# Patient Record
Sex: FEMALE | Race: WHITE | NOT HISPANIC OR LATINO
[De-identification: names, ages, dates, MRNs, and addresses within clinical notes are randomized per-mention and may not be internally consistent; named-entity substitution may affect disease eponyms.]

---

## 2017-09-18 ENCOUNTER — RX RENEWAL (OUTPATIENT)
Age: 67
End: 2017-09-18

## 2017-11-13 ENCOUNTER — APPOINTMENT (OUTPATIENT)
Dept: GASTROENTEROLOGY | Facility: CLINIC | Age: 67
End: 2017-11-13
Payer: MEDICARE

## 2017-11-13 VITALS
BODY MASS INDEX: 39.27 KG/M2 | SYSTOLIC BLOOD PRESSURE: 140 MMHG | WEIGHT: 230 LBS | HEIGHT: 64 IN | DIASTOLIC BLOOD PRESSURE: 82 MMHG | TEMPERATURE: 98.1 F

## 2017-11-13 DIAGNOSIS — Z78.9 OTHER SPECIFIED HEALTH STATUS: ICD-10-CM

## 2017-11-13 DIAGNOSIS — Z86.39 PERSONAL HISTORY OF OTHER ENDOCRINE, NUTRITIONAL AND METABOLIC DISEASE: ICD-10-CM

## 2017-11-13 DIAGNOSIS — Z82.49 FAMILY HISTORY OF ISCHEMIC HEART DISEASE AND OTHER DISEASES OF THE CIRCULATORY SYSTEM: ICD-10-CM

## 2017-11-13 DIAGNOSIS — Z86.79 PERSONAL HISTORY OF OTHER DISEASES OF THE CIRCULATORY SYSTEM: ICD-10-CM

## 2017-11-13 DIAGNOSIS — Z82.5 FAMILY HISTORY OF ASTHMA AND OTHER CHRONIC LOWER RESPIRATORY DISEASES: ICD-10-CM

## 2017-11-13 PROCEDURE — 99213 OFFICE O/P EST LOW 20 MIN: CPT

## 2018-09-03 ENCOUNTER — RX RENEWAL (OUTPATIENT)
Age: 68
End: 2018-09-03

## 2018-09-04 ENCOUNTER — RX RENEWAL (OUTPATIENT)
Age: 68
End: 2018-09-04

## 2018-11-27 ENCOUNTER — RX RENEWAL (OUTPATIENT)
Age: 68
End: 2018-11-27

## 2019-02-20 ENCOUNTER — RX RENEWAL (OUTPATIENT)
Age: 69
End: 2019-02-20

## 2019-03-26 ENCOUNTER — APPOINTMENT (OUTPATIENT)
Dept: GASTROENTEROLOGY | Facility: CLINIC | Age: 69
End: 2019-03-26
Payer: MEDICARE

## 2019-03-26 VITALS
DIASTOLIC BLOOD PRESSURE: 100 MMHG | HEART RATE: 85 BPM | TEMPERATURE: 98.2 F | SYSTOLIC BLOOD PRESSURE: 170 MMHG | HEIGHT: 64 IN | OXYGEN SATURATION: 97 % | WEIGHT: 262 LBS | BODY MASS INDEX: 44.73 KG/M2

## 2019-03-26 PROCEDURE — 99214 OFFICE O/P EST MOD 30 MIN: CPT

## 2019-03-26 NOTE — PHYSICAL EXAM
[General Appearance - Alert] : alert [General Appearance - Well Nourished] : well nourished [General Appearance - Well Developed] : well developed [Sclera] : the sclera and conjunctiva were normal [PERRL With Normal Accommodation] : pupils were equal in size, round, and reactive to light [Extraocular Movements] : extraocular movements were intact [Outer Ear] : the ears and nose were normal in appearance [Oropharynx] : the oropharynx was normal [Neck Appearance] : the appearance of the neck was normal [Neck Cervical Mass (___cm)] : no neck mass was observed [Jugular Venous Distention Increased] : there was no jugular-venous distention [Thyroid Diffuse Enlargement] : the thyroid was not enlarged [Thyroid Nodule] : there were no palpable thyroid nodules [Auscultation Breath Sounds / Voice Sounds] : lungs were clear to auscultation bilaterally [Heart Rate And Rhythm] : heart rate was normal and rhythm regular [Heart Sounds] : normal S1 and S2 [Heart Sounds Gallop] : no gallops [Murmurs] : no murmurs [Heart Sounds Pericardial Friction Rub] : no pericardial rub [Edema] : there was no peripheral edema [Veins - Varicosity Changes] : there were no varicosital changes [Bowel Sounds] : normal bowel sounds [Abdomen Soft] : soft [Abdomen Tenderness] : non-tender [Abdomen Mass (___ Cm)] : no abdominal mass palpated [Cervical Lymph Nodes Enlarged Posterior Bilaterally] : posterior cervical [Cervical Lymph Nodes Enlarged Anterior Bilaterally] : anterior cervical [Supraclavicular Lymph Nodes Enlarged Bilaterally] : supraclavicular [No CVA Tenderness] : no ~M costovertebral angle tenderness [No Spinal Tenderness] : no spinal tenderness [Abnormal Walk] : normal gait [Nail Clubbing] : no clubbing  or cyanosis of the fingernails [Musculoskeletal - Swelling] : no joint swelling seen [Motor Tone] : muscle strength and tone were normal [Skin Color & Pigmentation] : normal skin color and pigmentation [Skin Turgor] : normal skin turgor [] : no rash [Deep Tendon Reflexes (DTR)] : deep tendon reflexes were 2+ and symmetric [Sensation] : the sensory exam was normal to light touch and pinprick [No Focal Deficits] : no focal deficits [Oriented To Time, Place, And Person] : oriented to person, place, and time [Impaired Insight] : insight and judgment were intact [Affect] : the affect was normal

## 2019-03-26 NOTE — HISTORY OF PRESENT ILLNESS
[FreeTextEntry1] : Patient came to the office today to discuss possible colonoscopy and other GI related issues. Patient was seen here and encouraged to return at this time to discuss possible ongoing screening. In the interim the patient has been having difficulty with weight loss than weight gain after her gastric sleeve surgery patient has put on about 75 pounds related to dietary indiscretion. Patient is also having some pulmonary and cardiac symptomatology which will require further investigation via cardiologist office. She denies current nausea vomiting fever chills rectal bleeding or melena.

## 2019-03-26 NOTE — ASSESSMENT
[FreeTextEntry1] : Impression and plan\par \par The patient presents to the office today to discuss possible colonoscopy. Unfortunately she is undergoing some cardiac investigation for dyspnea and exertional chest symptoms. I told the patient that we should hold off on endoscopic testing at this point until cardiac status is clarified. Patient will return to see me after this is complete. In the interim I counseled patient regarding weight loss and avoidance of carbohydrates which he said is her main problem. Patient will endeavor to lose weight. Further suggestions will follow patient's return visit here

## 2019-05-16 ENCOUNTER — RX RENEWAL (OUTPATIENT)
Age: 69
End: 2019-05-16

## 2019-08-10 ENCOUNTER — RX RENEWAL (OUTPATIENT)
Age: 69
End: 2019-08-10

## 2019-09-12 ENCOUNTER — RX RENEWAL (OUTPATIENT)
Age: 69
End: 2019-09-12

## 2020-02-14 ENCOUNTER — RX RENEWAL (OUTPATIENT)
Age: 70
End: 2020-02-14

## 2020-05-20 ENCOUNTER — RX RENEWAL (OUTPATIENT)
Age: 70
End: 2020-05-20

## 2020-07-14 ENCOUNTER — APPOINTMENT (OUTPATIENT)
Dept: GASTROENTEROLOGY | Facility: CLINIC | Age: 70
End: 2020-07-14
Payer: MEDICARE

## 2020-07-14 VITALS
WEIGHT: 237 LBS | TEMPERATURE: 98.2 F | BODY MASS INDEX: 40.46 KG/M2 | HEART RATE: 84 BPM | HEIGHT: 64 IN | DIASTOLIC BLOOD PRESSURE: 70 MMHG | SYSTOLIC BLOOD PRESSURE: 136 MMHG | OXYGEN SATURATION: 95 %

## 2020-07-14 DIAGNOSIS — Z12.11 ENCOUNTER FOR SCREENING FOR MALIGNANT NEOPLASM OF COLON: ICD-10-CM

## 2020-07-14 PROCEDURE — 99214 OFFICE O/P EST MOD 30 MIN: CPT

## 2020-07-14 NOTE — ASSESSMENT
[FreeTextEntry1] : Impression and plan\par \par Patient came to the office today to discuss recent CT scan demonstrating a calcific lesion in the distal pancreas versus a clinical artery aneurysm. I will get a confirmed with MRI and refer as necessary to this the surgeon for evaluation. The patient does have chronic reflux as well as history of polyps but I will hold off on endoscopic testing at this time until MRI results are back. Patient will keep me posted regarding her results of covert testing which will be performed today.

## 2020-07-14 NOTE — HISTORY OF PRESENT ILLNESS
[FreeTextEntry1] : Patient comes in the office today to discuss recent CT scan which demonstrated possible calcific lesion as detailed the pancreas for assessment of reason. The patient has been feeling well but she does have chronic dyspepsia. She was recently sick with an upper respiratory infection a CT scan did suggest possible culprit related pulmonary changes. Patient is currently asymptomatic without residual cor fever chills et cetera. Patellofemoral pain nausea vomiting fever or chills.

## 2020-08-27 ENCOUNTER — RX RENEWAL (OUTPATIENT)
Age: 70
End: 2020-08-27

## 2020-09-20 ENCOUNTER — RX RENEWAL (OUTPATIENT)
Age: 70
End: 2020-09-20

## 2020-12-23 PROBLEM — Z12.11 ENCOUNTER FOR SCREENING COLONOSCOPY: Status: RESOLVED | Noted: 2019-03-26 | Resolved: 2020-12-23

## 2021-03-15 ENCOUNTER — RX RENEWAL (OUTPATIENT)
Age: 71
End: 2021-03-15

## 2021-10-04 ENCOUNTER — NON-APPOINTMENT (OUTPATIENT)
Age: 71
End: 2021-10-04

## 2021-10-07 ENCOUNTER — APPOINTMENT (OUTPATIENT)
Dept: GASTROENTEROLOGY | Facility: CLINIC | Age: 71
End: 2021-10-07
Payer: MEDICARE

## 2021-10-07 VITALS
WEIGHT: 245 LBS | TEMPERATURE: 97.5 F | BODY MASS INDEX: 43.41 KG/M2 | OXYGEN SATURATION: 97 % | DIASTOLIC BLOOD PRESSURE: 80 MMHG | SYSTOLIC BLOOD PRESSURE: 136 MMHG | HEIGHT: 63 IN | HEART RATE: 79 BPM

## 2021-10-07 DIAGNOSIS — R10.13 EPIGASTRIC PAIN: ICD-10-CM

## 2021-10-07 DIAGNOSIS — U07.1 COVID-19: ICD-10-CM

## 2021-10-07 PROCEDURE — 99214 OFFICE O/P EST MOD 30 MIN: CPT

## 2021-10-07 NOTE — HISTORY OF PRESENT ILLNESS
[FreeTextEntry1] : Patient came to the office today to arrange for upper endoscopy and colonoscopy. She has chronic dyspepsia and is desirous of screening colonoscopy. Patient recently had coronavirus infection  patient has a history of coronary stent hypertension hyperlipidemia. She has not seen cardiology for about 2 years. Patient denies current chest pain dyspnea diarrhea rectal bleeding or melena.Patient is scheduled to see urology for followup of bladder and kidney issues.

## 2021-10-07 NOTE — ASSESSMENT
[FreeTextEntry1] : Impression plan\par \par Patient came today to arrange for upper endoscopy and colonoscopy. He has chronic reflux symptoms and has had previous gastric sleeve surgery. The patient describes nighttime symptomatology she takes over-the-counter H2 blocker for relief. She is desirous of screening colonoscopy. Patient has history of coronary disease and as such I suggested that she see cardiology to obtain clearance prior to her procedure. Additionally patient will need the bmi check  at the endoscopy center. The risks benefits alternatives and limitations of procedure were discussed. The patient wishes to go ahead.

## 2021-11-05 ENCOUNTER — TRANSCRIPTION ENCOUNTER (OUTPATIENT)
Age: 71
End: 2021-11-05

## 2022-01-24 DIAGNOSIS — Z20.822 CONTACT WITH AND (SUSPECTED) EXPOSURE TO COVID-19: ICD-10-CM

## 2022-01-24 DIAGNOSIS — Z01.818 ENCOUNTER FOR OTHER PREPROCEDURAL EXAMINATION: ICD-10-CM

## 2022-01-24 DIAGNOSIS — Z80.0 FAMILY HISTORY OF MALIGNANT NEOPLASM OF DIGESTIVE ORGANS: ICD-10-CM

## 2022-01-25 DIAGNOSIS — Z00.00 ENCOUNTER FOR GENERAL ADULT MEDICAL EXAMINATION W/OUT ABNORMAL FINDINGS: ICD-10-CM

## 2022-03-22 ENCOUNTER — APPOINTMENT (OUTPATIENT)
Dept: GASTROENTEROLOGY | Facility: AMBULATORY MEDICAL SERVICES | Age: 72
End: 2022-03-22
Payer: MEDICARE

## 2022-03-22 LAB — SARS-COV-2 N GENE NPH QL NAA+PROBE: NOT DETECTED

## 2022-03-22 PROCEDURE — 43239 EGD BIOPSY SINGLE/MULTIPLE: CPT | Mod: 59

## 2022-03-22 PROCEDURE — 45378 DIAGNOSTIC COLONOSCOPY: CPT | Mod: PT

## 2023-10-12 ENCOUNTER — NON-APPOINTMENT (OUTPATIENT)
Age: 73
End: 2023-10-12

## 2023-11-13 ENCOUNTER — APPOINTMENT (OUTPATIENT)
Dept: ELECTROPHYSIOLOGY | Facility: CLINIC | Age: 73
End: 2023-11-13
Payer: MEDICARE

## 2023-11-13 VITALS — OXYGEN SATURATION: 96 % | DIASTOLIC BLOOD PRESSURE: 84 MMHG | SYSTOLIC BLOOD PRESSURE: 137 MMHG | HEART RATE: 76 BPM

## 2023-11-13 DIAGNOSIS — R00.0 TACHYCARDIA, UNSPECIFIED: ICD-10-CM

## 2023-11-13 DIAGNOSIS — Z87.39 PERSONAL HISTORY OF OTHER DISEASES OF THE MUSCULOSKELETAL SYSTEM AND CONNECTIVE TISSUE: ICD-10-CM

## 2023-11-13 PROCEDURE — 99205 OFFICE O/P NEW HI 60 MIN: CPT

## 2023-11-13 PROCEDURE — 93000 ELECTROCARDIOGRAM COMPLETE: CPT

## 2023-11-13 RX ORDER — SODIUM PICOSULFATE, MAGNESIUM OXIDE, AND ANHYDROUS CITRIC ACID 10; 3.5; 12 MG/160ML; G/160ML; G/160ML
10-3.5-12 MG-GM LIQUID ORAL
Qty: 1 | Refills: 0 | Status: DISCONTINUED | COMMUNITY
Start: 2022-01-13 | End: 2023-11-13

## 2023-11-13 RX ORDER — OMEPRAZOLE 40 MG/1
40 CAPSULE, DELAYED RELEASE ORAL
Qty: 90 | Refills: 0 | Status: DISCONTINUED | COMMUNITY
Start: 2017-06-21 | End: 2023-11-13

## 2023-12-01 ENCOUNTER — NON-APPOINTMENT (OUTPATIENT)
Age: 73
End: 2023-12-01

## 2023-12-01 ENCOUNTER — OUTPATIENT (OUTPATIENT)
Dept: OUTPATIENT SERVICES | Facility: HOSPITAL | Age: 73
LOS: 1 days | End: 2023-12-01
Payer: MEDICARE

## 2023-12-01 ENCOUNTER — APPOINTMENT (OUTPATIENT)
Dept: CV DIAGNOSITCS | Facility: HOSPITAL | Age: 73
End: 2023-12-01

## 2023-12-01 ENCOUNTER — APPOINTMENT (OUTPATIENT)
Dept: CARDIOLOGY | Facility: CLINIC | Age: 73
End: 2023-12-01
Payer: MEDICARE

## 2023-12-01 VITALS
OXYGEN SATURATION: 98 % | BODY MASS INDEX: 43.05 KG/M2 | WEIGHT: 243 LBS | HEIGHT: 63 IN | HEART RATE: 74 BPM | SYSTOLIC BLOOD PRESSURE: 154 MMHG | DIASTOLIC BLOOD PRESSURE: 81 MMHG

## 2023-12-01 DIAGNOSIS — I42.1 OBSTRUCTIVE HYPERTROPHIC CARDIOMYOPATHY: ICD-10-CM

## 2023-12-01 PROCEDURE — 99214 OFFICE O/P EST MOD 30 MIN: CPT

## 2023-12-01 PROCEDURE — 93000 ELECTROCARDIOGRAM COMPLETE: CPT

## 2023-12-01 PROCEDURE — 93306 TTE W/DOPPLER COMPLETE: CPT | Mod: 26

## 2023-12-01 PROCEDURE — 93306 TTE W/DOPPLER COMPLETE: CPT

## 2023-12-07 ENCOUNTER — NON-APPOINTMENT (OUTPATIENT)
Age: 73
End: 2023-12-07

## 2023-12-07 DIAGNOSIS — Z98.890 OTHER SPECIFIED POSTPROCEDURAL STATES: ICD-10-CM

## 2023-12-07 DIAGNOSIS — Z92.89 PERSONAL HISTORY OF OTHER MEDICAL TREATMENT: ICD-10-CM

## 2023-12-08 ENCOUNTER — NON-APPOINTMENT (OUTPATIENT)
Age: 73
End: 2023-12-08

## 2023-12-11 ENCOUNTER — APPOINTMENT (OUTPATIENT)
Dept: CARDIOLOGY | Facility: CLINIC | Age: 73
End: 2023-12-11
Payer: MEDICARE

## 2023-12-11 PROCEDURE — 99215 OFFICE O/P EST HI 40 MIN: CPT

## 2023-12-13 NOTE — ASSESSMENT
[FreeTextEntry1] : She will follow up with CTS but prior to undergoing any definitive treatment she will be walked on the treadmill to assess pre and post exercise RV pressures and post exercise BNP levels will be checked The possibility of a repeat R+L heart cath was also discussed The recommendation that coumadin rather than DOACs are indicated in MS+afib were reviewed; she understands the recommendation but is not interested in taking coumadin

## 2023-12-13 NOTE — PHYSICAL EXAM
[Well Developed] : well developed [Well Nourished] : well nourished [No Acute Distress] : no acute distress [Normal Venous Pressure] : normal venous pressure [Normal Conjunctiva] : normal conjunctiva [No Carotid Bruit] : no carotid bruit [Normal S1, S2] : normal S1, S2 [No Murmur] : no murmur [No Rub] : no rub [No Gallop] : no gallop [Clear Lung Fields] : clear lung fields [Good Air Entry] : good air entry [No Respiratory Distress] : no respiratory distress  [Soft] : abdomen soft [Non Tender] : non-tender [No Masses/organomegaly] : no masses/organomegaly [Normal Bowel Sounds] : normal bowel sounds [Normal Gait] : normal gait [No Edema] : no edema [No Cyanosis] : no cyanosis [No Clubbing] : no clubbing [No Varicosities] : no varicosities [No Skin Lesions] : no skin lesions [No Rash] : no rash [Moves all extremities] : moves all extremities [No Focal Deficits] : no focal deficits [Normal Speech] : normal speech [Alert and Oriented] : alert and oriented [Normal memory] : normal memory

## 2023-12-13 NOTE — REASON FOR VISIT
Subjective: The patient is awake and alert. Status post femoral endarterectomy on 9/30/2021    Objective:    BP (!) 153/65   Pulse 86   Temp 97.9 °F (36.6 °C) (Temporal)   Resp 15   Ht 5' 7\" (1.702 m)   Wt 230 lb 2.2 oz (104.4 kg)   SpO2 98%   BMI 36.04 kg/m²     In: 2213.1 [P.O.:300; I.V.:1913.1]  Out: 765   In: 2213.1   Out: 765 [Urine:765]    General appearance: NAD, conversant  HEENT: AT/NC, MMM  Neck: FROM, supple  Lungs: Clear to auscultation  CV: RRR, no MRGs  Vasc: Radial pulses 2+  Abdomen: Soft, non-tender; no masses or HSM  Extremities: No peripheral edema or digital cyanosis  Skin: no rash, lesions or ulcers  Psych: Alert and oriented to person, place and time  Neuro: Alert and interactive     Recent Labs     09/29/21 0445 09/30/21  0544 10/01/21  0450   WBC 10.8 10.1 12.1*   HGB 9.8* 9.9* 7.7*   HCT 30.7* 30.3* 23.4*    233 225       Recent Labs     09/29/21 0445 09/29/21  0445 09/30/21  0522 09/30/21  0522 09/30/21  0931 09/30/21  1135 10/01/21  0450     --  135  --   --   --  134   K 3.9   < > 3.5   < > 3.2* 3.5 4.0     --  98  --   --   --  103   CO2 21*  --  24  --   --   --  21*   BUN 33*  --  38*  --   --   --  42*   CREATININE 1.3*   < > 1.7*  --  1.5* 1.5* 1.6*   CALCIUM 8.7  --  8.9  --   --   --  8.1*    < > = values in this interval not displayed.        Assessment:    Principal Problem:    Critical lower limb ischemia (HCC)  Active Problems:    Peripheral vascular disease ([address]    H/O aortic valve replacement    CKD (chronic kidney disease) stage 3, GFR 30-59 ml/min    Diabetes mellitus type 2, uncontrolled ([address]    CAD (coronary artery disease), native coronary artery    Status post coronary artery bypass graft    History of CVA (cerebrovascular accident)    Nonrheumatic mitral valve regurgitation    COPD (chronic obstructive pulmonary disease) (HCC)    Peripheral vascular disease of lower extremity with ulceration (HCC)    Nonrheumatic mitral valve [FreeTextEntry1] : The patient has a 10+ year history of shortness of breath with exertion As part of her w/u she was diagnosed with CAD and in 2012 underwent PCI of an occluded LCX at [facility] (with minimal improvement of symptoms). In 2019 she was sent for a R+L heart cath where she was found to have a patent stent  Her CI was 2.3l/m/m2 and her PCW was A=9 V=6 Mean=4 She has had multiple stress imaging exams (SPECt and PET) where she was found to have a smallish LV cavity with an EF of 70+% and no active ischemia (of note she visually has hypertrophy of the septum noted on SPECT) She has had several echocardiograms revealing normal to hyperdynamic LV function. Mitral calcification with a low gradient (peak E around 1m/s and a short deceleration time) She was empirically treated for possible diastolic dysfunction despite normal BNP levels and normal pressures at cath both in 2012 and 2019 w/o any significant improvement in symptoms She is being treated for LEIDY Most recently she developed a-fib and was sent for an EP consult. On her echocardiogram prior to her EP visit she was noted to have a hypercontractile LV along with her known septal hypertrophy and an intracavitary gradient was found. She was seen by Dr. Mccarthy where an echocardiogram clearly demonstrated significant calcification of the leaflet tips and  a mean gradient around 5.5-6.5mmHg at a HR of around 80bpm. The patient is now potentially exploring mitral valve surgery. On questioning, the patient feels a little worse since developing a-fib but essentially feels pretty much the same for the last 10+ years

## 2023-12-15 PROBLEM — I48.0 PAROXYSMAL ATRIAL FIBRILLATION: Status: ACTIVE | Noted: 2023-11-13

## 2023-12-15 PROBLEM — E78.5 HYPERLIPIDEMIA, UNSPECIFIED HYPERLIPIDEMIA TYPE: Status: ACTIVE | Noted: 2023-11-13

## 2023-12-17 PROBLEM — Z63.4 SUDDEN DEATH OF FAMILY MEMBER: Status: ACTIVE | Noted: 2023-12-17

## 2023-12-17 RX ORDER — BIOTIN 10 MG
TABLET ORAL DAILY
Refills: 0 | Status: DISCONTINUED | COMMUNITY
End: 2023-12-17

## 2023-12-17 RX ORDER — CLINDAMYCIN PHOSPHATE 1 %
1 KIT TOPICAL
Refills: 0 | Status: DISCONTINUED | COMMUNITY
End: 2023-12-17

## 2023-12-18 ENCOUNTER — APPOINTMENT (OUTPATIENT)
Dept: CARDIOTHORACIC SURGERY | Facility: CLINIC | Age: 73
End: 2023-12-18
Payer: MEDICARE

## 2023-12-18 VITALS
OXYGEN SATURATION: 97 % | RESPIRATION RATE: 14 BRPM | HEART RATE: 74 BPM | HEIGHT: 63 IN | WEIGHT: 243 LBS | DIASTOLIC BLOOD PRESSURE: 76 MMHG | BODY MASS INDEX: 43.05 KG/M2 | TEMPERATURE: 97.5 F | SYSTOLIC BLOOD PRESSURE: 124 MMHG

## 2023-12-18 DIAGNOSIS — I48.0 PAROXYSMAL ATRIAL FIBRILLATION: ICD-10-CM

## 2023-12-18 DIAGNOSIS — Z63.4 DISAPPEARANCE AND DEATH OF FAMILY MEMBER: ICD-10-CM

## 2023-12-18 DIAGNOSIS — E78.5 HYPERLIPIDEMIA, UNSPECIFIED: ICD-10-CM

## 2023-12-18 PROCEDURE — 99204 OFFICE O/P NEW MOD 45 MIN: CPT

## 2023-12-18 RX ORDER — LINAGLIPTIN 5 MG/1
5 TABLET, FILM COATED ORAL DAILY
Refills: 0 | Status: COMPLETED | COMMUNITY
End: 2023-12-18

## 2023-12-18 RX ORDER — FUROSEMIDE 40 MG/1
40 TABLET ORAL DAILY
Refills: 0 | Status: COMPLETED | COMMUNITY
End: 2023-12-18

## 2023-12-18 RX ORDER — MELOXICAM 15 MG/1
15 TABLET ORAL DAILY
Refills: 0 | Status: COMPLETED | COMMUNITY
End: 2023-12-18

## 2023-12-18 RX ORDER — ROSUVASTATIN CALCIUM 10 MG/1
10 TABLET, FILM COATED ORAL
Refills: 0 | Status: COMPLETED | COMMUNITY
End: 2023-12-18

## 2023-12-18 RX ORDER — ROSUVASTATIN CALCIUM 20 MG/1
20 TABLET, FILM COATED ORAL DAILY
Refills: 0 | Status: COMPLETED | COMMUNITY
End: 2023-12-18

## 2023-12-18 RX ORDER — ISOSORBIDE MONONITRATE 60 MG/1
60 TABLET, EXTENDED RELEASE ORAL DAILY
Refills: 0 | Status: COMPLETED | COMMUNITY
End: 2023-12-18

## 2023-12-18 RX ORDER — INDOMETHACIN 50 MG/1
50 CAPSULE ORAL
Refills: 0 | Status: COMPLETED | COMMUNITY
End: 2023-12-18

## 2023-12-18 RX ORDER — ISOSORBIDE MONONITRATE 30 MG/1
30 TABLET, EXTENDED RELEASE ORAL DAILY
Refills: 0 | Status: COMPLETED | COMMUNITY
End: 2023-12-18

## 2023-12-18 RX ORDER — ROSUVASTATIN CALCIUM 5 MG/1
5 TABLET, FILM COATED ORAL DAILY
Refills: 0 | Status: COMPLETED | COMMUNITY
End: 2023-12-18

## 2023-12-18 SDOH — SOCIAL STABILITY - SOCIAL INSECURITY: DISSAPEARANCE AND DEATH OF FAMILY MEMBER: Z63.4

## 2023-12-18 NOTE — DATA REVIEWED
[FreeTextEntry1] : TTE  on 12/12/2023 showed doming and thickening of the tips of the mitral valve consistent with rheumatic mitral valve disease. Moderate to severe MS, the trans mitral mean gradient is 7.8 at a heart rate of 81 bpm. EF 77%  TTE on 10/4/2023 showed LV is normal in size, hypertrophy of the basal septum along with hyper contractility and EMETERIO and chordae, resting gradient of approximately 50 mmHg, RV is normal in size and function, EF 70%

## 2023-12-18 NOTE — ASSESSMENT
[FreeTextEntry1] : Elmira is a 73 year old with PMH that includes morbid obesity (status post gastric sleeve 2015),  DMT2, CAD status post PCI of LCX (2012), LEIDY, (CPAP), CKD (GRF 25, Cr 2 per scanned labs 10/23), atrial fibrillation (Eliquis), and rheumatic mitral valve disease. She reports chronic dyspnea on exertion and was referred for mitral valve surgery by Dr. Bartolo Cheatham.   Patient was seen by Dr. Albert for atrial fibrillation and further management.  Due to TTE showing possible HCOM patient was referred to [facility]. Patient was seen by Mehdi Mccarthy on 12/1/2023 for HCOM- a TTE in October showed EMETERIO with a resting LVOT gradient of approximately 50 mmHg. Patient had a repeat TTE which was reviewed by Dr. Mccarthy.  As per Dr. Mccarthy's office note, consideration for MVR with MAZE and left atrial appendage closure was recommended.   TTE on 12/12/2023 showed doming and thickening of the tips of the mitral valve consistent with rheumatic mitral valve disease. Moderate to severe MS, the trans mitral mean gradient is 7.8 at a heart rate of 81 bpm. EF 77%   and daughter.  BARBOZA for the past 10 years.  Reports it has gotten worse over the past few months with feeling of "elephant on my chest".  She started her husbands furosemide 20mg QD with improvement and then was prescribed lasix 40mg from her cardiologist with improvement in LE swelling and her chest pressure resolved.  Feels afib with palpitations as well. Has a long hx of palpitations but was recently dx with afib but unsure if she was in afib prior. Gets dizzy when she bends over or when she stands up too fast.  Denies CP, weight gain. Sleeps with 3 pillows on incline.  Can't lay flat due to comfort as well as orthopnea. Can only walk a city block before she has to stop to rest due to SOB.  Has trouble walking up inclines. Has hx of PCI and hasnt had cath since 2019. NYHA III  I reviewed the cardiac imaging, medical records and reports with patient and discussed the case.  I discussed the risks, benefits and alternatives to surgery. Risks included but not limited to bleeding, stroke, Myocardial Infarction, kidney problems, blood transfusion permanent pacemaker implantation, infections and death. I also discussed the various approaches in detail. I feel that the patient will benefit and is a candidate for surgical mitral valvve replacement, MAZE and RICHARD ligation. All questions and concerns were addressed.    Pt wishes to think about it prior to scheduling surgery.    Plan:  - Mitral valve replacement, MAZE and RICHARD ligation - Cardiac Catherization to evaluate coronary arteries five days prior as she will need to be off Eliquis 5 days prior to surgery.  - Stop Eliquis 5 days prior to surgery - Stop Farxiga 3 days prior to surgery - CAll with any quetions or concerns

## 2023-12-18 NOTE — HISTORY OF PRESENT ILLNESS
[Diabetes Mellitus] : Diabetes Mellitus [Dyslipidemia] : Dyslipidemia [Hypertension] : Hypertension [Sleep Apnea] : Sleep Apnea [A fib / A flutter] : A fib / A flutter [Dialysis] : no dialysis [FreeTextEntry1] : Elmira is a 73 year old with PMH that includes morbid obesity (status post gastric sleeve 2015),  DMT2, CAD status post PCI of LCX (2012), LEIDY, (CPAP), CKD (GRF 25, Cr 2 per scanned labs 10/23), atrial fibrillation (Eliquis), and rheumatic mitral valve disease. She reports chronic dyspnea on exertion and was referred for mitral valve surgery by Dr. Bartolo Cheatham.   Patient was seen by Dr. Albert for atrial fibrillation and further management.  Due to TTE showing possible HCOM patient was referred to [facility]. Patient was seen by Mehdi Mccarthy on 12/1/2023 for HCOM- a TTE in October showed EMETERIO with a resting LVOT gradient of approximately 50 mmHg. Patient had a repeat TTE which was reviewed by Dr. Mccarthy.  As per Dr. Mccarthy's office note, consideration for MVR with MAZE and left atrial appendage closure was recommended.   TTE on 12/12/2023 showed doming and thickening of the tips of the mitral valve consistent with rheumatic mitral valve disease. Moderate to severe MS, the trans mitral mean gradient is 7.8 at a heart rate of 81 bpm. EF 77%   and daughter.  BARBOZA for the past 10 years.  Reports it has gotten worse over the past few months with feeling of "elephant on my chest".  She started her husbands furosemide 20mg QD with improvement and then was prescribed lasix 40mg from her cardiologist with improvement in LE swelling and her chest pressure resolved.  Feels afib with palpitations as well. Has a long hx of palpitations but was recently dx with afib but unsure if she was in afib prior. Gets dizzy when she bends over or when she stands up too fast.  Denies CP, weight gain. Sleeps with 3 pillows on incline.  Can't lay flat due to comfort as well as orthopnea. Can only walk a city block before she has to stop to rest due to SOB.  Has trouble walking up inclines. Has hx of PCI and hasnt had cath since 2019. NYHA III [Home Oxygen] : no home oxygen use [Inhaled Medication Therapy] : no inhaled medication therapy [Liver Disease] : no liver disease [Immunocompromise Present] : not immunocompromised [Peripheral Artery Disease] : no peripheral artery disease [Unresponsive Neurologic State] : not in a unresponsive neurologic state

## 2023-12-18 NOTE — REVIEW OF SYSTEMS
[Feeling Poorly] : feeling poorly [Feeling Tired] : feeling tired [Chest Pain] : chest pain [Palpitations] : palpitations [Lower Ext Edema] : lower extremity edema [Shortness Of Breath] : shortness of breath [SOB on Exertion] : shortness of breath during exertion [Orthopnea] : orthopnea [As Noted in HPI] : as noted in HPI [Negative] : Heme/Lymph

## 2023-12-18 NOTE — PHYSICAL EXAM
[General Appearance - Alert] : alert [General Appearance - In No Acute Distress] : in no acute distress [Neck Appearance] : the appearance of the neck was normal [Jugular Venous Distention Increased] : there was no jugular-venous distention [] : no respiratory distress [Respiration, Rhythm And Depth] : normal respiratory rhythm and effort [Exaggerated Use Of Accessory Muscles For Inspiration] : no accessory muscle use [Auscultation Breath Sounds / Voice Sounds] : lungs were clear to auscultation bilaterally [Apical Impulse] : the apical impulse was normal [Heart Rate And Rhythm] : heart rate was normal and rhythm regular [Heart Sounds] : normal S1 and S2 [Abdomen Soft] : soft [Abdomen Tenderness] : non-tender [Involuntary Movements] : no involuntary movements were seen [No Focal Deficits] : no focal deficits [Oriented To Time, Place, And Person] : oriented to person, place, and time [Impaired Insight] : insight and judgment were intact [Affect] : the affect was normal [Mood] : the mood was normal

## 2023-12-21 ENCOUNTER — APPOINTMENT (OUTPATIENT)
Dept: CARDIOLOGY | Facility: CLINIC | Age: 73
End: 2023-12-21

## 2023-12-26 ENCOUNTER — APPOINTMENT (OUTPATIENT)
Dept: CARDIOLOGY | Facility: CLINIC | Age: 73
End: 2023-12-26

## 2023-12-26 RX ORDER — CLINDAMYCIN PHOSPHATE 1 %
1 KIT TOPICAL
Refills: 0 | Status: DISCONTINUED | COMMUNITY
End: 2023-12-26

## 2023-12-26 RX ORDER — ATENOLOL 50 MG/1
50 TABLET ORAL DAILY
Refills: 0 | Status: DISCONTINUED | COMMUNITY
End: 2023-12-26

## 2023-12-26 RX ORDER — SEMAGLUTIDE 0.68 MG/ML
2 INJECTION, SOLUTION SUBCUTANEOUS
Refills: 0 | Status: DISCONTINUED | COMMUNITY
End: 2023-12-26

## 2023-12-26 RX ORDER — NITROFURANTOIN MONOHYDRATE/MACROCRYSTALLINE 25; 75 MG/1; MG/1
100 CAPSULE ORAL
Refills: 0 | Status: DISCONTINUED | COMMUNITY
End: 2023-12-26

## 2023-12-26 RX ORDER — SITAGLIPTIN 100 MG/1
100 TABLET, FILM COATED ORAL DAILY
Refills: 0 | Status: DISCONTINUED | COMMUNITY
End: 2023-12-26

## 2023-12-26 NOTE — HISTORY OF PRESENT ILLNESS
[FreeTextEntry1] : This is a 72 yo F w/PMHx of HTN, HLD, DMT2, Gout, CAD with stents (LCX in 2012), LEIDY on CPAP, CKD4, recent diagnosed A-fib on Eliquis, reporting chronic BARBOZA and recently diagnosed for MVR.  Pt was evaluated by Dr. Dalal and has planned to have MVR with MAZE procedure on 1/9/24, has scheduled for cardiac cath on 1/2/24 prior to MVR with Maze procedure.  Medications reconciled to [place],  Procedure, logistics discussed as well as below. Pt understood and all questions are answered  Holding Eliquis for 3 doses(last barboza 12/31pm),  Farxiga for 3 days (last dose 12/28),  Hold Metformin one dose (last dose 1/1 pm) NPO for 4 hours (pt will go PST at 7am on 1/2 then come to cath lab)  Take am medications (except above) with very light breakfast around 5 am

## 2024-01-02 ENCOUNTER — OUTPATIENT (OUTPATIENT)
Dept: OUTPATIENT SERVICES | Facility: HOSPITAL | Age: 74
LOS: 1 days | End: 2024-01-02

## 2024-01-02 ENCOUNTER — OUTPATIENT (OUTPATIENT)
Dept: OUTPATIENT SERVICES | Facility: HOSPITAL | Age: 74
LOS: 1 days | End: 2024-01-02
Payer: MEDICARE

## 2024-01-02 ENCOUNTER — TRANSCRIPTION ENCOUNTER (OUTPATIENT)
Age: 74
End: 2024-01-02

## 2024-01-02 VITALS
HEART RATE: 87 BPM | RESPIRATION RATE: 16 BRPM | TEMPERATURE: 98 F | OXYGEN SATURATION: 97 % | SYSTOLIC BLOOD PRESSURE: 134 MMHG | HEIGHT: 62.5 IN | DIASTOLIC BLOOD PRESSURE: 74 MMHG | WEIGHT: 237 LBS

## 2024-01-02 DIAGNOSIS — Z90.3 ACQUIRED ABSENCE OF STOMACH [PART OF]: Chronic | ICD-10-CM

## 2024-01-02 DIAGNOSIS — N18.9 CHRONIC KIDNEY DISEASE, UNSPECIFIED: ICD-10-CM

## 2024-01-02 DIAGNOSIS — I34.81 NONRHEUMATIC MITRAL (VALVE) ANNULUS CALCIFICATION: ICD-10-CM

## 2024-01-02 DIAGNOSIS — Z01.818 ENCOUNTER FOR OTHER PREPROCEDURAL EXAMINATION: ICD-10-CM

## 2024-01-02 DIAGNOSIS — Z98.890 OTHER SPECIFIED POSTPROCEDURAL STATES: Chronic | ICD-10-CM

## 2024-01-02 DIAGNOSIS — E66.01 MORBID (SEVERE) OBESITY DUE TO EXCESS CALORIES: ICD-10-CM

## 2024-01-02 DIAGNOSIS — Z95.5 PRESENCE OF CORONARY ANGIOPLASTY IMPLANT AND GRAFT: ICD-10-CM

## 2024-01-02 DIAGNOSIS — Z95.5 PRESENCE OF CORONARY ANGIOPLASTY IMPLANT AND GRAFT: Chronic | ICD-10-CM

## 2024-01-02 DIAGNOSIS — E11.9 TYPE 2 DIABETES MELLITUS WITHOUT COMPLICATIONS: ICD-10-CM

## 2024-01-02 DIAGNOSIS — G47.33 OBSTRUCTIVE SLEEP APNEA (ADULT) (PEDIATRIC): ICD-10-CM

## 2024-01-02 DIAGNOSIS — I48.91 UNSPECIFIED ATRIAL FIBRILLATION: ICD-10-CM

## 2024-01-02 DIAGNOSIS — Z98.84 BARIATRIC SURGERY STATUS: Chronic | ICD-10-CM

## 2024-01-02 DIAGNOSIS — Z29.9 ENCOUNTER FOR PROPHYLACTIC MEASURES, UNSPECIFIED: ICD-10-CM

## 2024-01-02 LAB
A1C WITH ESTIMATED AVERAGE GLUCOSE RESULT: 7 % — HIGH (ref 4–5.6)
A1C WITH ESTIMATED AVERAGE GLUCOSE RESULT: 7 % — HIGH (ref 4–5.6)
ANION GAP SERPL CALC-SCNC: 14 MMOL/L — SIGNIFICANT CHANGE UP (ref 5–17)
ANION GAP SERPL CALC-SCNC: 14 MMOL/L — SIGNIFICANT CHANGE UP (ref 5–17)
BLD GP AB SCN SERPL QL: NEGATIVE — SIGNIFICANT CHANGE UP
BLD GP AB SCN SERPL QL: NEGATIVE — SIGNIFICANT CHANGE UP
BUN SERPL-MCNC: 30 MG/DL — HIGH (ref 7–23)
BUN SERPL-MCNC: 30 MG/DL — HIGH (ref 7–23)
CALCIUM SERPL-MCNC: 10.2 MG/DL — SIGNIFICANT CHANGE UP (ref 8.4–10.5)
CALCIUM SERPL-MCNC: 10.2 MG/DL — SIGNIFICANT CHANGE UP (ref 8.4–10.5)
CHLORIDE SERPL-SCNC: 107 MMOL/L — SIGNIFICANT CHANGE UP (ref 96–108)
CHLORIDE SERPL-SCNC: 107 MMOL/L — SIGNIFICANT CHANGE UP (ref 96–108)
CO2 SERPL-SCNC: 18 MMOL/L — LOW (ref 22–31)
CO2 SERPL-SCNC: 18 MMOL/L — LOW (ref 22–31)
CREAT SERPL-MCNC: 2.2 MG/DL — HIGH (ref 0.5–1.3)
CREAT SERPL-MCNC: 2.2 MG/DL — HIGH (ref 0.5–1.3)
EGFR: 23 ML/MIN/1.73M2 — LOW
EGFR: 23 ML/MIN/1.73M2 — LOW
ESTIMATED AVERAGE GLUCOSE: 154 MG/DL — HIGH (ref 68–114)
ESTIMATED AVERAGE GLUCOSE: 154 MG/DL — HIGH (ref 68–114)
GLUCOSE SERPL-MCNC: 122 MG/DL — HIGH (ref 70–99)
GLUCOSE SERPL-MCNC: 122 MG/DL — HIGH (ref 70–99)
HCT VFR BLD CALC: 38.5 % — SIGNIFICANT CHANGE UP (ref 34.5–45)
HCT VFR BLD CALC: 38.5 % — SIGNIFICANT CHANGE UP (ref 34.5–45)
HGB BLD-MCNC: 12.8 G/DL — SIGNIFICANT CHANGE UP (ref 11.5–15.5)
HGB BLD-MCNC: 12.8 G/DL — SIGNIFICANT CHANGE UP (ref 11.5–15.5)
MCHC RBC-ENTMCNC: 31.9 PG — SIGNIFICANT CHANGE UP (ref 27–34)
MCHC RBC-ENTMCNC: 31.9 PG — SIGNIFICANT CHANGE UP (ref 27–34)
MCHC RBC-ENTMCNC: 33.2 GM/DL — SIGNIFICANT CHANGE UP (ref 32–36)
MCHC RBC-ENTMCNC: 33.2 GM/DL — SIGNIFICANT CHANGE UP (ref 32–36)
MCV RBC AUTO: 96 FL — SIGNIFICANT CHANGE UP (ref 80–100)
MCV RBC AUTO: 96 FL — SIGNIFICANT CHANGE UP (ref 80–100)
MRSA PCR RESULT.: SIGNIFICANT CHANGE UP
MRSA PCR RESULT.: SIGNIFICANT CHANGE UP
NRBC # BLD: 0 /100 WBCS — SIGNIFICANT CHANGE UP (ref 0–0)
NRBC # BLD: 0 /100 WBCS — SIGNIFICANT CHANGE UP (ref 0–0)
PLATELET # BLD AUTO: 120 K/UL — LOW (ref 150–400)
PLATELET # BLD AUTO: 120 K/UL — LOW (ref 150–400)
POTASSIUM SERPL-MCNC: 4.2 MMOL/L — SIGNIFICANT CHANGE UP (ref 3.5–5.3)
POTASSIUM SERPL-MCNC: 4.2 MMOL/L — SIGNIFICANT CHANGE UP (ref 3.5–5.3)
POTASSIUM SERPL-SCNC: 4.2 MMOL/L — SIGNIFICANT CHANGE UP (ref 3.5–5.3)
POTASSIUM SERPL-SCNC: 4.2 MMOL/L — SIGNIFICANT CHANGE UP (ref 3.5–5.3)
RBC # BLD: 4.01 M/UL — SIGNIFICANT CHANGE UP (ref 3.8–5.2)
RBC # BLD: 4.01 M/UL — SIGNIFICANT CHANGE UP (ref 3.8–5.2)
RBC # FLD: 14.2 % — SIGNIFICANT CHANGE UP (ref 10.3–14.5)
RBC # FLD: 14.2 % — SIGNIFICANT CHANGE UP (ref 10.3–14.5)
RH IG SCN BLD-IMP: POSITIVE — SIGNIFICANT CHANGE UP
RH IG SCN BLD-IMP: POSITIVE — SIGNIFICANT CHANGE UP
S AUREUS DNA NOSE QL NAA+PROBE: DETECTED
S AUREUS DNA NOSE QL NAA+PROBE: DETECTED
SODIUM SERPL-SCNC: 139 MMOL/L — SIGNIFICANT CHANGE UP (ref 135–145)
SODIUM SERPL-SCNC: 139 MMOL/L — SIGNIFICANT CHANGE UP (ref 135–145)
WBC # BLD: 5.45 K/UL — SIGNIFICANT CHANGE UP (ref 3.8–10.5)
WBC # BLD: 5.45 K/UL — SIGNIFICANT CHANGE UP (ref 3.8–10.5)
WBC # FLD AUTO: 5.45 K/UL — SIGNIFICANT CHANGE UP (ref 3.8–10.5)
WBC # FLD AUTO: 5.45 K/UL — SIGNIFICANT CHANGE UP (ref 3.8–10.5)

## 2024-01-02 PROCEDURE — 93880 EXTRACRANIAL BILAT STUDY: CPT | Mod: 26

## 2024-01-02 PROCEDURE — 71046 X-RAY EXAM CHEST 2 VIEWS: CPT | Mod: 26

## 2024-01-02 RX ORDER — CHLORHEXIDINE GLUCONATE 213 G/1000ML
1 SOLUTION TOPICAL ONCE
Refills: 0 | Status: COMPLETED | OUTPATIENT
Start: 2024-01-23 | End: 2024-01-23

## 2024-01-02 RX ORDER — OMEGA-3 ACID ETHYL ESTERS 1 G
1 CAPSULE ORAL
Refills: 0 | DISCHARGE

## 2024-01-02 RX ORDER — CEFUROXIME AXETIL 250 MG
1500 TABLET ORAL ONCE
Refills: 0 | Status: COMPLETED | OUTPATIENT
Start: 2024-01-23 | End: 2024-01-23

## 2024-01-02 RX ORDER — COLCHICINE 0.6 MG
1 TABLET ORAL
Refills: 0 | DISCHARGE

## 2024-01-02 NOTE — H&P PST ADULT - LAST CARDIAC ANGIOGRAM/IMAGING
2019 at [facility] 2/2 BARBOZA - as per patient, medical mgmt 2019 at [facility] 2/2 BARBOZA - as per patient, medical mgmt

## 2024-01-02 NOTE — H&P PST ADULT - CARDIOVASCULAR
irregular rhythm - AF, regular rate, 1+ peripheral edema/S1 S2 present/no gallops/no rub/murmur/peripheral edema details…

## 2024-01-02 NOTE — H&P PST ADULT - PROBLEM SELECTOR PLAN 2
HGA1C ordered and obtained at [facility]  FS on admit  Pt advised to hold metformin DOS with last dose on 1/8/24 PM HGA1C ordered and obtained at [facility]  FS on admit  Pt advised to hold metformin DOS with last dose on 1/8/24 PM HGA1C ordered and obtained at [facility]  FS on admit  Pt advised to hold metformin DOS with last dose on 1/8/24 PM  Pt denies any nausea, vomiting or early satiety with Trulicity use - pt advised to hold medication 7 days prior to surgery with last dose on 12/31/23 HGA1C ordered and obtained at [facility]  FS on admit  Pt advised to hold metformin DOS with last dose on 1/8/24 PM  Pt denies any nausea, vomiting or early satiety with Trulicity use - pt advised to hold medication 7 days prior to surgery with last dose on 12/31/23

## 2024-01-02 NOTE — H&P PST ADULT - PROBLEM SELECTOR PLAN 5
venous stat K DOS venous stat K DOS  Pt advised to hold Farxiga 4 days prior to surgery with last dose on 1/4/24

## 2024-01-02 NOTE — H&P PST ADULT - NEGATIVE GASTROINTESTINAL SYMPTOMS
no early satiety with trulicity use/no nausea/no vomiting/no diarrhea/no constipation/no change in bowel habits/no abdominal pain/no melena/no jaundice

## 2024-01-02 NOTE — H&P PST ADULT - ASSESSMENT
DASI score: 5.72  DASI activity: Able to walk 2-3 blocks at own pace, ADLs, Grocery Shopping, 1 Flight of Stairs to get to bedroom  Loose teeth or denture: denies DASI score: 5.72  DASI activity: Able to walk 2-3 blocks at own pace, ADLs, Grocery Shopping, 1 Flight of Stairs to get to bedroom  BARBOZA when walking > 1 block or climbing 1 flight of stairs, sleeps on 3 pillows at night on incline; pt denies chest pain, dyspnea at rest or syncopal episodes  Loose teeth or denture: denies    CAPRINI VTE 2.0 SCORE [CLOT updated 2019]    AGE RELATED RISK FACTORS                                                       MOBILITY RELATED FACTORS  [ ] Age 41-60 years                                            (1 Point)                    [ ] Bed rest                                                        (1 Point)  [X ] Age: 61-74 years                                           (2 Points)                  [ ] Plaster cast                                                   (2 Points)  [ ] Age= 75 years                                              (3 Points)                    [ ] Bed bound for more than 72 hours                 (2 Points)    DISEASE RELATED RISK FACTORS                                               GENDER SPECIFIC FACTORS  [X ] Edema in the lower extremities                       (1 Point)              [ ] Pregnancy                                                     (1 Point)  [ ] Varicose veins                                               (1 Point)                     [ ] Post-partum < 6 weeks                                   (1 Point)             [X ] BMI > 25 Kg/m2                                            (1 Point)                     [ ] Hormonal therapy  or oral contraception          (1 Point)                 [ ] Sepsis (in the previous month)                        (1 Point)               [ ] History of pregnancy complications                 (1 point)  [ ] Pneumonia or serious lung disease                                               [ ] Unexplained or recurrent                     (1 Point)           (in the previous month)                               (1 Point)  [ ] Abnormal pulmonary function test                     (1 Point)                 SURGERY RELATED RISK FACTORS  [ ] Acute myocardial infarction                              (1 Point)               [ ]  Section                                             (1 Point)  [ ] Congestive heart failure (in the previous month)  (1 Point)      [ ] Minor surgery                                                  (1 Point)   [ ] Inflammatory bowel disease                             (1 Point)               [ ] Arthroscopic surgery                                        (2 Points)  [ ] Central venous access                                      (2 Points)                [X ] General surgery lasting more than 45 minutes (2 points)  [ ] Malignancy- Present or previous                   (2 Points)                [ ] Elective arthroplasty                                         (5 points)    [ ] Stroke (in the previous month)                          (5 Points)                                                                                                                                                           HEMATOLOGY RELATED FACTORS                                                 TRAUMA RELATED RISK FACTORS  [ ] Prior episodes of VTE                                     (3 Points)                [ ] Fracture of the hip, pelvis, or leg                       (5 Points)  [ ] Positive family history for VTE                         (3 Points)             [ ] Acute spinal cord injury (in the previous month)  (5 Points)  [ ] Prothrombin 65715 A                                     (3 Points)               [ ] Paralysis  (less than 1 month)                             (5 Points)  [ ] Factor V Leiden                                             (3 Points)                  [ ] Multiple Trauma within 1 month                        (5 Points)  [ ] Lupus anticoagulants                                     (3 Points)                                                           [ ] Anticardiolipin antibodies                               (3 Points)                                                       [ ] High homocysteine in the blood                      (3 Points)                                             [ ] Other congenital or acquired thrombophilia      (3 Points)                                                [ ] Heparin induced thrombocytopenia                  (3 Points)                                     Total Score [    6      ] DASI score: 5.72  DASI activity: Able to walk 2-3 blocks at own pace, ADLs, Grocery Shopping, 1 Flight of Stairs to get to bedroom  BRABOZA when walking > 1 block or climbing 1 flight of stairs, sleeps on 3 pillows at night on incline; pt denies chest pain, dyspnea at rest or syncopal episodes  Loose teeth or denture: denies    CAPRINI VTE 2.0 SCORE [CLOT updated 2019]    AGE RELATED RISK FACTORS                                                       MOBILITY RELATED FACTORS  [ ] Age 41-60 years                                            (1 Point)                    [ ] Bed rest                                                        (1 Point)  [X ] Age: 61-74 years                                           (2 Points)                  [ ] Plaster cast                                                   (2 Points)  [ ] Age= 75 years                                              (3 Points)                    [ ] Bed bound for more than 72 hours                 (2 Points)    DISEASE RELATED RISK FACTORS                                               GENDER SPECIFIC FACTORS  [X ] Edema in the lower extremities                       (1 Point)              [ ] Pregnancy                                                     (1 Point)  [ ] Varicose veins                                               (1 Point)                     [ ] Post-partum < 6 weeks                                   (1 Point)             [X ] BMI > 25 Kg/m2                                            (1 Point)                     [ ] Hormonal therapy  or oral contraception          (1 Point)                 [ ] Sepsis (in the previous month)                        (1 Point)               [ ] History of pregnancy complications                 (1 point)  [ ] Pneumonia or serious lung disease                                               [ ] Unexplained or recurrent                     (1 Point)           (in the previous month)                               (1 Point)  [ ] Abnormal pulmonary function test                     (1 Point)                 SURGERY RELATED RISK FACTORS  [ ] Acute myocardial infarction                              (1 Point)               [ ]  Section                                             (1 Point)  [ ] Congestive heart failure (in the previous month)  (1 Point)      [ ] Minor surgery                                                  (1 Point)   [ ] Inflammatory bowel disease                             (1 Point)               [ ] Arthroscopic surgery                                        (2 Points)  [ ] Central venous access                                      (2 Points)                [X ] General surgery lasting more than 45 minutes (2 points)  [ ] Malignancy- Present or previous                   (2 Points)                [ ] Elective arthroplasty                                         (5 points)    [ ] Stroke (in the previous month)                          (5 Points)                                                                                                                                                           HEMATOLOGY RELATED FACTORS                                                 TRAUMA RELATED RISK FACTORS  [ ] Prior episodes of VTE                                     (3 Points)                [ ] Fracture of the hip, pelvis, or leg                       (5 Points)  [ ] Positive family history for VTE                         (3 Points)             [ ] Acute spinal cord injury (in the previous month)  (5 Points)  [ ] Prothrombin 21642 A                                     (3 Points)               [ ] Paralysis  (less than 1 month)                             (5 Points)  [ ] Factor V Leiden                                             (3 Points)                  [ ] Multiple Trauma within 1 month                        (5 Points)  [ ] Lupus anticoagulants                                     (3 Points)                                                           [ ] Anticardiolipin antibodies                               (3 Points)                                                       [ ] High homocysteine in the blood                      (3 Points)                                             [ ] Other congenital or acquired thrombophilia      (3 Points)                                                [ ] Heparin induced thrombocytopenia                  (3 Points)                                     Total Score [    6      ]

## 2024-01-02 NOTE — H&P PST ADULT - NSICDXPASTSURGICALHX_GEN_ALL_CORE_FT
PAST SURGICAL HISTORY:  H/O gastric sleeve     History of coronary angiogram     History of laparoscopic adjustable gastric banding     History of removal of laparoscopic gastric banding device     NVD (normal vaginal delivery)     S/P coronary artery stent placement

## 2024-01-02 NOTE — H&P PST ADULT - LAST ECHOCARDIOGRAM
12/1/23 with moderate to severe mitral valve stenosis, left ventricular systolic function is hyperdynamic, no regional wall abnormalities, EF estimated at 77%

## 2024-01-02 NOTE — H&P PST ADULT - HISTORY OF PRESENT ILLNESS
73 year old female with PMH morbid obesity (BMI 42.7 s/p gastric sleeve 2015), DMT2, CAD s/p PCI of LCX (2012), LEIDY (on CPAP), CKD (GRF 25, Cr 2 per scanned labs 10/23), AF (on Eliquis) and rheumatic mitral valve disease reports c/o chronic BARBOZA for over 10 years. She reports that it has progressively worsened over the past few months with the feeling of an "elephant on my chest" that has progressively worsened over the past few months. Her symptoms improved after starting Lasix daily. She gets dizzy when she bends over or when she stands up too fast. She denies CP or weight gain. She sleeps with 3 pillows on an incline at night. She cannot lay flat due to comfort as well as orthopnea. She can only walk one block before she has to stop to rest due to SOB. Pt now presents to PST for scheduled mitral valve replacement, maze procedure and left atrial appendage ligation with Dr. Dalal on 1/9/24.      73 year old female with PMH morbid obesity (BMI 42.7 s/p gastric sleeve 2015), DMT2, CAD s/p PCI of LCX (2012), LEIDY (on CPAP), CKD (stage 3 - on farxiga), AF (on Eliquis) and rheumatic mitral valve disease reports c/o chronic BARBOZA for over 10 years that has progressively worsened over the past few months with the feeling of an "elephant on my chest." Her symptoms improved after starting Lasix daily. She gets dizzy when she bends over or when she stands up too fast. She denies CP or weight gain. She sleeps with 3 pillows on an incline at night. She cannot lay flat due to comfort as well as orthopnea. She can only walk one block before she has to stop to rest due to SOB. Pt now presents to PST for scheduled mitral valve replacement, maze procedure and left atrial appendage ligation with Dr. Dalal on 1/9/24.

## 2024-01-02 NOTE — H&P PST ADULT - GENITOURINARY
NIKKI CAVAZOSG DISCHARGE NOTE    Patient discharged to home at 5:21 PM via carseat. Accompanied by mother and father and staff. Discharge instructions reviewed with caregiver, opportunity offered to ask questions. Prescriptions - None ordered for discharge. All belongings sent with patient.    Idania Valentine RN   negative

## 2024-01-02 NOTE — H&P PST ADULT - NSICDXPASTMEDICALHX_GEN_ALL_CORE_FT
PAST MEDICAL HISTORY:  2019 novel coronavirus disease (COVID-19)     AF (atrial fibrillation)     Chronic kidney disease (CKD)     HLD (hyperlipidemia)     HTN (hypertension)     Hypertrophic obstructive cardiomyopathy     Nonrheumatic mitral (valve) annulus calcification     Obesity, morbid, BMI 40.0-49.9     LEIDY on CPAP     Stented coronary artery     Type 2 diabetes mellitus

## 2024-01-02 NOTE — H&P PST ADULT - PRIMARY CARE PROVIDER
Dr. Lyla Ferrari [phone] - last visit 9/2023 for F/U Dr. Lyla Ferrari [phone] - last visit 9/2023 for F/U

## 2024-01-02 NOTE — H&P PST ADULT - PROBLEM SELECTOR PLAN 1
Pt is scheduled for a mitral valve replacement, maze procedure and left atrial appendage ligation with Dr. Dalal on 1/9/24  CBC, BMP, HGA1C, T/S and MRSA/MSSA ordered and obtained at [facility]  Carotid duplex and 2-view AP/LA CXR ordered and obtained at PST  ABO, FS on admit  ERP medications ordered DOS Pt is scheduled for a mitral valve replacement, maze procedure and left atrial appendage ligation with Dr. Dalal on 1/9/24  CBC, BMP, HGA1C, T/S and MRSA/MSSA ordered and obtained at [facility]  Carotid duplex and 2-view AP/LA CXR ordered and obtained at PST  ABO, FS on admit  ERP medications ordered DOS

## 2024-01-02 NOTE — H&P PST ADULT - OTHER CARE PROVIDERS
Dr. Bartolo Cheatham (Cardiologist) [phone] - last visit 2 weeks ago for F/U; Dr. José Miguel Cruz (Nephrologist) [phone] - last visit 6 months ago Dr. Bartolo Cheatham (Cardiologist) [phone] - last visit 2 weeks ago for F/U; Dr. José Miguel Cruz (Nephrologist) [phone] - last visit 6 months ago

## 2024-01-02 NOTE — H&P PST ADULT - RESPIRATORY AND THORAX COMMENTS
BARBOZA when walking > 1 block or climbing 1 flight of stairs, sleeps on 3 pillows at night on incline; pt denies chest pain, dyspnea at rest or syncopal episodes

## 2024-01-03 DIAGNOSIS — Z22.321 CARRIER OR SUSPECTED CARRIER OF METHICILLIN SUSCEPTIBLE STAPHYLOCOCCUS AUREUS: ICD-10-CM

## 2024-01-06 ENCOUNTER — EMERGENCY (EMERGENCY)
Facility: HOSPITAL | Age: 74
LOS: 1 days | Discharge: HOME | End: 2024-01-06
Payer: MEDICARE

## 2024-01-06 VITALS
SYSTOLIC BLOOD PRESSURE: 135 MMHG | TEMPERATURE: 98 F | HEART RATE: 61 BPM | OXYGEN SATURATION: 99 % | DIASTOLIC BLOOD PRESSURE: 87 MMHG | RESPIRATION RATE: 16 BRPM

## 2024-01-06 VITALS
HEART RATE: 72 BPM | SYSTOLIC BLOOD PRESSURE: 124 MMHG | RESPIRATION RATE: 20 BRPM | HEIGHT: 63 IN | OXYGEN SATURATION: 98 % | WEIGHT: 237 LBS | TEMPERATURE: 99 F | DIASTOLIC BLOOD PRESSURE: 65 MMHG

## 2024-01-06 DIAGNOSIS — Z95.5 PRESENCE OF CORONARY ANGIOPLASTY IMPLANT AND GRAFT: Chronic | ICD-10-CM

## 2024-01-06 DIAGNOSIS — Z98.84 BARIATRIC SURGERY STATUS: Chronic | ICD-10-CM

## 2024-01-06 DIAGNOSIS — Z98.890 OTHER SPECIFIED POSTPROCEDURAL STATES: Chronic | ICD-10-CM

## 2024-01-06 DIAGNOSIS — Z90.3 ACQUIRED ABSENCE OF STOMACH [PART OF]: Chronic | ICD-10-CM

## 2024-01-06 PROBLEM — U07.1 COVID-19: Chronic | Status: ACTIVE | Noted: 2024-01-02

## 2024-01-06 PROBLEM — E66.01 MORBID (SEVERE) OBESITY DUE TO EXCESS CALORIES: Chronic | Status: ACTIVE | Noted: 2024-01-02

## 2024-01-06 PROBLEM — I42.1 OBSTRUCTIVE HYPERTROPHIC CARDIOMYOPATHY: Chronic | Status: ACTIVE | Noted: 2024-01-02

## 2024-01-06 PROBLEM — E78.5 HYPERLIPIDEMIA, UNSPECIFIED: Chronic | Status: ACTIVE | Noted: 2024-01-02

## 2024-01-06 PROBLEM — I10 ESSENTIAL (PRIMARY) HYPERTENSION: Chronic | Status: ACTIVE | Noted: 2024-01-02

## 2024-01-06 PROBLEM — I34.81 NONRHEUMATIC MITRAL (VALVE) ANNULUS CALCIFICATION: Chronic | Status: ACTIVE | Noted: 2024-01-02

## 2024-01-06 PROBLEM — E11.9 TYPE 2 DIABETES MELLITUS WITHOUT COMPLICATIONS: Chronic | Status: ACTIVE | Noted: 2024-01-02

## 2024-01-06 PROBLEM — I48.91 UNSPECIFIED ATRIAL FIBRILLATION: Chronic | Status: ACTIVE | Noted: 2024-01-02

## 2024-01-06 PROBLEM — N18.9 CHRONIC KIDNEY DISEASE, UNSPECIFIED: Chronic | Status: ACTIVE | Noted: 2024-01-02

## 2024-01-06 PROBLEM — G47.33 OBSTRUCTIVE SLEEP APNEA (ADULT) (PEDIATRIC): Chronic | Status: ACTIVE | Noted: 2024-01-02

## 2024-01-06 LAB
ALBUMIN SERPL ELPH-MCNC: 4.1 G/DL — SIGNIFICANT CHANGE UP (ref 3.3–5)
ALBUMIN SERPL ELPH-MCNC: 4.1 G/DL — SIGNIFICANT CHANGE UP (ref 3.3–5)
ALP SERPL-CCNC: 71 U/L — SIGNIFICANT CHANGE UP (ref 40–120)
ALP SERPL-CCNC: 71 U/L — SIGNIFICANT CHANGE UP (ref 40–120)
ALT FLD-CCNC: 46 U/L — HIGH (ref 10–45)
ALT FLD-CCNC: 46 U/L — HIGH (ref 10–45)
ANION GAP SERPL CALC-SCNC: 10 MMOL/L — SIGNIFICANT CHANGE UP (ref 5–17)
ANION GAP SERPL CALC-SCNC: 10 MMOL/L — SIGNIFICANT CHANGE UP (ref 5–17)
APPEARANCE UR: ABNORMAL
APPEARANCE UR: ABNORMAL
APTT BLD: 30.9 SEC — SIGNIFICANT CHANGE UP (ref 24.5–35.6)
APTT BLD: 30.9 SEC — SIGNIFICANT CHANGE UP (ref 24.5–35.6)
AST SERPL-CCNC: 25 U/L — SIGNIFICANT CHANGE UP (ref 10–40)
AST SERPL-CCNC: 25 U/L — SIGNIFICANT CHANGE UP (ref 10–40)
BACTERIA # UR AUTO: NEGATIVE /HPF — SIGNIFICANT CHANGE UP
BACTERIA # UR AUTO: NEGATIVE /HPF — SIGNIFICANT CHANGE UP
BASOPHILS # BLD AUTO: 0.01 K/UL — SIGNIFICANT CHANGE UP (ref 0–0.2)
BASOPHILS # BLD AUTO: 0.01 K/UL — SIGNIFICANT CHANGE UP (ref 0–0.2)
BASOPHILS NFR BLD AUTO: 0.2 % — SIGNIFICANT CHANGE UP (ref 0–2)
BASOPHILS NFR BLD AUTO: 0.2 % — SIGNIFICANT CHANGE UP (ref 0–2)
BILIRUB SERPL-MCNC: 0.4 MG/DL — SIGNIFICANT CHANGE UP (ref 0.2–1.2)
BILIRUB SERPL-MCNC: 0.4 MG/DL — SIGNIFICANT CHANGE UP (ref 0.2–1.2)
BILIRUB UR-MCNC: NEGATIVE — SIGNIFICANT CHANGE UP
BILIRUB UR-MCNC: NEGATIVE — SIGNIFICANT CHANGE UP
BUN SERPL-MCNC: 31 MG/DL — HIGH (ref 7–23)
BUN SERPL-MCNC: 31 MG/DL — HIGH (ref 7–23)
CALCIUM SERPL-MCNC: 10.4 MG/DL — SIGNIFICANT CHANGE UP (ref 8.4–10.5)
CALCIUM SERPL-MCNC: 10.4 MG/DL — SIGNIFICANT CHANGE UP (ref 8.4–10.5)
CAST: 1 /LPF — SIGNIFICANT CHANGE UP (ref 0–4)
CAST: 1 /LPF — SIGNIFICANT CHANGE UP (ref 0–4)
CHLORIDE SERPL-SCNC: 110 MMOL/L — HIGH (ref 96–108)
CHLORIDE SERPL-SCNC: 110 MMOL/L — HIGH (ref 96–108)
CO2 SERPL-SCNC: 22 MMOL/L — SIGNIFICANT CHANGE UP (ref 22–31)
CO2 SERPL-SCNC: 22 MMOL/L — SIGNIFICANT CHANGE UP (ref 22–31)
COLOR SPEC: ABNORMAL
COLOR SPEC: ABNORMAL
CREAT SERPL-MCNC: 1.86 MG/DL — HIGH (ref 0.5–1.3)
CREAT SERPL-MCNC: 1.86 MG/DL — HIGH (ref 0.5–1.3)
DIFF PNL FLD: ABNORMAL
DIFF PNL FLD: ABNORMAL
EGFR: 28 ML/MIN/1.73M2 — LOW
EGFR: 28 ML/MIN/1.73M2 — LOW
EOSINOPHIL # BLD AUTO: 0.05 K/UL — SIGNIFICANT CHANGE UP (ref 0–0.5)
EOSINOPHIL # BLD AUTO: 0.05 K/UL — SIGNIFICANT CHANGE UP (ref 0–0.5)
EOSINOPHIL NFR BLD AUTO: 0.8 % — SIGNIFICANT CHANGE UP (ref 0–6)
EOSINOPHIL NFR BLD AUTO: 0.8 % — SIGNIFICANT CHANGE UP (ref 0–6)
GLUCOSE SERPL-MCNC: 99 MG/DL — SIGNIFICANT CHANGE UP (ref 70–99)
GLUCOSE SERPL-MCNC: 99 MG/DL — SIGNIFICANT CHANGE UP (ref 70–99)
GLUCOSE UR QL: 100 MG/DL
GLUCOSE UR QL: 100 MG/DL
HCT VFR BLD CALC: 36.2 % — SIGNIFICANT CHANGE UP (ref 34.5–45)
HCT VFR BLD CALC: 36.2 % — SIGNIFICANT CHANGE UP (ref 34.5–45)
HGB BLD-MCNC: 12.1 G/DL — SIGNIFICANT CHANGE UP (ref 11.5–15.5)
HGB BLD-MCNC: 12.1 G/DL — SIGNIFICANT CHANGE UP (ref 11.5–15.5)
IMM GRANULOCYTES NFR BLD AUTO: 0.3 % — SIGNIFICANT CHANGE UP (ref 0–0.9)
IMM GRANULOCYTES NFR BLD AUTO: 0.3 % — SIGNIFICANT CHANGE UP (ref 0–0.9)
INR BLD: 1.15 RATIO — SIGNIFICANT CHANGE UP (ref 0.85–1.18)
INR BLD: 1.15 RATIO — SIGNIFICANT CHANGE UP (ref 0.85–1.18)
KETONES UR-MCNC: NEGATIVE MG/DL — SIGNIFICANT CHANGE UP
KETONES UR-MCNC: NEGATIVE MG/DL — SIGNIFICANT CHANGE UP
LEUKOCYTE ESTERASE UR-ACNC: ABNORMAL
LEUKOCYTE ESTERASE UR-ACNC: ABNORMAL
LIDOCAIN IGE QN: 87 U/L — HIGH (ref 7–60)
LIDOCAIN IGE QN: 87 U/L — HIGH (ref 7–60)
LYMPHOCYTES # BLD AUTO: 2.1 K/UL — SIGNIFICANT CHANGE UP (ref 1–3.3)
LYMPHOCYTES # BLD AUTO: 2.1 K/UL — SIGNIFICANT CHANGE UP (ref 1–3.3)
LYMPHOCYTES # BLD AUTO: 35.1 % — SIGNIFICANT CHANGE UP (ref 13–44)
LYMPHOCYTES # BLD AUTO: 35.1 % — SIGNIFICANT CHANGE UP (ref 13–44)
MCHC RBC-ENTMCNC: 32 PG — SIGNIFICANT CHANGE UP (ref 27–34)
MCHC RBC-ENTMCNC: 32 PG — SIGNIFICANT CHANGE UP (ref 27–34)
MCHC RBC-ENTMCNC: 33.4 GM/DL — SIGNIFICANT CHANGE UP (ref 32–36)
MCHC RBC-ENTMCNC: 33.4 GM/DL — SIGNIFICANT CHANGE UP (ref 32–36)
MCV RBC AUTO: 95.8 FL — SIGNIFICANT CHANGE UP (ref 80–100)
MCV RBC AUTO: 95.8 FL — SIGNIFICANT CHANGE UP (ref 80–100)
MONOCYTES # BLD AUTO: 0.34 K/UL — SIGNIFICANT CHANGE UP (ref 0–0.9)
MONOCYTES # BLD AUTO: 0.34 K/UL — SIGNIFICANT CHANGE UP (ref 0–0.9)
MONOCYTES NFR BLD AUTO: 5.7 % — SIGNIFICANT CHANGE UP (ref 2–14)
MONOCYTES NFR BLD AUTO: 5.7 % — SIGNIFICANT CHANGE UP (ref 2–14)
NEUTROPHILS # BLD AUTO: 3.46 K/UL — SIGNIFICANT CHANGE UP (ref 1.8–7.4)
NEUTROPHILS # BLD AUTO: 3.46 K/UL — SIGNIFICANT CHANGE UP (ref 1.8–7.4)
NEUTROPHILS NFR BLD AUTO: 57.9 % — SIGNIFICANT CHANGE UP (ref 43–77)
NEUTROPHILS NFR BLD AUTO: 57.9 % — SIGNIFICANT CHANGE UP (ref 43–77)
NITRITE UR-MCNC: NEGATIVE — SIGNIFICANT CHANGE UP
NITRITE UR-MCNC: NEGATIVE — SIGNIFICANT CHANGE UP
NRBC # BLD: 0 /100 WBCS — SIGNIFICANT CHANGE UP (ref 0–0)
NRBC # BLD: 0 /100 WBCS — SIGNIFICANT CHANGE UP (ref 0–0)
NT-PROBNP SERPL-SCNC: 1573 PG/ML — HIGH (ref 0–300)
NT-PROBNP SERPL-SCNC: 1573 PG/ML — HIGH (ref 0–300)
PH UR: 6 — SIGNIFICANT CHANGE UP (ref 5–8)
PH UR: 6 — SIGNIFICANT CHANGE UP (ref 5–8)
PLATELET # BLD AUTO: 121 K/UL — LOW (ref 150–400)
PLATELET # BLD AUTO: 121 K/UL — LOW (ref 150–400)
POTASSIUM SERPL-MCNC: 4.8 MMOL/L — SIGNIFICANT CHANGE UP (ref 3.5–5.3)
POTASSIUM SERPL-MCNC: 4.8 MMOL/L — SIGNIFICANT CHANGE UP (ref 3.5–5.3)
POTASSIUM SERPL-SCNC: 4.8 MMOL/L — SIGNIFICANT CHANGE UP (ref 3.5–5.3)
POTASSIUM SERPL-SCNC: 4.8 MMOL/L — SIGNIFICANT CHANGE UP (ref 3.5–5.3)
PROT SERPL-MCNC: 6.8 G/DL — SIGNIFICANT CHANGE UP (ref 6–8.3)
PROT SERPL-MCNC: 6.8 G/DL — SIGNIFICANT CHANGE UP (ref 6–8.3)
PROT UR-MCNC: 100 MG/DL
PROT UR-MCNC: 100 MG/DL
PROTHROM AB SERPL-ACNC: 12 SEC — SIGNIFICANT CHANGE UP (ref 9.5–13)
PROTHROM AB SERPL-ACNC: 12 SEC — SIGNIFICANT CHANGE UP (ref 9.5–13)
RBC # BLD: 3.78 M/UL — LOW (ref 3.8–5.2)
RBC # BLD: 3.78 M/UL — LOW (ref 3.8–5.2)
RBC # FLD: 14 % — SIGNIFICANT CHANGE UP (ref 10.3–14.5)
RBC # FLD: 14 % — SIGNIFICANT CHANGE UP (ref 10.3–14.5)
RBC CASTS # UR COMP ASSIST: 25 /HPF — HIGH (ref 0–4)
RBC CASTS # UR COMP ASSIST: 25 /HPF — HIGH (ref 0–4)
SODIUM SERPL-SCNC: 142 MMOL/L — SIGNIFICANT CHANGE UP (ref 135–145)
SODIUM SERPL-SCNC: 142 MMOL/L — SIGNIFICANT CHANGE UP (ref 135–145)
SP GR SPEC: 1.01 — SIGNIFICANT CHANGE UP (ref 1–1.03)
SP GR SPEC: 1.01 — SIGNIFICANT CHANGE UP (ref 1–1.03)
SQUAMOUS # UR AUTO: 3 /HPF — SIGNIFICANT CHANGE UP (ref 0–5)
SQUAMOUS # UR AUTO: 3 /HPF — SIGNIFICANT CHANGE UP (ref 0–5)
TROPONIN T, HIGH SENSITIVITY RESULT: 21 NG/L — SIGNIFICANT CHANGE UP (ref 0–51)
UROBILINOGEN FLD QL: 0.2 MG/DL — SIGNIFICANT CHANGE UP (ref 0.2–1)
UROBILINOGEN FLD QL: 0.2 MG/DL — SIGNIFICANT CHANGE UP (ref 0.2–1)
WBC # BLD: 5.98 K/UL — SIGNIFICANT CHANGE UP (ref 3.8–10.5)
WBC # BLD: 5.98 K/UL — SIGNIFICANT CHANGE UP (ref 3.8–10.5)
WBC # FLD AUTO: 5.98 K/UL — SIGNIFICANT CHANGE UP (ref 3.8–10.5)
WBC # FLD AUTO: 5.98 K/UL — SIGNIFICANT CHANGE UP (ref 3.8–10.5)
WBC UR QL: 5 /HPF — SIGNIFICANT CHANGE UP (ref 0–5)
WBC UR QL: 5 /HPF — SIGNIFICANT CHANGE UP (ref 0–5)

## 2024-01-06 PROCEDURE — 99285 EMERGENCY DEPT VISIT HI MDM: CPT | Mod: 25

## 2024-01-06 PROCEDURE — 87086 URINE CULTURE/COLONY COUNT: CPT

## 2024-01-06 PROCEDURE — 83690 ASSAY OF LIPASE: CPT

## 2024-01-06 PROCEDURE — 85610 PROTHROMBIN TIME: CPT

## 2024-01-06 PROCEDURE — 85025 COMPLETE CBC W/AUTO DIFF WBC: CPT

## 2024-01-06 PROCEDURE — 36415 COLL VENOUS BLD VENIPUNCTURE: CPT

## 2024-01-06 PROCEDURE — 74176 CT ABD & PELVIS W/O CONTRAST: CPT | Mod: 26,MA

## 2024-01-06 PROCEDURE — 74176 CT ABD & PELVIS W/O CONTRAST: CPT | Mod: MA

## 2024-01-06 PROCEDURE — 84484 ASSAY OF TROPONIN QUANT: CPT

## 2024-01-06 PROCEDURE — 85730 THROMBOPLASTIN TIME PARTIAL: CPT

## 2024-01-06 PROCEDURE — 81001 URINALYSIS AUTO W/SCOPE: CPT

## 2024-01-06 PROCEDURE — 80053 COMPREHEN METABOLIC PANEL: CPT

## 2024-01-06 PROCEDURE — 83880 ASSAY OF NATRIURETIC PEPTIDE: CPT

## 2024-01-06 PROCEDURE — 93005 ELECTROCARDIOGRAM TRACING: CPT

## 2024-01-06 PROCEDURE — 99285 EMERGENCY DEPT VISIT HI MDM: CPT

## 2024-01-06 RX ORDER — SODIUM CHLORIDE 9 MG/ML
1000 INJECTION INTRAMUSCULAR; INTRAVENOUS; SUBCUTANEOUS
Refills: 0 | Status: DISCONTINUED | OUTPATIENT
Start: 2024-01-06 | End: 2024-01-10

## 2024-01-06 NOTE — ED PROVIDER NOTE - PROGRESS NOTE DETAILS
Wander GUILLEN: Pt is stable and ready for discharge. Strict return precautions given. All questions answered. Pt has no chest pain nor SOB, elevated BNP likely i/s/o elevated creatinine. Pt informed of incidental findings and will have close f/u w/ uro.

## 2024-01-06 NOTE — ED ADULT NURSE NOTE - OBJECTIVE STATEMENT
73 y.o. female coming in from home via private car for hematuria x this AM at around 0200. pt states that she is on Elaquis for a fib but states that she did not take it this AM. pt also states that she was supposed to have surgery for a possible valve replacement r/t rheumatic heart. pt states that she was unable to get an angio because her BUN and Creatinine were elevated and could not get the contrast. PMH diabetes, HTN, HLD. pt denies CP/dizziness/SOB/weakness/burning/pain on urination/N/V/D, no other complaints at this time. pt states that at 0200 today the urine was a very dark red color and has lightened over the day, urine at bedside noted to be pink colored

## 2024-01-06 NOTE — ED ADULT TRIAGE NOTE - CHIEF COMPLAINT QUOTE
hematuria since 2am this morning, (on eliquis - hx rheumatic mitral stenosis/afib), pending open heart surgery scheduled for jan 23rd, delayed for JORDIN - unable to undergo cardiac cath

## 2024-01-06 NOTE — ED PROVIDER NOTE - ATTENDING CONTRIBUTION TO CARE
MD Kang:  patient seen and evaluated with the resident.  I was present for key portions of the History & Physical, and I agree with the Impression & Plan.    Patient is a 73-year-old female brought in by daughter for evaluation of spontaneous gross hematuria at 2 AM today.  Patient is on Eliquis for atrial fibrillation.  She was supposed to have a mitral valve repair, but the procedure was delayed because she was unable to undergo preprocedural cardiac catheterization on account of acutely worsened renal function (baseline creatinine 1.5 but most recent creatinine 2.2).    Associated symptoms: No abdominal pain, no dysuria, no hesitancy, no pressure, no flank pain, no fevers, no chills    Quality of hematuria initially was like dark wine but no clots.  Presently is translucent more like carroll    Vital signs within normal limits  Gen: Well appearing F in NAD.  Head: NC/AT.     PERRL, EOMI.  Neck: trachea midline, supple.    Resp:  No distress, CTA B.    Cardiac RRR, no RMG.    Abdomen:  soft, nondistended, nontender; no R/G.  Ext: no deformities, no edema.    Neuro:  A&Ox4 appears non focal.   Skin:  Warm and dry as visualized, no rash.     Psych:  Normal affect and mood.    Medical decision making:  HPI most consistent with resolving gross hematuria without symptoms concerning for obstructive uropathy.  Nevertheless, differential diagnosis includes UTI, glomerulonephritis, and she will be evaluated with basic labs CBC, CMP, urinalysis, urine culture, noncontrast CT abdomen.    If the above workup is unremarkable then patient will be instructed to hold her Eliquis for the next 72 hours to encourage spontaneous abatement of her bleeding.  She will also need expeditious urology follow-up for outpatient cystoscopy.  Patient has a urologist at [facility] who she will call on Monday, we will also make a referral within [facility]. MD Kang:  patient seen and evaluated with the resident.  I was present for key portions of the History & Physical, and I agree with the Impression & Plan.    Patient is a 73-year-old female brought in by daughter for evaluation of spontaneous gross hematuria at 2 AM today.  Patient is on Eliquis for atrial fibrillation.  She was supposed to have a mitral valve repair, but the procedure was delayed because she was unable to undergo preprocedural cardiac catheterization on account of acutely worsened renal function (baseline creatinine 1.5 but most recent creatinine 2.2).    Associated symptoms: No abdominal pain, no dysuria, no hesitancy, no pressure, no flank pain, no fevers, no chills    Quality of hematuria initially was like dark wine but no clots.  Presently is translucent more like carroll    Vital signs within normal limits  Gen: Well appearing F in NAD.  Head: NC/AT.     PERRL, EOMI.  Neck: trachea midline, supple.    Resp:  No distress, CTA B.    Cardiac RRR, no RMG.    Abdomen:  soft, nondistended, nontender; no R/G.  Ext: no deformities, no edema.    Neuro:  A&Ox4 appears non focal.   Skin:  Warm and dry as visualized, no rash.     Psych:  Normal affect and mood.    Medical decision making:  HPI most consistent with resolving gross hematuria without symptoms concerning for obstructive uropathy.  Nevertheless, differential diagnosis includes UTI, glomerulonephritis, and she will be evaluated with basic labs CBC, CMP, urinalysis, urine culture, noncontrast CT abdomen.    If the above workup is unremarkable then patient will be instructed to hold her Eliquis for the next 72 hours to encourage spontaneous abatement of her bleeding.  She will also need expeditious urology follow-up for outpatient cystoscopy.  Patient has a urologist at [facility] who she will call on Monday, we will also make a referral within [facility].

## 2024-01-06 NOTE — ED PROVIDER NOTE - CLINICAL SUMMARY MEDICAL DECISION MAKING FREE TEXT BOX
74 y/o female w/ PMH morbid obesity (BMI 42.7 s/p gastric sleeve 2015), DMT2, CAD s/p PCI of LCX (2012), LEIDY (on CPAP), CKD (stage 3 - on farxiga), AF (on Eliquis) and rheumatic mitral valve disease c/o 12 hour history of spontaneous dark hematuria. Pts symptoms are improving throughout the day. Pt is otherwise asymptomatic. Denies fevers, chills, nausea, vomiting, dizziness, chest pain, SOB, abdominal pain, dysuria. +1 bilateral pitting edema, urine is becoming clear. Will screen for ACS (troponin), anemia/electrolytes, and chest x ray to screen for cardiopulmonary pathology. BNP for heart failure. Check creatinine and reassess. UA/UC for UTI. CTAP w/o contrast to eval for ureteral pathology.    Urologist: Tamra Craig MD 72 y/o female w/ PMH morbid obesity (BMI 42.7 s/p gastric sleeve 2015), DMT2, CAD s/p PCI of LCX (2012), LEIDY (on CPAP), CKD (stage 3 - on farxiga), AF (on Eliquis) and rheumatic mitral valve disease c/o 12 hour history of spontaneous dark hematuria. Pts symptoms are improving throughout the day. Pt is otherwise asymptomatic. Denies fevers, chills, nausea, vomiting, dizziness, chest pain, SOB, abdominal pain, dysuria. +1 bilateral pitting edema, urine is becoming clear. Will screen for ACS (troponin), anemia/electrolytes, and chest x ray to screen for cardiopulmonary pathology. BNP for heart failure. Check creatinine and reassess. UA/UC for UTI. CTAP w/o contrast to eval for ureteral pathology.    Urologist: Tamra Craig MD

## 2024-01-06 NOTE — ED PROVIDER NOTE - PATIENT PORTAL LINK FT
You can access the FollowMyHealth Patient Portal offered by [facility] by registering at the following website: [email or web address] By joining [facility]’s FollowMyHealth portal, you will also be able to view your health information using other applications (apps) compatible with our system. You can access the FollowMyHealth Patient Portal offered by [facility] by registering at the following website: [email or web address] By joining [employer]’s FollowMyHealth portal, you will also be able to view your health information using other applications (apps) compatible with our system.

## 2024-01-06 NOTE — CONSULT NOTE ADULT - SUBJECTIVE AND OBJECTIVE BOX
Patient is a 73y old  Female who presents with a chief complaint of     HPI:   72 yo F with morbid obesity (BMI 42.7 s/p gastric sleeve ), DMT2, CAD s/p PCI of LCX (), LEIDY (on CPAP), CKD (stage 3 - on farxiga), AF (on Eliquis) and rheumatic mitral valve disease c/o 12 hour history of spontaneous dark hematuria. NEphrology following up for elevated Creatinine. Patient has CKD ( baseline Cr 1.5 mg/dl)  and is seeing Dr. Galindo Nieves as outpatient. No h/o kidney stones. No other urinary complaints. No SOb, pedal edema. She did not take her eliquis this am     PAST MEDICAL & SURGICAL HISTORY:  Nonrheumatic mitral (valve) annulus calcification      Obesity, morbid, BMI 40.0-49.9      Chronic kidney disease (CKD)      AF (atrial fibrillation)      Type 2 diabetes mellitus      HLD (hyperlipidemia)      LEIDY on CPAP      Hypertrophic obstructive cardiomyopathy      2019 novel coronavirus disease (COVID-19)      HTN (hypertension)      Stented coronary artery      History of coronary angiogram      S/P coronary artery stent placement      H/O gastric sleeve      History of laparoscopic adjustable gastric banding      History of removal of laparoscopic gastric banding device      NVD (normal vaginal delivery)          MEDICATIONS  (STANDING):      Allergies    No Known Allergies    Intolerances        SOCIAL HISTORY:  Denies ETOh,Smoking,     FAMILY HISTORY:      REVIEW OF SYSTEMS:  CONSTITUTIONAL: No weakness, fevers or chills  EYES/ENT: No visual changes;  No vertigo or throat pain   NECK: No pain or stiffness  RESPIRATORY: No cough, wheezing, hemoptysis; No shortness of breath  CARDIOVASCULAR: No chest pain or palpitations  GASTROINTESTINAL: No abdominal or epigastric pain. No nausea, vomiting, or hematemesis; No diarrhea or constipation. No melena or hematochezia.  GENITOURINARY: No dysuria, frequency or hematuria  NEUROLOGICAL: No numbness or weakness  SKIN: No itching, burning, rashes, or lesions   All other review of systems is negative unless indicated above.    VITAL:  T(C): , Max: 37 (24 @ 13:49)  T(F): , Max: 98.6 (24 @ 13:49)  HR: 72 (24 @ 13:49)  BP: 124/65 (24 @ 13:49)  BP(mean): --  RR: 20 (24 @ 13:49)  SpO2: 98% (24 @ 13:49)  Wt(kg): --    PHYSICAL EXAM:  Constitutional: NAD, Alert  HEENT: NCAT, MMM  Neck: Supple, No JVD  Respiratory: CTA-b/l  Cardiovascular: RRR s1s2, no m/r/g  Gastrointestinal: BS+, soft, NT/ND  Extremities: No peripheral edema b/l  Neurological: no focal deficits; strength grossly intact  Back: no CVAT b/l  Skin: No rashes, no nevi    LABS:                        12.1   5.98  )-----------( 121      ( 2024 16:18 )             36.2     Na(142)/K(4.8)/Cl(110)/HCO3(22)/BUN(31)/Cr(1.86)Glu(99)/Ca(10.4)/Mg(--)/PO4(--)     @ 16:18    Urinalysis Basic - ( 2024 16:18 )    Color: Red / Appearance: Cloudy / S.009 / pH: x  Gluc: 99 mg/dL / Ketone: Negative mg/dL  / Bili: Negative / Urobili: 0.2 mg/dL   Blood: x / Protein: 100 mg/dL / Nitrite: Negative   Leuk Esterase: Trace / RBC: 25 /HPF / WBC 5 /HPF   Sq Epi: x / Non Sq Epi: 3 /HPF / Bacteria: Negative /HPF            IMAGING:    ASSESSMENT:   72 yo F with morbid obesity (BMI 42.7 s/p gastric sleeve ), DMT2, CAD s/p PCI of LCX (), LEIDY (on CPAP), CKD (stage 3 - on farxiga), AF (on Eliquis) and rheumatic mitral valve disease c/o 12 hour history of spontaneous dark hematuria.    CKD III with baseline Cr 1.5 mg/dl   Mild non oliguric JORDIN - pre renal vs obstructive   Euvolemic   lytes controlled   Blood pressure controlled  hematuria       RECOMMEND:  gentle hydration NS 50 cc x 10 hours   CT abdomen pending   Urine studies when hematuria clears      Thank you for involving [facility] in this patient's care.    With warm regards,    Wilma Lopez MD   [facility]  Office: [phone]           Patient is a 73y old  Female who presents with a chief complaint of     HPI:   72 yo F with morbid obesity (BMI 42.7 s/p gastric sleeve ), DMT2, CAD s/p PCI of LCX (), LEIDY (on CPAP), CKD (stage 3 - on farxiga), AF (on Eliquis) and rheumatic mitral valve disease c/o 12 hour history of spontaneous dark hematuria. NEphrology following up for elevated Creatinine. Patient has CKD ( baseline Cr 1.5 mg/dl)  and is seeing Dr. Galindo Nieves as outpatient. No h/o kidney stones. No other urinary complaints. No SOb, pedal edema. She did not take her eliquis this am     PAST MEDICAL & SURGICAL HISTORY:  Nonrheumatic mitral (valve) annulus calcification      Obesity, morbid, BMI 40.0-49.9      Chronic kidney disease (CKD)      AF (atrial fibrillation)      Type 2 diabetes mellitus      HLD (hyperlipidemia)      LEIDY on CPAP      Hypertrophic obstructive cardiomyopathy      2019 novel coronavirus disease (COVID-19)      HTN (hypertension)      Stented coronary artery      History of coronary angiogram      S/P coronary artery stent placement      H/O gastric sleeve      History of laparoscopic adjustable gastric banding      History of removal of laparoscopic gastric banding device      NVD (normal vaginal delivery)          MEDICATIONS  (STANDING):      Allergies    No Known Allergies    Intolerances        SOCIAL HISTORY:  Denies ETOh,Smoking,     FAMILY HISTORY:      REVIEW OF SYSTEMS:  CONSTITUTIONAL: No weakness, fevers or chills  EYES/ENT: No visual changes;  No vertigo or throat pain   NECK: No pain or stiffness  RESPIRATORY: No cough, wheezing, hemoptysis; No shortness of breath  CARDIOVASCULAR: No chest pain or palpitations  GASTROINTESTINAL: No abdominal or epigastric pain. No nausea, vomiting, or hematemesis; No diarrhea or constipation. No melena or hematochezia.  GENITOURINARY: No dysuria, frequency or hematuria  NEUROLOGICAL: No numbness or weakness  SKIN: No itching, burning, rashes, or lesions   All other review of systems is negative unless indicated above.    VITAL:  T(C): , Max: 37 (24 @ 13:49)  T(F): , Max: 98.6 (24 @ 13:49)  HR: 72 (24 @ 13:49)  BP: 124/65 (24 @ 13:49)  BP(mean): --  RR: 20 (24 @ 13:49)  SpO2: 98% (24 @ 13:49)  Wt(kg): --    PHYSICAL EXAM:  Constitutional: NAD, Alert  HEENT: NCAT, MMM  Neck: Supple, No JVD  Respiratory: CTA-b/l  Cardiovascular: RRR s1s2, no m/r/g  Gastrointestinal: BS+, soft, NT/ND  Extremities: No peripheral edema b/l  Neurological: no focal deficits; strength grossly intact  Back: no CVAT b/l  Skin: No rashes, no nevi    LABS:                        12.1   5.98  )-----------( 121      ( 2024 16:18 )             36.2     Na(142)/K(4.8)/Cl(110)/HCO3(22)/BUN(31)/Cr(1.86)Glu(99)/Ca(10.4)/Mg(--)/PO4(--)     @ 16:18    Urinalysis Basic - ( 2024 16:18 )    Color: Red / Appearance: Cloudy / S.009 / pH: x  Gluc: 99 mg/dL / Ketone: Negative mg/dL  / Bili: Negative / Urobili: 0.2 mg/dL   Blood: x / Protein: 100 mg/dL / Nitrite: Negative   Leuk Esterase: Trace / RBC: 25 /HPF / WBC 5 /HPF   Sq Epi: x / Non Sq Epi: 3 /HPF / Bacteria: Negative /HPF            IMAGING:    ASSESSMENT:   72 yo F with morbid obesity (BMI 42.7 s/p gastric sleeve ), DMT2, CAD s/p PCI of LCX (), LEIDY (on CPAP), CKD (stage 3 - on farxiga), AF (on Eliquis) and rheumatic mitral valve disease c/o 12 hour history of spontaneous dark hematuria.    CKD III with baseline Cr 1.5 mg/dl   Mild non oliguric JORDIN - pre renal vs obstructive   Euvolemic   lytes controlled   Blood pressure controlled  hematuria       RECOMMEND:  gentle hydration NS 50 cc x 10 hours   CT abdomen pending   Urine studies when hematuria clears      Thank you for involving [facility] in this patient's care.    With warm regards,    Wilma Lopez MD   [facility]  Office: [phone]

## 2024-01-06 NOTE — ED PROVIDER NOTE - PHYSICAL EXAMINATION
GENERAL: NAD  HEENT:  Atraumatic  CHEST/LUNG: Chest rise equal bilaterally  HEART: Regular rate and rhythm  ABDOMEN: Soft, Nontender, Nondistended. No CVA TTP.   EXTREMITIES:  Extremities warm> +1 bilateral pitting edema  PSYCH: A&Ox3  SKIN: No obvious rashes or lesions  NEUROLOGY: strength and sensation intact in all extremities

## 2024-01-06 NOTE — ED PROVIDER NOTE - OBJECTIVE STATEMENT
72 y/o female w/ PMH morbid obesity (BMI 42.7 s/p gastric sleeve 2015), DMT2, CAD s/p PCI of LCX (2012), LEIDY (on CPAP), CKD (stage 3 - on farxiga), AF (on Eliquis) and rheumatic mitral valve disease c/o 12 hour history of spontaneous dark hematuria. Pts symptoms are improving throughout the day. Pt is otherwise asymptomatic. Denies fevers, chills, nausea, vomiting, dizziness, chest pain, SOB, abdominal pain, dysuria.

## 2024-01-06 NOTE — ED ADULT NURSE NOTE - NSFALLUNIVINTERV_ED_ALL_ED
Bed/Stretcher in lowest position, wheels locked, appropriate side rails in place/Call bell, personal items and telephone in reach/Instruct patient to call for assistance before getting out of bed/chair/stretcher/Non-slip footwear applied when patient is off stretcher/[place] to call system/Physically safe environment - no spills, clutter or unnecessary equipment/Purposeful proactive rounding/Room/bathroom lighting operational, light cord in reach Bed/Stretcher in lowest position, wheels locked, appropriate side rails in place/Call bell, personal items and telephone in reach/Instruct patient to call for assistance before getting out of bed/chair/stretcher/Non-slip footwear applied when patient is off stretcher/[place] to call system/Physically safe environment - no spills, clutter or unnecessary equipment/Purposeful proactive rounding/Room/bathroom lighting operational, light cord in reach

## 2024-01-06 NOTE — ED PROVIDER NOTE - NSFOLLOWUPINSTRUCTIONS_ED_ALL_ED_FT
You were seen for resolving hematuria.     You have a 7 mm hyperattenuating lesion in the left kidney mid pole.    Please follow up with your cardiologist for your elevated pro-BNP and a urology doctor for your bloody urine.     Please follow up with your primary care doctor for your liver findings on the CT scan.       SEEK IMMEDIATE MEDICAL CARE IF YOU HAVE ANY OF THE FOLLOWING SYMPTOMS: worsening chest pain, coughing up blood, unexplained back/neck/jaw pain, severe abdominal pain, dizziness or lightheadedness, fainting, shortness of breath, sweaty or clammy skin, vomiting, or racing heart beat. These symptoms may represent a serious problem that is an emergency. Do not wait to see if the symptoms will go away. Get medical help right away. Call 911 and do not drive yourself to the hospital.

## 2024-01-08 ENCOUNTER — APPOINTMENT (OUTPATIENT)
Dept: CARDIOLOGY | Facility: CLINIC | Age: 74
End: 2024-01-08

## 2024-01-08 LAB
CULTURE RESULTS: SIGNIFICANT CHANGE UP
CULTURE RESULTS: SIGNIFICANT CHANGE UP
SPECIMEN SOURCE: SIGNIFICANT CHANGE UP
SPECIMEN SOURCE: SIGNIFICANT CHANGE UP

## 2024-01-16 ENCOUNTER — NON-APPOINTMENT (OUTPATIENT)
Age: 74
End: 2024-01-16

## 2024-01-18 ENCOUNTER — TRANSCRIPTION ENCOUNTER (OUTPATIENT)
Age: 74
End: 2024-01-18

## 2024-01-18 ENCOUNTER — OUTPATIENT (OUTPATIENT)
Dept: OUTPATIENT SERVICES | Facility: HOSPITAL | Age: 74
LOS: 1 days | End: 2024-01-18
Payer: MEDICARE

## 2024-01-18 VITALS
OXYGEN SATURATION: 98 % | HEART RATE: 74 BPM | RESPIRATION RATE: 17 BRPM | SYSTOLIC BLOOD PRESSURE: 126 MMHG | DIASTOLIC BLOOD PRESSURE: 63 MMHG

## 2024-01-18 VITALS
WEIGHT: 257.06 LBS | TEMPERATURE: 98 F | OXYGEN SATURATION: 98 % | HEIGHT: 63 IN | RESPIRATION RATE: 17 BRPM | HEART RATE: 74 BPM | SYSTOLIC BLOOD PRESSURE: 143 MMHG | DIASTOLIC BLOOD PRESSURE: 68 MMHG

## 2024-01-18 DIAGNOSIS — I25.10 ATHEROSCLEROTIC HEART DISEASE OF NATIVE CORONARY ARTERY WITHOUT ANGINA PECTORIS: ICD-10-CM

## 2024-01-18 DIAGNOSIS — Z98.890 OTHER SPECIFIED POSTPROCEDURAL STATES: Chronic | ICD-10-CM

## 2024-01-18 DIAGNOSIS — Z98.84 BARIATRIC SURGERY STATUS: Chronic | ICD-10-CM

## 2024-01-18 DIAGNOSIS — Z95.5 PRESENCE OF CORONARY ANGIOPLASTY IMPLANT AND GRAFT: Chronic | ICD-10-CM

## 2024-01-18 DIAGNOSIS — Z90.3 ACQUIRED ABSENCE OF STOMACH [PART OF]: Chronic | ICD-10-CM

## 2024-01-18 LAB
ANION GAP SERPL CALC-SCNC: 12 MMOL/L — SIGNIFICANT CHANGE UP (ref 5–17)
BUN SERPL-MCNC: 33 MG/DL — HIGH (ref 7–23)
CALCIUM SERPL-MCNC: 10.4 MG/DL — SIGNIFICANT CHANGE UP (ref 8.4–10.5)
CHLORIDE SERPL-SCNC: 107 MMOL/L — SIGNIFICANT CHANGE UP (ref 96–108)
CO2 SERPL-SCNC: 23 MMOL/L — SIGNIFICANT CHANGE UP (ref 22–31)
CREAT SERPL-MCNC: 2.03 MG/DL — HIGH (ref 0.5–1.3)
EGFR: 26 ML/MIN/1.73M2 — LOW
GLUCOSE BLDC GLUCOMTR-MCNC: 157 MG/DL — HIGH (ref 70–99)
GLUCOSE SERPL-MCNC: 157 MG/DL — HIGH (ref 70–99)
HCT VFR BLD CALC: 38.4 % — SIGNIFICANT CHANGE UP (ref 34.5–45)
HGB BLD-MCNC: 12.8 G/DL — SIGNIFICANT CHANGE UP (ref 11.5–15.5)
MCHC RBC-ENTMCNC: 33 PG — SIGNIFICANT CHANGE UP (ref 27–34)
MCHC RBC-ENTMCNC: 33.3 GM/DL — SIGNIFICANT CHANGE UP (ref 32–36)
MCV RBC AUTO: 99 FL — SIGNIFICANT CHANGE UP (ref 80–100)
NRBC # BLD: 0 /100 WBCS — SIGNIFICANT CHANGE UP (ref 0–0)
PLATELET # BLD AUTO: 154 K/UL — SIGNIFICANT CHANGE UP (ref 150–400)
POTASSIUM SERPL-MCNC: 4.6 MMOL/L — SIGNIFICANT CHANGE UP (ref 3.5–5.3)
POTASSIUM SERPL-SCNC: 4.6 MMOL/L — SIGNIFICANT CHANGE UP (ref 3.5–5.3)
RBC # BLD: 3.88 M/UL — SIGNIFICANT CHANGE UP (ref 3.8–5.2)
RBC # FLD: 14.5 % — SIGNIFICANT CHANGE UP (ref 10.3–14.5)
SODIUM SERPL-SCNC: 142 MMOL/L — SIGNIFICANT CHANGE UP (ref 135–145)
WBC # BLD: 7.68 K/UL — SIGNIFICANT CHANGE UP (ref 3.8–10.5)
WBC # FLD AUTO: 7.68 K/UL — SIGNIFICANT CHANGE UP (ref 3.8–10.5)

## 2024-01-18 PROCEDURE — 87640 STAPH A DNA AMP PROBE: CPT

## 2024-01-18 PROCEDURE — 93454 CORONARY ARTERY ANGIO S&I: CPT | Mod: 26

## 2024-01-18 PROCEDURE — 86850 RBC ANTIBODY SCREEN: CPT

## 2024-01-18 PROCEDURE — 93880 EXTRACRANIAL BILAT STUDY: CPT

## 2024-01-18 PROCEDURE — 86900 BLOOD TYPING SEROLOGIC ABO: CPT

## 2024-01-18 PROCEDURE — 85027 COMPLETE CBC AUTOMATED: CPT

## 2024-01-18 PROCEDURE — G0463: CPT

## 2024-01-18 PROCEDURE — 83735 ASSAY OF MAGNESIUM: CPT

## 2024-01-18 PROCEDURE — 99152 MOD SED SAME PHYS/QHP 5/>YRS: CPT

## 2024-01-18 PROCEDURE — 86901 BLOOD TYPING SEROLOGIC RH(D): CPT

## 2024-01-18 PROCEDURE — 82962 GLUCOSE BLOOD TEST: CPT

## 2024-01-18 PROCEDURE — 71046 X-RAY EXAM CHEST 2 VIEWS: CPT

## 2024-01-18 PROCEDURE — 93005 ELECTROCARDIOGRAM TRACING: CPT

## 2024-01-18 PROCEDURE — 86923 COMPATIBILITY TEST ELECTRIC: CPT

## 2024-01-18 PROCEDURE — 87641 MR-STAPH DNA AMP PROBE: CPT

## 2024-01-18 PROCEDURE — 83036 HEMOGLOBIN GLYCOSYLATED A1C: CPT

## 2024-01-18 PROCEDURE — 80048 BASIC METABOLIC PNL TOTAL CA: CPT

## 2024-01-18 PROCEDURE — 36415 COLL VENOUS BLD VENIPUNCTURE: CPT

## 2024-01-18 PROCEDURE — 80053 COMPREHEN METABOLIC PANEL: CPT

## 2024-01-18 RX ORDER — UBIDECARENONE 100 MG
1 CAPSULE ORAL
Refills: 0 | DISCHARGE

## 2024-01-18 RX ORDER — APIXABAN 2.5 MG/1
1 TABLET, FILM COATED ORAL
Refills: 0 | DISCHARGE

## 2024-01-18 RX ORDER — ASCORBIC ACID 60 MG
1 TABLET,CHEWABLE ORAL
Refills: 0 | DISCHARGE

## 2024-01-18 RX ORDER — DULAGLUTIDE 4.5 MG/.5ML
0.75 INJECTION, SOLUTION SUBCUTANEOUS
Refills: 0 | DISCHARGE

## 2024-01-18 RX ORDER — METFORMIN HYDROCHLORIDE 850 MG/1
1 TABLET ORAL
Refills: 0 | DISCHARGE

## 2024-01-18 RX ORDER — SODIUM CHLORIDE 9 MG/ML
1000 INJECTION INTRAMUSCULAR; INTRAVENOUS; SUBCUTANEOUS
Refills: 0 | Status: DISCONTINUED | OUTPATIENT
Start: 2024-01-18 | End: 2024-02-01

## 2024-01-18 RX ORDER — LOSARTAN POTASSIUM 100 MG/1
1 TABLET, FILM COATED ORAL
Refills: 0 | DISCHARGE

## 2024-01-18 RX ORDER — DAPAGLIFLOZIN 10 MG/1
1 TABLET, FILM COATED ORAL
Refills: 0 | DISCHARGE

## 2024-01-18 RX ADMIN — SODIUM CHLORIDE 75 MILLILITER(S): 9 INJECTION INTRAMUSCULAR; INTRAVENOUS; SUBCUTANEOUS at 07:29

## 2024-01-18 NOTE — ASU PATIENT PROFILE, ADULT - VISION (WITH CORRECTIVE LENSES IF THE PATIENT USUALLY WEARS THEM):
Discharged in satisfactory condition. Normal vision: sees adequately in most situations; can see medication labels, newsprint

## 2024-01-18 NOTE — ASU DISCHARGE PLAN (ADULT/PEDIATRIC) - NS MD DC FALL RISK RISK
For information on Fall & Injury Prevention, visit: [email or web address] OR  [email or web address] OR  https://www.cdc.gov/steadi/patient.html

## 2024-01-18 NOTE — ASU DISCHARGE PLAN (ADULT/PEDIATRIC) - ASU DC SPECIAL INSTRUCTIONSFT

## 2024-01-18 NOTE — ASU DISCHARGE PLAN (ADULT/PEDIATRIC) - CARE PROVIDER_API CALL
Pierre Dalal  Thoracic and Cardiac Surgery  [address] [place] [id number]  Phone: [phone]  Fax: [phone]  Established Patient  Follow Up Time:

## 2024-01-22 ENCOUNTER — NON-APPOINTMENT (OUTPATIENT)
Age: 74
End: 2024-01-22

## 2024-01-22 ENCOUNTER — TRANSCRIPTION ENCOUNTER (OUTPATIENT)
Age: 74
End: 2024-01-22

## 2024-01-23 ENCOUNTER — INPATIENT (INPATIENT)
Facility: HOSPITAL | Age: 74
LOS: 15 days | Discharge: HOME HEALTH | DRG: 219 | End: 2024-02-08
Attending: THORACIC SURGERY (CARDIOTHORACIC VASCULAR SURGERY) | Admitting: THORACIC SURGERY (CARDIOTHORACIC VASCULAR SURGERY)
Payer: MEDICARE

## 2024-01-23 ENCOUNTER — RESULT REVIEW (OUTPATIENT)
Age: 74
End: 2024-01-23

## 2024-01-23 ENCOUNTER — APPOINTMENT (OUTPATIENT)
Dept: CARDIOTHORACIC SURGERY | Facility: HOSPITAL | Age: 74
End: 2024-01-23

## 2024-01-23 VITALS
HEIGHT: 62.52 IN | DIASTOLIC BLOOD PRESSURE: 83 MMHG | OXYGEN SATURATION: 96 % | SYSTOLIC BLOOD PRESSURE: 158 MMHG | RESPIRATION RATE: 18 BRPM | TEMPERATURE: 99 F | HEART RATE: 71 BPM | WEIGHT: 240.08 LBS

## 2024-01-23 DIAGNOSIS — Z95.5 PRESENCE OF CORONARY ANGIOPLASTY IMPLANT AND GRAFT: Chronic | ICD-10-CM

## 2024-01-23 DIAGNOSIS — Z98.84 BARIATRIC SURGERY STATUS: Chronic | ICD-10-CM

## 2024-01-23 DIAGNOSIS — I34.81 NONRHEUMATIC MITRAL (VALVE) ANNULUS CALCIFICATION: ICD-10-CM

## 2024-01-23 DIAGNOSIS — Z90.3 ACQUIRED ABSENCE OF STOMACH [PART OF]: Chronic | ICD-10-CM

## 2024-01-23 DIAGNOSIS — Z98.890 OTHER SPECIFIED POSTPROCEDURAL STATES: Chronic | ICD-10-CM

## 2024-01-23 LAB
ALBUMIN SERPL ELPH-MCNC: 3.3 G/DL — SIGNIFICANT CHANGE UP (ref 3.3–5)
ALP SERPL-CCNC: 45 U/L — SIGNIFICANT CHANGE UP (ref 40–120)
ALT FLD-CCNC: 21 U/L — SIGNIFICANT CHANGE UP (ref 10–45)
ANION GAP SERPL CALC-SCNC: 13 MMOL/L — SIGNIFICANT CHANGE UP (ref 5–17)
APTT BLD: 26.2 SEC — SIGNIFICANT CHANGE UP (ref 24.5–35.6)
AST SERPL-CCNC: 75 U/L — HIGH (ref 10–40)
BASE EXCESS BLDV CALC-SCNC: -0.4 MMOL/L — SIGNIFICANT CHANGE UP (ref -2–3)
BASE EXCESS BLDV CALC-SCNC: -0.5 MMOL/L — SIGNIFICANT CHANGE UP (ref -2–3)
BASE EXCESS BLDV CALC-SCNC: -0.8 MMOL/L — SIGNIFICANT CHANGE UP (ref -2–3)
BASE EXCESS BLDV CALC-SCNC: -1.6 MMOL/L — SIGNIFICANT CHANGE UP (ref -2–3)
BASE EXCESS BLDV CALC-SCNC: -1.8 MMOL/L — SIGNIFICANT CHANGE UP (ref -2–3)
BASE EXCESS BLDV CALC-SCNC: -2.3 MMOL/L — LOW (ref -2–3)
BASE EXCESS BLDV CALC-SCNC: -4.9 MMOL/L — LOW (ref -2–3)
BASOPHILS # BLD AUTO: 0.01 K/UL — SIGNIFICANT CHANGE UP (ref 0–0.2)
BASOPHILS NFR BLD AUTO: 0.2 % — SIGNIFICANT CHANGE UP (ref 0–2)
BILIRUB SERPL-MCNC: 1 MG/DL — SIGNIFICANT CHANGE UP (ref 0.2–1.2)
BUN SERPL-MCNC: 22 MG/DL — SIGNIFICANT CHANGE UP (ref 7–23)
CA-I SERPL-SCNC: 0.89 MMOL/L — LOW (ref 1.15–1.33)
CA-I SERPL-SCNC: 0.91 MMOL/L — LOW (ref 1.15–1.33)
CA-I SERPL-SCNC: 1.03 MMOL/L — LOW (ref 1.15–1.33)
CA-I SERPL-SCNC: 1.18 MMOL/L — SIGNIFICANT CHANGE UP (ref 1.15–1.33)
CALCIUM SERPL-MCNC: 8.5 MG/DL — SIGNIFICANT CHANGE UP (ref 8.4–10.5)
CHLORIDE BLDV-SCNC: 106 MMOL/L — SIGNIFICANT CHANGE UP (ref 96–108)
CHLORIDE BLDV-SCNC: 108 MMOL/L — SIGNIFICANT CHANGE UP (ref 96–108)
CHLORIDE BLDV-SCNC: 110 MMOL/L — HIGH (ref 96–108)
CHLORIDE BLDV-SCNC: 111 MMOL/L — HIGH (ref 96–108)
CHLORIDE SERPL-SCNC: 112 MMOL/L — HIGH (ref 96–108)
CK MB BLD-MCNC: 9.7 % — HIGH (ref 0–3.5)
CK MB CFR SERPL CALC: 74 NG/ML — HIGH (ref 0–3.8)
CK SERPL-CCNC: 759 U/L — HIGH (ref 25–170)
CLOSURE TME COLL+EPINEP BLD: 107 K/UL — LOW (ref 150–400)
CO2 BLDV-SCNC: 22 MMOL/L — SIGNIFICANT CHANGE UP (ref 22–26)
CO2 BLDV-SCNC: 24 MMOL/L — SIGNIFICANT CHANGE UP (ref 22–26)
CO2 BLDV-SCNC: 25 MMOL/L — SIGNIFICANT CHANGE UP (ref 22–26)
CO2 BLDV-SCNC: 26 MMOL/L — SIGNIFICANT CHANGE UP (ref 22–26)
CO2 SERPL-SCNC: 22 MMOL/L — SIGNIFICANT CHANGE UP (ref 22–31)
CREAT SERPL-MCNC: 1.4 MG/DL — HIGH (ref 0.5–1.3)
EGFR: 40 ML/MIN/1.73M2 — LOW
EOSINOPHIL # BLD AUTO: 0.02 K/UL — SIGNIFICANT CHANGE UP (ref 0–0.5)
EOSINOPHIL NFR BLD AUTO: 0.3 % — SIGNIFICANT CHANGE UP (ref 0–6)
FIBRINOGEN PPP-MCNC: 289 MG/DL — SIGNIFICANT CHANGE UP (ref 200–445)
GAS PNL BLDA: SIGNIFICANT CHANGE UP
GAS PNL BLDV: 140 MMOL/L — SIGNIFICANT CHANGE UP (ref 136–145)
GAS PNL BLDV: 141 MMOL/L — SIGNIFICANT CHANGE UP (ref 136–145)
GAS PNL BLDV: 142 MMOL/L — SIGNIFICANT CHANGE UP (ref 136–145)
GAS PNL BLDV: 143 MMOL/L — SIGNIFICANT CHANGE UP (ref 136–145)
GAS PNL BLDV: SIGNIFICANT CHANGE UP
GLUCOSE BLDV-MCNC: 116 MG/DL — HIGH (ref 70–99)
GLUCOSE BLDV-MCNC: 126 MG/DL — HIGH (ref 70–99)
GLUCOSE BLDV-MCNC: 132 MG/DL — HIGH (ref 70–99)
GLUCOSE BLDV-MCNC: 140 MG/DL — HIGH (ref 70–99)
GLUCOSE SERPL-MCNC: 136 MG/DL — HIGH (ref 70–99)
HCO3 BLDV-SCNC: 21 MMOL/L — LOW (ref 22–29)
HCO3 BLDV-SCNC: 23 MMOL/L — SIGNIFICANT CHANGE UP (ref 22–29)
HCO3 BLDV-SCNC: 24 MMOL/L — SIGNIFICANT CHANGE UP (ref 22–29)
HCO3 BLDV-SCNC: 25 MMOL/L — SIGNIFICANT CHANGE UP (ref 22–29)
HCT VFR BLD CALC: 28.2 % — LOW (ref 34.5–45)
HCT VFR BLDA CALC: 25 % — LOW (ref 34.5–46.5)
HCT VFR BLDA CALC: 26 % — LOW (ref 34.5–46.5)
HCT VFR BLDA CALC: 27 % — LOW (ref 34.5–46.5)
HCT VFR BLDA CALC: 29 % — LOW (ref 34.5–46.5)
HGB BLD CALC-MCNC: 8.2 G/DL — LOW (ref 11.7–16.1)
HGB BLD CALC-MCNC: 8.8 G/DL — LOW (ref 11.7–16.1)
HGB BLD CALC-MCNC: 8.9 G/DL — LOW (ref 11.7–16.1)
HGB BLD CALC-MCNC: 9.7 G/DL — LOW (ref 11.7–16.1)
HGB BLD-MCNC: 9.7 G/DL — LOW (ref 11.5–15.5)
HOROWITZ INDEX BLDV+IHG-RTO: 100 — SIGNIFICANT CHANGE UP
HOROWITZ INDEX BLDV+IHG-RTO: 40 — SIGNIFICANT CHANGE UP
HOROWITZ INDEX BLDV+IHG-RTO: 50 — SIGNIFICANT CHANGE UP
IMM GRANULOCYTES NFR BLD AUTO: 0.7 % — SIGNIFICANT CHANGE UP (ref 0–0.9)
INR BLD: 1.23 RATIO — HIGH (ref 0.85–1.18)
LACTATE BLDV-MCNC: 0.7 MMOL/L — SIGNIFICANT CHANGE UP (ref 0.5–2)
LACTATE BLDV-MCNC: 0.7 MMOL/L — SIGNIFICANT CHANGE UP (ref 0.5–2)
LACTATE BLDV-MCNC: 0.8 MMOL/L — SIGNIFICANT CHANGE UP (ref 0.5–2)
LACTATE BLDV-MCNC: 1.4 MMOL/L — SIGNIFICANT CHANGE UP (ref 0.5–2)
LYMPHOCYTES # BLD AUTO: 1.2 K/UL — SIGNIFICANT CHANGE UP (ref 1–3.3)
LYMPHOCYTES # BLD AUTO: 20 % — SIGNIFICANT CHANGE UP (ref 13–44)
MAGNESIUM SERPL-MCNC: 4 MG/DL — HIGH (ref 1.6–2.6)
MCHC RBC-ENTMCNC: 33.2 PG — SIGNIFICANT CHANGE UP (ref 27–34)
MCHC RBC-ENTMCNC: 34.4 GM/DL — SIGNIFICANT CHANGE UP (ref 32–36)
MCV RBC AUTO: 96.6 FL — SIGNIFICANT CHANGE UP (ref 80–100)
MONOCYTES # BLD AUTO: 0.54 K/UL — SIGNIFICANT CHANGE UP (ref 0–0.9)
MONOCYTES NFR BLD AUTO: 9 % — SIGNIFICANT CHANGE UP (ref 2–14)
NEUTROPHILS # BLD AUTO: 4.18 K/UL — SIGNIFICANT CHANGE UP (ref 1.8–7.4)
NEUTROPHILS NFR BLD AUTO: 69.8 % — SIGNIFICANT CHANGE UP (ref 43–77)
NRBC # BLD: 0 /100 WBCS — SIGNIFICANT CHANGE UP (ref 0–0)
OTHER CELLS CSF MANUAL: 12.8 ML/DL — LOW (ref 18–22)
PCO2 BLDV: 34 MMHG — LOW (ref 39–42)
PCO2 BLDV: 36 MMHG — LOW (ref 39–42)
PCO2 BLDV: 39 MMHG — SIGNIFICANT CHANGE UP (ref 39–42)
PCO2 BLDV: 39 MMHG — SIGNIFICANT CHANGE UP (ref 39–42)
PCO2 BLDV: 42 MMHG — SIGNIFICANT CHANGE UP (ref 39–42)
PCO2 BLDV: 42 MMHG — SIGNIFICANT CHANGE UP (ref 39–42)
PCO2 BLDV: 43 MMHG — HIGH (ref 39–42)
PH BLDV: 7.3 — LOW (ref 7.32–7.43)
PH BLDV: 7.35 — SIGNIFICANT CHANGE UP (ref 7.32–7.43)
PH BLDV: 7.38 — SIGNIFICANT CHANGE UP (ref 7.32–7.43)
PH BLDV: 7.38 — SIGNIFICANT CHANGE UP (ref 7.32–7.43)
PH BLDV: 7.4 — SIGNIFICANT CHANGE UP (ref 7.32–7.43)
PH BLDV: 7.41 — SIGNIFICANT CHANGE UP (ref 7.32–7.43)
PH BLDV: 7.44 — HIGH (ref 7.32–7.43)
PHOSPHATE SERPL-MCNC: 1.8 MG/DL — LOW (ref 2.5–4.5)
PLATELET # BLD AUTO: SIGNIFICANT CHANGE UP K/UL (ref 150–400)
PO2 BLDV: 174 MMHG — HIGH (ref 25–45)
PO2 BLDV: 214 MMHG — HIGH (ref 25–45)
PO2 BLDV: 241 MMHG — HIGH (ref 25–45)
PO2 BLDV: 43 MMHG — SIGNIFICANT CHANGE UP (ref 25–45)
PO2 BLDV: 44 MMHG — SIGNIFICANT CHANGE UP (ref 25–45)
PO2 BLDV: 48 MMHG — HIGH (ref 25–45)
PO2 BLDV: 70 MMHG — HIGH (ref 25–45)
POTASSIUM BLDV-SCNC: 4.2 MMOL/L — SIGNIFICANT CHANGE UP (ref 3.5–5.1)
POTASSIUM BLDV-SCNC: 5.2 MMOL/L — HIGH (ref 3.5–5.1)
POTASSIUM BLDV-SCNC: 6.2 MMOL/L — CRITICAL HIGH (ref 3.5–5.1)
POTASSIUM BLDV-SCNC: 6.5 MMOL/L — CRITICAL HIGH (ref 3.5–5.1)
POTASSIUM BLDV-SCNC: 7.4 MMOL/L — CRITICAL HIGH (ref 3.5–5.1)
POTASSIUM SERPL-MCNC: 4.9 MMOL/L — SIGNIFICANT CHANGE UP (ref 3.5–5.3)
POTASSIUM SERPL-SCNC: 4.9 MMOL/L — SIGNIFICANT CHANGE UP (ref 3.5–5.3)
PROT SERPL-MCNC: 5.1 G/DL — LOW (ref 6–8.3)
PROTHROM AB SERPL-ACNC: 13.4 SEC — HIGH (ref 9.5–13)
RBC # BLD: 2.92 M/UL — LOW (ref 3.8–5.2)
RBC # FLD: 14.6 % — HIGH (ref 10.3–14.5)
SAO2 % BLDV: 73.4 % — SIGNIFICANT CHANGE UP (ref 67–88)
SAO2 % BLDV: 75.6 % — SIGNIFICANT CHANGE UP (ref 67–88)
SAO2 % BLDV: 79.2 % — SIGNIFICANT CHANGE UP (ref 67–88)
SAO2 % BLDV: 95.7 % — HIGH (ref 67–88)
SAO2 % BLDV: 98.7 % — HIGH (ref 67–88)
SAO2 % BLDV: 99.3 % — HIGH (ref 67–88)
SAO2 % BLDV: 99.8 % — HIGH (ref 67–88)
SODIUM SERPL-SCNC: 147 MMOL/L — HIGH (ref 135–145)
TROPONIN T, HIGH SENSITIVITY RESULT: 2999 NG/L — HIGH (ref 0–51)
VANCOMYCIN TROUGH SERPL-MCNC: 13.3 UG/ML — SIGNIFICANT CHANGE UP (ref 10–20)
WBC # BLD: 5.99 K/UL — SIGNIFICANT CHANGE UP (ref 3.8–10.5)
WBC # FLD AUTO: 5.99 K/UL — SIGNIFICANT CHANGE UP (ref 3.8–10.5)

## 2024-01-23 PROCEDURE — 99291 CRITICAL CARE FIRST HOUR: CPT

## 2024-01-23 PROCEDURE — 33430 REPLACEMENT OF MITRAL VALVE: CPT

## 2024-01-23 PROCEDURE — 33510 CABG VEIN SINGLE: CPT

## 2024-01-23 PROCEDURE — 88304 TISSUE EXAM BY PATHOLOGIST: CPT | Mod: 26

## 2024-01-23 PROCEDURE — 88305 TISSUE EXAM BY PATHOLOGIST: CPT | Mod: 26

## 2024-01-23 PROCEDURE — 71045 X-RAY EXAM CHEST 1 VIEW: CPT | Mod: 26

## 2024-01-23 PROCEDURE — 33258 ABLATE ATRIA X10SV ADD-ON: CPT

## 2024-01-23 PROCEDURE — 33508 ENDOSCOPIC VEIN HARVEST: CPT | Mod: 59

## 2024-01-23 DEVICE — SURGICEL FIBRILLAR 4 X 4": Type: IMPLANTABLE DEVICE | Status: FUNCTIONAL

## 2024-01-23 DEVICE — MEDIASTINAL CATH DRAIN 9MM: Type: IMPLANTABLE DEVICE | Status: FUNCTIONAL

## 2024-01-23 DEVICE — CATH VENT L 2 LUM 18FRX20IN: Type: IMPLANTABLE DEVICE | Status: FUNCTIONAL

## 2024-01-23 DEVICE — PACING WIRE WHITE M-24 BAREWIRE 37MM X 89MM: Type: IMPLANTABLE DEVICE | Status: FUNCTIONAL

## 2024-01-23 DEVICE — CANNULA RCSP 15FR X 12.5" AUTO-INFLATE CUFF WITH SOLID STYLET: Type: IMPLANTABLE DEVICE | Status: FUNCTIONAL

## 2024-01-23 DEVICE — LIGATING CLIPS WECK HORIZON MEDIUM (BLUE) 24: Type: IMPLANTABLE DEVICE | Status: FUNCTIONAL

## 2024-01-23 DEVICE — LIGATING CLIPS WECK HORIZON SMALL-WIDE (RED) 24: Type: IMPLANTABLE DEVICE | Status: FUNCTIONAL

## 2024-01-23 DEVICE — CANNULA VENOUS 1 STAGE RIGHT ANGLE 28FR X 3/8": Type: IMPLANTABLE DEVICE | Status: FUNCTIONAL

## 2024-01-23 DEVICE — CANNULA AORTIC PERFUSION EZ GLIDE STRAIGHT 21FR X 35CM VENTED: Type: IMPLANTABLE DEVICE | Status: FUNCTIONAL

## 2024-01-23 DEVICE — ATRICLIP LAA EXCLUSION DEVICE 35MM FLEX: Type: IMPLANTABLE DEVICE | Status: FUNCTIONAL

## 2024-01-23 DEVICE — CANNULA AORTIC ROOT 14G X 14CM FLANGED: Type: IMPLANTABLE DEVICE | Status: FUNCTIONAL

## 2024-01-23 DEVICE — CANNULA VENOUS 1 STAGE STRAIGHT 36FR X 1/2": Type: IMPLANTABLE DEVICE | Status: FUNCTIONAL

## 2024-01-23 DEVICE — ATRICURE ISOLATOR SYNERGY CLAMP OPEN LEFT CURVE 65MM JAW: Type: IMPLANTABLE DEVICE | Status: FUNCTIONAL

## 2024-01-23 DEVICE — SURGCEL 4 X 8": Type: IMPLANTABLE DEVICE | Status: FUNCTIONAL

## 2024-01-23 DEVICE — KIT CVC 2LUM MAC 9FR CHG: Type: IMPLANTABLE DEVICE | Status: FUNCTIONAL

## 2024-01-23 DEVICE — KIT A-LINE 1LUM 20G X 12CM SAFE KIT: Type: IMPLANTABLE DEVICE | Status: FUNCTIONAL

## 2024-01-23 DEVICE — BIOGLUE 5ML SYR: Type: IMPLANTABLE DEVICE | Status: FUNCTIONAL

## 2024-01-23 DEVICE — IMPLANTABLE DEVICE: Type: IMPLANTABLE DEVICE | Status: FUNCTIONAL

## 2024-01-23 RX ORDER — INSULIN HUMAN 100 [IU]/ML
3 INJECTION, SOLUTION SUBCUTANEOUS
Qty: 100 | Refills: 0 | Status: DISCONTINUED | OUTPATIENT
Start: 2024-01-23 | End: 2024-01-26

## 2024-01-23 RX ORDER — MUPIROCIN 20 MG/G
1 OINTMENT TOPICAL
Refills: 0 | Status: COMPLETED | OUTPATIENT
Start: 2024-01-23 | End: 2024-01-28

## 2024-01-23 RX ORDER — SODIUM CHLORIDE 9 MG/ML
3 INJECTION INTRAMUSCULAR; INTRAVENOUS; SUBCUTANEOUS EVERY 8 HOURS
Refills: 0 | Status: DISCONTINUED | OUTPATIENT
Start: 2024-01-23 | End: 2024-02-08

## 2024-01-23 RX ORDER — CHLORHEXIDINE GLUCONATE 213 G/1000ML
1 SOLUTION TOPICAL DAILY
Refills: 0 | Status: DISCONTINUED | OUTPATIENT
Start: 2024-01-23 | End: 2024-02-08

## 2024-01-23 RX ORDER — ASCORBIC ACID 60 MG
500 TABLET,CHEWABLE ORAL
Refills: 0 | Status: COMPLETED | OUTPATIENT
Start: 2024-01-23 | End: 2024-01-28

## 2024-01-23 RX ORDER — CALCIUM GLUCONATE 100 MG/ML
2 VIAL (ML) INTRAVENOUS ONCE
Refills: 0 | Status: COMPLETED | OUTPATIENT
Start: 2024-01-23 | End: 2024-01-23

## 2024-01-23 RX ORDER — ACETAMINOPHEN 500 MG
1000 TABLET ORAL ONCE
Refills: 0 | Status: COMPLETED | OUTPATIENT
Start: 2024-01-23 | End: 2024-01-23

## 2024-01-23 RX ORDER — OXYCODONE HYDROCHLORIDE 5 MG/1
5 TABLET ORAL EVERY 4 HOURS
Refills: 0 | Status: DISCONTINUED | OUTPATIENT
Start: 2024-01-23 | End: 2024-01-27

## 2024-01-23 RX ORDER — ALBUMIN HUMAN 25 %
250 VIAL (ML) INTRAVENOUS ONCE
Refills: 0 | Status: COMPLETED | OUTPATIENT
Start: 2024-01-23 | End: 2024-01-23

## 2024-01-23 RX ORDER — DEXTROSE 50 % IN WATER 50 %
50 SYRINGE (ML) INTRAVENOUS
Refills: 0 | Status: DISCONTINUED | OUTPATIENT
Start: 2024-01-23 | End: 2024-02-08

## 2024-01-23 RX ORDER — ALBUMIN HUMAN 25 %
250 VIAL (ML) INTRAVENOUS
Refills: 0 | Status: COMPLETED | OUTPATIENT
Start: 2024-01-23 | End: 2024-01-23

## 2024-01-23 RX ORDER — PANTOPRAZOLE SODIUM 20 MG/1
40 TABLET, DELAYED RELEASE ORAL DAILY
Refills: 0 | Status: DISCONTINUED | OUTPATIENT
Start: 2024-01-23 | End: 2024-01-26

## 2024-01-23 RX ORDER — LIDOCAINE HCL 20 MG/ML
0.2 VIAL (ML) INJECTION ONCE
Refills: 0 | Status: COMPLETED | OUTPATIENT
Start: 2024-01-23 | End: 2024-01-23

## 2024-01-23 RX ORDER — MILRINONE LACTATE 1 MG/ML
0.2 INJECTION, SOLUTION INTRAVENOUS
Qty: 20 | Refills: 0 | Status: DISCONTINUED | OUTPATIENT
Start: 2024-01-23 | End: 2024-01-24

## 2024-01-23 RX ORDER — SODIUM CHLORIDE 9 MG/ML
1000 INJECTION INTRAMUSCULAR; INTRAVENOUS; SUBCUTANEOUS
Refills: 0 | Status: DISCONTINUED | OUTPATIENT
Start: 2024-01-23 | End: 2024-02-04

## 2024-01-23 RX ORDER — POLYETHYLENE GLYCOL 3350 17 G/17G
17 POWDER, FOR SOLUTION ORAL DAILY
Refills: 0 | Status: DISCONTINUED | OUTPATIENT
Start: 2024-01-24 | End: 2024-02-08

## 2024-01-23 RX ORDER — SODIUM CHLORIDE 9 MG/ML
3 INJECTION INTRAMUSCULAR; INTRAVENOUS; SUBCUTANEOUS EVERY 8 HOURS
Refills: 0 | Status: DISCONTINUED | OUTPATIENT
Start: 2024-01-23 | End: 2024-01-23

## 2024-01-23 RX ORDER — CHLORHEXIDINE GLUCONATE 213 G/1000ML
15 SOLUTION TOPICAL EVERY 12 HOURS
Refills: 0 | Status: DISCONTINUED | OUTPATIENT
Start: 2024-01-23 | End: 2024-01-23

## 2024-01-23 RX ORDER — SODIUM CHLORIDE 9 MG/ML
1000 INJECTION INTRAMUSCULAR; INTRAVENOUS; SUBCUTANEOUS ONCE
Refills: 0 | Status: COMPLETED | OUTPATIENT
Start: 2024-01-23 | End: 2024-01-23

## 2024-01-23 RX ORDER — CEFUROXIME AXETIL 250 MG
1500 TABLET ORAL EVERY 8 HOURS
Refills: 0 | Status: DISCONTINUED | OUTPATIENT
Start: 2024-01-23 | End: 2024-01-23

## 2024-01-23 RX ORDER — GABAPENTIN 400 MG/1
200 CAPSULE ORAL ONCE
Refills: 0 | Status: COMPLETED | OUTPATIENT
Start: 2024-01-23 | End: 2024-01-23

## 2024-01-23 RX ORDER — ACETAMINOPHEN 500 MG
650 TABLET ORAL EVERY 6 HOURS
Refills: 0 | Status: COMPLETED | OUTPATIENT
Start: 2024-01-23 | End: 2024-01-26

## 2024-01-23 RX ORDER — SENNA PLUS 8.6 MG/1
2 TABLET ORAL AT BEDTIME
Refills: 0 | Status: DISCONTINUED | OUTPATIENT
Start: 2024-01-24 | End: 2024-02-08

## 2024-01-23 RX ORDER — ALLOPURINOL 300 MG
1 TABLET ORAL
Refills: 0 | DISCHARGE

## 2024-01-23 RX ORDER — OXYCODONE HYDROCHLORIDE 5 MG/1
10 TABLET ORAL EVERY 4 HOURS
Refills: 0 | Status: DISCONTINUED | OUTPATIENT
Start: 2024-01-23 | End: 2024-01-30

## 2024-01-23 RX ORDER — DOBUTAMINE HCL 250MG/20ML
2.5 VIAL (ML) INTRAVENOUS
Qty: 500 | Refills: 0 | Status: DISCONTINUED | OUTPATIENT
Start: 2024-01-23 | End: 2024-01-27

## 2024-01-23 RX ORDER — HYDROMORPHONE HYDROCHLORIDE 2 MG/ML
0.5 INJECTION INTRAMUSCULAR; INTRAVENOUS; SUBCUTANEOUS ONCE
Refills: 0 | Status: DISCONTINUED | OUTPATIENT
Start: 2024-01-23 | End: 2024-01-23

## 2024-01-23 RX ORDER — AMIODARONE HYDROCHLORIDE 400 MG/1
400 TABLET ORAL
Refills: 0 | Status: DISCONTINUED | OUTPATIENT
Start: 2024-01-23 | End: 2024-01-23

## 2024-01-23 RX ORDER — DEXTROSE 50 % IN WATER 50 %
25 SYRINGE (ML) INTRAVENOUS
Refills: 0 | Status: DISCONTINUED | OUTPATIENT
Start: 2024-01-23 | End: 2024-02-08

## 2024-01-23 RX ORDER — DEXMEDETOMIDINE HYDROCHLORIDE IN 0.9% SODIUM CHLORIDE 4 UG/ML
1.5 INJECTION INTRAVENOUS
Qty: 200 | Refills: 0 | Status: DISCONTINUED | OUTPATIENT
Start: 2024-01-23 | End: 2024-01-24

## 2024-01-23 RX ORDER — CEFUROXIME AXETIL 250 MG
1500 TABLET ORAL EVERY 12 HOURS
Refills: 0 | Status: COMPLETED | OUTPATIENT
Start: 2024-01-23 | End: 2024-01-25

## 2024-01-23 RX ORDER — VANCOMYCIN HCL 1 G
1000 VIAL (EA) INTRAVENOUS EVERY 12 HOURS
Refills: 0 | Status: COMPLETED | OUTPATIENT
Start: 2024-01-23 | End: 2024-01-24

## 2024-01-23 RX ORDER — GABAPENTIN 400 MG/1
100 CAPSULE ORAL EVERY 12 HOURS
Refills: 0 | Status: COMPLETED | OUTPATIENT
Start: 2024-01-23 | End: 2024-01-28

## 2024-01-23 RX ORDER — HYDROMORPHONE HYDROCHLORIDE 2 MG/ML
0.5 INJECTION INTRAMUSCULAR; INTRAVENOUS; SUBCUTANEOUS EVERY 6 HOURS
Refills: 0 | Status: DISCONTINUED | OUTPATIENT
Start: 2024-01-23 | End: 2024-01-25

## 2024-01-23 RX ORDER — ACETAMINOPHEN 500 MG
650 TABLET ORAL EVERY 6 HOURS
Refills: 0 | Status: DISCONTINUED | OUTPATIENT
Start: 2024-01-26 | End: 2024-02-08

## 2024-01-23 RX ADMIN — CHLORHEXIDINE GLUCONATE 1 APPLICATION(S): 213 SOLUTION TOPICAL at 20:21

## 2024-01-23 RX ADMIN — CHLORHEXIDINE GLUCONATE 15 MILLILITER(S): 213 SOLUTION TOPICAL at 19:16

## 2024-01-23 RX ADMIN — Medication 0.2 MILLILITER(S): at 08:06

## 2024-01-23 RX ADMIN — Medication 1000 MILLIGRAM(S): at 08:07

## 2024-01-23 RX ADMIN — DEXMEDETOMIDINE HYDROCHLORIDE IN 0.9% SODIUM CHLORIDE 40.8 MICROGRAM(S)/KG/HR: 4 INJECTION INTRAVENOUS at 18:44

## 2024-01-23 RX ADMIN — Medication 125 MILLILITER(S): at 16:57

## 2024-01-23 RX ADMIN — Medication 200 GRAM(S): at 21:29

## 2024-01-23 RX ADMIN — INSULIN HUMAN 3 UNIT(S)/HR: 100 INJECTION, SOLUTION SUBCUTANEOUS at 18:44

## 2024-01-23 RX ADMIN — Medication 125 MILLILITER(S): at 18:47

## 2024-01-23 RX ADMIN — Medication 250 MILLIGRAM(S): at 20:21

## 2024-01-23 RX ADMIN — Medication 6.53 MICROGRAM(S)/KG/MIN: at 18:44

## 2024-01-23 RX ADMIN — MUPIROCIN 1 APPLICATION(S): 20 OINTMENT TOPICAL at 19:16

## 2024-01-23 RX ADMIN — Medication 200 GRAM(S): at 19:27

## 2024-01-23 RX ADMIN — Medication 100 MILLIGRAM(S): at 21:38

## 2024-01-23 RX ADMIN — HYDROMORPHONE HYDROCHLORIDE 0.5 MILLIGRAM(S): 2 INJECTION INTRAMUSCULAR; INTRAVENOUS; SUBCUTANEOUS at 16:50

## 2024-01-23 RX ADMIN — Medication 125 MILLILITER(S): at 22:28

## 2024-01-23 RX ADMIN — SODIUM CHLORIDE 3 MILLILITER(S): 9 INJECTION INTRAMUSCULAR; INTRAVENOUS; SUBCUTANEOUS at 22:30

## 2024-01-23 RX ADMIN — HYDROMORPHONE HYDROCHLORIDE 0.5 MILLIGRAM(S): 2 INJECTION INTRAMUSCULAR; INTRAVENOUS; SUBCUTANEOUS at 16:35

## 2024-01-23 RX ADMIN — Medication 125 MILLILITER(S): at 22:26

## 2024-01-23 RX ADMIN — GABAPENTIN 200 MILLIGRAM(S): 400 CAPSULE ORAL at 08:07

## 2024-01-23 RX ADMIN — HYDROMORPHONE HYDROCHLORIDE 0.5 MILLIGRAM(S): 2 INJECTION INTRAMUSCULAR; INTRAVENOUS; SUBCUTANEOUS at 20:30

## 2024-01-23 RX ADMIN — HYDROMORPHONE HYDROCHLORIDE 0.5 MILLIGRAM(S): 2 INJECTION INTRAMUSCULAR; INTRAVENOUS; SUBCUTANEOUS at 20:45

## 2024-01-23 RX ADMIN — SODIUM CHLORIDE 1000 MILLILITER(S): 9 INJECTION INTRAMUSCULAR; INTRAVENOUS; SUBCUTANEOUS at 17:00

## 2024-01-23 RX ADMIN — Medication 125 MILLILITER(S): at 17:07

## 2024-01-23 NOTE — BRIEF OPERATIVE NOTE - COMMENTS
accurate blood loss cannot be estimated due to use of cell saver and cardiotomy suction  CXR performed in OR and reviewed  by Dr Dalal, no unanticipated foreign bodies in the chest

## 2024-01-23 NOTE — BRIEF OPERATIVE NOTE - NSICDXBRIEFPROCEDURE_GEN_ALL_CORE_FT
PROCEDURES:  Mitral valve replacement 23-Jan-2024 16:40:32  John Hu  Hybrid Maze procedure 23-Jan-2024 16:40:42  John Hu  Clipping, left atrial appendage 23-Jan-2024 16:41:01  John Hu  CABG, using 1 vein graft 23-Jan-2024 16:41:09  John Hu

## 2024-01-23 NOTE — CONSULT NOTE ADULT - ASSESSMENT
73 year old female with PMH morbid obesity (BMI 42.7 s/p gastric sleeve 2015), DMT2, CAD s/p PCI of LCX (2012), LEIDY (on CPAP), CKD (stage 3 - on farxiga), AF (on Eliquis) and rheumatic mitral valve disease reports c/o chronic BARBOZA  SP Mitral valve replacement;  Hybrid Maze procedure   Clipping, left atrial appendage and CABG, using 1 vein graft    CKD stage 3 a            73 year old female with PMH morbid obesity (BMI 42.7 s/p gastric sleeve 2015), DMT2, CAD s/p PCI of LCX (2012), LEIDY (on CPAP), CKD (stage 3 - on farxiga), AF (on Eliquis) and rheumatic mitral valve disease reports c/o chronic BARBOZA  1/23/24 SP Mitral valve replacement;  Hybrid Maze procedure Clipping, left atrial appendage and CABG, using 1 vein graft    CKD stage 3a   Hypernatremia   Hypophosphatemia   Respiratory failure and intubated     1 Renal - Creatinine baseline more in the 1.6-1.7 range;  On Bumex gtt at present and trend her urine output  As an outpt we discussed JORDIN and the possibility of HD as well   Cont the daniels   No need for renal sono  IV NaPhos x 1   2 Endo-Farxiga and ARB are both held and to restarted as outpt   3 CVS-Off bblockers and on inotropes at present ;  Not on pressors   4 Pulm-Wait till sedation weaars and then CPAP to extubate   5 GI-NPO     dw CTICU  >60 minutes spent on total encounter and more then 50% of the visit was spent counseling and/or coordinating care by the attending physician   Critically sick and unstable     Sayed [facility]   [phone]

## 2024-01-23 NOTE — CONSULT NOTE ADULT - SUBJECTIVE AND OBJECTIVE BOX
NEPHROLOGY - NSN    Patient seen and examined.    HPI:  73 year old female with PMH morbid obesity (BMI 42.7 s/p gastric sleeve 2015), DMT2, CAD s/p PCI of LCX (2012), LEIDY (on CPAP), CKD (stage 3 - on farxiga), AF (on Eliquis) and rheumatic mitral valve disease reports c/o chronic BARBOZA for over 10 years that has progressively worsened over the past few months with the feeling of an "elephant on my chest." Her symptoms improved after starting Lasix daily. She gets dizzy when she bends over or when she stands up too fast. She denies CP or weight gain. She sleeps with 3 pillows on an incline at night. She cannot lay flat due to comfort as well as orthopnea. She can only walk one block before she has to stop to rest due to SOB. Pt now presents to [facility] for scheduled mitral valve replacement, maze procedure and left atrial appendage ligation with Dr. Dalal on 1/9/24.      (02 Jan 2024 07:09)      PAST MEDICAL & SURGICAL HISTORY:  Nonrheumatic mitral (valve) annulus calcification      Obesity, morbid, BMI 40.0-49.9      Chronic kidney disease (CKD)      AF (atrial fibrillation)      Type 2 diabetes mellitus      HLD (hyperlipidemia)      LEIDY on CPAP      Hypertrophic obstructive cardiomyopathy      2019 novel coronavirus disease (COVID-19)      HTN (hypertension)      Stented coronary artery      History of coronary angiogram      S/P coronary artery stent placement      H/O gastric sleeve      History of laparoscopic adjustable gastric banding      History of removal of laparoscopic gastric banding device      NVD (normal vaginal delivery)          MEDICATIONS  (STANDING):  acetaminophen     Tablet .. 650 milliGRAM(s) Oral every 6 hours  albumin human  5% IVPB 250 milliLiter(s) IV Intermittent every 10 minutes  ascorbic acid 500 milliGRAM(s) Oral two times a day  cefuroxime  IVPB 1500 milliGRAM(s) IV Intermittent every 12 hours  cefuroxime  IVPB 1500 milliGRAM(s) IV Intermittent once  chlorhexidine 0.12% Liquid 15 milliLiter(s) Oral Mucosa every 12 hours  chlorhexidine 2% Cloths 1 Application(s) Topical daily  chlorhexidine 2% Cloths 1 Application(s) Topical once  dextrose 50% Injectable 50 milliLiter(s) IV Push every 15 minutes  dextrose 50% Injectable 25 milliLiter(s) IV Push every 15 minutes  DOBUTamine Infusion 2 MICROgram(s)/kG/Min (6.53 mL/Hr) IV Continuous <Continuous>  gabapentin 100 milliGRAM(s) Oral every 12 hours  insulin regular Infusion 3 Unit(s)/Hr (3 mL/Hr) IV Continuous <Continuous>  mupirocin 2% Ointment 1 Application(s) Both Nostrils two times a day  pantoprazole  Injectable 40 milliGRAM(s) IV Push daily  sodium chloride 0.9% lock flush 3 milliLiter(s) IV Push every 8 hours  sodium chloride 0.9%. 1000 milliLiter(s) (10 mL/Hr) IV Continuous <Continuous>  vancomycin  IVPB 1000 milliGRAM(s) IV Intermittent every 12 hours      Allergies    No Known Allergies    Intolerances        SOCIAL HISTORY:  Denies alcohol abuse, drug abuse or tobacco usage.     FAMILY HISTORY:      VITALS:  T(C): 37.1 (01-23-24 @ 07:00), Max: 37.1 (01-23-24 @ 07:00)  HR: 80 (01-23-24 @ 17:03) (71 - 80)  BP: 158/83 (01-23-24 @ 07:00) (158/83 - 158/83)  RR: 18 (01-23-24 @ 07:00) (18 - 18)  SpO2: 100% (01-23-24 @ 17:03) (96% - 100%)    REVIEW OF SYSTEMS:  Denies any nausea, vomiting, diarrhea, fever or chills. Denies chest pain, SOB, focal weakness, hematuria or dysuria. Good oral intake and denies fatigue or weakness. All other pertinent systems are reviewed and are negative.    PHYSICAL EXAM:  Constitutional: NAD  HEENT: EOMI  Neck:  No JVD, supple   Respiratory: CTA B/L  Cardiovascular: S1 and S2, RRR  Gastrointestinal: + BS, soft, NT, ND  Extremities: No peripheral edema, + peripheral pulses  Neurological: A/O x 3, CN2-12 intact  Psychiatric: Normal mood, normal affect  : No Diallo  Skin: No rashes, C/D/I  Access: Not applicable    I and O's:    Height (cm): 158.8 (01-23 @ 07:00)  Weight (kg): 108.862 (01-23 @ 07:00)  BMI (kg/m2): 43.2 (01-23 @ 07:00)  BSA (m2): 2.08 (01-23 @ 07:00)    LABS:                        9.7    5.99  )-----------( x        ( 23 Jan 2024 16:27 )             28.2     01-23    147<H>  |  112<H>  |  22  ----------------------------<  136<H>  4.9   |  22  |  1.40<H>    Ca    8.5      23 Jan 2024 16:27  Phos  1.8     01-23  Mg     4.0     01-23    TPro  5.1<L>  /  Alb  3.3  /  TBili  1.0  /  DBili  x   /  AST  75<H>  /  ALT  21  /  AlkPhos  45  01-23      URINE:  Urinalysis Basic - ( 23 Jan 2024 16:27 )    Color: x / Appearance: x / SG: x / pH: x  Gluc: 136 mg/dL / Ketone: x  / Bili: x / Urobili: x   Blood: x / Protein: x / Nitrite: x   Leuk Esterase: x / RBC: x / WBC x   Sq Epi: x / Non Sq Epi: x / Bacteria: x        RADIOLOGY & ADDITIONAL STUDIES:    < from: CT Abdomen and Pelvis No Cont (01.06.24 @ 18:01) >    ACC: [id number] EXAM:  CT ABDOMEN AND PELVIS   ORDERED BY: MICHELLE BATISTA     PROCEDURE DATE:  01/06/2024          INTERPRETATION:  CLINICAL INFORMATION: Hematuria.    COMPARISON: None.    CONTRAST/COMPLICATIONS:  IV Contrast: NONE  Oral Contrast: NONE  Complications: None reported at time of study completion    PROCEDURE:  CT of the Abdomen and Pelvis was performed.  Sagittal and coronal reformats were performed.    FINDINGS:  Limited evaluation of the vasculature and solid organs in the absence of   intravenous contrast.  LOWER CHEST: Atherosclerotic changes of the coronary arteries and aorta.   Mitral annular calcification. Cardiomegaly. Lung bases are clear.    LIVER: Normal morphology. Mildly increased hepatic attenuation.  BILE DUCTS: Normal caliber.  GALLBLADDER: Within normal limits.  SPLEEN: Within normal limits. Focal vascular calcification in the splenic   hilum.  PANCREAS: Partial fatty involution. No ductal dilatation. No   peripancreatic edema.  ADRENALS: Within normal limits.  KIDNEYS/URETERS: No hydronephrosis or obstructing urinary stones. No   nephrolithiasis. Left lower pole and renal sinus parapelvic cysts. Mild   bilateral nonspecific perinephric stranding. This left-sided fat   stranding in the renal pelvis and along the ureter. 7 mm hyperattenuating   lesion in the left kidney mid pole.    BLADDER: Minimally distended.  REPRODUCTIVE ORGANS: Uterus and adnexa within normal limits.    BOWEL: Sleeve gastrectomy. No bowel obstruction. Appendix is normal.   Colonic diverticulosis. No evidence of acute diverticulitis.  PERITONEUM: No ascites. No pneumoperitoneum  VESSELS: Atherosclerotic changes.  RETROPERITONEUM/LYMPH NODES: No lymphadenopathy. Subcentimeter   gastrohepatic lymph node and portacaval lymph node.  ABDOMINAL WALL: Rectus diastases. Small calcification in the anterior   abdominal wall, image [id number].  BONES: Degenerative changes.    IMPRESSION:  No hydronephrosis or obstructing urolithiasis.    Perinephric fat stranding and inflammatory fat stranding along the left   upper collecting system may represent upper urinary tract infection,   correlate with urinalysis.    Uncomplicated colonic diverticulosis.    Mildly increased hepatic attenuation, which may be seen secondary to   medication side effect such as amiodarone. Alternative etiology include   iron or copper deposition.        --- End of Report ---          EMELY SAHNI MD; Resident Radiologist  This document has been electronically signed.  NAREN MEDELLIN MD; Attending Radiologist  This document has been electronically signed. Jan 6 2024    < end of copied text >   NEPHROLOGY - NSN    Patient seen and examined.    HPI:  73 year old female with PMH morbid obesity (BMI 42.7 s/p gastric sleeve 2015), DMT2, CAD s/p PCI of LCX (2012), LEIDY (on CPAP), CKD (stage 3 - on farxiga), AF (on Eliquis) and rheumatic mitral valve disease reports c/o chronic BARBOZA for over 10 years that has progressively worsened over the past few months with the feeling of an "elephant on my chest." Her symptoms improved after starting Lasix daily. She gets dizzy when she bends over or when she stands up too fast. She denies CP or weight gain. She sleeps with 3 pillows on an incline at night. She cannot lay flat due to comfort as well as orthopnea. She can only walk one block before she has to stop to rest due to SOB. Pt now presents to [facility] for scheduled mitral valve replacement, maze procedure and left atrial appendage ligation with Dr. Dalal on 1/9/24.     As an outpt her creatinine was over 2 and they delayed cath.  I stopped the ARB and the farxiga in anticipation of the OR and the cath   Pt is intubated on inotropes and Bumex     Unable to get ROS      PAST MEDICAL & SURGICAL HISTORY:  Nonrheumatic mitral (valve) annulus calcification      Obesity, morbid, BMI 40.0-49.9      Chronic kidney disease (CKD)      AF (atrial fibrillation)      Type 2 diabetes mellitus      HLD (hyperlipidemia)      LEIDY on CPAP      Hypertrophic obstructive cardiomyopathy      2019 novel coronavirus disease (COVID-19)      HTN (hypertension)      Stented coronary artery      History of coronary angiogram      S/P coronary artery stent placement      H/O gastric sleeve      History of laparoscopic adjustable gastric banding      History of removal of laparoscopic gastric banding device      NVD (normal vaginal delivery)          MEDICATIONS  (STANDING):  acetaminophen     Tablet .. 650 milliGRAM(s) Oral every 6 hours  albumin human  5% IVPB 250 milliLiter(s) IV Intermittent every 10 minutes  ascorbic acid 500 milliGRAM(s) Oral two times a day  cefuroxime  IVPB 1500 milliGRAM(s) IV Intermittent every 12 hours  cefuroxime  IVPB 1500 milliGRAM(s) IV Intermittent once  chlorhexidine 0.12% Liquid 15 milliLiter(s) Oral Mucosa every 12 hours  chlorhexidine 2% Cloths 1 Application(s) Topical daily  chlorhexidine 2% Cloths 1 Application(s) Topical once  dextrose 50% Injectable 50 milliLiter(s) IV Push every 15 minutes  dextrose 50% Injectable 25 milliLiter(s) IV Push every 15 minutes  DOBUTamine Infusion 2 MICROgram(s)/kG/Min (6.53 mL/Hr) IV Continuous <Continuous>  gabapentin 100 milliGRAM(s) Oral every 12 hours  insulin regular Infusion 3 Unit(s)/Hr (3 mL/Hr) IV Continuous <Continuous>  mupirocin 2% Ointment 1 Application(s) Both Nostrils two times a day  pantoprazole  Injectable 40 milliGRAM(s) IV Push daily  sodium chloride 0.9% lock flush 3 milliLiter(s) IV Push every 8 hours  sodium chloride 0.9%. 1000 milliLiter(s) (10 mL/Hr) IV Continuous <Continuous>  vancomycin  IVPB 1000 milliGRAM(s) IV Intermittent every 12 hours      Allergies    No Known Allergies    Intolerances        SOCIAL HISTORY:  Denies alcohol abuse, drug abuse or tobacco usage.     FAMILY HISTORY:      VITALS:  T(C): 37.1 (01-23-24 @ 07:00), Max: 37.1 (01-23-24 @ 07:00)  HR: 80 (01-23-24 @ 17:03) (71 - 80)  BP: 158/83 (01-23-24 @ 07:00) (158/83 - 158/83)  RR: 18 (01-23-24 @ 07:00) (18 - 18)  SpO2: 100% (01-23-24 @ 17:03) (96% - 100%)    REVIEW OF SYSTEMS:  unable .    PHYSICAL EXAM:  Constitutional: Obese   HEENT: EOMI  Neck:  No JVD, supple   Respiratory: transmitted   Cardiovascular: S1 and S2, RRR  Gastrointestinal: + BS, soft, NT, ND  Extremities: No peripheral edema, + peripheral pulses  Neurological:    : +  Diallo  Skin: No rashes, C/D/I  Access: Not applicable    I and O's:    Height (cm): 158.8 (01-23 @ 07:00)  Weight (kg): 108.862 (01-23 @ 07:00)  BMI (kg/m2): 43.2 (01-23 @ 07:00)  BSA (m2): 2.08 (01-23 @ 07:00)    LABS:                        9.7    5.99  )-----------( x        ( 23 Jan 2024 16:27 )             28.2     01-23    147<H>  |  112<H>  |  22  ----------------------------<  136<H>  4.9   |  22  |  1.40<H>    Ca    8.5      23 Jan 2024 16:27  Phos  1.8     01-23  Mg     4.0     01-23    TPro  5.1<L>  /  Alb  3.3  /  TBili  1.0  /  DBili  x   /  AST  75<H>  /  ALT  21  /  AlkPhos  45  01-23      URINE:  Urinalysis Basic - ( 23 Jan 2024 16:27 )    Color: x / Appearance: x / SG: x / pH: x  Gluc: 136 mg/dL / Ketone: x  / Bili: x / Urobili: x   Blood: x / Protein: x / Nitrite: x   Leuk Esterase: x / RBC: x / WBC x   Sq Epi: x / Non Sq Epi: x / Bacteria: x        RADIOLOGY & ADDITIONAL STUDIES:    < from: CT Abdomen and Pelvis No Cont (01.06.24 @ 18:01) >    ACC: [id number] EXAM:  CT ABDOMEN AND PELVIS   ORDERED BY: MICHELLE BATISTA     PROCEDURE DATE:  01/06/2024          INTERPRETATION:  CLINICAL INFORMATION: Hematuria.    COMPARISON: None.    CONTRAST/COMPLICATIONS:  IV Contrast: NONE  Oral Contrast: NONE  Complications: None reported at time of study completion    PROCEDURE:  CT of the Abdomen and Pelvis was performed.  Sagittal and coronal reformats were performed.    FINDINGS:  Limited evaluation of the vasculature and solid organs in the absence of   intravenous contrast.  LOWER CHEST: Atherosclerotic changes of the coronary arteries and aorta.   Mitral annular calcification. Cardiomegaly. Lung bases are clear.    LIVER: Normal morphology. Mildly increased hepatic attenuation.  BILE DUCTS: Normal caliber.  GALLBLADDER: Within normal limits.  SPLEEN: Within normal limits. Focal vascular calcification in the splenic   hilum.  PANCREAS: Partial fatty involution. No ductal dilatation. No   peripancreatic edema.  ADRENALS: Within normal limits.  KIDNEYS/URETERS: No hydronephrosis or obstructing urinary stones. No   nephrolithiasis. Left lower pole and renal sinus parapelvic cysts. Mild   bilateral nonspecific perinephric stranding. This left-sided fat   stranding in the renal pelvis and along the ureter. 7 mm hyperattenuating   lesion in the left kidney mid pole.    BLADDER: Minimally distended.  REPRODUCTIVE ORGANS: Uterus and adnexa within normal limits.    BOWEL: Sleeve gastrectomy. No bowel obstruction. Appendix is normal.   Colonic diverticulosis. No evidence of acute diverticulitis.  PERITONEUM: No ascites. No pneumoperitoneum  VESSELS: Atherosclerotic changes.  RETROPERITONEUM/LYMPH NODES: No lymphadenopathy. Subcentimeter   gastrohepatic lymph node and portacaval lymph node.  ABDOMINAL WALL: Rectus diastases. Small calcification in the anterior   abdominal wall, image [id number].  BONES: Degenerative changes.    IMPRESSION:  No hydronephrosis or obstructing urolithiasis.    Perinephric fat stranding and inflammatory fat stranding along the left   upper collecting system may represent upper urinary tract infection,   correlate with urinalysis.    Uncomplicated colonic diverticulosis.    Mildly increased hepatic attenuation, which may be seen secondary to   medication side effect such as amiodarone. Alternative etiology include   iron or copper deposition.        --- End of Report ---          EMELY SAHNI MD; Resident Radiologist  This document has been electronically signed.  NAREN MEDELLIN MD; Attending Radiologist  This document has been electronically signed. Jan 6 2024    < end of copied text >

## 2024-01-23 NOTE — BRIEF OPERATIVE NOTE - OPERATION/FINDINGS
CPB: 228 min  XC: 179 min  Mitral valve replacement with morin II valve for mitral stenosis   RICHARD clip, MAZE  C1 - SVG - LAD

## 2024-01-23 NOTE — AIRWAY REMOVAL NOTE  ADULT & PEDS - ARTIFICAL AIRWAY REMOVAL COMMENTS
Written order for extubation verified. The patient was identified by full name and birth date compared to the identification band. Present during the procedure was JENNA Suarez

## 2024-01-23 NOTE — BRIEF OPERATIVE NOTE - NSICDXBRIEFPREOP_GEN_ALL_CORE_FT
PRE-OP DIAGNOSIS:  Mitral stenosis 23-Jan-2024 16:41:30  John Hu  Chronic atrial fibrillation 23-Jan-2024 16:41:39  John Hu

## 2024-01-23 NOTE — BRIEF OPERATIVE NOTE - NS MD BRIEF OP NOTE  [facility]  [address] [place] [id number]  Phone: [phone]  Fax: [phone]  Follow Up Time: 2 weeks   Saphenous Vein

## 2024-01-23 NOTE — PATIENT PROFILE ADULT - FUNCTIONAL ASSESSMENT - DAILY ACTIVITY 5.
Normal vision: sees adequately in most situations; can see medication labels, newsprint
4 = No assist / stand by assistance

## 2024-01-23 NOTE — PROGRESS NOTE ADULT - SUBJECTIVE AND OBJECTIVE BOX
Patient seen and examined at the bedside.    Remained critically ill on continuous ICU monitoring.    OBJECTIVE:  Vital Signs Last 24 Hrs  T(C): 35.1 (23 Jan 2024 16:05), Max: 37.1 (23 Jan 2024 07:00)  T(F): 95.2 (23 Jan 2024 16:05), Max: 98.8 (23 Jan 2024 07:00)  HR: 80 (23 Jan 2024 17:15) (71 - 80)  BP: 158/83 (23 Jan 2024 07:00) (158/83 - 158/83)  BP(mean): --  RR: 10 (23 Jan 2024 17:15) (10 - 19)  SpO2: 99% (23 Jan 2024 17:15) (96% - 100%)    Parameters below as of 23 Jan 2024 16:05  Patient On (Oxygen Delivery Method): ventilator    O2 Concentration (%): 100      Physical Exam:   General: Intubated   Neurology: nonfocal   ENT/Neck: Neck supple, trachea midline, No JVD   Respiratory: Clear bilaterally   CV: S1S2, no murmurs        [x] Sternal dressing, [x] Mediastinal CTx2 [x] Bulb        [x] Paced rhythm, [x] TPM DDD - 80  Abdominal: Soft, NT, ND +BS   Extremities: 1-2+ pedal edema noted   ============================I/O===========================   I&O's Detail    ============================ LABS =========================                        9.7    5.99  )-----------( x        ( 23 Jan 2024 16:27 )             28.2     01-23    147<H>  |  112<H>  |  22  ----------------------------<  136<H>  4.9   |  22  |  1.40<H>    Ca    8.5      23 Jan 2024 16:27  Phos  1.8     01-23  Mg     4.0     01-23    TPro  5.1<L>  /  Alb  3.3  /  TBili  1.0  /  DBili  x   /  AST  75<H>  /  ALT  21  /  AlkPhos  45  01-23    LIVER FUNCTIONS - ( 23 Jan 2024 16:27 )  Alb: 3.3 g/dL / Pro: 5.1 g/dL / ALK PHOS: 45 U/L / ALT: 21 U/L / AST: 75 U/L / GGT: x           PT/INR - ( 23 Jan 2024 16:27 )   PT: 13.4 sec;   INR: 1.23 ratio       PTT - ( 23 Jan 2024 16:27 )  PTT:26.2 sec  ABG - ( 23 Jan 2024 17:00 )  pH, Arterial: 7.48  pH, Blood: x     /  pCO2: 28    /  pO2: 219   / HCO3: 21    / Base Excess: -2.0  /  SaO2: 98.8      ======================Micro/Rad/Cardio=================  Culture: Reviewed   CXR: Reviewed  Echo: Reviewed  ======================================================  PAST MEDICAL & SURGICAL HISTORY:  Nonrheumatic mitral (valve) annulus calcification  Obesity, morbid, BMI 40.0-49.9  Chronic kidney disease (CKD)  AF (atrial fibrillation)  Type 2 diabetes mellitus  HLD (hyperlipidemia)  LEIDY on CPAP  Hypertrophic obstructive cardiomyopathy  2019 novel coronavirus disease (COVID-19)  HTN (hypertension)  Stented coronary artery  History of coronary angiogram  S/P coronary artery stent placement  H/O gastric sleeve  History of laparoscopic adjustable gastric banding  History of removal of laparoscopic gastric banding device  NVD (normal vaginal delivery)  ======================================================    Assessment:  74 yo F with PMHx of morbid obesity (BMI 42.7 s/p gastric sleeve 2015), DMT2, CAD s/p PCI of LCX (2012), LEIDY (on CPAP), CKD (stage 3 - on farxiga), AF (on Eliquis) and rheumatic mitral valve disease.    Mitral stenosis, chronic afib s/p MVR, MAZE, RICHARD, CABG x1 on 1/23/24  Hemodynamic instability  Post op respiratory insufficiency  Acute blood loss anemia    ======================================================  Plan:   ***Neuro***  [x] Nonfocal    Pain management with Tylenol, Dilaudid, Gabapentin, Oxycodone, and prns    ***Cardiovascular***  Invasive hemodynamic monitoring, assess perfusion indices   WV / CVP 12/ MAP 92/ Hct 38.4/ Lactate 1.8  [x] Dobutamine 2 mcgs/kG/Min - off  Volume: 1u PRBC, 1 cryo, 2u Plt in OR; 250mL Albumin in CTU   Reassessment of hemodynamics post resuscitation  Monitor chest tube outputs     ***Pulmonary***  Post op vent management   Titration of FiO2 and PEEP, follow SpO2, CXR, blood gasses     Mode: AC/ CMV (Assist Control/ Continuous Mandatory Ventilation)  RR (machine): 10  TV (machine): 800  FiO2: 50  PEEP: 5  ITime: 1  MAP: 10  PIP: 36              ***GI***  [x] NPO for now.    [x] Protonix for stress ulcer ppx    ***Renal***  [x] CKD  Continue to monitor I/Os, BUN/Creatinine.   Replete lymiladis PRN  Imani present     ***ID***  Perioperative coverage with Cefuroxime   Vanco for postop ppx    ***Endocrine***  [x] DM2: HbA1c 7.0%                - [x] Insulin gtt               - Need tight glycemic control to prevent wound infection.            Patient requires continuous monitoring with:  bedside rhythm monitoring, arterial line, pulse oximetry, ventilator management and monitoring; intermittent blood gas analysis.  Care plan discussed with ICU care team.  patient remain critical; required more than usual post op care; I have spent 35 minutes providing non routine post op care, revaluated multiple times during the day .     Care Plan discussed with the ICU care team. Patient remained critical, at risk for life threatening decompensation.     By signing my name below, I, Jonelle Rod , attest that this documentation has been prepared under the direction and in the presence of Stephen Rendon MD.  Electronically signed: Kevin Keys, 01-23-24 @ 17:25    I, Julieta Mitchell, personally performed the services described in this documentation. all medical record entries made by the scribe were at my direction and in my presence. I have reviewed the chart and agree that the record reflects my personal performance and is accurate and complete  Electronically signed: Stephen Rendon MD. Patient seen and examined at the bedside.    Remained critically ill on continuous ICU monitoring.    OBJECTIVE:  Vital Signs Last 24 Hrs  T(C): 35.1 (23 Jan 2024 16:05), Max: 37.1 (23 Jan 2024 07:00)  T(F): 95.2 (23 Jan 2024 16:05), Max: 98.8 (23 Jan 2024 07:00)  HR: 80 (23 Jan 2024 17:15) (71 - 80)  BP: 158/83 (23 Jan 2024 07:00) (158/83 - 158/83)  BP(mean): --  RR: 10 (23 Jan 2024 17:15) (10 - 19)  SpO2: 99% (23 Jan 2024 17:15) (96% - 100%)    Parameters below as of 23 Jan 2024 16:05  Patient On (Oxygen Delivery Method): ventilator    O2 Concentration (%): 100      Physical Exam:   General: Intubated   Neurology: nonfocal   ENT/Neck: Neck supple, trachea midline, No JVD   Respiratory: Clear bilaterally   CV: S1S2, no murmurs        [x] Sternal dressing, [x] Mediastinal CTx2 [x] Bulb        [x] Paced rhythm, [x] TPM DDD - 80  Abdominal: Soft, NT, ND +BS   Extremities: 1-2+ pedal edema noted   ============================I/O===========================   I&O's Detail    ============================ LABS =========================                        9.7    5.99  )-----------( x        ( 23 Jan 2024 16:27 )             28.2     01-23    147<H>  |  112<H>  |  22  ----------------------------<  136<H>  4.9   |  22  |  1.40<H>    Ca    8.5      23 Jan 2024 16:27  Phos  1.8     01-23  Mg     4.0     01-23    TPro  5.1<L>  /  Alb  3.3  /  TBili  1.0  /  DBili  x   /  AST  75<H>  /  ALT  21  /  AlkPhos  45  01-23    LIVER FUNCTIONS - ( 23 Jan 2024 16:27 )  Alb: 3.3 g/dL / Pro: 5.1 g/dL / ALK PHOS: 45 U/L / ALT: 21 U/L / AST: 75 U/L / GGT: x           PT/INR - ( 23 Jan 2024 16:27 )   PT: 13.4 sec;   INR: 1.23 ratio       PTT - ( 23 Jan 2024 16:27 )  PTT:26.2 sec  ABG - ( 23 Jan 2024 17:00 )  pH, Arterial: 7.48  pH, Blood: x     /  pCO2: 28    /  pO2: 219   / HCO3: 21    / Base Excess: -2.0  /  SaO2: 98.8      ======================Micro/Rad/Cardio=================  Culture: Reviewed   CXR: Reviewed  Echo: Reviewed  ======================================================  PAST MEDICAL & SURGICAL HISTORY:  Nonrheumatic mitral (valve) annulus calcification  Obesity, morbid, BMI 40.0-49.9  Chronic kidney disease (CKD)  AF (atrial fibrillation)  Type 2 diabetes mellitus  HLD (hyperlipidemia)  LEIDY on CPAP  Hypertrophic obstructive cardiomyopathy  2019 novel coronavirus disease (COVID-19)  HTN (hypertension)  Stented coronary artery  History of coronary angiogram  S/P coronary artery stent placement  H/O gastric sleeve  History of laparoscopic adjustable gastric banding  History of removal of laparoscopic gastric banding device  NVD (normal vaginal delivery)  ======================================================    Assessment:  72 yo F with PMHx of morbid obesity (BMI 42.7 s/p gastric sleeve 2015), DMT2, CAD s/p PCI of LCX (2012), LEIDY (on CPAP), CKD (stage 3 - on farxiga), AF (on Eliquis) and rheumatic mitral valve disease.    Mitral stenosis, chronic afib s/p MVR, MAZE, RICHARD, CABG x1 on 1/23/24  Hemodynamic instability  Post op respiratory insufficiency  Acute blood loss anemia    ======================================================  Plan:   ***Neuro***  [x] Nonfocal    Pain management with Tylenol, Dilaudid, Gabapentin, Oxycodone, and prns    ***Cardiovascular***  Invasive hemodynamic monitoring, assess perfusion indices   SC / CVP 12/ MAP 92/ Hct 38.4/ Lactate 1.8  [x] Dobutamine 2 mcgs/kG/Min - off  Volume: 1u PRBC, 1 cryo, 2u Plt in OR; 250mL Albumin in CTU   Reassessment of hemodynamics post resuscitation  Monitor chest tube outputs     ***Pulmonary***  Post op vent management   Titration of FiO2 and PEEP, follow SpO2, CXR, blood gasses     Mode: AC/ CMV (Assist Control/ Continuous Mandatory Ventilation)  RR (machine): 10  TV (machine): 800  FiO2: 50  PEEP: 5  ITime: 1  MAP: 10  PIP: 36              ***GI***  [x] NPO for now.    [x] Protonix for stress ulcer ppx    ***Renal***  [x] CKD  Continue to monitor I/Os, BUN/Creatinine.   Replete lymiladis PRN  Imani present     ***ID***  Perioperative coverage with Cefuroxime   Vanco for postop ppx    ***Endocrine***  [x] DM2: HbA1c 7.0%                - [x] Insulin gtt               - Need tight glycemic control to prevent wound infection.            Patient requires continuous monitoring with:  bedside rhythm monitoring, arterial line, pulse oximetry, ventilator management and monitoring; intermittent blood gas analysis.  Care plan discussed with ICU care team.  patient remain critical; required more than usual post op care; I have spent 45 minutes providing non routine post op care, revaluated multiple times during the day .     Care Plan discussed with the ICU care team. Patient remained critical, at risk for life threatening decompensation.     By signing my name below, I, Jonelle Rod , attest that this documentation has been prepared under the direction and in the presence of Stephen Rendon MD.  Electronically signed: Kevin Keys, 01-23-24 @ 17:25    I, Julieta Mitchell, personally performed the services described in this documentation. all medical record entries made by the scribe were at my direction and in my presence. I have reviewed the chart and agree that the record reflects my personal performance and is accurate and complete  Electronically signed: Stephen Rendon MD. Patient seen and examined at the bedside.    Remained critically ill on continuous ICU monitoring.    OBJECTIVE:  Vital Signs Last 24 Hrs  T(C): 35.1 (23 Jan 2024 16:05), Max: 37.1 (23 Jan 2024 07:00)  T(F): 95.2 (23 Jan 2024 16:05), Max: 98.8 (23 Jan 2024 07:00)  HR: 80 (23 Jan 2024 17:15) (71 - 80)  BP: 158/83 (23 Jan 2024 07:00) (158/83 - 158/83)  BP(mean): --  RR: 10 (23 Jan 2024 17:15) (10 - 19)  SpO2: 99% (23 Jan 2024 17:15) (96% - 100%)    Parameters below as of 23 Jan 2024 16:05  Patient On (Oxygen Delivery Method): ventilator    O2 Concentration (%): 100      Physical Exam:   General: Intubated   Neurology: nonfocal   ENT/Neck: Neck supple, trachea midline, No JVD   Respiratory: Clear bilaterally   CV: S1S2, no murmurs        [x] Sternal dressing, [x] Mediastinal CTx2 [x] Bulb        [x] Paced rhythm, [x] TPM DDD - 80  Abdominal: Soft, NT, ND +BS   Extremities: 1-2+ pedal edema noted   ============================I/O===========================   I&O's Detail I&O's Detail    23 Jan 2024 07:01  -  23 Jan 2024 20:16  --------------------------------------------------------  IN:    Albumin 5%  - 250 mL: 750 mL    Dexmedetomidine: 81.7 mL    DOBUTamine: 35.8 mL    Insulin: 9 mL    Milrinone: 26 mL    sodium chloride 0.9%: 50 mL    Sodium Chloride 0.9% Bolus: 1000 mL  Total IN: 1952.5 mL    OUT:    Bulb (mL): 105 mL    Chest Tube (mL): 190 mL    Indwelling Catheter - Urethral (mL): 605 mL  Total OUT: 900 mL    Total NET: 1052.5 mL  ============================ LABS =========================                        9.7    5.99  )-----------( x        ( 23 Jan 2024 16:27 )             28.2     01-23    147<H>  |  112<H>  |  22  ----------------------------<  136<H>  4.9   |  22  |  1.40<H>    Ca    8.5      23 Jan 2024 16:27  Phos  1.8     01-23  Mg     4.0     01-23    TPro  5.1<L>  /  Alb  3.3  /  TBili  1.0  /  DBili  x   /  AST  75<H>  /  ALT  21  /  AlkPhos  45  01-23    LIVER FUNCTIONS - ( 23 Jan 2024 16:27 )  Alb: 3.3 g/dL / Pro: 5.1 g/dL / ALK PHOS: 45 U/L / ALT: 21 U/L / AST: 75 U/L / GGT: x           PT/INR - ( 23 Jan 2024 16:27 )   PT: 13.4 sec;   INR: 1.23 ratio       PTT - ( 23 Jan 2024 16:27 )  PTT:26.2 sec  ABG - ( 23 Jan 2024 17:00 )  pH, Arterial: 7.48  pH, Blood: x     /  pCO2: 28    /  pO2: 219   / HCO3: 21    / Base Excess: -2.0  /  SaO2: 98.8      ======================Micro/Rad/Cardio=================  Culture: Reviewed   CXR: Reviewed  Echo: Reviewed  ======================================================  PAST MEDICAL & SURGICAL HISTORY:  Nonrheumatic mitral (valve) annulus calcification  Obesity, morbid, BMI 40.0-49.9  Chronic kidney disease (CKD)  AF (atrial fibrillation)  Type 2 diabetes mellitus  HLD (hyperlipidemia)  LEIDY on CPAP  Hypertrophic obstructive cardiomyopathy  2019 novel coronavirus disease (COVID-19)  HTN (hypertension)  Stented coronary artery  History of coronary angiogram  S/P coronary artery stent placement  H/O gastric sleeve  History of laparoscopic adjustable gastric banding  History of removal of laparoscopic gastric banding device  NVD (normal vaginal delivery)  ======================================================    Assessment:  74 yo F with PMHx of morbid obesity (BMI 42.7 s/p gastric sleeve 2015), DMT2, CAD s/p PCI of LCX (2012), LEIDY (on CPAP), CKD (stage 3 - on farxiga), AF (on Eliquis) and rheumatic mitral valve disease.    Nonrheumatic mitral stenosis, chronic afib s/p MVR, MAZE, RICHARD, CABG x1 on 1/23/24  Hemodynamic instability  Post op respiratory insufficiency  Acute blood loss anemia    ======================================================  Plan:   ***Neuro***  [x] Nonfocal    Pain management with Tylenol, Dilaudid, Gabapentin, Oxycodone, and prns    ***Cardiovascular***  Invasive hemodynamic monitoring, assess perfusion indices   HI / CVP 12/ MAP 92/ Hct 38.4/ Lactate 1.8  [x] Dobutamine - off [x] Primacor 0.2 mcgs/kG/Min (started)   Volume: 1u PRBC, 1 cryo, 2u Plt in OR; 250mL Albumin in CTU   Reassessment of hemodynamics post resuscitation  Monitor chest tube outputs     ***Pulmonary***  Post op vent management   Titration of FiO2 and PEEP, follow SpO2, CXR, blood gasses   CPAP use at home - will start BiPAP on extubation.     Mode: AC/ CMV (Assist Control/ Continuous Mandatory Ventilation)  RR (machine): 10  TV (machine): 800  FiO2: 50  PEEP: 5  ITime: 1  MAP: 10  PIP: 36              ***GI***  [x] NPO for now.    [x] Protonix for stress ulcer ppx    ***Renal***  [x] CKD  Continue to monitor I/Os, BUN/Creatinine.   Replete lytes PRN  Diallo present     ***ID***  Perioperative coverage with Cefuroxime   Continue Vanco for MSSA     ***Endocrine***  [x] DM2: HbA1c 7.0%                - [x] Insulin gtt               - Need tight glycemic control to prevent wound infection.            Patient requires continuous monitoring with:  bedside rhythm monitoring, arterial line, pulse oximetry, ventilator management and monitoring; intermittent blood gas analysis.  Care plan discussed with ICU care team.  patient remain critical; required more than usual post op care; I have spent 45 minutes providing non routine post op care, revaluated multiple times during the day .     Care Plan discussed with the ICU care team. Patient remained critical, at risk for life threatening decompensation.     By signing my name below, I, Jonelle Rod , attest that this documentation has been prepared under the direction and in the presence of Stephen Rendon MD.  Electronically signed: Kevin Keys, 01-23-24 @ 17:25    I, Julieta Mitchell, personally performed the services described in this documentation. all medical record entries made by the scribe were at my direction and in my presence. I have reviewed the chart and agree that the record reflects my personal performance and is accurate and complete  Electronically signed: Stephen Rendon MD.    Progress Note Adult-Critical Care Scribe [Charted Location: [facility] [id number]] [Authored: 23-Jan-2024 17:24]- for Visit: [phone], Incomplete, Revised, Unsigned w/additional Signatures Pending, Available to Patient    Progress Note:   · Provider Specialty	Critical Care      · Subjective and Objective:   Patient seen and examined at the bedside.    Remained critically ill on continuous ICU monitoring.    OBJECTIVE:  Vital Signs Last 24 Hrs  T(C): 35.1 (23 Jan 2024 16:05), Max: 37.1 (23 Jan 2024 07:00)  T(F): 95.2 (23 Jan 2024 16:05), Max: 98.8 (23 Jan 2024 07:00)  HR: 80 (23 Jan 2024 17:15) (71 - 80)  BP: 158/83 (23 Jan 2024 07:00) (158/83 - 158/83)  BP(mean): --  RR: 10 (23 Jan 2024 17:15) (10 - 19)  SpO2: 99% (23 Jan 2024 17:15) (96% - 100%)    Parameters below as of 23 Jan 2024 16:05  Patient On (Oxygen Delivery Method): ventilator    O2 Concentration (%): 100      Physical Exam:   General: Intubated   Neurology: nonfocal   ENT/Neck: Neck supple, trachea midline, No JVD   Respiratory: Clear bilaterally   CV: S1S2, no murmurs        [x] Sternal dressing, [x] Mediastinal CTx2 [x] Bulb        [x] Paced rhythm, [x] TPM DDD - 80  Abdominal: Soft, NT, ND +BS   Extremities: 1-2+ pedal edema noted   ============================I/O===========================   I&O's Detail I&O's Detail    23 Jan 2024 07:01  -  23 Jan 2024 20:16  --------------------------------------------------------  IN:    Albumin 5%  - 250 mL: 750 mL    Dexmedetomidine: 81.7 mL    DOBUTamine: 35.8 mL    Insulin: 9 mL    Milrinone: 26 mL    sodium chloride 0.9%: 50 mL    Sodium Chloride 0.9% Bolus: 1000 mL  Total IN: 1952.5 mL    OUT:    Bulb (mL): 105 mL    Chest Tube (mL): 190 mL    Indwelling Catheter - Urethral (mL): 605 mL  Total OUT: 900 mL    Total NET: 1052.5 mL  ============================ LABS =========================                        9.7    5.99  )-----------( x        ( 23 Jan 2024 16:27 )             28.2     01-23    147<H>  |  112<H>  |  22  ----------------------------<  136<H>  4.9   |  22  |  1.40<H>    Ca    8.5      23 Jan 2024 16:27  Phos  1.8     01-23  Mg     4.0     01-23    TPro  5.1<L>  /  Alb  3.3  /  TBili  1.0  /  DBili  x   /  AST  75<H>  /  ALT  21  /  AlkPhos  45  01-23    LIVER FUNCTIONS - ( 23 Jan 2024 16:27 )  Alb: 3.3 g/dL / Pro: 5.1 g/dL / ALK PHOS: 45 U/L / ALT: 21 U/L / AST: 75 U/L / GGT: x           PT/INR - ( 23 Jan 2024 16:27 )   PT: 13.4 sec;   INR: 1.23 ratio       PTT - ( 23 Jan 2024 16:27 )  PTT:26.2 sec  ABG - ( 23 Jan 2024 17:00 )  pH, Arterial: 7.48  pH, Blood: x     /  pCO2: 28    /  pO2: 219   / HCO3: 21    / Base Excess: -2.0  /  SaO2: 98.8      ======================Micro/Rad/Cardio=================  Culture: Reviewed   CXR: Reviewed  Echo: Reviewed  ======================================================  PAST MEDICAL & SURGICAL HISTORY:  Nonrheumatic mitral (valve) annulus calcification  Obesity, morbid, BMI 40.0-49.9  Chronic kidney disease (CKD)  AF (atrial fibrillation)  Type 2 diabetes mellitus  HLD (hyperlipidemia)  LEIDY on CPAP  Hypertrophic obstructive cardiomyopathy                                                                                                                                                                                                           2019 novel coronavirus disease (COVID-19)  HTN (hypertension)  Stented coronary artery  History of coronary angiogram  S/P coronary artery stent placement  H/O gastric sleeve  History of laparoscopic adjustable gastric banding  History of removal of laparoscopic gastric banding device  NVD (normal vaginal delivery)  ======================================================    Assessment:  72 yo F with PMHx of morbid obesity (BMI 42.7 s/p gastric sleeve 2015), DMT2, CAD s/p PCI of LCX (2012), LEIDY (on CPAP), CKD (stage 3 - on farxiga), AF (on Eliquis) and rheumatic mitral valve disease.    Nonrheumatic mitral stenosis, chronic afib s/p MVR, MAZE, RICHARD, CABG x1 on 1/23/24  Hemodynamic instability  Post op respiratory insufficiency   encounter weaning ventilator  Encounter AV pacing temporary cardiac pacing  Acute blood loss anemia  Obstructive sleep apnea    ======================================================  Plan:   ***Neuro***  [x] Nonfocal    Pain management with Tylenol, Dilaudid, Gabapentin, Oxycodone, and prns    ***Cardiovascular***  Invasive hemodynamic monitoring, assess perfusion indices   WV / CVP 12/ MAP 92/ Hct 38.4/ Lactate 1.8  [x] Dobutamine - off [x] Primacor 0.2 mcgs/kG/Min (started)   Volume: 1u PRBC, 1 cryo, 2u Plt in OR; 250mL Albumin in CTU   Reassessment of hemodynamics post resuscitation  Monitor chest tube outputs   Inotropic support dobutamine, Primacor    ***Pulmonary***  Post op vent management   Titration of FiO2 and PEEP, follow SpO2, CXR, blood gasses   CPAP use at home - will start BiPAP on extubation.     Mode: AC/ CMV (Assist Control/ Continuous Mandatory Ventilation)  RR (machine): 10  TV (machine): 800  FiO2: 50  PEEP: 5  ITime: 1  MAP: 10  PIP: 36            Wean to extubate  ***GI***  [x] NPO for now.    [x] Protonix for stress ulcer ppx    ***Renal***  [x] CKD  Continue to monitor I/Os, BUN/Creatinine.   Replete lytes PRN  Diallo present     ***ID***  Perioperative coverage with Cefuroxime   Continue Vanco for MSSA     ***Endocrine***  [x] DM2: HbA1c 7.0%                - [x] Insulin gtt               - Need tight glycemic control to prevent wound infection.      Patient requires continuous monitoring with:  bedside rhythm monitoring, arterial line, pulse oximetry, ventilator management and monitoring; intermittent blood gas analysis.  Care plan discussed with ICU care team.  patient remain critical; required more than usual post op care; I have spent 45 minutes providing non routine post op care, revaluated multiple times during the day .     Care Plan discussed with the ICU care team. Patient remained critical, at risk for life threatening decompensation.     By signing my name below, I, Jonelle Rod , attest that this documentation has been prepared under the direction and in the presence of Stephen Rendon MD.  Electronically signed: Kevin Keys, 01-23-24 @ 17:25    I, Julieta Mitchell, personally performed the services described in this documentation. all medical record entries made by the scribe were at my direction and in my presence. I have reviewed the chart and agree that the record reflects my personal performance and is accurate and complete  Electronically signed: Stephen Rendon MD.        Electronic Signatures:  Julieta Mitchell)  (Signature Pending)  	Authored: Progress Note, Subjective and Objective  Jonelle Rod (Scribe)  (Entered 23-Jan-2024 20:17)  	Entered: Progress Note, Subjective and Objective      Last Updated: 23-Jan-2024 20:17 by Jonelle Rod (Mikee)

## 2024-01-24 LAB
ALBUMIN SERPL ELPH-MCNC: 4.1 G/DL — SIGNIFICANT CHANGE UP (ref 3.3–5)
ALP SERPL-CCNC: 40 U/L — SIGNIFICANT CHANGE UP (ref 40–120)
ALT FLD-CCNC: 13 U/L — SIGNIFICANT CHANGE UP (ref 10–45)
ANION GAP SERPL CALC-SCNC: 14 MMOL/L — SIGNIFICANT CHANGE UP (ref 5–17)
ANION GAP SERPL CALC-SCNC: 15 MMOL/L — SIGNIFICANT CHANGE UP (ref 5–17)
APTT BLD: 23.9 SEC — LOW (ref 24.5–35.6)
AST SERPL-CCNC: 77 U/L — HIGH (ref 10–40)
BASE EXCESS BLDV CALC-SCNC: -1.6 MMOL/L — SIGNIFICANT CHANGE UP (ref -2–3)
BASE EXCESS BLDV CALC-SCNC: -1.7 MMOL/L — SIGNIFICANT CHANGE UP (ref -2–3)
BASE EXCESS BLDV CALC-SCNC: -5.6 MMOL/L — LOW (ref -2–3)
BILIRUB SERPL-MCNC: 1.3 MG/DL — HIGH (ref 0.2–1.2)
BUN SERPL-MCNC: 21 MG/DL — SIGNIFICANT CHANGE UP (ref 7–23)
BUN SERPL-MCNC: 22 MG/DL — SIGNIFICANT CHANGE UP (ref 7–23)
CA-I SERPL-SCNC: 1.26 MMOL/L — SIGNIFICANT CHANGE UP (ref 1.15–1.33)
CALCIUM SERPL-MCNC: 9 MG/DL — SIGNIFICANT CHANGE UP (ref 8.4–10.5)
CALCIUM SERPL-MCNC: 9.1 MG/DL — SIGNIFICANT CHANGE UP (ref 8.4–10.5)
CHLORIDE BLDV-SCNC: 110 MMOL/L — HIGH (ref 96–108)
CHLORIDE SERPL-SCNC: 109 MMOL/L — HIGH (ref 96–108)
CHLORIDE SERPL-SCNC: 114 MMOL/L — HIGH (ref 96–108)
CO2 BLDV-SCNC: 22 MMOL/L — SIGNIFICANT CHANGE UP (ref 22–26)
CO2 BLDV-SCNC: 27 MMOL/L — HIGH (ref 22–26)
CO2 BLDV-SCNC: 27 MMOL/L — HIGH (ref 22–26)
CO2 SERPL-SCNC: 20 MMOL/L — LOW (ref 22–31)
CO2 SERPL-SCNC: 20 MMOL/L — LOW (ref 22–31)
CREAT SERPL-MCNC: 1.63 MG/DL — HIGH (ref 0.5–1.3)
CREAT SERPL-MCNC: 1.68 MG/DL — HIGH (ref 0.5–1.3)
EGFR: 32 ML/MIN/1.73M2 — LOW
EGFR: 33 ML/MIN/1.73M2 — LOW
FIBRINOGEN PPP-MCNC: 279 MG/DL — SIGNIFICANT CHANGE UP (ref 200–445)
GAS PNL BLDA: SIGNIFICANT CHANGE UP
GAS PNL BLDV: 140 MMOL/L — SIGNIFICANT CHANGE UP (ref 136–145)
GAS PNL BLDV: SIGNIFICANT CHANGE UP
GAS PNL BLDV: SIGNIFICANT CHANGE UP
GLUCOSE BLDV-MCNC: 137 MG/DL — HIGH (ref 70–99)
GLUCOSE SERPL-MCNC: 147 MG/DL — HIGH (ref 70–99)
GLUCOSE SERPL-MCNC: 161 MG/DL — HIGH (ref 70–99)
HCO3 BLDV-SCNC: 21 MMOL/L — LOW (ref 22–29)
HCO3 BLDV-SCNC: 25 MMOL/L — SIGNIFICANT CHANGE UP (ref 22–29)
HCO3 BLDV-SCNC: 26 MMOL/L — SIGNIFICANT CHANGE UP (ref 22–29)
HCT VFR BLD CALC: 27.2 % — LOW (ref 34.5–45)
HCT VFR BLDA CALC: 28 % — LOW (ref 34.5–46.5)
HGB BLD CALC-MCNC: 9.2 G/DL — LOW (ref 11.7–16.1)
HGB BLD-MCNC: 9.2 G/DL — LOW (ref 11.5–15.5)
HOROWITZ INDEX BLDV+IHG-RTO: 40 — SIGNIFICANT CHANGE UP
HOROWITZ INDEX BLDV+IHG-RTO: 44 — SIGNIFICANT CHANGE UP
INR BLD: 1.19 RATIO — HIGH (ref 0.85–1.18)
LACTATE BLDV-MCNC: 0.9 MMOL/L — SIGNIFICANT CHANGE UP (ref 0.5–2)
MAGNESIUM SERPL-MCNC: 3.1 MG/DL — HIGH (ref 1.6–2.6)
MCHC RBC-ENTMCNC: 32.5 PG — SIGNIFICANT CHANGE UP (ref 27–34)
MCHC RBC-ENTMCNC: 33.8 GM/DL — SIGNIFICANT CHANGE UP (ref 32–36)
MCV RBC AUTO: 96.1 FL — SIGNIFICANT CHANGE UP (ref 80–100)
NRBC # BLD: 0 /100 WBCS — SIGNIFICANT CHANGE UP (ref 0–0)
PCO2 BLDV: 43 MMHG — HIGH (ref 39–42)
PCO2 BLDV: 50 MMHG — HIGH (ref 39–42)
PCO2 BLDV: 52 MMHG — HIGH (ref 39–42)
PH BLDV: 7.29 — LOW (ref 7.32–7.43)
PH BLDV: 7.3 — LOW (ref 7.32–7.43)
PH BLDV: 7.31 — LOW (ref 7.32–7.43)
PHOSPHATE SERPL-MCNC: 2.8 MG/DL — SIGNIFICANT CHANGE UP (ref 2.5–4.5)
PLATELET # BLD AUTO: 115 K/UL — LOW (ref 150–400)
PO2 BLDV: 47 MMHG — HIGH (ref 25–45)
PO2 BLDV: 48 MMHG — HIGH (ref 25–45)
PO2 BLDV: 49 MMHG — HIGH (ref 25–45)
POTASSIUM BLDV-SCNC: 4.3 MMOL/L — SIGNIFICANT CHANGE UP (ref 3.5–5.1)
POTASSIUM SERPL-MCNC: 4.5 MMOL/L — SIGNIFICANT CHANGE UP (ref 3.5–5.3)
POTASSIUM SERPL-MCNC: 4.8 MMOL/L — SIGNIFICANT CHANGE UP (ref 3.5–5.3)
POTASSIUM SERPL-SCNC: 4.5 MMOL/L — SIGNIFICANT CHANGE UP (ref 3.5–5.3)
POTASSIUM SERPL-SCNC: 4.8 MMOL/L — SIGNIFICANT CHANGE UP (ref 3.5–5.3)
PROT SERPL-MCNC: 5.5 G/DL — LOW (ref 6–8.3)
PROTHROM AB SERPL-ACNC: 13 SEC — SIGNIFICANT CHANGE UP (ref 9.5–13)
RBC # BLD: 2.83 M/UL — LOW (ref 3.8–5.2)
RBC # FLD: 16 % — HIGH (ref 10.3–14.5)
SAO2 % BLDV: 76.7 % — SIGNIFICANT CHANGE UP (ref 67–88)
SAO2 % BLDV: 78.5 % — SIGNIFICANT CHANGE UP (ref 67–88)
SAO2 % BLDV: 79.8 % — SIGNIFICANT CHANGE UP (ref 67–88)
SODIUM SERPL-SCNC: 143 MMOL/L — SIGNIFICANT CHANGE UP (ref 135–145)
SODIUM SERPL-SCNC: 149 MMOL/L — HIGH (ref 135–145)
TSH SERPL-MCNC: 3.47 UIU/ML — SIGNIFICANT CHANGE UP (ref 0.27–4.2)
VANCOMYCIN TROUGH SERPL-MCNC: 15.2 UG/ML — SIGNIFICANT CHANGE UP (ref 10–20)
VANCOMYCIN TROUGH SERPL-MCNC: 16.5 UG/ML — SIGNIFICANT CHANGE UP (ref 10–20)
VANCOMYCIN TROUGH SERPL-MCNC: 20.5 UG/ML — HIGH (ref 10–20)
WBC # BLD: 5.64 K/UL — SIGNIFICANT CHANGE UP (ref 3.8–10.5)
WBC # FLD AUTO: 5.64 K/UL — SIGNIFICANT CHANGE UP (ref 3.8–10.5)

## 2024-01-24 PROCEDURE — 93010 ELECTROCARDIOGRAM REPORT: CPT

## 2024-01-24 PROCEDURE — 71045 X-RAY EXAM CHEST 1 VIEW: CPT | Mod: 26

## 2024-01-24 PROCEDURE — 99291 CRITICAL CARE FIRST HOUR: CPT

## 2024-01-24 RX ORDER — ACETAMINOPHEN 500 MG
1000 TABLET ORAL ONCE
Refills: 0 | Status: COMPLETED | OUTPATIENT
Start: 2024-01-24 | End: 2024-01-24

## 2024-01-24 RX ORDER — SODIUM BICARBONATE 1 MEQ/ML
50 SYRINGE (ML) INTRAVENOUS ONCE
Refills: 0 | Status: COMPLETED | OUTPATIENT
Start: 2024-01-24 | End: 2024-01-24

## 2024-01-24 RX ORDER — ONDANSETRON 8 MG/1
4 TABLET, FILM COATED ORAL ONCE
Refills: 0 | Status: COMPLETED | OUTPATIENT
Start: 2024-01-24 | End: 2024-01-24

## 2024-01-24 RX ORDER — NOREPINEPHRINE BITARTRATE/D5W 8 MG/250ML
0.03 PLASTIC BAG, INJECTION (ML) INTRAVENOUS
Qty: 8 | Refills: 0 | Status: DISCONTINUED | OUTPATIENT
Start: 2024-01-24 | End: 2024-01-25

## 2024-01-24 RX ORDER — AMINOCAPROIC ACID 500 MG/1
5 TABLET ORAL ONCE
Refills: 0 | Status: COMPLETED | OUTPATIENT
Start: 2024-01-24 | End: 2024-01-24

## 2024-01-24 RX ORDER — ALBUMIN HUMAN 25 %
250 VIAL (ML) INTRAVENOUS ONCE
Refills: 0 | Status: COMPLETED | OUTPATIENT
Start: 2024-01-24 | End: 2024-01-24

## 2024-01-24 RX ORDER — METOCLOPRAMIDE HCL 10 MG
5 TABLET ORAL ONCE
Refills: 0 | Status: COMPLETED | OUTPATIENT
Start: 2024-01-24 | End: 2024-01-24

## 2024-01-24 RX ORDER — FUROSEMIDE 40 MG
20 TABLET ORAL EVERY 12 HOURS
Refills: 0 | Status: DISCONTINUED | OUTPATIENT
Start: 2024-01-24 | End: 2024-01-25

## 2024-01-24 RX ORDER — ENOXAPARIN SODIUM 100 MG/ML
30 INJECTION SUBCUTANEOUS EVERY 24 HOURS
Refills: 0 | Status: DISCONTINUED | OUTPATIENT
Start: 2024-01-24 | End: 2024-01-27

## 2024-01-24 RX ORDER — ATORVASTATIN CALCIUM 80 MG/1
80 TABLET, FILM COATED ORAL AT BEDTIME
Refills: 0 | Status: DISCONTINUED | OUTPATIENT
Start: 2024-01-24 | End: 2024-02-08

## 2024-01-24 RX ORDER — ASPIRIN/CALCIUM CARB/MAGNESIUM 324 MG
81 TABLET ORAL DAILY
Refills: 0 | Status: DISCONTINUED | OUTPATIENT
Start: 2024-01-24 | End: 2024-02-08

## 2024-01-24 RX ADMIN — SODIUM CHLORIDE 3 MILLILITER(S): 9 INJECTION INTRAMUSCULAR; INTRAVENOUS; SUBCUTANEOUS at 05:51

## 2024-01-24 RX ADMIN — POLYETHYLENE GLYCOL 3350 17 GRAM(S): 17 POWDER, FOR SOLUTION ORAL at 11:45

## 2024-01-24 RX ADMIN — GABAPENTIN 100 MILLIGRAM(S): 400 CAPSULE ORAL at 17:17

## 2024-01-24 RX ADMIN — PANTOPRAZOLE SODIUM 40 MILLIGRAM(S): 20 TABLET, DELAYED RELEASE ORAL at 11:45

## 2024-01-24 RX ADMIN — SODIUM CHLORIDE 3 MILLILITER(S): 9 INJECTION INTRAMUSCULAR; INTRAVENOUS; SUBCUTANEOUS at 21:27

## 2024-01-24 RX ADMIN — Medication 100 MILLIGRAM(S): at 21:22

## 2024-01-24 RX ADMIN — AMINOCAPROIC ACID 250 GRAM(S): 500 TABLET ORAL at 00:30

## 2024-01-24 RX ADMIN — Medication 81 MILLIGRAM(S): at 05:44

## 2024-01-24 RX ADMIN — Medication 400 MILLIGRAM(S): at 09:53

## 2024-01-24 RX ADMIN — ONDANSETRON 4 MILLIGRAM(S): 8 TABLET, FILM COATED ORAL at 11:18

## 2024-01-24 RX ADMIN — ENOXAPARIN SODIUM 30 MILLIGRAM(S): 100 INJECTION SUBCUTANEOUS at 17:18

## 2024-01-24 RX ADMIN — Medication 500 MILLIGRAM(S): at 17:18

## 2024-01-24 RX ADMIN — Medication 500 MILLIGRAM(S): at 05:44

## 2024-01-24 RX ADMIN — MUPIROCIN 1 APPLICATION(S): 20 OINTMENT TOPICAL at 05:51

## 2024-01-24 RX ADMIN — OXYCODONE HYDROCHLORIDE 10 MILLIGRAM(S): 5 TABLET ORAL at 22:16

## 2024-01-24 RX ADMIN — GABAPENTIN 100 MILLIGRAM(S): 400 CAPSULE ORAL at 05:45

## 2024-01-24 RX ADMIN — Medication 50 MILLIEQUIVALENT(S): at 12:54

## 2024-01-24 RX ADMIN — CHLORHEXIDINE GLUCONATE 1 APPLICATION(S): 213 SOLUTION TOPICAL at 12:26

## 2024-01-24 RX ADMIN — Medication 400 MILLIGRAM(S): at 03:45

## 2024-01-24 RX ADMIN — Medication 100 MILLIGRAM(S): at 08:26

## 2024-01-24 RX ADMIN — ATORVASTATIN CALCIUM 80 MILLIGRAM(S): 80 TABLET, FILM COATED ORAL at 21:22

## 2024-01-24 RX ADMIN — Medication 5 MILLIGRAM(S): at 13:20

## 2024-01-24 RX ADMIN — OXYCODONE HYDROCHLORIDE 10 MILLIGRAM(S): 5 TABLET ORAL at 21:46

## 2024-01-24 RX ADMIN — Medication 1000 MILLIGRAM(S): at 14:15

## 2024-01-24 RX ADMIN — HYDROMORPHONE HYDROCHLORIDE 0.5 MILLIGRAM(S): 2 INJECTION INTRAMUSCULAR; INTRAVENOUS; SUBCUTANEOUS at 17:17

## 2024-01-24 RX ADMIN — HYDROMORPHONE HYDROCHLORIDE 0.5 MILLIGRAM(S): 2 INJECTION INTRAMUSCULAR; INTRAVENOUS; SUBCUTANEOUS at 17:32

## 2024-01-24 RX ADMIN — Medication 1000 MILLIGRAM(S): at 04:00

## 2024-01-24 RX ADMIN — Medication 400 MILLIGRAM(S): at 14:00

## 2024-01-24 RX ADMIN — SENNA PLUS 2 TABLET(S): 8.6 TABLET ORAL at 21:22

## 2024-01-24 RX ADMIN — MUPIROCIN 1 APPLICATION(S): 20 OINTMENT TOPICAL at 17:18

## 2024-01-24 RX ADMIN — Medication 1000 MILLIGRAM(S): at 10:08

## 2024-01-24 RX ADMIN — Medication 125 MILLILITER(S): at 06:55

## 2024-01-24 RX ADMIN — Medication 250 MILLIGRAM(S): at 09:17

## 2024-01-24 RX ADMIN — SODIUM CHLORIDE 3 MILLILITER(S): 9 INJECTION INTRAMUSCULAR; INTRAVENOUS; SUBCUTANEOUS at 13:05

## 2024-01-24 NOTE — PHYSICAL THERAPY INITIAL EVALUATION ADULT - MANUAL MUSCLE TESTING RESULTS, REHAB EVAL
Functionally assessed atleast 3+/5 for B UE and LE while pushing up from chair into standing/grossly assessed due to

## 2024-01-24 NOTE — PROGRESS NOTE ADULT - SUBJECTIVE AND OBJECTIVE BOX
Patient seen and examined at the bedside.    Remained critically ill on continuous ICU monitoring.    OBJECTIVE:  Vital Signs Last 24 Hrs  T(C): 37.2 (24 Jan 2024 08:00), Max: 37.3 (24 Jan 2024 00:00)  T(F): 99 (24 Jan 2024 08:00), Max: 99.1 (24 Jan 2024 00:00)  HR: 87 (24 Jan 2024 07:45) (80 - 105)  BP: --  BP(mean): --  RR: 18 (24 Jan 2024 07:00) (10 - 20)  SpO2: 97% (24 Jan 2024 07:45) (94% - 100%)    Parameters below as of 24 Jan 2024 08:00  Patient On (Oxygen Delivery Method): nasal cannula  O2 Flow (L/min): 6      Physical Exam:   General: Intubated   Neurology: nonfocal   ENT/Neck: Neck supple, trachea midline, No JVD   Respiratory: Clear bilaterally   CV: S1S2, no murmurs        [x] Sternal dressing, [x] Mediastinal CTx2 [x] Bulb        [x] Paced rhythm, [x] TPM DDD - 88  Abdominal: Soft, NT, ND +BS   Extremities: 1-2+ pedal edema noted     Assessment:  74 yo F with PMHx of morbid obesity (BMI 42.7 s/p gastric sleeve 2015), DMT2, CAD s/p PCI of LCX (2012), LEIDY (on CPAP), CKD (stage 3 - on farxiga), AF (on Eliquis) and rheumatic mitral valve disease.    Nonrheumatic mitral stenosis, chronic afib s/p MVR, MAZE, RICHARD, CABG x1 on 1/23/24  Hemodynamic instability  Post op respiratory insufficiency  Acute blood loss anemia  Thrombocytopenia    Plan:   ***Neuro***  [x] Nonfocal    Pain management with Tylenol, Dilaudid, Gabapentin, Oxycodone, and prns    ***Cardiovascular***  Invasive hemodynamic monitoring, assess perfusion indices   PA / CVP 3 / MAP 67 / Hct 27.2% / Lactate 1.3  [x] Dobutamine 4 mcg/kG/MIN  Volume: 1u PRBC, Albumin 250 mL   Reassessment of hemodynamics post resuscitation  [x] ASA [x] Statin  Monitor chest tube outputs     ***Pulmonary***  [x] Nasal Cannula 6L  Post op vent management   Titration of FiO2 and PEEP, follow SpO2, CXR, blood gasses   CPAP use at home - will start BiPAP on extubation.     Mode: CPAP with PS  FiO2: 40  PEEP: 5  PS: 10  MAP: 7  PIP: 16           ***GI***  [x] NPO for now.    [x] Protonix for stress ulcer prophylaxis  Bowel regimen with Miralax and Senna.     ***Renal***  [x] CKD  Continue to monitor I/Os, BUN/Creatinine.   Replete lytes PRN  Diallo present     ***ID***  Perioperative coverage with Cefuroxime   Continue Vanco for MSSA     ***Endocrine***  [x] DM2: HbA1c 7.0%                - [x] Insulin gtt               - Need tight glycemic control to prevent wound infection.                  Patient requires continuous monitoring with bedside rhythm monitoring, pulse oximetry monitoring, and continuous central venous and arterial pressure monitoring; and intermittent blood gas analysis. Care plan discussed with the ICU care team.   Patient remained critical, at risk for life threatening decompensation.    I have spent 30 minutes providing critical care management to this patient.    By signing my name below, I, Karine Navarro, attest that this documentation has been prepared under the direction and in the presence of LISA Guerrier  Electronically signed: Kevin Manning, 01-24-24 @ 09:24    IErin PA, personally performed the services described in this documentation. all medical record entries made by the scribe were at my direction and in my presence. I have reviewed the chart and agree that the record reflects my personal performance and is accurate and complete  Electronically signed: LISA Guerrier Patient seen and examined at the bedside.    Remained critically ill on continuous ICU monitoring.    OBJECTIVE:  Vital Signs Last 24 Hrs  T(C): 37.2 (24 Jan 2024 08:00), Max: 37.3 (24 Jan 2024 00:00)  T(F): 99 (24 Jan 2024 08:00), Max: 99.1 (24 Jan 2024 00:00)  HR: 87 (24 Jan 2024 07:45) (80 - 105)  BP: --  BP(mean): --  RR: 18 (24 Jan 2024 07:00) (10 - 20)  SpO2: 97% (24 Jan 2024 07:45) (94% - 100%)  Parameters below as of 24 Jan 2024 08:00  Patient On (Oxygen Delivery Method): nasal cannula  O2 Flow (L/min): 6    Physical Exam:   General: OOBTC, sitting comfortably.   Neurology: nonfocal. Moves all extremities and follows commands. A+Ox4   ENT/Neck: Neck supple, trachea midline, No JVD   Respiratory: Clear bilaterally   CV: S1S2, no murmurs        [x] Sternal dressing, [x] Mediastinal CTx2 [x] Bulb        [x] Paced rhythm, [x] TPM DDD - 88  Abdominal: Soft, NT, ND +BS   Extremities: 1-2+ pedal edema noted     Assessment:  72 yo F with PMHx of morbid obesity (BMI 42.7 s/p gastric sleeve 2015), DMT2, CAD s/p PCI of LCX (2012), LEIDY (on CPAP), CKD (stage 3 - on farxiga), AF (on Eliquis) and rheumatic mitral valve disease.  Nonrheumatic mitral stenosis, chronic afib s/p MVR, MAZE, RICHARD, CABG x1 on 1/23/24  Hemodynamic instability  Post op respiratory insufficiency  Acute blood loss anemia  Thrombocytopenia    Plan:   ***Neuro***  [x] Nonfocal    Pain management with Tylenol, Dilaudid, Gabapentin, Oxycodone, and prns    ***Cardiovascular***  Invasive hemodynamic monitoring, assess perfusion indices   WA / CVP 3 / MAP 67 / Hct 27.2% / Lactate 1.3  [x] Dobutamine 4 mcg/kG/MIN  Volume: 1u PRBC, Albumin 250 mL for orthostatic hypotension.   Reassessment of hemodynamics post resuscitation  [x] ASA [x] Statin  Monitor chest tube outputs  [X] lovenox for DVT prophylaxis  [X] Lipitor      ***Pulmonary***  [x] Nasal Cannula 6L - wean as tolerated and by O2 saturations.   Post op vent management   Titration of FiO2 and PEEP, follow SpO2, CXR, blood gasses   CPAP use at home - will start BiPAP on extubation.          ***GI***    [x] Protonix for stress ulcer prophylaxis  Bowel regimen with Miralax and Senna.     ***Renal***  [x] CKD  Continue to monitor I/Os, BUN/Creatinine.   Replete lytes PRN  Diallo present     ***ID***  Perioperative coverage with Cefuroxime   Continue Vanco for MSSA     ***Endocrine***  [x] DM2: HbA1c 7.0%                - [x] Insulin gtt               - Need tight glycemic control to prevent wound infection.                  Patient requires continuous monitoring with bedside rhythm monitoring, pulse oximetry monitoring, and continuous central venous and arterial pressure monitoring; and intermittent blood gas analysis. Care plan discussed with the ICU care team.   Patient remained critical, at risk for life threatening decompensation.    I have spent 30 minutes providing critical care management to this patient.    By signing my name below, I, Karine Navarro, attest that this documentation has been prepared under the direction and in the presence of LISA Guerrier  Electronically signed: Kevin Manning, 01-24-24 @ 09:24    I, LISA Guerrier, personally performed the services described in this documentation. all medical record entries made by the deliaibe were at my direction and in my presence. I have reviewed the chart and agree that the record reflects my personal performance and is accurate and complete  Electronically signed: LISA Guerrier

## 2024-01-24 NOTE — PHYSICAL THERAPY INITIAL EVALUATION ADULT - ADDITIONAL COMMENTS
Pt lives in a house with , has no stairs to enter through the garage, able to stay on first floor, bedroom on 3rd floor with 6+6 steps to get to bedroom. Patient was independent with all ADLs and IADLs prior to admission. Ambulated without device prior.

## 2024-01-24 NOTE — PROGRESS NOTE ADULT - ASSESSMENT
73 year old female with PMH morbid obesity (BMI 42.7 s/p gastric sleeve 2015), DMT2, CAD s/p PCI of LCX (2012), LEIDY (on CPAP), CKD (stage 3 - on farxiga), AF (on Eliquis) and rheumatic mitral valve disease reports c/o chronic BARBOZA  1/23/24 SP Mitral valve replacement;  Hybrid Maze procedure Clipping, left atrial appendage and CABG, using 1 vein graft    CKD stage 3a   Hypernatremia       1 Renal - Creatinine baseline more in the 1.6-1.7 range;  Trend renal function and urine output   As an outpt we discussed JORDIN and the possibility of HD as well   Cont the daniels   No need for renal sono  2 Endo-Farxiga and ARB are both held and to restarted as outpt   3 CVS-Off bblockers and on inotropes at present ;  Taper Levo as BP allows   4 Pulm-Nasal canula and incentive spirometry   5 GI- encourage PO intake     dw CTICU  >60 minutes spent on total encounter and more then 50% of the visit was spent counseling and/or coordinating care by the attending physician   Critically sick and unstable     Sayed [facility]   [phone]

## 2024-01-24 NOTE — PROGRESS NOTE ADULT - SUBJECTIVE AND OBJECTIVE BOX
[facility]OLOGY[facility] [phone]        Patient seen and examined in bed.  She was on Levo gtt and  gtt         MEDICATIONS  (STANDING):  acetaminophen     Tablet .. 650 milliGRAM(s) Oral every 6 hours  ascorbic acid 500 milliGRAM(s) Oral two times a day  aspirin enteric coated 81 milliGRAM(s) Oral daily  atorvastatin 80 milliGRAM(s) Oral at bedtime  cefuroxime  IVPB 1500 milliGRAM(s) IV Intermittent every 12 hours  cefuroxime  IVPB 1500 milliGRAM(s) IV Intermittent once  chlorhexidine 2% Cloths 1 Application(s) Topical daily  chlorhexidine 2% Cloths 1 Application(s) Topical once  dextrose 50% Injectable 50 milliLiter(s) IV Push every 15 minutes  dextrose 50% Injectable 25 milliLiter(s) IV Push every 15 minutes  DOBUTamine Infusion 4 MICROgram(s)/kG/Min (13.1 mL/Hr) IV Continuous <Continuous>  gabapentin 100 milliGRAM(s) Oral every 12 hours  insulin regular Infusion 3 Unit(s)/Hr (3 mL/Hr) IV Continuous <Continuous>  mupirocin 2% Ointment 1 Application(s) Both Nostrils two times a day  pantoprazole  Injectable 40 milliGRAM(s) IV Push daily  polyethylene glycol 3350 17 Gram(s) Oral daily  senna 2 Tablet(s) Oral at bedtime  sodium chloride 0.9% lock flush 3 milliLiter(s) IV Push every 8 hours  sodium chloride 0.9%. 1000 milliLiter(s) (10 mL/Hr) IV Continuous <Continuous>  vancomycin  IVPB 1000 milliGRAM(s) IV Intermittent every 12 hours      VITAL:  T(C): , Max: 37.3 (24 @ 00:00)  T(F): , Max: 99.1 (24 @ 00:00)  HR: 87 (24 @ 07:45)  BP: --  BP(mean): --  RR: 18 (24 @ 07:00)  SpO2: 97% (24 @ 07:45)  Wt(kg): --    I and O's:     @ 07:01  -   @ 07:00  --------------------------------------------------------  IN: 4199.7 mL / OUT: 2965 mL / NET: 1234.7 mL     @ 07:01  -   @ 08:59  --------------------------------------------------------  IN: 28.6 mL / OUT: 0 mL / NET: 28.6 mL          PHYSICAL EXAM:    Constitutional: NAD; obese   Neck:  No JVD  Respiratory: reduce   Cardiovascular: S1 and S2  Gastrointestinal: BS+, soft, NT/ND  Extremities: No peripheral edema  Neurological: A/O x 3, no focal deficits  Psychiatric: Normal mood, normal affect  : +  Diallo  Skin: No rashes  Access: Not applicable    LABS:                        9.2    5.64  )-----------( 115      ( 2024 00:36 )             27.2         149<H>  |  114<H>  |  22  ----------------------------<  161<H>  4.8   |  20<L>  |  1.68<H>    Ca    9.1      2024 00:36  Phos  2.8       Mg     3.1         TPro  5.5<L>  /  Alb  4.1  /  TBili  1.3<H>  /  DBili  x   /  AST  77<H>  /  ALT  13  /  AlkPhos  40            Urine Studies:  Urinalysis Basic - ( 2024 00:36 )    Color: x / Appearance: x / SG: x / pH: x  Gluc: 161 mg/dL / Ketone: x  / Bili: x / Urobili: x   Blood: x / Protein: x / Nitrite: x   Leuk Esterase: x / RBC: x / WBC x   Sq Epi: x / Non Sq Epi: x / Bacteria: x            RADIOLOGY & ADDITIONAL STUDIES:

## 2024-01-24 NOTE — PHYSICAL THERAPY INITIAL EVALUATION ADULT - PERTINENT HX OF CURRENT PROBLEM, REHAB EVAL
73 year old female with PMH morbid obesity (BMI 42.7 s/p gastric sleeve 2015), DMT2, CAD s/p PCI of LCX (2012), LEIDY (on CPAP), CKD (stage 3 - on farxiga), AF (on Eliquis) and rheumatic mitral valve disease reports c/o chronic BARBOZA s/p Mitral valve replacement;  Hybrid Maze procedure Clipping, left atrial appendage and CABG, using 1 vein graft 1/23/24

## 2024-01-25 LAB
ALBUMIN SERPL ELPH-MCNC: 3.4 G/DL — SIGNIFICANT CHANGE UP (ref 3.3–5)
ALP SERPL-CCNC: 43 U/L — SIGNIFICANT CHANGE UP (ref 40–120)
ALT FLD-CCNC: 14 U/L — SIGNIFICANT CHANGE UP (ref 10–45)
ANION GAP SERPL CALC-SCNC: 13 MMOL/L — SIGNIFICANT CHANGE UP (ref 5–17)
AST SERPL-CCNC: 53 U/L — HIGH (ref 10–40)
BASE EXCESS BLDV CALC-SCNC: -1.1 MMOL/L — SIGNIFICANT CHANGE UP (ref -2–3)
BASE EXCESS BLDV CALC-SCNC: -1.8 MMOL/L — SIGNIFICANT CHANGE UP (ref -2–3)
BASE EXCESS BLDV CALC-SCNC: -2.2 MMOL/L — LOW (ref -2–3)
BASOPHILS # BLD AUTO: 0.01 K/UL — SIGNIFICANT CHANGE UP (ref 0–0.2)
BASOPHILS NFR BLD AUTO: 0.1 % — SIGNIFICANT CHANGE UP (ref 0–2)
BILIRUB SERPL-MCNC: 0.8 MG/DL — SIGNIFICANT CHANGE UP (ref 0.2–1.2)
BUN SERPL-MCNC: 20 MG/DL — SIGNIFICANT CHANGE UP (ref 7–23)
CALCIUM SERPL-MCNC: 9.1 MG/DL — SIGNIFICANT CHANGE UP (ref 8.4–10.5)
CHLORIDE SERPL-SCNC: 108 MMOL/L — SIGNIFICANT CHANGE UP (ref 96–108)
CO2 BLDV-SCNC: 26 MMOL/L — SIGNIFICANT CHANGE UP (ref 22–26)
CO2 SERPL-SCNC: 21 MMOL/L — LOW (ref 22–31)
CREAT SERPL-MCNC: 1.6 MG/DL — HIGH (ref 0.5–1.3)
EGFR: 34 ML/MIN/1.73M2 — LOW
EOSINOPHIL # BLD AUTO: 0 K/UL — SIGNIFICANT CHANGE UP (ref 0–0.5)
EOSINOPHIL NFR BLD AUTO: 0 % — SIGNIFICANT CHANGE UP (ref 0–6)
GAS PNL BLDA: SIGNIFICANT CHANGE UP
GAS PNL BLDV: SIGNIFICANT CHANGE UP
GAS PNL BLDV: SIGNIFICANT CHANGE UP
GLUCOSE SERPL-MCNC: 128 MG/DL — HIGH (ref 70–99)
HCO3 BLDV-SCNC: 24 MMOL/L — SIGNIFICANT CHANGE UP (ref 22–29)
HCO3 BLDV-SCNC: 25 MMOL/L — SIGNIFICANT CHANGE UP (ref 22–29)
HCO3 BLDV-SCNC: 25 MMOL/L — SIGNIFICANT CHANGE UP (ref 22–29)
HCT VFR BLD CALC: 27.8 % — LOW (ref 34.5–45)
HGB BLD-MCNC: 9.1 G/DL — LOW (ref 11.5–15.5)
HOROWITZ INDEX BLDV+IHG-RTO: 40 — SIGNIFICANT CHANGE UP
HOROWITZ INDEX BLDV+IHG-RTO: 44 — SIGNIFICANT CHANGE UP
HOROWITZ INDEX BLDV+IHG-RTO: 44 — SIGNIFICANT CHANGE UP
IMM GRANULOCYTES NFR BLD AUTO: 0.5 % — SIGNIFICANT CHANGE UP (ref 0–0.9)
LYMPHOCYTES # BLD AUTO: 1.02 K/UL — SIGNIFICANT CHANGE UP (ref 1–3.3)
LYMPHOCYTES # BLD AUTO: 13.5 % — SIGNIFICANT CHANGE UP (ref 13–44)
MAGNESIUM SERPL-MCNC: 2.4 MG/DL — SIGNIFICANT CHANGE UP (ref 1.6–2.6)
MCHC RBC-ENTMCNC: 31.6 PG — SIGNIFICANT CHANGE UP (ref 27–34)
MCHC RBC-ENTMCNC: 32.7 GM/DL — SIGNIFICANT CHANGE UP (ref 32–36)
MCV RBC AUTO: 96.5 FL — SIGNIFICANT CHANGE UP (ref 80–100)
MONOCYTES # BLD AUTO: 0.56 K/UL — SIGNIFICANT CHANGE UP (ref 0–0.9)
MONOCYTES NFR BLD AUTO: 7.4 % — SIGNIFICANT CHANGE UP (ref 2–14)
NEUTROPHILS # BLD AUTO: 5.93 K/UL — SIGNIFICANT CHANGE UP (ref 1.8–7.4)
NEUTROPHILS NFR BLD AUTO: 78.5 % — HIGH (ref 43–77)
NRBC # BLD: 0 /100 WBCS — SIGNIFICANT CHANGE UP (ref 0–0)
PCO2 BLDV: 45 MMHG — HIGH (ref 39–42)
PCO2 BLDV: 47 MMHG — HIGH (ref 39–42)
PCO2 BLDV: 48 MMHG — HIGH (ref 39–42)
PH BLDV: 7.32 — SIGNIFICANT CHANGE UP (ref 7.32–7.43)
PH BLDV: 7.32 — SIGNIFICANT CHANGE UP (ref 7.32–7.43)
PH BLDV: 7.35 — SIGNIFICANT CHANGE UP (ref 7.32–7.43)
PHOSPHATE SERPL-MCNC: 3.4 MG/DL — SIGNIFICANT CHANGE UP (ref 2.5–4.5)
PLATELET # BLD AUTO: 89 K/UL — LOW (ref 150–400)
PO2 BLDV: 40 MMHG — SIGNIFICANT CHANGE UP (ref 25–45)
PO2 BLDV: 42 MMHG — SIGNIFICANT CHANGE UP (ref 25–45)
PO2 BLDV: 45 MMHG — SIGNIFICANT CHANGE UP (ref 25–45)
POTASSIUM SERPL-MCNC: 4.3 MMOL/L — SIGNIFICANT CHANGE UP (ref 3.5–5.3)
POTASSIUM SERPL-SCNC: 4.3 MMOL/L — SIGNIFICANT CHANGE UP (ref 3.5–5.3)
PROT SERPL-MCNC: 5.4 G/DL — LOW (ref 6–8.3)
RBC # BLD: 2.88 M/UL — LOW (ref 3.8–5.2)
RBC # FLD: 17.4 % — HIGH (ref 10.3–14.5)
SAO2 % BLDV: 72.5 % — SIGNIFICANT CHANGE UP (ref 67–88)
SAO2 % BLDV: 72.9 % — SIGNIFICANT CHANGE UP (ref 67–88)
SAO2 % BLDV: 76.7 % — SIGNIFICANT CHANGE UP (ref 67–88)
SODIUM SERPL-SCNC: 142 MMOL/L — SIGNIFICANT CHANGE UP (ref 135–145)
WBC # BLD: 7.56 K/UL — SIGNIFICANT CHANGE UP (ref 3.8–10.5)
WBC # FLD AUTO: 7.56 K/UL — SIGNIFICANT CHANGE UP (ref 3.8–10.5)

## 2024-01-25 PROCEDURE — 71045 X-RAY EXAM CHEST 1 VIEW: CPT | Mod: 26

## 2024-01-25 PROCEDURE — 99291 CRITICAL CARE FIRST HOUR: CPT

## 2024-01-25 RX ORDER — ONDANSETRON 8 MG/1
4 TABLET, FILM COATED ORAL ONCE
Refills: 0 | Status: COMPLETED | OUTPATIENT
Start: 2024-01-25 | End: 2024-01-25

## 2024-01-25 RX ORDER — SPIRONOLACTONE 25 MG/1
50 TABLET, FILM COATED ORAL EVERY 12 HOURS
Refills: 0 | Status: DISCONTINUED | OUTPATIENT
Start: 2024-01-25 | End: 2024-01-29

## 2024-01-25 RX ADMIN — Medication 650 MILLIGRAM(S): at 17:03

## 2024-01-25 RX ADMIN — SODIUM CHLORIDE 3 MILLILITER(S): 9 INJECTION INTRAMUSCULAR; INTRAVENOUS; SUBCUTANEOUS at 06:15

## 2024-01-25 RX ADMIN — Medication 500 MILLIGRAM(S): at 17:03

## 2024-01-25 RX ADMIN — Medication 10 MILLIGRAM(S): at 17:03

## 2024-01-25 RX ADMIN — MUPIROCIN 1 APPLICATION(S): 20 OINTMENT TOPICAL at 17:13

## 2024-01-25 RX ADMIN — Medication 650 MILLIGRAM(S): at 13:00

## 2024-01-25 RX ADMIN — Medication 650 MILLIGRAM(S): at 00:23

## 2024-01-25 RX ADMIN — ATORVASTATIN CALCIUM 80 MILLIGRAM(S): 80 TABLET, FILM COATED ORAL at 21:08

## 2024-01-25 RX ADMIN — HYDROMORPHONE HYDROCHLORIDE 0.5 MILLIGRAM(S): 2 INJECTION INTRAMUSCULAR; INTRAVENOUS; SUBCUTANEOUS at 17:30

## 2024-01-25 RX ADMIN — Medication 10 MILLIGRAM(S): at 06:11

## 2024-01-25 RX ADMIN — SODIUM CHLORIDE 3 MILLILITER(S): 9 INJECTION INTRAMUSCULAR; INTRAVENOUS; SUBCUTANEOUS at 22:00

## 2024-01-25 RX ADMIN — Medication 650 MILLIGRAM(S): at 06:11

## 2024-01-25 RX ADMIN — ONDANSETRON 4 MILLIGRAM(S): 8 TABLET, FILM COATED ORAL at 17:02

## 2024-01-25 RX ADMIN — ENOXAPARIN SODIUM 30 MILLIGRAM(S): 100 INJECTION SUBCUTANEOUS at 17:02

## 2024-01-25 RX ADMIN — Medication 650 MILLIGRAM(S): at 00:53

## 2024-01-25 RX ADMIN — Medication 100 MILLIGRAM(S): at 08:13

## 2024-01-25 RX ADMIN — HYDROMORPHONE HYDROCHLORIDE 0.5 MILLIGRAM(S): 2 INJECTION INTRAMUSCULAR; INTRAVENOUS; SUBCUTANEOUS at 17:02

## 2024-01-25 RX ADMIN — SPIRONOLACTONE 50 MILLIGRAM(S): 25 TABLET, FILM COATED ORAL at 06:12

## 2024-01-25 RX ADMIN — HYDROMORPHONE HYDROCHLORIDE 0.5 MILLIGRAM(S): 2 INJECTION INTRAMUSCULAR; INTRAVENOUS; SUBCUTANEOUS at 05:31

## 2024-01-25 RX ADMIN — Medication 20 MILLIGRAM(S): at 00:23

## 2024-01-25 RX ADMIN — Medication 500 MILLIGRAM(S): at 06:11

## 2024-01-25 RX ADMIN — Medication 650 MILLIGRAM(S): at 18:03

## 2024-01-25 RX ADMIN — MUPIROCIN 1 APPLICATION(S): 20 OINTMENT TOPICAL at 06:12

## 2024-01-25 RX ADMIN — SENNA PLUS 2 TABLET(S): 8.6 TABLET ORAL at 21:08

## 2024-01-25 RX ADMIN — SPIRONOLACTONE 50 MILLIGRAM(S): 25 TABLET, FILM COATED ORAL at 17:03

## 2024-01-25 RX ADMIN — GABAPENTIN 100 MILLIGRAM(S): 400 CAPSULE ORAL at 17:03

## 2024-01-25 RX ADMIN — Medication 650 MILLIGRAM(S): at 06:45

## 2024-01-25 RX ADMIN — HYDROMORPHONE HYDROCHLORIDE 0.5 MILLIGRAM(S): 2 INJECTION INTRAMUSCULAR; INTRAVENOUS; SUBCUTANEOUS at 06:01

## 2024-01-25 RX ADMIN — GABAPENTIN 100 MILLIGRAM(S): 400 CAPSULE ORAL at 06:12

## 2024-01-25 NOTE — PROGRESS NOTE ADULT - ASSESSMENT
73 year old female with PMH morbid obesity (BMI 42.7 s/p gastric sleeve 2015), DMT2, CAD s/p PCI of LCX (2012), LEIDY (on CPAP), CKD (stage 3 - on farxiga), AF (on Eliquis) and rheumatic mitral valve disease reports c/o chronic BARBOZA  1/23/24 SP Mitral valve replacement;  Hybrid Maze procedure Clipping, left atrial appendage and CABG, using 1 vein graft    CKD stage 3a       1 Renal - Creatinine baseline more in the 1.6-1.7 range;  PO Torsemide started and on aldactone as well   (As an outpt we discussed JORDIN and the possibility of HD as well)  Cont the daniels   No need for renal sono  2 Endo-Farxiga and ARB are both held and to restarted as outpt   3 CVS-Off bblockers and on inotropes at present ;  Not on pressors   4 Pulm-Nasal canula       dw CTICU  >60 minutes spent on total encounter and more then 50% of the visit was spent counseling and/or coordinating care by the attending physician   Critically sick and unstable     Sayed [facility]   [phone]

## 2024-01-25 NOTE — DIETITIAN INITIAL EVALUATION ADULT - ADD RECOMMEND
Reinforce nutrition education as needed - RD remains available. Provide encouragement with PO intake, menu selections, and assistance with meals as needed. Continue to monitor nutritional intake, labs, weights, BM, skin, clinical course.  Multivitamin if not otherwise contraindicated. Reinforce nutrition education as needed - RD remains available. Provide encouragement with PO intake, menu selections, and assistance with meals as needed. Continue to monitor nutritional intake, labs, weights, BM, skin, clinical course.

## 2024-01-25 NOTE — CONSULT NOTE ADULT - ASSESSMENT
73 year old female with PMH morbid obesity (BMI 42.7 s/p gastric sleeve 2015), DMT2, CAD s/p PCI of LCX (2012), LEIDY (on CPAP), CKD (stage 3 - on farxiga), valvular pAF (on Eliquis) and rheumatic mitral valve disease reports c/o chronic BARBOZA for over 10 years that has progressively worsened over the past few months. Pt now s/p scheduled mitral valve replacement, 1v CABG to LAD, maze procedure and left atrial appendage ligation with Dr. Dalal on 1/23/24. Post procedure, in junctional bradycardia, epicardial A and V wires placed.   Patient currently V pacing with poor Atrial capture.     Recommendations  - V-threshold 2 mA  - Continue to monitor underlying heart rhythm to determine if will need PPM    Please see attending attestation for final recommendations        Michael Samuels MD  Cardiology Fellow     All Cardiology service information can be found 24/7 on [email or web address], password: [email or web address]

## 2024-01-25 NOTE — PROGRESS NOTE ADULT - SUBJECTIVE AND OBJECTIVE BOX
Patient seen and examined at the bedside.    Remained critically ill on continuous ICU monitoring.    OBJECTIVE:  Vital Signs Last 24 Hrs  T(C): 36.9 (25 Jan 2024 08:00), Max: 37.5 (25 Jan 2024 04:00)  T(F): 98.4 (25 Jan 2024 08:00), Max: 99.5 (25 Jan 2024 04:00)  HR: 88 (25 Jan 2024 09:00) (88 - 107)  BP: 108/53 (25 Jan 2024 09:00) (101/55 - 133/71)  BP(mean): 77 (25 Jan 2024 09:00) (74 - 96)  RR: 14 (25 Jan 2024 09:00) (5 - 37)  SpO2: 94% (25 Jan 2024 09:00) (91% - 100%)    Parameters below as of 25 Jan 2024 08:00  Patient On (Oxygen Delivery Method): nasal cannula  O2 Flow (L/min): 6        Physical Exam:   General: OOBTC, sitting comfortably.   Neurology: nonfocal. Moves all extremities and follows commands. A+Ox4   ENT/Neck: Neck supple, trachea midline, No JVD   Respiratory: Clear bilaterally   CV: S1S2, no murmurs        [x] Sternal dressing, [x] Mediastinal CTx2 [x] Bulb        [x] Paced rhythm, [x] TPM DDD - 88  Abdominal: Soft, NT, ND +BS   Extremities: 1-2+ pedal edema noted                        Assessment:  72 yo F with PMHx of morbid obesity (BMI 42.7 s/p gastric sleeve 2015), DMT2, CAD s/p PCI of LCX (2012), LEIDY (on CPAP), CKD (stage 3 - on farxiga), AF (on Eliquis) and rheumatic mitral valve disease.  Nonrheumatic mitral stenosis, chronic afib s/p MVR, MAZE, RICHARD, CABG x1 on 1/23/24  Hemodynamic instability  Post op respiratory insufficiency  Acute blood loss anemia  Thrombocytopenia      Plan:   ***Neuro***  [x] Nonfocal    Pain management with Tylenol, Dilaudid, Gabapentin, Oxycodone, and prns    ***Cardiovascular***  Invasive hemodynamic monitoring, assess perfusion indices   TX / CVP 10 / MAP 68 / Hct 27.8% / Lactate 1.2  [x] Dobutamine 2.5 mcg/kG/MIN  Volume: 1u PRBC, Albumin 250 mL for orthostatic hypotension.   Reassessment of hemodynamics post resuscitation  [x] ASA [x] Statin  Monitor chest tube outputs  [X] lovenox for DVT prophylaxis    ***Pulmonary***  [x] Nasal Cannula 6L - wean as tolerated and by O2 saturations.   Post op vent management   Titration of FiO2 and PEEP, follow SpO2, CXR, blood gasses   CPAP use at home - will start BiPAP on extubation.          ***GI***    Tolerating diet  [x] Protonix for stress ulcer prophylaxis  Bowel regimen with Miralax and Senna.     ***Renal***  [x] CKD  Diuresis with aldactone and torsemide   Continue to monitor I/Os, BUN/Creatinine.   Replete lytes PRN  Diallo present     ***ID***  No active antibiotic coverage    ***Endocrine***  [x] DM2: HbA1c 7.0%                - [x] Insulin gtt               - Need tight glycemic control to prevent wound infection.            Patient requires continuous monitoring with bedside rhythm monitoring, pulse oximetry monitoring, and continuous central venous and arterial pressure monitoring; and intermittent blood gas analysis. Care plan discussed with the ICU care team.   Patient remained critical, at risk for life threatening decompensation.    I have spent 30 minutes providing critical care management to this patient.    By signing my name below, I, Prabha Hightower, attest that this documentation has been prepared under the direction and in the presence of LISA Guerrier  Electronically signed: Kevin Molina, 01-25-24 @ 09:11    I, LISA Guerrier, personally performed the services described in this documentation. all medical record entries made by the deliaibjenny were at my direction and in my presence. I have reviewed the chart and agree that the record reflects my personal performance and is accurate and complete  Electronically signed: LISA Guerrier Patient seen and examined at the bedside.    Remained critically ill on continuous ICU monitoring.    OBJECTIVE:  Vital Signs Last 24 Hrs  T(C): 36.9 (25 Jan 2024 08:00), Max: 37.5 (25 Jan 2024 04:00)  T(F): 98.4 (25 Jan 2024 08:00), Max: 99.5 (25 Jan 2024 04:00)  HR: 88 (25 Jan 2024 09:00) (88 - 107)  BP: 108/53 (25 Jan 2024 09:00) (101/55 - 133/71)  BP(mean): 77 (25 Jan 2024 09:00) (74 - 96)  RR: 14 (25 Jan 2024 09:00) (5 - 37)  SpO2: 94% (25 Jan 2024 09:00) (91% - 100%)    Parameters below as of 25 Jan 2024 08:00  Patient On (Oxygen Delivery Method): nasal cannula  O2 Flow (L/min): 6    Physical Exam:   General: OOBTC, sitting comfortably.   Neurology: nonfocal. Moves all extremities and follows commands. A+Ox4   ENT/Neck: Neck supple, trachea midline, No JVD   Respiratory: Clear bilaterally   CV: S1S2, no murmurs        [x] Sternal dressing, [x] Mediastinal CTx2 [x] Bulb        [x] Paced rhythm, [x] TPM DDD - 90, 10, 10  Abdominal: Soft, NT, ND +BS   Extremities: 1-2+ pedal edema noted                      Assessment:  72 yo F with PMHx of morbid obesity (BMI 42.7 s/p gastric sleeve 2015), DMT2, CAD s/p PCI of LCX (2012), LEIDY (on CPAP), CKD (stage 3 - on farxiga), AF (on Eliquis) and rheumatic mitral valve disease.  Nonrheumatic mitral stenosis, chronic afib s/p MVR, MAZE, RICHARD, CABG x1 on 1/23/24  Hemodynamic instability  Post op respiratory insufficiency  Acute blood loss anemia  Thrombocytopenia    Plan:   ***Neuro***  [x] Nonfocal    Pain management with Tylenol, Dilaudid, Gabapentin, Oxycodone, and prns    ***Cardiovascular***  Invasive hemodynamic monitoring, assess perfusion indices   VA / CVP 10 / MAP 68 / Hct 27.8% / Lactate 1.2  [x] Dobutamine 2.5 mcg/kG/MIN  Volume: 1u PRBC, Albumin 250 mL for orthostatic hypotension.   Reassessment of hemodynamics post resuscitation  [x] ASA [x] Statin  Monitor chest tube outputs. Will remove Mediastinal tubes today given low outputs.   [X] lovenox for DVT prophylaxis  EP consult today for underlying junctional rhythm and potential need for PPM. F/u recommendations.     ***Pulmonary***  [x] Nasal Cannula 6L - wean as tolerated and by O2 saturations.   Post op vent management   Titration of FiO2 and PEEP, follow SpO2, CXR, blood gasses   CPAP use at home - will start BiPAP on extubation.          ***GI***    Tolerating diet  [x] Protonix for stress ulcer prophylaxis  Bowel regimen with Miralax and Senna.     ***Renal***  [x] CKD  Diuresis with aldactone and torsemide   Continue to monitor I/Os, BUN/Creatinine.   Replete lytes PRN  Diallo present     ***ID***  No active antibiotic coverage    ***Endocrine***  [x] DM2: HbA1c 7.0%                - [x] Insulin gtt               - Need tight glycemic control to prevent wound infection.    Patient requires continuous monitoring with bedside rhythm monitoring, pulse oximetry monitoring, and continuous central venous and arterial pressure monitoring; and intermittent blood gas analysis. Care plan discussed with the ICU care team.   Patient remained critical, at risk for life threatening decompensation.    I have spent 45 minutes providing critical care management to this patient.    By signing my name below, I, Prabha Hightower, attest that this documentation has been prepared under the direction and in the presence of LISA Guerrier  Electronically signed: Colleen Molina, 01-25-24 @ 09:11    I, LISA Guerrier, personally performed the services described in this documentation. all medical record entries made by the colleen were at my direction and in my presence. I have reviewed the chart and agree that the record reflects my personal performance and is accurate and complete  Electronically signed: LISA Guerrier

## 2024-01-25 NOTE — DIETITIAN INITIAL EVALUATION ADULT - OTHER INFO
- S/p MVR, MAZE, RICHARD, CABG x1 on 1/23/24.  - HbA1c 7.0%, on insulin drip for BG management. At home, on Metformin & Trulicity  - Hx gastric sleeve in 2015    Weight Hx:  - Dosing wt 240lbs.    - S/p MVR, MAZE, RICHARD, CABG x1 on 1/23/24.  - HbA1c 7.0%, on insulin drip for BG management. At home, on Metformin & Trulicity  - Hx gastric sleeve in 2015    Weight Hx:  - Dosing wt 240lbs. Pt reports UBW 239lbs. Weight prior to gastric sleeve 260lbs. Pt endorses intentional weight loss recently when starting Trulicity. Current wt 280.2lbs. Weight fluctuations expected secondary to perioperative fluid shifts/diuresis, will continue to monitor/trend as able.

## 2024-01-25 NOTE — CONSULT NOTE ADULT - SUBJECTIVE AND OBJECTIVE BOX
Patient seen and evaluated at bedside      HPI:  73 year old female with PMH morbid obesity (BMI 42.7 s/p gastric sleeve 2015), DMT2, CAD s/p PCI of LCX (2012), LEIDY (on CPAP), CKD (stage 3 - on farxiga), valvular pAF (on Eliquis) and rheumatic mitral valve disease reports c/o chronic BARBOZA for over 10 years that has progressively worsened over the past few months. Pt now s/p scheduled mitral valve replacement, 1v CABG to LAD, maze procedure and left atrial appendage ligation with Dr. Dalal on 1/23/24. Post procedure, in junctional bradycardia, epicardial A and V wires placed.    Patient currently V pacing with poor Atrial capture.     PMHx:   Nonrheumatic mitral (valve) annulus calcification  Obesity, morbid, BMI 40.0-49.9  Chronic kidney disease (CKD)  AF (atrial fibrillation)  Type 2 diabetes mellitus  HLD (hyperlipidemia)  LEIDY on CPAP  Hypertrophic obstructive cardiomyopathy  2019 novel coronavirus disease (COVID-19)  HTN (hypertension)  Stented coronary artery        PSHx:   History of coronary angiogram  Stented coronary artery  S/P coronary artery stent placement  H/O gastric sleeve  History of laparoscopic adjustable gastric banding  History of removal of laparoscopic gastric banding device  NVD (normal vaginal delivery)      Allergies:  No Known Allergies      Current Medications:   acetaminophen     Tablet .. 650 milliGRAM(s) Oral every 6 hours  ascorbic acid 500 milliGRAM(s) Oral two times a day  aspirin enteric coated 81 milliGRAM(s) Oral daily  atorvastatin 80 milliGRAM(s) Oral at bedtime  bisacodyl Suppository 10 milliGRAM(s) Rectal once  chlorhexidine 2% Cloths 1 Application(s) Topical daily  dextrose 50% Injectable 25 milliLiter(s) IV Push every 15 minutes  dextrose 50% Injectable 50 milliLiter(s) IV Push every 15 minutes  DOBUTamine Infusion 2.5 MICROgram(s)/kG/Min IV Continuous <Continuous>  enoxaparin Injectable 30 milliGRAM(s) SubCutaneous every 24 hours  gabapentin 100 milliGRAM(s) Oral every 12 hours  HYDROmorphone  Injectable 0.5 milliGRAM(s) IV Push every 6 hours PRN  insulin regular Infusion 3 Unit(s)/Hr IV Continuous <Continuous>  mupirocin 2% Ointment 1 Application(s) Both Nostrils two times a day  oxyCODONE    IR 10 milliGRAM(s) Oral every 4 hours PRN  oxyCODONE    IR 5 milliGRAM(s) Oral every 4 hours PRN  pantoprazole  Injectable 40 milliGRAM(s) IV Push daily  polyethylene glycol 3350 17 Gram(s) Oral daily  senna 2 Tablet(s) Oral at bedtime  sodium chloride 0.9% lock flush 3 milliLiter(s) IV Push every 8 hours  sodium chloride 0.9%. 1000 milliLiter(s) IV Continuous <Continuous>  spironolactone 50 milliGRAM(s) Oral every 12 hours  torsemide 10 milliGRAM(s) Oral every 12 hours    REVIEW OF SYSTEMS:  All other review of systems is negative unless indicated above.    Physical Exam:  T(F): 98.4 (01-25), Max: 99.5 (01-25)  HR: 88 (01-25) (88 - 107)  BP: 107/68 (01-25) (101/55 - 133/71)  RR: 19 (01-25)  SpO2: 95% (01-25)  Appearance: No acute distress;  Eyes:  EOMI  HEENT: Normal oral mucosa  Cardiovascular: RRR, sternal dressing in place  Respiratory: nml resp effort   Gastrointestinal: soft, non-tender, non-distended  Neurologic: Non-focal  Psychiatry: AAOx3, mood & affect appropriate    Cardiovascular Diagnostic Testing:      Labs: Personally reviewed                        9.1    7.56  )-----------( 89       ( 25 Jan 2024 00:45 )             27.8     01-25    142  |  108  |  20  ----------------------------<  128<H>  4.3   |  21<L>  |  1.60<H>    Ca    9.1      25 Jan 2024 00:44  Phos  3.4     01-25  Mg     2.4     01-25    TPro  5.4<L>  /  Alb  3.4  /  TBili  0.8  /  DBili  x   /  AST  53<H>  /  ALT  14  /  AlkPhos  43  01-25    PT/INR - ( 24 Jan 2024 00:36 )   PT: 13.0 sec;   INR: 1.19 ratio         PTT - ( 24 Jan 2024 00:36 )  PTT:23.9 sec    CARDIAC MARKERS ( 23 Jan 2024 16:27 )  2999 ng/L / x     / x     / 759 U/L / x     / 74.0 ng/mL              Thyroid Stimulating Hormone, Serum: 3.47 uIU/mL (01-23 @ 18:26)

## 2024-01-25 NOTE — DIETITIAN INITIAL EVALUATION ADULT - ORAL INTAKE PTA/DIET HISTORY
Pt reports fair appetite and intake PTA; states since starting Trulicity in the past 6 months her appetite is somewhat diminished. Also noted pt with hx of gastric sleeve. Avoids sugar sweetened beverages and is mindful of carbohydrate intake. Takes multivitamin, vitamin D, vitamin C and CoQ10 at home. NKFA or intolerances. Denies difficulty chewing/swallowing.

## 2024-01-25 NOTE — PROGRESS NOTE ADULT - SUBJECTIVE AND OBJECTIVE BOX
[facility] [phone]        Patient seen and examined in bed. On  gtt at present         MEDICATIONS  (STANDING):  acetaminophen     Tablet .. 650 milliGRAM(s) Oral every 6 hours  ascorbic acid 500 milliGRAM(s) Oral two times a day  aspirin enteric coated 81 milliGRAM(s) Oral daily  atorvastatin 80 milliGRAM(s) Oral at bedtime  bisacodyl Suppository 10 milliGRAM(s) Rectal once  chlorhexidine 2% Cloths 1 Application(s) Topical daily  dextrose 50% Injectable 50 milliLiter(s) IV Push every 15 minutes  dextrose 50% Injectable 25 milliLiter(s) IV Push every 15 minutes  DOBUTamine Infusion 2.5 MICROgram(s)/kG/Min (8.17 mL/Hr) IV Continuous <Continuous>  enoxaparin Injectable 30 milliGRAM(s) SubCutaneous every 24 hours  gabapentin 100 milliGRAM(s) Oral every 12 hours  insulin regular Infusion 3 Unit(s)/Hr (3 mL/Hr) IV Continuous <Continuous>  mupirocin 2% Ointment 1 Application(s) Both Nostrils two times a day  pantoprazole  Injectable 40 milliGRAM(s) IV Push daily  polyethylene glycol 3350 17 Gram(s) Oral daily  senna 2 Tablet(s) Oral at bedtime  sodium chloride 0.9% lock flush 3 milliLiter(s) IV Push every 8 hours  sodium chloride 0.9%. 1000 milliLiter(s) (10 mL/Hr) IV Continuous <Continuous>  spironolactone 50 milliGRAM(s) Oral every 12 hours  torsemide 10 milliGRAM(s) Oral every 12 hours      VITAL:  T(C): , Max: 37.5 (24 @ 04:00)  T(F): , Max: 99.5 (24 @ 04:00)  HR: 89 (24 @ 08:00)  BP: 124/64 (24 @ 08:00)  BP(mean): 87 (24 @ 08:00)  RR: 26 (24 @ 08:00)  SpO2: 93% (24 @ 08:00)  Wt(kg): --    I and O's:     @ 07:  -   @ 07:00  --------------------------------------------------------  IN: 1177.1 mL / OUT: 1635 mL / NET: -457.9 mL     @ 07:01  -   @ 08:22  --------------------------------------------------------  IN: 21.2 mL / OUT: 85 mL / NET: -63.8 mL          PHYSICAL EXAM:    Constitutional: NAD; obese   Neck:  No JVD  Respiratory: reduced   Cardiovascular: S1 and S2  Gastrointestinal: BS+, soft, NT/ND  Extremities: No peripheral edema  Neurological: A/O x 3, no focal deficits  Psychiatric: Normal mood, normal affect  : +  D[place]  Skin: No rashes  Access: Not applicable    LABS:                        9.1    7.56  )-----------( 89       ( 2024 00:45 )             27.8         142  |  108  |  20  ----------------------------<  128<H>  4.3   |  21<L>  |  1.60<H>    Ca    9.1      2024 00:44  Phos  3.4       Mg     2.4         TPro  5.4<L>  /  Alb  3.4  /  TBili  0.8  /  DBili  x   /  AST  53<H>  /  ALT  14  /  AlkPhos  43            Urine Studies:  Urinalysis Basic - ( 2024 00:44 )    Color: x / Appearance: x / SG: x / pH: x  Gluc: 128 mg/dL / Ketone: x  / Bili: x / Urobili: x   Blood: x / Protein: x / Nitrite: x   Leuk Esterase: x / RBC: x / WBC x   Sq Epi: x / Non Sq Epi: x / Bacteria: x            RADIOLOGY & ADDITIONAL STUDIES:    < from: Xray Chest 1 View- PORTABLE-Routine (24 @ 04:34) >    ACC: [id number] EXAM:  XR CHEST PORTABLE ROUTINE 1V   ORDERED BY: GISELLE JENSEN     ACC: [id number] EXAM:  XR CHEST PORTABLE ROUTINE 1V   ORDERED BY: MIRA MABRY     PROCEDURE DATE:  2024          INTERPRETATION:  EXAMINATION: XR CHEST, XR CHEST    CLINICAL INDICATION: Post-cardiac surgery    TECHNIQUE: Single frontal, portable view of the chest was obtained.    COMPARISON: Chest x-ray 2024.    FINDINGS:    2024 3:29 PM:  Midline sternotomy wires.  Surgical clips overlying the mediastinum.  Left atrial appendage clip.  Mediastinal drains in place.  Status post mitral valve replacement.  ET tube with tip terminating in the mid trachea.  Enteric tube courses over the diaphragm with tip and side-port overlying   the proximal stomach.  The heart is not accurately assessed in this AP projection.  Increased interstitial lung markings bilaterally, right greater than left.  There is no pneumothorax or large pleural effusion, the left costophrenic   angle is collimated out ofthe field-of-view.  No acute abnormalities in the visualized osseous structures.    2024 3:05 AM:  Interval removal of ET and enteric tubes.  Mildly improved interstitial opacities bilaterally.    IMPRESSION:  Lines and tubes as above.  Improving postoperative pulmonary edema.    --- End of Report ---           ROBERT CARR MD; Resident Radiologist  This document has been electronically signed.  ALISA BROWN MD; Attending Radiologist  This document has been electronically signed. 2024  5:15PM    < end of copied text >

## 2024-01-25 NOTE — DIETITIAN INITIAL EVALUATION ADULT - PERTINENT MEDS FT
MEDICATIONS  (STANDING):  acetaminophen     Tablet .. 650 milliGRAM(s) Oral every 6 hours  ascorbic acid 500 milliGRAM(s) Oral two times a day  aspirin enteric coated 81 milliGRAM(s) Oral daily  atorvastatin 80 milliGRAM(s) Oral at bedtime  bisacodyl Suppository 10 milliGRAM(s) Rectal once  chlorhexidine 2% Cloths 1 Application(s) Topical daily  dextrose 50% Injectable 25 milliLiter(s) IV Push every 15 minutes  dextrose 50% Injectable 50 milliLiter(s) IV Push every 15 minutes  DOBUTamine Infusion 2.5 MICROgram(s)/kG/Min (8.17 mL/Hr) IV Continuous <Continuous>  enoxaparin Injectable 30 milliGRAM(s) SubCutaneous every 24 hours  gabapentin 100 milliGRAM(s) Oral every 12 hours  insulin regular Infusion 3 Unit(s)/Hr (3 mL/Hr) IV Continuous <Continuous>  mupirocin 2% Ointment 1 Application(s) Both Nostrils two times a day  pantoprazole  Injectable 40 milliGRAM(s) IV Push daily  polyethylene glycol 3350 17 Gram(s) Oral daily  senna 2 Tablet(s) Oral at bedtime  sodium chloride 0.9% lock flush 3 milliLiter(s) IV Push every 8 hours  sodium chloride 0.9%. 1000 milliLiter(s) (10 mL/Hr) IV Continuous <Continuous>  spironolactone 50 milliGRAM(s) Oral every 12 hours  torsemide 10 milliGRAM(s) Oral every 12 hours    MEDICATIONS  (PRN):  HYDROmorphone  Injectable 0.5 milliGRAM(s) IV Push every 6 hours PRN Breakthrough Pain  oxyCODONE    IR 10 milliGRAM(s) Oral every 4 hours PRN Severe Pain (7 - 10)  oxyCODONE    IR 5 milliGRAM(s) Oral every 4 hours PRN Moderate Pain (4 - 6)

## 2024-01-25 NOTE — DIETITIAN INITIAL EVALUATION ADULT - NSFNSGIIOFT_GEN_A_CORE
Last BM ____.     01-24-24 @ 07:01  -  01-25-24 @ 07:00  --------------------------------------------------------  OUT:    Chest Tube (mL): 20 mL  Total OUT: 20 mL    Total NET: -20 mL      01-25-24 @ 07:01 - 01-25-24 @ 09:10  --------------------------------------------------------  OUT:    Chest Tube (mL): 20 mL  Total OUT: 20 mL    Total NET: -20 mL       Last BM prior to surgery.     01-24-24 @ 07:01  -  01-25-24 @ 07:00  --------------------------------------------------------  OUT:    Chest Tube (mL): 20 mL  Total OUT: 20 mL    Total NET: -20 mL      01-25-24 @ 07:01  -  01-25-24 @ 09:10  --------------------------------------------------------  OUT:    Chest Tube (mL): 20 mL  Total OUT: 20 mL    Total NET: -20 mL

## 2024-01-26 DIAGNOSIS — I49.8 OTHER SPECIFIED CARDIAC ARRHYTHMIAS: ICD-10-CM

## 2024-01-26 DIAGNOSIS — Z95.1 PRESENCE OF AORTOCORONARY BYPASS GRAFT: ICD-10-CM

## 2024-01-26 LAB
ALBUMIN SERPL ELPH-MCNC: 3.4 G/DL — SIGNIFICANT CHANGE UP (ref 3.3–5)
ALP SERPL-CCNC: 48 U/L — SIGNIFICANT CHANGE UP (ref 40–120)
ALT FLD-CCNC: 14 U/L — SIGNIFICANT CHANGE UP (ref 10–45)
ANION GAP SERPL CALC-SCNC: 14 MMOL/L — SIGNIFICANT CHANGE UP (ref 5–17)
AST SERPL-CCNC: 35 U/L — SIGNIFICANT CHANGE UP (ref 10–40)
BASE EXCESS BLDV CALC-SCNC: 0.8 MMOL/L — SIGNIFICANT CHANGE UP (ref -2–3)
BASOPHILS # BLD AUTO: 0.02 K/UL — SIGNIFICANT CHANGE UP (ref 0–0.2)
BASOPHILS NFR BLD AUTO: 0.3 % — SIGNIFICANT CHANGE UP (ref 0–2)
BILIRUB SERPL-MCNC: 0.9 MG/DL — SIGNIFICANT CHANGE UP (ref 0.2–1.2)
BUN SERPL-MCNC: 22 MG/DL — SIGNIFICANT CHANGE UP (ref 7–23)
CALCIUM SERPL-MCNC: 9.3 MG/DL — SIGNIFICANT CHANGE UP (ref 8.4–10.5)
CHLORIDE SERPL-SCNC: 108 MMOL/L — SIGNIFICANT CHANGE UP (ref 96–108)
CO2 BLDV-SCNC: 29 MMOL/L — HIGH (ref 22–26)
CO2 SERPL-SCNC: 24 MMOL/L — SIGNIFICANT CHANGE UP (ref 22–31)
CREAT SERPL-MCNC: 1.69 MG/DL — HIGH (ref 0.5–1.3)
EGFR: 32 ML/MIN/1.73M2 — LOW
EOSINOPHIL # BLD AUTO: 0.04 K/UL — SIGNIFICANT CHANGE UP (ref 0–0.5)
EOSINOPHIL NFR BLD AUTO: 0.6 % — SIGNIFICANT CHANGE UP (ref 0–6)
GAS PNL BLDA: SIGNIFICANT CHANGE UP
GAS PNL BLDV: SIGNIFICANT CHANGE UP
GLUCOSE SERPL-MCNC: 101 MG/DL — HIGH (ref 70–99)
HCO3 BLDV-SCNC: 27 MMOL/L — SIGNIFICANT CHANGE UP (ref 22–29)
HCT VFR BLD CALC: 27.3 % — LOW (ref 34.5–45)
HGB BLD-MCNC: 8.9 G/DL — LOW (ref 11.5–15.5)
HOROWITZ INDEX BLDV+IHG-RTO: 40 — SIGNIFICANT CHANGE UP
IMM GRANULOCYTES NFR BLD AUTO: 0.6 % — SIGNIFICANT CHANGE UP (ref 0–0.9)
LYMPHOCYTES # BLD AUTO: 1.3 K/UL — SIGNIFICANT CHANGE UP (ref 1–3.3)
LYMPHOCYTES # BLD AUTO: 17.9 % — SIGNIFICANT CHANGE UP (ref 13–44)
MAGNESIUM SERPL-MCNC: 2.1 MG/DL — SIGNIFICANT CHANGE UP (ref 1.6–2.6)
MCHC RBC-ENTMCNC: 31.7 PG — SIGNIFICANT CHANGE UP (ref 27–34)
MCHC RBC-ENTMCNC: 32.6 GM/DL — SIGNIFICANT CHANGE UP (ref 32–36)
MCV RBC AUTO: 97.2 FL — SIGNIFICANT CHANGE UP (ref 80–100)
MONOCYTES # BLD AUTO: 0.59 K/UL — SIGNIFICANT CHANGE UP (ref 0–0.9)
MONOCYTES NFR BLD AUTO: 8.1 % — SIGNIFICANT CHANGE UP (ref 2–14)
NEUTROPHILS # BLD AUTO: 5.26 K/UL — SIGNIFICANT CHANGE UP (ref 1.8–7.4)
NEUTROPHILS NFR BLD AUTO: 72.5 % — SIGNIFICANT CHANGE UP (ref 43–77)
NRBC # BLD: 0 /100 WBCS — SIGNIFICANT CHANGE UP (ref 0–0)
PCO2 BLDV: 49 MMHG — HIGH (ref 39–42)
PH BLDV: 7.35 — SIGNIFICANT CHANGE UP (ref 7.32–7.43)
PHOSPHATE SERPL-MCNC: 2.9 MG/DL — SIGNIFICANT CHANGE UP (ref 2.5–4.5)
PLATELET # BLD AUTO: 93 K/UL — LOW (ref 150–400)
PO2 BLDV: 44 MMHG — SIGNIFICANT CHANGE UP (ref 25–45)
POTASSIUM SERPL-MCNC: 4.2 MMOL/L — SIGNIFICANT CHANGE UP (ref 3.5–5.3)
POTASSIUM SERPL-SCNC: 4.2 MMOL/L — SIGNIFICANT CHANGE UP (ref 3.5–5.3)
PROT SERPL-MCNC: 5.4 G/DL — LOW (ref 6–8.3)
RBC # BLD: 2.81 M/UL — LOW (ref 3.8–5.2)
RBC # FLD: 16.5 % — HIGH (ref 10.3–14.5)
SAO2 % BLDV: 77.7 % — SIGNIFICANT CHANGE UP (ref 67–88)
SODIUM SERPL-SCNC: 146 MMOL/L — HIGH (ref 135–145)
WBC # BLD: 7.25 K/UL — SIGNIFICANT CHANGE UP (ref 3.8–10.5)
WBC # FLD AUTO: 7.25 K/UL — SIGNIFICANT CHANGE UP (ref 3.8–10.5)

## 2024-01-26 PROCEDURE — 71045 X-RAY EXAM CHEST 1 VIEW: CPT | Mod: 26

## 2024-01-26 PROCEDURE — 93010 ELECTROCARDIOGRAM REPORT: CPT

## 2024-01-26 PROCEDURE — 99233 SBSQ HOSP IP/OBS HIGH 50: CPT

## 2024-01-26 PROCEDURE — 99291 CRITICAL CARE FIRST HOUR: CPT

## 2024-01-26 RX ORDER — INSULIN LISPRO 100/ML
6 VIAL (ML) SUBCUTANEOUS
Refills: 0 | Status: DISCONTINUED | OUTPATIENT
Start: 2024-01-26 | End: 2024-01-26

## 2024-01-26 RX ORDER — GLUCAGON INJECTION, SOLUTION 0.5 MG/.1ML
1 INJECTION, SOLUTION SUBCUTANEOUS ONCE
Refills: 0 | Status: DISCONTINUED | OUTPATIENT
Start: 2024-01-26 | End: 2024-02-08

## 2024-01-26 RX ORDER — INSULIN LISPRO 100/ML
VIAL (ML) SUBCUTANEOUS
Refills: 0 | Status: DISCONTINUED | OUTPATIENT
Start: 2024-01-26 | End: 2024-02-08

## 2024-01-26 RX ORDER — SODIUM CHLORIDE 9 MG/ML
1000 INJECTION, SOLUTION INTRAVENOUS
Refills: 0 | Status: DISCONTINUED | OUTPATIENT
Start: 2024-01-26 | End: 2024-02-08

## 2024-01-26 RX ORDER — INSULIN LISPRO 100/ML
3 VIAL (ML) SUBCUTANEOUS ONCE
Refills: 0 | Status: COMPLETED | OUTPATIENT
Start: 2024-01-26 | End: 2024-01-26

## 2024-01-26 RX ORDER — ONDANSETRON 8 MG/1
8 TABLET, FILM COATED ORAL ONCE
Refills: 0 | Status: DISCONTINUED | OUTPATIENT
Start: 2024-01-26 | End: 2024-01-26

## 2024-01-26 RX ORDER — INSULIN GLARGINE 100 [IU]/ML
25 INJECTION, SOLUTION SUBCUTANEOUS AT BEDTIME
Refills: 0 | Status: DISCONTINUED | OUTPATIENT
Start: 2024-01-26 | End: 2024-01-29

## 2024-01-26 RX ORDER — INSULIN GLARGINE 100 [IU]/ML
19 INJECTION, SOLUTION SUBCUTANEOUS AT BEDTIME
Refills: 0 | Status: DISCONTINUED | OUTPATIENT
Start: 2024-01-26 | End: 2024-01-26

## 2024-01-26 RX ORDER — DEXTROSE 50 % IN WATER 50 %
25 SYRINGE (ML) INTRAVENOUS ONCE
Refills: 0 | Status: DISCONTINUED | OUTPATIENT
Start: 2024-01-26 | End: 2024-02-08

## 2024-01-26 RX ORDER — INSULIN LISPRO 100/ML
7 VIAL (ML) SUBCUTANEOUS
Refills: 0 | Status: DISCONTINUED | OUTPATIENT
Start: 2024-01-26 | End: 2024-01-27

## 2024-01-26 RX ORDER — ONDANSETRON 8 MG/1
4 TABLET, FILM COATED ORAL ONCE
Refills: 0 | Status: COMPLETED | OUTPATIENT
Start: 2024-01-26 | End: 2024-01-26

## 2024-01-26 RX ORDER — INSULIN LISPRO 100/ML
VIAL (ML) SUBCUTANEOUS AT BEDTIME
Refills: 0 | Status: DISCONTINUED | OUTPATIENT
Start: 2024-01-26 | End: 2024-02-08

## 2024-01-26 RX ORDER — DEXTROSE 50 % IN WATER 50 %
12.5 SYRINGE (ML) INTRAVENOUS ONCE
Refills: 0 | Status: DISCONTINUED | OUTPATIENT
Start: 2024-01-26 | End: 2024-02-08

## 2024-01-26 RX ORDER — PANTOPRAZOLE SODIUM 20 MG/1
40 TABLET, DELAYED RELEASE ORAL
Refills: 0 | Status: DISCONTINUED | OUTPATIENT
Start: 2024-01-27 | End: 2024-02-08

## 2024-01-26 RX ORDER — ALLOPURINOL 300 MG
100 TABLET ORAL DAILY
Refills: 0 | Status: DISCONTINUED | OUTPATIENT
Start: 2024-01-27 | End: 2024-02-08

## 2024-01-26 RX ORDER — HYDROMORPHONE HYDROCHLORIDE 2 MG/ML
0.5 INJECTION INTRAMUSCULAR; INTRAVENOUS; SUBCUTANEOUS ONCE
Refills: 0 | Status: DISCONTINUED | OUTPATIENT
Start: 2024-01-26 | End: 2024-01-26

## 2024-01-26 RX ORDER — DEXTROSE 50 % IN WATER 50 %
15 SYRINGE (ML) INTRAVENOUS ONCE
Refills: 0 | Status: DISCONTINUED | OUTPATIENT
Start: 2024-01-26 | End: 2024-02-08

## 2024-01-26 RX ADMIN — INSULIN GLARGINE 25 UNIT(S): 100 INJECTION, SOLUTION SUBCUTANEOUS at 21:58

## 2024-01-26 RX ADMIN — Medication 500 MILLIGRAM(S): at 06:28

## 2024-01-26 RX ADMIN — Medication 650 MILLIGRAM(S): at 06:59

## 2024-01-26 RX ADMIN — SODIUM CHLORIDE 3 MILLILITER(S): 9 INJECTION INTRAMUSCULAR; INTRAVENOUS; SUBCUTANEOUS at 22:00

## 2024-01-26 RX ADMIN — SODIUM CHLORIDE 3 MILLILITER(S): 9 INJECTION INTRAMUSCULAR; INTRAVENOUS; SUBCUTANEOUS at 15:58

## 2024-01-26 RX ADMIN — Medication 2: at 17:28

## 2024-01-26 RX ADMIN — OXYCODONE HYDROCHLORIDE 5 MILLIGRAM(S): 5 TABLET ORAL at 13:00

## 2024-01-26 RX ADMIN — SPIRONOLACTONE 50 MILLIGRAM(S): 25 TABLET, FILM COATED ORAL at 06:28

## 2024-01-26 RX ADMIN — Medication 7 UNIT(S): at 17:30

## 2024-01-26 RX ADMIN — SPIRONOLACTONE 50 MILLIGRAM(S): 25 TABLET, FILM COATED ORAL at 17:31

## 2024-01-26 RX ADMIN — Medication 2: at 12:36

## 2024-01-26 RX ADMIN — Medication 81 MILLIGRAM(S): at 12:31

## 2024-01-26 RX ADMIN — ONDANSETRON 4 MILLIGRAM(S): 8 TABLET, FILM COATED ORAL at 12:32

## 2024-01-26 RX ADMIN — Medication 650 MILLIGRAM(S): at 06:29

## 2024-01-26 RX ADMIN — PANTOPRAZOLE SODIUM 40 MILLIGRAM(S): 20 TABLET, DELAYED RELEASE ORAL at 12:32

## 2024-01-26 RX ADMIN — ENOXAPARIN SODIUM 30 MILLIGRAM(S): 100 INJECTION SUBCUTANEOUS at 17:28

## 2024-01-26 RX ADMIN — HYDROMORPHONE HYDROCHLORIDE 0.5 MILLIGRAM(S): 2 INJECTION INTRAMUSCULAR; INTRAVENOUS; SUBCUTANEOUS at 03:15

## 2024-01-26 RX ADMIN — GABAPENTIN 100 MILLIGRAM(S): 400 CAPSULE ORAL at 06:28

## 2024-01-26 RX ADMIN — Medication 2: at 08:50

## 2024-01-26 RX ADMIN — OXYCODONE HYDROCHLORIDE 5 MILLIGRAM(S): 5 TABLET ORAL at 12:30

## 2024-01-26 RX ADMIN — GABAPENTIN 100 MILLIGRAM(S): 400 CAPSULE ORAL at 17:28

## 2024-01-26 RX ADMIN — Medication 500 MILLIGRAM(S): at 17:28

## 2024-01-26 RX ADMIN — Medication 650 MILLIGRAM(S): at 13:02

## 2024-01-26 RX ADMIN — MUPIROCIN 1 APPLICATION(S): 20 OINTMENT TOPICAL at 17:27

## 2024-01-26 RX ADMIN — OXYCODONE HYDROCHLORIDE 10 MILLIGRAM(S): 5 TABLET ORAL at 20:15

## 2024-01-26 RX ADMIN — Medication 3 UNIT(S): at 12:36

## 2024-01-26 RX ADMIN — MUPIROCIN 1 APPLICATION(S): 20 OINTMENT TOPICAL at 06:29

## 2024-01-26 RX ADMIN — SODIUM CHLORIDE 3 MILLILITER(S): 9 INJECTION INTRAMUSCULAR; INTRAVENOUS; SUBCUTANEOUS at 06:22

## 2024-01-26 RX ADMIN — CHLORHEXIDINE GLUCONATE 1 APPLICATION(S): 213 SOLUTION TOPICAL at 12:37

## 2024-01-26 RX ADMIN — HYDROMORPHONE HYDROCHLORIDE 0.5 MILLIGRAM(S): 2 INJECTION INTRAMUSCULAR; INTRAVENOUS; SUBCUTANEOUS at 04:00

## 2024-01-26 RX ADMIN — OXYCODONE HYDROCHLORIDE 10 MILLIGRAM(S): 5 TABLET ORAL at 20:45

## 2024-01-26 RX ADMIN — Medication 650 MILLIGRAM(S): at 12:32

## 2024-01-26 RX ADMIN — POLYETHYLENE GLYCOL 3350 17 GRAM(S): 17 POWDER, FOR SOLUTION ORAL at 12:36

## 2024-01-26 RX ADMIN — ATORVASTATIN CALCIUM 80 MILLIGRAM(S): 80 TABLET, FILM COATED ORAL at 21:59

## 2024-01-26 RX ADMIN — Medication 10 MILLIGRAM(S): at 06:29

## 2024-01-26 NOTE — PROGRESS NOTE ADULT - PROBLEM SELECTOR PLAN 3
continue postop care  continue asa and statin  maintain  @ 2.5 mcg/kg/ min  diuresis for hypervolemia  maintain meds ct- likely d/c in am if output minimal   + epicardial pw - VVi 50  pulm toilet  pain management  increase activity as tolerated   discharge planning- home pt continue postop care  continue asa and statin  maintain  @ 2.5 mcg/kg/ min  diuresis for hypervolemia  maintain meds ct- likely d/c in am if output minimal   + epicardial pw - DDD 50  pulm toilet  pain management  increase activity as tolerated   discharge planning- home pt

## 2024-01-26 NOTE — PROGRESS NOTE ADULT - ASSESSMENT
73 year old female with PMH morbid obesity (BMI 42.7 s/p gastric sleeve 2015), DMT2, CAD s/p PCI of LCX (2012), LEIDY (on CPAP), CKD (stage 3 - on farxiga), AF (on Eliquis) and rheumatic mitral valve disease reports c/o chronic BARBOZA  1/23/24 SP Mitral valve replacement;  Hybrid Maze procedure Clipping, left atrial appendage and CABG, using 1 vein graft    CKD stage 3a   Hypernatremia     1 Renal - Creatinine baseline more in the 1.6-1.7 range;  PO Torsemide started and on aldactone that was increased   Cont the daniels   No need for renal sono  2 Endo-Farxiga and ARB are both held and to restarted as outpt   3 CVS-Off bblockers and on inotropes at present ;  Not on pressors   4 Pulm-Nasal canula   5 GI-Encourage PO intake     dw CTICU      Sayed [facility]   [phone]          73 year old female with PMH morbid obesity (BMI 42.7 s/p gastric sleeve 2015), DMT2, CAD s/p PCI of LCX (2012), LEIDY (on CPAP), CKD (stage 3 - on farxiga), AF (on Eliquis) and rheumatic mitral valve disease reports c/o chronic BARBOZA  1/23/24 SP Mitral valve replacement;  Hybrid Maze procedure Clipping, left atrial appendage and CABG, using 1 vein graft    CKD stage 3a   Hypernatremia     1 Renal - Creatinine baseline more in the 1.6-1.7 range;  PO Torsemide started and on aldactone that was increased   Cont the daniels   No need for renal sono  2 Endo-Farxiga and ARB are both held and to restarted as outpt   3 CVS-Off bblockers and on inotropes at present ;  Not on pressors   Trying to see underlying rhythm that was junctional   4 Pulm-Nasal canula   5 GI-Encourage PO intake and she can increase her free water intake     dw CTICU    >60 minutes spent on total encounter and more then 50% of the visit was spent counseling and/or coordinating care by the attending physician       Sayed Lizbeth   [facility]   [phone]

## 2024-01-26 NOTE — PROGRESS NOTE ADULT - ASSESSMENT
73F w/ PMH morbid obesity (BMI 42.7 s/p gastric sleeve ), DMT2, CAD s/p PCI of LCX (), LEIDY (on CPAP), CKD (stage 3 - on farxiga), AF (on Eliquis) and rheumatic mitral valve disease reports c/o chronic BARBOZA for over 10 years that has progressively worsened over the past few months with the feeling of an "elephant on my chest." Her symptoms improved after starting Lasix daily. She gets dizzy when she bends over or when she stands up too fast. She denies CP or weight gain. She sleeps with 3 pillows on an incline at night. She cannot lay flat due to comfort as well as orthopnea. She can only walk one block before she has to stop to rest due to SOB. Pt now presents to PST for scheduled mitral valve replacement, maze procedure and left atrial appendage ligation with Dr. Dalal on 24.  s/p  MVR (t)/ C1V/ Maze/ RICHARD clip  post extubated / inotropic support  EP consulted for underlying junctional rhythm; no av nodals at this time; + epicardial pw; maintain    tx sdu; VSS; afib 60-70 on  2.5; maintain med ct's; diuresis as per renal; endo consulted for diabetic management  keep npo after midnight sat into sun for possible ppm as per EP; no av nodals at this time   discharge planning- home pt when stable

## 2024-01-26 NOTE — CONSULT NOTE ADULT - PROBLEM SELECTOR RECOMMENDATION 9
Will increase Lantus to 25 units at bed time.  Will increase Admelog to  7units before each meal in addition to Admelog correction scale coverage.  Will continue monitoring FS, log, and glucose trends, will Follow up.  Patient counseled for compliance with consistent low carb diet and exercise as tolerated outpatient.

## 2024-01-26 NOTE — PROGRESS NOTE ADULT - SUBJECTIVE AND OBJECTIVE BOX
VITAL SIGNS    Telemetry:    Vital Signs Last 24 Hrs  T(C): 36.5 (24 @ 12:00), Max: 37 (24 @ 16:00)  T(F): 97.7 (24 @ 12:00), Max: 98.6 (24 @ 16:00)  HR: 69 (24 @ 13:00) (69 - 89)  BP: 132/59 (24 @ 11:00) (96/54 - 137/65)  RR: 24 (24 @ 13:00) (12 - 25)  SpO2: 96% (24 @ 13:00) (94% - 100%)             @ 07: @ 07:00  --------------------------------------------------------  IN: 738.8 mL / OUT: 735 mL / NET: 3.8 mL     07: @ 15:24  --------------------------------------------------------  IN: 329.2 mL / OUT: 364 mL / NET: -34.8 mL       Daily     Daily Weight in k.7 (2024 00:00)  Admit Wt: Drug Dosing Weight  Height (cm): 158.8 (2024 07:00)  Weight (kg): 108.862 (2024 07:00)  BMI (kg/m2): 43.2 (2024 07:00)  BSA (m2): 2.08 (2024 07:00)    Bilirubin Total: 0.9 mg/dL ( @ 00:35)    CAPILLARY BLOOD GLUCOSE  165 (2024 12:00)  162 (2024 08:00)  148 (2024 06:00)  146 (2024 04:00)  136 (2024 02:00)  116 (2024 01:00)  96 (2024 00:30)  116 (2024 23:00)  123 (2024 21:00)  136 (2024 20:00)  147 (2024 19:00)  131 (2024 18:00)  130 (2024 17:00)  118 (2024 16:00)      POCT Blood Glucose.: 165 mg/dL (2024 12:04)  POCT Blood Glucose.: 162 mg/dL (2024 07:57)  POCT Blood Glucose.: 148 mg/dL (2024 05:55)  POCT Blood Glucose.: 141 mg/dL (2024 04:05)  POCT Blood Glucose.: 64 mg/dL (2024 04:03)  POCT Blood Glucose.: 131 mg/dL (2024 01:54)  POCT Blood Glucose.: 116 mg/dL (2024 01:10)  POCT Blood Glucose.: 116 mg/dL (2024 23:05)  POCT Blood Glucose.: 123 mg/dL (2024 21:58)  POCT Blood Glucose.: 125 mg/dL (2024 21:11)  POCT Blood Glucose.: 131 mg/dL (2024 20:31)  POCT Blood Glucose.: 147 mg/dL (2024 19:00)  POCT Blood Glucose.: 131 mg/dL (2024 17:53)  POCT Blood Glucose.: 130 mg/dL (2024 17:09)  POCT Blood Glucose.: 118 mg/dL (2024 16:04)          LABS:     @ 07:  -   @ 07:00  --------------------------------------------------------  IN: 738.8 mL / OUT: 735 mL / NET: 3.8 mL     @ 07:  -   @ 15:24  --------------------------------------------------------  IN: 329.2 mL / OUT: 364 mL / NET: -34.8 mL    cret                        8.9    7.25  )-----------( 93       ( 2024 00:35 )             27.3         146<H>  |  108  |  22  ----------------------------<  101<H>  4.2   |  24  |  1.69<H>    Ca    9.3      2024 00:35  Phos  2.9       Mg     2.1         TPro  5.4<L>  /  Alb  3.4  /  TBili  0.9  /  DBili  x   /  AST  35  /  ALT  14  /  AlkPhos  48          acetaminophen     Tablet .. 650 milliGRAM(s) Oral every 6 hours PRN  ascorbic acid 500 milliGRAM(s) Oral two times a day  aspirin enteric coated 81 milliGRAM(s) Oral daily  atorvastatin 80 milliGRAM(s) Oral at bedtime  bisacodyl Suppository 10 milliGRAM(s) Rectal once  chlorhexidine 2% Cloths 1 Application(s) Topical daily  dextrose 5%. 1000 milliLiter(s) IV Continuous <Continuous>  dextrose 5%. 1000 milliLiter(s) IV Continuous <Continuous>  dextrose 50% Injectable 25 milliLiter(s) IV Push every 15 minutes  dextrose 50% Injectable 12.5 Gram(s) IV Push once  dextrose 50% Injectable 25 Gram(s) IV Push once  dextrose 50% Injectable 25 Gram(s) IV Push once  dextrose 50% Injectable 50 milliLiter(s) IV Push every 15 minutes  dextrose Oral Gel 15 Gram(s) Oral once PRN  DOBUTamine Infusion 2.5 MICROgram(s)/kG/Min IV Continuous <Continuous>  enoxaparin Injectable 30 milliGRAM(s) SubCutaneous every 24 hours  gabapentin 100 milliGRAM(s) Oral every 12 hours  glucagon  Injectable 1 milliGRAM(s) IntraMuscular once  insulin glargine Injectable (LANTUS) 25 Unit(s) SubCutaneous at bedtime  insulin lispro (ADMELOG) corrective regimen sliding scale   SubCutaneous three times a day before meals  insulin lispro (ADMELOG) corrective regimen sliding scale   SubCutaneous at bedtime  insulin lispro Injectable (ADMELOG) 7 Unit(s) SubCutaneous three times a day before meals  mupirocin 2% Ointment 1 Application(s) Both Nostrils two times a day  oxyCODONE    IR 5 milliGRAM(s) Oral every 4 hours PRN  oxyCODONE    IR 10 milliGRAM(s) Oral every 4 hours PRN  polyethylene glycol 3350 17 Gram(s) Oral daily  senna 2 Tablet(s) Oral at bedtime  sodium chloride 0.9% lock flush 3 milliLiter(s) IV Push every 8 hours  sodium chloride 0.9%. 1000 milliLiter(s) IV Continuous <Continuous>  spironolactone 50 milliGRAM(s) Oral every 12 hours  torsemide 10 milliGRAM(s) Oral every 12 hours      PHYSICAL EXAM    Subjective: "I feel ok."   Neurology: alert and oriented x 3, nonfocal, no gross deficits  CV : tele:  afib 60-70  Sternal Wound :  CDI with prevena dressing , Stable  Lungs: clear diminished at the bases; RR easy, unlabored   Abdomen: soft, nontender, nondistended, positive bowel sounds, neg bowel movement   Neg N/V/D; obese abdomen    :  daniels d/c- pt dtv   Extremities:   BRAUN; trace LE edema, neg calf tenderness.  PPP bilaterally; rt radial av intact       PW: + epicardial pw VVI 50   Chest tubes: 2 meds---> draining serous- sang drainage                 VITAL SIGNS    Telemetry:  afib 60-70  Vital Signs Last 24 Hrs  T(C): 36.5 (24 @ 12:00), Max: 37 (24 @ 16:00)  T(F): 97.7 (24 @ 12:00), Max: 98.6 (24 @ 16:00)  HR: 69 (24 @ 13:00) (69 - 89)  BP: 132/59 (24 @ 11:00) (96/54 - 137/65)  RR: 24 (24 @ 13:00) (12 - 25)  SpO2: 96% (24 @ 13:00) (94% - 100%)             07:  -   @ 07:00  --------------------------------------------------------  IN: 738.8 mL / OUT: 735 mL / NET: 3.8 mL     07: @ 15:24  --------------------------------------------------------  IN: 329.2 mL / OUT: 364 mL / NET: -34.8 mL       Daily     Daily Weight in k.7 (2024 00:00)  Admit Wt: Drug Dosing Weight  Height (cm): 158.8 (2024 07:00)  Weight (kg): 108.862 (2024 07:00)  BMI (kg/m2): 43.2 (2024 07:00)  BSA (m2): 2.08 (2024 07:00)    Bilirubin Total: 0.9 mg/dL ( @ 00:35)    CAPILLARY BLOOD GLUCOSE  165 (2024 12:00)  162 (2024 08:00)  148 (2024 06:00)  146 (2024 04:00)  136 (2024 02:00)  116 (2024 01:00)  96 (2024 00:30)  116 (2024 23:00)  123 (2024 21:00)  136 (2024 20:00)  147 (2024 19:00)  131 (2024 18:00)  130 (2024 17:00)  118 (2024 16:00)      POCT Blood Glucose.: 165 mg/dL (2024 12:04)  POCT Blood Glucose.: 162 mg/dL (2024 07:57)  POCT Blood Glucose.: 148 mg/dL (2024 05:55)  POCT Blood Glucose.: 141 mg/dL (2024 04:05)  POCT Blood Glucose.: 64 mg/dL (2024 04:03)  POCT Blood Glucose.: 131 mg/dL (2024 01:54)  POCT Blood Glucose.: 116 mg/dL (2024 01:10)  POCT Blood Glucose.: 116 mg/dL (2024 23:05)  POCT Blood Glucose.: 123 mg/dL (2024 21:58)  POCT Blood Glucose.: 125 mg/dL (2024 21:11)  POCT Blood Glucose.: 131 mg/dL (2024 20:31)  POCT Blood Glucose.: 147 mg/dL (2024 19:00)  POCT Blood Glucose.: 131 mg/dL (2024 17:53)  POCT Blood Glucose.: 130 mg/dL (2024 17:09)  POCT Blood Glucose.: 118 mg/dL (2024 16:04)          LABS:     @ : @ 07:00  --------------------------------------------------------  IN: 738.8 mL / OUT: 735 mL / NET: 3.8 mL     @ 07: @ 15:24  --------------------------------------------------------  IN: 329.2 mL / OUT: 364 mL / NET: -34.8 mL    cret                        8.9    7.25  )-----------( 93       ( 2024 00:35 )             27.3         146<H>  |  108  |  22  ----------------------------<  101<H>  4.2   |  24  |  1.69<H>    Ca    9.3      2024 00:35  Phos  2.9       Mg     2.1         TPro  5.4<L>  /  Alb  3.4  /  TBili  0.9  /  DBili  x   /  AST  35  /  ALT  14  /  AlkPhos  48          acetaminophen     Tablet .. 650 milliGRAM(s) Oral every 6 hours PRN  ascorbic acid 500 milliGRAM(s) Oral two times a day  aspirin enteric coated 81 milliGRAM(s) Oral daily  atorvastatin 80 milliGRAM(s) Oral at bedtime  bisacodyl Suppository 10 milliGRAM(s) Rectal once  chlorhexidine 2% Cloths 1 Application(s) Topical daily  dextrose 5%. 1000 milliLiter(s) IV Continuous <Continuous>  dextrose 5%. 1000 milliLiter(s) IV Continuous <Continuous>  dextrose 50% Injectable 25 milliLiter(s) IV Push every 15 minutes  dextrose 50% Injectable 12.5 Gram(s) IV Push once  dextrose 50% Injectable 25 Gram(s) IV Push once  dextrose 50% Injectable 25 Gram(s) IV Push once  dextrose 50% Injectable 50 milliLiter(s) IV Push every 15 minutes  dextrose Oral Gel 15 Gram(s) Oral once PRN  DOBUTamine Infusion 2.5 MICROgram(s)/kG/Min IV Continuous <Continuous>  enoxaparin Injectable 30 milliGRAM(s) SubCutaneous every 24 hours  gabapentin 100 milliGRAM(s) Oral every 12 hours  glucagon  Injectable 1 milliGRAM(s) IntraMuscular once  insulin glargine Injectable (LANTUS) 25 Unit(s) SubCutaneous at bedtime  insulin lispro (ADMELOG) corrective regimen sliding scale   SubCutaneous three times a day before meals  insulin lispro (ADMELOG) corrective regimen sliding scale   SubCutaneous at bedtime  insulin lispro Injectable (ADMELOG) 7 Unit(s) SubCutaneous three times a day before meals  mupirocin 2% Ointment 1 Application(s) Both Nostrils two times a day  oxyCODONE    IR 5 milliGRAM(s) Oral every 4 hours PRN  oxyCODONE    IR 10 milliGRAM(s) Oral every 4 hours PRN  polyethylene glycol 3350 17 Gram(s) Oral daily  senna 2 Tablet(s) Oral at bedtime  sodium chloride 0.9% lock flush 3 milliLiter(s) IV Push every 8 hours  sodium chloride 0.9%. 1000 milliLiter(s) IV Continuous <Continuous>  spironolactone 50 milliGRAM(s) Oral every 12 hours  torsemide 10 milliGRAM(s) Oral every 12 hours      PHYSICAL EXAM    Subjective: "I feel ok."   Neurology: alert and oriented x 3, nonfocal, no gross deficits  CV : tele:  afib 60-70  Sternal Wound :  CDI with prevena dressing , Stable  Lungs: clear diminished at the bases; RR easy, unlabored   Abdomen: soft, nontender, nondistended, positive bowel sounds, neg bowel movement   Neg N/V/D; obese abdomen    :  daniels d/c- pt dtv   Extremities:   BRAUN; trace LE edema, neg calf tenderness.  PPP bilaterally; rt radial av intact       PW: + epicardial pw DDD 50  Chest tubes: 2 meds---> draining serous- sang drainage

## 2024-01-26 NOTE — PROGRESS NOTE ADULT - ASSESSMENT
73 year old female with PMH morbid obesity (BMI 42.7 s/p gastric sleeve 2015), DMT2, CAD s/p PCI of LCX (2012), LEIDY (on CPAP), CKD (stage 3 - on farxiga), valvular pAF (on Eliquis) and rheumatic mitral valve disease reports c/o chronic BARBOZA for over 10 years that has progressively worsened over the past few months. Pt now s/p scheduled mitral valve replacement, 1v CABG to LAD, maze procedure and left atrial appendage ligation with Dr. Dalal on 1/23/24. Post procedure, in junctional bradycardia, epicardial A and V wires placed.     Impression and Recommendations  - Patient placed on backup rate of 50 on the PPM, not currently paced. On telemetry appears to have accelerated junctional rhythm, HR in the 70s, narrow complex QRS  - Asymptomatic and hemodynamically stable, plan to transfer to stepdown unit  - Continue to monitor on telemetry   - Further recommendations to follow     Anabela Montgomery MD  Cardiology Fellow     Recommendations are preliminary until cosigned by attending  73 year old female with PMH morbid obesity (BMI 42.7 s/p gastric sleeve 2015), DMT2, CAD s/p PCI of LCX (2012), LEIDY (on CPAP), CKD (stage 3 - on farxiga), valvular pAF (on Eliquis) and rheumatic mitral valve disease reports c/o chronic BARBOZA for over 10 years that has progressively worsened over the past few months. Pt now s/p scheduled mitral valve replacement, 1v CABG to LAD, maze procedure and left atrial appendage ligation with Dr. Dalal on 1/23/24. Post procedure, in junctional bradycardia, epicardial A and V wires placed.     Impression and Recommendations  - Patient placed on backup rate of 50 on the PPM, not currently paced. On telemetry appears to have accelerated junctional rhythm, HR in the 70s, narrow complex QRS. Likely Sinus bradycardia with junctional escape w/ V-A conduction  - Asymptomatic and hemodynamically stable, plan to transfer to stepdown unit  - Continue to monitor on telemetry, sinus node will likely recover   - Please Keep NPO at midnight on Sunday in the case the patient continues to be in the same rhythm and requires a PPM     Anabela Montgomery MD  Cardiology Fellow     Recommendations are preliminary until cosigned by attending

## 2024-01-26 NOTE — CONSULT NOTE ADULT - NS ATTEND AMEND GEN_ALL_CORE FT
stable underlying junctional at 60 bpm(narrow complex QRS) with heart block. Will see how her rhythm progresses over next 48 hours to determine need for permanent pacing
Chart, labs, vitals, radiology reviewed. Above H&P reviewed and edited where appropriate. Agree with history and physical exam. Agree with assessment and plan. I reviewed the overnight course of events and discussed the care with the patient/ family.  All the decisions in assessment and plan are made by me.

## 2024-01-26 NOTE — PROGRESS NOTE ADULT - SUBJECTIVE AND OBJECTIVE BOX
Overnight Events: No acute events     Review Of Systems: No chest pain, shortness of breath, or palpitations            Current Meds:  acetaminophen     Tablet .. 650 milliGRAM(s) Oral every 6 hours  acetaminophen     Tablet .. 650 milliGRAM(s) Oral every 6 hours PRN  ascorbic acid 500 milliGRAM(s) Oral two times a day  aspirin enteric coated 81 milliGRAM(s) Oral daily  atorvastatin 80 milliGRAM(s) Oral at bedtime  bisacodyl Suppository 10 milliGRAM(s) Rectal once  chlorhexidine 2% Cloths 1 Application(s) Topical daily  dextrose 5%. 1000 milliLiter(s) IV Continuous <Continuous>  dextrose 5%. 1000 milliLiter(s) IV Continuous <Continuous>  dextrose 50% Injectable 25 milliLiter(s) IV Push every 15 minutes  dextrose 50% Injectable 12.5 Gram(s) IV Push once  dextrose 50% Injectable 25 Gram(s) IV Push once  dextrose 50% Injectable 50 milliLiter(s) IV Push every 15 minutes  dextrose 50% Injectable 25 Gram(s) IV Push once  dextrose Oral Gel 15 Gram(s) Oral once PRN  DOBUTamine Infusion 2.5 MICROgram(s)/kG/Min IV Continuous <Continuous>  enoxaparin Injectable 30 milliGRAM(s) SubCutaneous every 24 hours  gabapentin 100 milliGRAM(s) Oral every 12 hours  glucagon  Injectable 1 milliGRAM(s) IntraMuscular once  insulin glargine Injectable (LANTUS) 19 Unit(s) SubCutaneous at bedtime  insulin lispro (ADMELOG) corrective regimen sliding scale   SubCutaneous at bedtime  insulin lispro (ADMELOG) corrective regimen sliding scale   SubCutaneous three times a day before meals  insulin lispro Injectable (ADMELOG) 6 Unit(s) SubCutaneous three times a day before meals  mupirocin 2% Ointment 1 Application(s) Both Nostrils two times a day  oxyCODONE    IR 10 milliGRAM(s) Oral every 4 hours PRN  oxyCODONE    IR 5 milliGRAM(s) Oral every 4 hours PRN  pantoprazole  Injectable 40 milliGRAM(s) IV Push daily  polyethylene glycol 3350 17 Gram(s) Oral daily  senna 2 Tablet(s) Oral at bedtime  sodium chloride 0.9% lock flush 3 milliLiter(s) IV Push every 8 hours  sodium chloride 0.9%. 1000 milliLiter(s) IV Continuous <Continuous>  spironolactone 50 milliGRAM(s) Oral every 12 hours  torsemide 10 milliGRAM(s) Oral every 12 hours      Vitals:  T(F): 98.2 (), Max: 98.6 ()  HR: 71 () (71 - 89)  BP: 96/54 () (96/54 - 137/65)  RR: 22 ()  SpO2: 95% ()  I&O's Summary    2024 07:  -  2024 07:00  --------------------------------------------------------  IN: 738.8 mL / OUT: 735 mL / NET: 3.8 mL    2024 07:  -  2024 11:27  --------------------------------------------------------  IN: 174.6 mL / OUT: 35 mL / NET: 139.6 mL        Physical Exam:    Appearance: No acute distress; Sitting in bedside chair  Head: AT/NC  Cardiovascular: RRR, sternal dressing in place  Respiratory: nml resp effort   Gastrointestinal: soft, non-tender, non-distended  Neurologic: Normal speech  Psychiatry: AAOx3, mood & affect appropriate    Cardiovascular Diagnostic Testin.9    7.25  )-----------( 93       ( 2024 00:35 )             27.3         146<H>  |  108  |  22  ----------------------------<  101<H>  4.2   |  24  |  1.69<H>    Ca    9.3      2024 00:35  Phos  2.9       Mg     2.1         TPro  5.4<L>  /  Alb  3.4  /  TBili  0.9  /  DBili  x   /  AST  35  /  ALT  14  /  AlkPhos  48        CARDIAC MARKERS ( 2024 16:27 )  2999 ng/L / x     / x     / 759 U/L / x     / 74.0 ng/mL

## 2024-01-26 NOTE — PROGRESS NOTE ADULT - SUBJECTIVE AND OBJECTIVE BOX
Patient seen and examined at the bedside.    Remained critically ill on continuous ICU monitoring.    OBJECTIVE:  Vital Signs Last 24 Hrs  T(C): 36.8 (26 Jan 2024 08:00), Max: 37 (25 Jan 2024 12:00)  T(F): 98.2 (26 Jan 2024 08:00), Max: 98.6 (25 Jan 2024 12:00)  HR: 71 (26 Jan 2024 09:05) (71 - 89)  BP: 96/54 (26 Jan 2024 07:00) (96/54 - 137/65)  BP(mean): 72 (26 Jan 2024 07:00) (72 - 94)  RR: 22 (26 Jan 2024 08:00) (12 - 25)  SpO2: 95% (26 Jan 2024 09:05) (94% - 100%)    Parameters below as of 26 Jan 2024 08:00  Patient On (Oxygen Delivery Method): nasal cannula  O2 Flow (L/min): 5      Physical Exam:   General: OOBTC, sitting comfortably.   Neurology: nonfocal. Moves all extremities and follows commands. A+Ox4   ENT/Neck: Neck supple, trachea midline, No JVD   Respiratory: Clear bilaterally   CV: S1S2, no murmurs        [x] Sternal dressing, [x] Mediastinal CTx2 [x] Bulb        [x] Paced rhythm, [x] TPM DDD - 88, 10, 10  Abdominal: Soft, NT, ND +BS   Extremities: 1-2+ pedal edema noted                      Assessment:  72 yo F with PMHx of morbid obesity (BMI 42.7 s/p gastric sleeve 2015), DMT2, CAD s/p PCI of LCX (2012), LEIDY (on CPAP), CKD (stage 3 - on farxiga), AF (on Eliquis) and rheumatic mitral valve disease.  Nonrheumatic mitral stenosis, chronic afib s/p MVR, MAZE, RICHARD, CABG x1 on 1/23/24  Hemodynamic instability  Post op respiratory insufficiency  Acute blood loss anemia  Thrombocytopenia    Plan:   ***Neuro***  [x] Nonfocal    Pain management with Tylenol, Gabapentin, Oxycodone, and prns    ***Cardiovascular***  Invasive hemodynamic monitoring, assess perfusion indices   ND / CVP 10 / MAP 52 / Hct 27.3% / Lactate 0.6  [x] Dobutamine 2.5 mcg/kG/MIN  Reassessment of hemodynamics post resuscitation  [x] ASA [x] Statin  Monitor chest tube outputs. Will remove Mediastinal tubes today given low outputs.   [X] Lovenox for DVT prophylaxis  EP consult yesterday for underlying junctional rhythm and potential need for PPM. F/u recommendations.     ***Pulmonary***  [x] Nasal Cannula 5L - wean as tolerated and by O2 saturations.   Encourage incentive spirometry, continue pulse ox monitoring, follow ABGs            ***GI***    Tolerating diet  [x] Protonix for stress ulcer prophylaxis  Bowel regimen with Miralax and Senna.     ***Renal***  [x] CKD  Diuresis with aldactone and torsemide   Continue to monitor I/Os, BUN/Creatinine.   Replete lytes PRN  Diallo present     ***ID***  No active antibiotic coverage    ***Endocrine***  [x] DM2: HbA1c 7.0%                - [x] Monitor blood sugars             - Need tight glycemic control to prevent wound infection.      Patient requires continuous monitoring with bedside rhythm monitoring, pulse oximetry monitoring, and continuous central venous and arterial pressure monitoring; and intermittent blood gas analysis. Care plan discussed with the ICU care team.   Patient remained critical, at risk for life threatening decompensation.    I have spent 30 minutes providing critical care management to this patient.    By signing my name below, I, Thee Heredia, attest that this documentation has been prepared under the direction and in the presence of LISA Guerrier   Electronically signed: Kevin Ca, 01-26-24 @ 09:33    I, LISA Guerrier, personally performed the services described in this documentation. all medical record entries made by the deliaibe were at my direction and in my presence. I have reviewed the chart and agree that the record reflects my personal performance and is accurate and complete  Electronically signed: LISA Guerrier  Patient seen and examined at the bedside.    Remained critically ill on continuous ICU monitoring.    OBJECTIVE:  Vital Signs Last 24 Hrs  T(C): 36.8 (26 Jan 2024 08:00), Max: 37 (25 Jan 2024 12:00)  T(F): 98.2 (26 Jan 2024 08:00), Max: 98.6 (25 Jan 2024 12:00)  HR: 71 (26 Jan 2024 09:05) (71 - 89)  BP: 96/54 (26 Jan 2024 07:00) (96/54 - 137/65)  BP(mean): 72 (26 Jan 2024 07:00) (72 - 94)  RR: 22 (26 Jan 2024 08:00) (12 - 25)  SpO2: 95% (26 Jan 2024 09:05) (94% - 100%)    Parameters below as of 26 Jan 2024 08:00  Patient On (Oxygen Delivery Method): nasal cannula  O2 Flow (L/min): 5    Physical Exam:   General: OOBTC, sitting comfortably.   Neurology: nonfocal. Moves all extremities and follows commands. A+Ox4   ENT/Neck: Neck supple, trachea midline, No JVD   Respiratory: Clear bilaterally   CV: S1S2, no murmurs        [x] Sternal dressing, [x] Mediastinal CTx2 [x] Bulb        [x] Paced rhythm, [x] TPM DDD - 88, 10, 10  Abdominal: Soft, NT, ND +BS   Extremities: 1-2+ pedal edema noted                      Assessment:  74 yo F with PMHx of morbid obesity (BMI 42.7 s/p gastric sleeve 2015), DMT2, CAD s/p PCI of LCX (2012), LEIDY (on CPAP), CKD (stage 3 - on farxiga), AF (on Eliquis) and rheumatic mitral valve disease.  Nonrheumatic mitral stenosis, chronic afib s/p MVR, MAZE, RICHARD, CABG x1 on 1/23/24  Hemodynamic instability  Post op respiratory insufficiency  Acute blood loss anemia  Thrombocytopenia    Plan:   ***Neuro***  [x] Nonfocal    Pain management with Tylenol, Gabapentin, Oxycodone, and prns  Ambulates well with assistance.     ***Cardiovascular***  Invasive hemodynamic monitoring, assess perfusion indices   AR / CVP 10 / MAP 52 / Hct 27.3% / Lactate 0.6  [x] Dobutamine 2.5 mcg/kG/MIN for chronotropy  Reassessment of hemodynamics post resuscitation  [x] ASA [x] Statin  Monitor chest tube outputs.   [X] Lovenox for DVT prophylaxis  EP consult yesterday for underlying junctional rhythm and potential need for PPM. Will monitor over the weekend.     ***Pulmonary***  [x] Nasal Cannula 5L - wean as tolerated and by O2 saturations.   Encourage incentive spirometry, continue pulse ox monitoring, follow ABGs     ***GI***    Tolerating diet  [x] Protonix for stress ulcer prophylaxis  Bowel regimen with Miralax and Senna.     ***Renal***  [x] CKD  Diuresis with aldactone and torsemide   Continue to monitor I/Os, BUN/Creatinine.   Replete lytes PRN  Diallo present     ***ID***  No active antibiotic coverage    ***Endocrine***  [x] DM2: HbA1c 7.0%                - [x] Monitor blood sugars             - Need tight glycemic control to prevent wound infection.               On insulin sliding scale, premeal, and bedtime dose. Endocrinology following.       Patient requires continuous monitoring with bedside rhythm monitoring, pulse oximetry monitoring, and continuous central venous and arterial pressure monitoring; and intermittent blood gas analysis. Care plan discussed with the ICU care team.   Patient remained critical, at risk for life threatening decompensation.    I have spent 45 minutes providing critical care management to this patient.    By signing my name below, I, Thee Heredia, attest that this documentation has been prepared under the direction and in the presence of LISA Guerrier   Electronically signed: Kevin Ca, 01-26-24 @ 09:33    Erin HATHAWAY PA, personally performed the services described in this documentation. all medical record entries made by the deliaibjenny were at my direction and in my presence. I have reviewed the chart and agree that the record reflects my personal performance and is accurate and complete  Electronically signed: LISA Guerrier

## 2024-01-26 NOTE — PROGRESS NOTE ADULT - PROBLEM SELECTOR PLAN 1
Renal following  diuretics as per renal- torsemide 10 po bid; ald 50 po bid   trend bmp  strict I & O's  avoid nephrotoxic agents

## 2024-01-26 NOTE — PROGRESS NOTE ADULT - PROBLEM SELECTOR PLAN 4
EP following  no av nodals at this time  + epicardial pw - VVi 50   As per EP:    - Patient placed on backup rate of 50 on the PPM, not currently paced. On telemetry appears to have accelerated junctional rhythm, HR in the 70s, narrow complex QRS. Likely Sinus bradycardia with junctional escape w/ V-A conduction  - Asymptomatic and hemodynamically stable, plan to transfer to stepdown unit  - Continue to monitor on telemetry, sinus node will likely recover   - Please Keep NPO at midnight on Sunday in the case the patient continues to be in the same rhythm and requires a PPM EP following  no av nodals at this time  + epicardial pw - DDD 50  As per EP:    - Patient placed on backup rate of 50 on the PPM, not currently paced. On telemetry appears to have accelerated junctional rhythm, HR in the 70s, narrow complex QRS. Likely Sinus bradycardia with junctional escape w/ V-A conduction  - Asymptomatic and hemodynamically stable, plan to transfer to stepdown unit  - Continue to monitor on telemetry, sinus node will likely recover   - Please Keep NPO at midnight on Sunday in the case the patient continues to be in the same rhythm and requires a PPM

## 2024-01-26 NOTE — CONSULT NOTE ADULT - SUBJECTIVE AND OBJECTIVE BOX
HPI:  73 year old female with PMH morbid obesity (BMI 42.7 s/p gastric sleeve 2015), DMT2, CAD s/p PCI of LCX (2012), LEIDY (on CPAP), CKD (stage 3 - on farxiga), AF (on Eliquis) and rheumatic mitral valve disease reports c/o chronic BARBOZA for over 10 years that has progressively worsened over the past few months with the feeling of an "elephant on my chest." Her symptoms improved after starting Lasix daily. She gets dizzy when she bends over or when she stands up too fast. She denies CP or weight gain. She sleeps with 3 pillows on an incline at night. She cannot lay flat due to comfort as well as orthopnea. She can only walk one block before she has to stop to rest due to SOB. Pt now presents to [facility] for scheduled mitral valve replacement, maze procedure and left atrial appendage ligation with Dr. Dalal on 1/9/24.      (02 Jan 2024 07:09)      Patient has history of diabetes, A1C 7.0 % [facility]      PAST MEDICAL & SURGICAL HISTORY:  Nonrheumatic mitral (valve) annulus calcification      Obesity, morbid, BMI 40.0-49.9      Chronic kidney disease (CKD)      AF (atrial fibrillation)      Type 2 diabetes mellitus      HLD (hyperlipidemia)      LEIDY on CPAP      Hypertrophic obstructive cardiomyopathy      2019 novel coronavirus disease (COVID-19)      HTN (hypertension)      Stented coronary artery      History of coronary angiogram      S/P coronary artery stent placement      H/O gastric sleeve      History of laparoscopic adjustable gastric banding      History of removal of laparoscopic gastric banding device      NVD (normal vaginal delivery)          FAMILY HISTORY:      Social History:            HOME MEDICATIONS:  Home Medications:  allopurinol 100 mg oral tablet: 1 tab(s) orally once a day (23 Jan 2024 07:11)  apixaban 2.5 mg oral tablet: 1 tab(s) orally 2 times a day call Dr. Dalal&#x27;s office to confirm stop date (23 Jan 2024 07:11)  furosemide 20 mg oral tablet: 1 tab(s) orally once a day (23 Jan 2024 07:11)  Januvia 50 mg oral tablet: 1 tab(s) orally once a day (23 Jan 2024 07:11)  metoprolol succinate 50 mg oral capsule, extended release: 1 cap(s) orally once a day (23 Jan 2024 07:11)  rosuvastatin 40 mg oral tablet: 1 tab(s) orally once a day (at bedtime) (23 Jan 2024 07:11)            MEDICATIONS  (STANDING):  ascorbic acid 500 milliGRAM(s) Oral two times a day  aspirin enteric coated 81 milliGRAM(s) Oral daily  atorvastatin 80 milliGRAM(s) Oral at bedtime  bisacodyl Suppository 10 milliGRAM(s) Rectal once  chlorhexidine 2% Cloths 1 Application(s) Topical daily  dextrose 5%. 1000 milliLiter(s) (100 mL/Hr) IV Continuous <Continuous>  dextrose 5%. 1000 milliLiter(s) (50 mL/Hr) IV Continuous <Continuous>  dextrose 50% Injectable 50 milliLiter(s) IV Push every 15 minutes  dextrose 50% Injectable 25 milliLiter(s) IV Push every 15 minutes  dextrose 50% Injectable 12.5 Gram(s) IV Push once  dextrose 50% Injectable 25 Gram(s) IV Push once  dextrose 50% Injectable 25 Gram(s) IV Push once  DOBUTamine Infusion 2.5 MICROgram(s)/kG/Min (8.17 mL/Hr) IV Continuous <Continuous>  enoxaparin Injectable 30 milliGRAM(s) SubCutaneous every 24 hours  gabapentin 100 milliGRAM(s) Oral every 12 hours  glucagon  Injectable 1 milliGRAM(s) IntraMuscular once  insulin glargine Injectable (LANTUS) 25 Unit(s) SubCutaneous at bedtime  insulin lispro (ADMELOG) corrective regimen sliding scale   SubCutaneous at bedtime  insulin lispro (ADMELOG) corrective regimen sliding scale   SubCutaneous three times a day before meals  insulin lispro Injectable (ADMELOG) 7 Unit(s) SubCutaneous three times a day before meals  mupirocin 2% Ointment 1 Application(s) Both Nostrils two times a day  polyethylene glycol 3350 17 Gram(s) Oral daily  senna 2 Tablet(s) Oral at bedtime  sodium chloride 0.9% lock flush 3 milliLiter(s) IV Push every 8 hours  sodium chloride 0.9%. 1000 milliLiter(s) (10 mL/Hr) IV Continuous <Continuous>  spironolactone 50 milliGRAM(s) Oral every 12 hours  torsemide 10 milliGRAM(s) Oral every 12 hours    MEDICATIONS  (PRN):  acetaminophen     Tablet .. 650 milliGRAM(s) Oral every 6 hours PRN Mild Pain (1 - 3)  dextrose Oral Gel 15 Gram(s) Oral once PRN Blood Glucose LESS THAN 70 milliGRAM(s)/deciliter  oxyCODONE    IR 5 milliGRAM(s) Oral every 4 hours PRN Moderate Pain (4 - 6)  oxyCODONE    IR 10 milliGRAM(s) Oral every 4 hours PRN Severe Pain (7 - 10)      Allergies    No Known Allergies    Intolerances        Review of Systems:  Neuro: No HA, no dizziness  Cardiovascular: No chest pain, no palpitations  Respiratory: no SOB, no cough  GI: No nausea, vomiting, abdominal pain  MSK: Denies joint/muscle pain      ALL OTHER SYSTEMS REVIEWED AND NEGATIVE        PHYSICAL EXAM:  VITALS: T(C): 36.5 (01-26-24 @ 12:00)  T(F): 97.7 (01-26-24 @ 12:00), Max: 98.6 (01-25-24 @ 16:00)  HR: 69 (01-26-24 @ 13:00) (69 - 89)  BP: 132/59 (01-26-24 @ 11:00) (96/54 - 137/65)  RR:  (12 - 25)  SpO2:  (94% - 100%)  Wt(kg): --  GENERAL: NAD, well-groomed, well-developed  NEURO:  alert and oriented  RESPIRATORY: Clear to auscultation bilaterally; No rales, rhonchi, wheezing  CARDIOVASCULAR: Si S2  GI: Soft, non distended, normal bowel sounds  MUSCULOSKELETAL: Moves all extremities equally       POCT Blood Glucose.: 165 mg/dL (01-26-24 @ 12:04)  POCT Blood Glucose.: 162 mg/dL (01-26-24 @ 07:57)  POCT Blood Glucose.: 148 mg/dL (01-26-24 @ 05:55)  POCT Blood Glucose.: 141 mg/dL (01-26-24 @ 04:05)  POCT Blood Glucose.: 64 mg/dL (01-26-24 @ 04:03)  POCT Blood Glucose.: 131 mg/dL (01-26-24 @ 01:54)  POCT Blood Glucose.: 116 mg/dL (01-26-24 @ 01:10)  POCT Blood Glucose.: 116 mg/dL (01-25-24 @ 23:05)  POCT Blood Glucose.: 123 mg/dL (01-25-24 @ 21:58)  POCT Blood Glucose.: 125 mg/dL (01-25-24 @ 21:11)  POCT Blood Glucose.: 131 mg/dL (01-25-24 @ 20:31)  POCT Blood Glucose.: 147 mg/dL (01-25-24 @ 19:00)  POCT Blood Glucose.: 131 mg/dL (01-25-24 @ 17:53)  POCT Blood Glucose.: 130 mg/dL (01-25-24 @ 17:09)  POCT Blood Glucose.: 118 mg/dL (01-25-24 @ 16:04)  POCT Blood Glucose.: 133 mg/dL (01-25-24 @ 14:53)  POCT Blood Glucose.: 143 mg/dL (01-25-24 @ 13:53)  POCT Blood Glucose.: 131 mg/dL (01-25-24 @ 13:12)  POCT Blood Glucose.: 158 mg/dL (01-25-24 @ 11:57)  POCT Blood Glucose.: 131 mg/dL (01-25-24 @ 10:57)  POCT Blood Glucose.: 142 mg/dL (01-25-24 @ 10:03)  POCT Blood Glucose.: 142 mg/dL (01-25-24 @ 08:52)  POCT Blood Glucose.: 153 mg/dL (01-25-24 @ 07:47)  POCT Blood Glucose.: 168 mg/dL (01-25-24 @ 06:57)  POCT Blood Glucose.: 178 mg/dL (01-25-24 @ 06:20)  POCT Blood Glucose.: 148 mg/dL (01-25-24 @ 04:03)  POCT Blood Glucose.: 143 mg/dL (01-25-24 @ 01:55)  POCT Blood Glucose.: 113 mg/dL (01-24-24 @ 23:08)  POCT Blood Glucose.: 121 mg/dL (01-24-24 @ 22:26)  POCT Blood Glucose.: 96 mg/dL (01-24-24 @ 21:11)  POCT Blood Glucose.: 86 mg/dL (01-24-24 @ 20:35)  POCT Blood Glucose.: 104 mg/dL (01-24-24 @ 18:43)  POCT Blood Glucose.: 110 mg/dL (01-24-24 @ 18:16)  POCT Blood Glucose.: 121 mg/dL (01-24-24 @ 17:01)  POCT Blood Glucose.: 117 mg/dL (01-24-24 @ 15:48)  POCT Blood Glucose.: 128 mg/dL (01-24-24 @ 14:57)  POCT Blood Glucose.: 137 mg/dL (01-24-24 @ 13:49)  POCT Blood Glucose.: 153 mg/dL (01-24-24 @ 12:55)  POCT Blood Glucose.: 144 mg/dL (01-24-24 @ 11:50)  POCT Blood Glucose.: 133 mg/dL (01-24-24 @ 11:00)  POCT Blood Glucose.: 100 mg/dL (01-24-24 @ 09:51)  POCT Blood Glucose.: 108 mg/dL (01-24-24 @ 09:19)  POCT Blood Glucose.: 142 mg/dL (01-24-24 @ 07:49)  POCT Blood Glucose.: 164 mg/dL (01-24-24 @ 06:42)  POCT Blood Glucose.: 153 mg/dL (01-24-24 @ 06:09)  POCT Blood Glucose.: 117 mg/dL (01-24-24 @ 05:03)  POCT Blood Glucose.: 138 mg/dL (01-24-24 @ 03:45)  POCT Blood Glucose.: 153 mg/dL (01-24-24 @ 02:55)  POCT Blood Glucose.: 172 mg/dL (01-24-24 @ 02:04)  POCT Blood Glucose.: 196 mg/dL (01-24-24 @ 01:08)  POCT Blood Glucose.: 161 mg/dL (01-24-24 @ 00:20)  POCT Blood Glucose.: 164 mg/dL (01-23-24 @ 22:55)  POCT Blood Glucose.: 151 mg/dL (01-23-24 @ 21:52)  POCT Blood Glucose.: 161 mg/dL (01-23-24 @ 20:50)  POCT Blood Glucose.: 163 mg/dL (01-23-24 @ 19:51)  POCT Blood Glucose.: 170 mg/dL (01-23-24 @ 18:57)  POCT Blood Glucose.: 177 mg/dL (01-23-24 @ 17:51)  POCT Blood Glucose.: 167 mg/dL (01-23-24 @ 16:59)                            8.9    7.25  )-----------( 93       ( 26 Jan 2024 00:35 )             27.3       01-26    146<H>  |  108  |  22  ----------------------------<  101<H>  4.2   |  24  |  1.69<H>    eGFR: 32<L>    Ca    9.3      01-26  Mg     2.1     01-26  Phos  2.9     01-26    TPro  5.4<L>  /  Alb  3.4  /  TBili  0.9  /  DBili  x   /  AST  35  /  ALT  14  /  AlkPhos  48  01-26      Thyroid Function Tests:  01-23 @ 18:26 TSH 3.47 FreeT4 -- T3 -- Anti TPO -- Anti Thyroglobulin Ab -- TSI --    Diet, Regular:   Consistent Carbohydrate No Snacks (CSTCHO) (01-24-24 @ 20:36) [Active]  Diet, Clear Liquid:   Consistent Carbohydrate No Snacks (CSTCHO) (01-23-24 @ 07:55) [Available for Activation]          A1C with Estimated Average Glucose Result: 7.0 % (01-02-24 @ 09:38)                     HPI:  73 year old female with PMH morbid obesity (BMI 42.7 s/p gastric sleeve 2015), DMT2, CAD s/p PCI of LCX (2012), LEIDY (on CPAP), CKD (stage 3 -  on farxiga), AF (on Eliquis) and rheumatic mitral valve disease reports c/o chronic BARBOZA for over 10 years that has progressively worsened over the past few months with the feeling of an "elephant on my chest." Her symptoms improved after starting Lasix daily. She gets dizzy when she bends over or when she stands up too fast. She denies CP or weight gain. She sleeps with 3 pillows on an incline at night. She cannot lay flat due to comfort as well as orthopnea. She can only walk one block before she has to stop to rest due to SOB. Pt now presents to [facility] for scheduled mitral valve replacement, maze procedure and left atrial appendage ligation with Dr. Dalal on 1/9/24.      (02 Jan 2024 07:09)      Patient has history of diabetes, A1C 7.0 % [facility]      PAST MEDICAL & SURGICAL HISTORY:  Nonrheumatic mitral (valve) annulus calcification      Obesity, morbid, BMI 40.0-49.9      Chronic kidney disease (CKD)      AF (atrial fibrillation)      Type 2 diabetes mellitus      HLD (hyperlipidemia)      LEIDY on CPAP      Hypertrophic obstructive cardiomyopathy      2019 novel coronavirus disease (COVID-19)      HTN (hypertension)      Stented coronary artery      History of coronary angiogram      S/P coronary artery stent placement      H/O gastric sleeve      History of laparoscopic adjustable gastric banding      History of removal of laparoscopic gastric banding device      NVD (normal vaginal delivery)          FAMILY HISTORY:      Social History:            HOME MEDICATIONS:  Home Medications:  allopurinol 100 mg oral tablet: 1 tab(s) orally once a day (23 Jan 2024 07:11)  apixaban 2.5 mg oral tablet: 1 tab(s) orally 2 times a day call Dr. Dalal&#x27;s office to confirm stop date (23 Jan 2024 07:11)  furosemide 20 mg oral tablet: 1 tab(s) orally once a day (23 Jan 2024 07:11)  Januvia 50 mg oral tablet: 1 tab(s) orally once a day (23 Jan 2024 07:11)  metoprolol succinate 50 mg oral capsule, extended release: 1 cap(s) orally once a day (23 Jan 2024 07:11)  rosuvastatin 40 mg oral tablet: 1 tab(s) orally once a day (at bedtime) (23 Jan 2024 07:11)            MEDICATIONS  (STANDING):  ascorbic acid 500 milliGRAM(s) Oral two times a day  aspirin enteric coated 81 milliGRAM(s) Oral daily  atorvastatin 80 milliGRAM(s) Oral at bedtime  bisacodyl Suppository 10 milliGRAM(s) Rectal once  chlorhexidine 2% Cloths 1 Application(s) Topical daily  dextrose 5%. 1000 milliLiter(s) (100 mL/Hr) IV Continuous <Continuous>  dextrose 5%. 1000 milliLiter(s) (50 mL/Hr) IV Continuous <Continuous>  dextrose 50% Injectable 50 milliLiter(s) IV Push every 15 minutes  dextrose 50% Injectable 25 milliLiter(s) IV Push every 15 minutes  dextrose 50% Injectable 12.5 Gram(s) IV Push once  dextrose 50% Injectable 25 Gram(s) IV Push once  dextrose 50% Injectable 25 Gram(s) IV Push once  DOBUTamine Infusion 2.5 MICROgram(s)/kG/Min (8.17 mL/Hr) IV Continuous <Continuous>  enoxaparin Injectable 30 milliGRAM(s) SubCutaneous every 24 hours  gabapentin 100 milliGRAM(s) Oral every 12 hours  glucagon  Injectable 1 milliGRAM(s) IntraMuscular once  insulin glargine Injectable (LANTUS) 25 Unit(s) SubCutaneous at bedtime  insulin lispro (ADMELOG) corrective regimen sliding scale   SubCutaneous at bedtime  insulin lispro (ADMELOG) corrective regimen sliding scale   SubCutaneous three times a day before meals  insulin lispro Injectable (ADMELOG) 7 Unit(s) SubCutaneous three times a day before meals  mupirocin 2% Ointment 1 Application(s) Both Nostrils two times a day  polyethylene glycol 3350 17 Gram(s) Oral daily  senna 2 Tablet(s) Oral at bedtime  sodium chloride 0.9% lock flush 3 milliLiter(s) IV Push every 8 hours  sodium chloride 0.9%. 1000 milliLiter(s) (10 mL/Hr) IV Continuous <Continuous>  spironolactone 50 milliGRAM(s) Oral every 12 hours  torsemide 10 milliGRAM(s) Oral every 12 hours    MEDICATIONS  (PRN):  acetaminophen     Tablet .. 650 milliGRAM(s) Oral every 6 hours PRN Mild Pain (1 - 3)  dextrose Oral Gel 15 Gram(s) Oral once PRN Blood Glucose LESS THAN 70 milliGRAM(s)/deciliter  oxyCODONE    IR 5 milliGRAM(s) Oral every 4 hours PRN Moderate Pain (4 - 6)  oxyCODONE    IR 10 milliGRAM(s) Oral every 4 hours PRN Severe Pain (7 - 10)      Allergies    No Known Allergies    Intolerances        Review of Systems:  Neuro: No HA, no dizziness  Cardiovascular: No chest pain, no palpitations  Respiratory: no SOB, no cough  GI: No nausea, vomiting, abdominal pain  MSK: Denies joint/muscle pain      ALL OTHER SYSTEMS REVIEWED AND NEGATIVE        PHYSICAL EXAM:  VITALS: T(C): 36.5 (01-26-24 @ 12:00)  T(F): 97.7 (01-26-24 @ 12:00), Max: 98.6 (01-25-24 @ 16:00)  HR: 69 (01-26-24 @ 13:00) (69 - 89)  BP: 132/59 (01-26-24 @ 11:00) (96/54 - 137/65)  RR:  (12 - 25)  SpO2:  (94% - 100%)  Wt(kg): --  GENERAL: NAD, well-groomed, well-developed  NEURO:  alert and oriented  RESPIRATORY: Clear to auscultation bilaterally; No rales, rhonchi, wheezing  CARDIOVASCULAR: Si S2  GI: Soft, non distended, normal bowel sounds  MUSCULOSKELETAL: Moves all extremities equally       POCT Blood Glucose.: 165 mg/dL (01-26-24 @ 12:04)  POCT Blood Glucose.: 162 mg/dL (01-26-24 @ 07:57)  POCT Blood Glucose.: 148 mg/dL (01-26-24 @ 05:55)  POCT Blood Glucose.: 141 mg/dL (01-26-24 @ 04:05)  POCT Blood Glucose.: 64 mg/dL (01-26-24 @ 04:03)  POCT Blood Glucose.: 131 mg/dL (01-26-24 @ 01:54)  POCT Blood Glucose.: 116 mg/dL (01-26-24 @ 01:10)  POCT Blood Glucose.: 116 mg/dL (01-25-24 @ 23:05)  POCT Blood Glucose.: 123 mg/dL (01-25-24 @ 21:58)  POCT Blood Glucose.: 125 mg/dL (01-25-24 @ 21:11)  POCT Blood Glucose.: 131 mg/dL (01-25-24 @ 20:31)  POCT Blood Glucose.: 147 mg/dL (01-25-24 @ 19:00)  POCT Blood Glucose.: 131 mg/dL (01-25-24 @ 17:53)  POCT Blood Glucose.: 130 mg/dL (01-25-24 @ 17:09)  POCT Blood Glucose.: 118 mg/dL (01-25-24 @ 16:04)  POCT Blood Glucose.: 133 mg/dL (01-25-24 @ 14:53)  POCT Blood Glucose.: 143 mg/dL (01-25-24 @ 13:53)  POCT Blood Glucose.: 131 mg/dL (01-25-24 @ 13:12)  POCT Blood Glucose.: 158 mg/dL (01-25-24 @ 11:57)  POCT Blood Glucose.: 131 mg/dL (01-25-24 @ 10:57)  POCT Blood Glucose.: 142 mg/dL (01-25-24 @ 10:03)  POCT Blood Glucose.: 142 mg/dL (01-25-24 @ 08:52)  POCT Blood Glucose.: 153 mg/dL (01-25-24 @ 07:47)  POCT Blood Glucose.: 168 mg/dL (01-25-24 @ 06:57)  POCT Blood Glucose.: 178 mg/dL (01-25-24 @ 06:20)  POCT Blood Glucose.: 148 mg/dL (01-25-24 @ 04:03)  POCT Blood Glucose.: 143 mg/dL (01-25-24 @ 01:55)  POCT Blood Glucose.: 113 mg/dL (01-24-24 @ 23:08)  POCT Blood Glucose.: 121 mg/dL (01-24-24 @ 22:26)  POCT Blood Glucose.: 96 mg/dL (01-24-24 @ 21:11)  POCT Blood Glucose.: 86 mg/dL (01-24-24 @ 20:35)  POCT Blood Glucose.: 104 mg/dL (01-24-24 @ 18:43)  POCT Blood Glucose.: 110 mg/dL (01-24-24 @ 18:16)  POCT Blood Glucose.: 121 mg/dL (01-24-24 @ 17:01)  POCT Blood Glucose.: 117 mg/dL (01-24-24 @ 15:48)  POCT Blood Glucose.: 128 mg/dL (01-24-24 @ 14:57)  POCT Blood Glucose.: 137 mg/dL (01-24-24 @ 13:49)  POCT Blood Glucose.: 153 mg/dL (01-24-24 @ 12:55)  POCT Blood Glucose.: 144 mg/dL (01-24-24 @ 11:50)  POCT Blood Glucose.: 133 mg/dL (01-24-24 @ 11:00)  POCT Blood Glucose.: 100 mg/dL (01-24-24 @ 09:51)  POCT Blood Glucose.: 108 mg/dL (01-24-24 @ 09:19)  POCT Blood Glucose.: 142 mg/dL (01-24-24 @ 07:49)  POCT Blood Glucose.: 164 mg/dL (01-24-24 @ 06:42)  POCT Blood Glucose.: 153 mg/dL (01-24-24 @ 06:09)  POCT Blood Glucose.: 117 mg/dL (01-24-24 @ 05:03)  POCT Blood Glucose.: 138 mg/dL (01-24-24 @ 03:45)  POCT Blood Glucose.: 153 mg/dL (01-24-24 @ 02:55)  POCT Blood Glucose.: 172 mg/dL (01-24-24 @ 02:04)  POCT Blood Glucose.: 196 mg/dL (01-24-24 @ 01:08)  POCT Blood Glucose.: 161 mg/dL (01-24-24 @ 00:20)  POCT Blood Glucose.: 164 mg/dL (01-23-24 @ 22:55)  POCT Blood Glucose.: 151 mg/dL (01-23-24 @ 21:52)  POCT Blood Glucose.: 161 mg/dL (01-23-24 @ 20:50)  POCT Blood Glucose.: 163 mg/dL (01-23-24 @ 19:51)  POCT Blood Glucose.: 170 mg/dL (01-23-24 @ 18:57)  POCT Blood Glucose.: 177 mg/dL (01-23-24 @ 17:51)  POCT Blood Glucose.: 167 mg/dL (01-23-24 @ 16:59)                            8.9    7.25  )-----------( 93       ( 26 Jan 2024 00:35 )             27.3       01-26    146<H>  |  108  |  22  ----------------------------<  101<H>  4.2   |  24  |  1.69<H>    eGFR: 32<L>    Ca    9.3      01-26  Mg     2.1     01-26  Phos  2.9     01-26    TPro  5.4<L>  /  Alb  3.4  /  TBili  0.9  /  DBili  x   /  AST  35  /  ALT  14  /  AlkPhos  48  01-26      Thyroid Function Tests:  01-23 @ 18:26 TSH 3.47 FreeT4 -- T3 -- Anti TPO -- Anti Thyroglobulin Ab -- TSI --    Diet, Regular:   Consistent Carbohydrate No Snacks (CSTCHO) (01-24-24 @ 20:36) [Active]  Diet, Clear Liquid:   Consistent Carbohydrate No Snacks (CSTCHO) (01-23-24 @ 07:55) [Available for Activation]          A1C with Estimated Average Glucose Result: 7.0 % (01-02-24 @ 09:38)

## 2024-01-26 NOTE — CONSULT NOTE ADULT - ASSESSMENT
Assessment  DMT2: 73y Female with DM T2 with hyperglycemia, A1C7% , was on oral meds at home, was using trulicity prior now on basal bolus insulin with coverage, postop CT surgery, down graded to stepdown  Will need tight glycemic control for postop wound healing.   CAD: on medications, stable, monitored. s/p CABG/ MVR   HTN: on antihypertensive medications, monitored, asymptomatic.  Obesity: No strict exercise routines, not on any weight loss program, neither on low calorie diet.        Discussed plan and management wit Dr Chika Farr MD  Cell: [phone]  Office: [phone]               Assessment  DMT2: 73y Female with DM T2 with hyperglycemia, A1C7% , was on oral meds at home, was using trulicity prior now on basal bolus insulin with coverage,  postop CT surgery, down graded to stepdown  Will need tight glycemic control for postop wound healing.   CAD: on medications, stable, monitored. s/p CABG/ MVR   HTN: on antihypertensive medications, monitored, asymptomatic.  Obesity: No strict exercise routines, not on any weight loss program, neither on low calorie diet.        Discussed plan and management wit Dr Chika Farr MD  Cell: [phone]  Office: [phone]

## 2024-01-26 NOTE — PROGRESS NOTE ADULT - SUBJECTIVE AND OBJECTIVE BOX
NEPHROLOGY-[facility] [phone]        Patient seen and examined in bed.  She was the same and remained on  gtt         MEDICATIONS  (STANDING):  acetaminophen     Tablet .. 650 milliGRAM(s) Oral every 6 hours  ascorbic acid 500 milliGRAM(s) Oral two times a day  aspirin enteric coated 81 milliGRAM(s) Oral daily  atorvastatin 80 milliGRAM(s) Oral at bedtime  bisacodyl Suppository 10 milliGRAM(s) Rectal once  chlorhexidine 2% Cloths 1 Application(s) Topical daily  dextrose 50% Injectable 25 milliLiter(s) IV Push every 15 minutes  dextrose 50% Injectable 50 milliLiter(s) IV Push every 15 minutes  DOBUTamine Infusion 2.5 MICROgram(s)/kG/Min (8.17 mL/Hr) IV Continuous <Continuous>  enoxaparin Injectable 30 milliGRAM(s) SubCutaneous every 24 hours  gabapentin 100 milliGRAM(s) Oral every 12 hours  insulin regular Infusion 3 Unit(s)/Hr (3 mL/Hr) IV Continuous <Continuous>  mupirocin 2% Ointment 1 Application(s) Both Nostrils two times a day  pantoprazole  Injectable 40 milliGRAM(s) IV Push daily  polyethylene glycol 3350 17 Gram(s) Oral daily  senna 2 Tablet(s) Oral at bedtime  sodium chloride 0.9% lock flush 3 milliLiter(s) IV Push every 8 hours  sodium chloride 0.9%. 1000 milliLiter(s) (10 mL/Hr) IV Continuous <Continuous>  spironolactone 50 milliGRAM(s) Oral every 12 hours  torsemide 10 milliGRAM(s) Oral every 12 hours      VITAL:  T(C): , Max: 37 (24 @ 12:00)  T(F): , Max: 98.6 (24 @ 12:00)  HR: 87 (24 @ 08:00)  BP: 96/54 (24 @ 07:00)  BP(mean): 72 (24 @ 07:00)  RR: 22 (24 @ 08:00)  SpO2: 95% (24 @ 08:00)  Wt(kg): --    I and O's:     07:  -  01-26 @ 07:00  --------------------------------------------------------  IN: 738.8 mL / OUT: 735 mL / NET: 3.8 mL          PHYSICAL EXAM:    Constitutional: NAD; obese   Neck:  No JVD  Respiratory: reduced   Cardiovascular: S1 and S2  Gastrointestinal: BS+, soft, NT/ND  Extremities: No peripheral edema  Neurological: A/O x 3, no focal deficits  Psychiatric: Normal mood, normal affect  : +  Diallo  Skin: No rashes  Access: Not applicable    LABS:                        8.9    7.25  )-----------( 93       ( 2024 00:35 )             27.3         146<H>  |  108  |  22  ----------------------------<  101<H>  4.2   |  24  |  1.69<H>    Ca    9.3      2024 00:35  Phos  2.9       Mg     2.1         TPro  5.4<L>  /  Alb  3.4  /  TBili  0.9  /  DBili  x   /  AST  35  /  ALT  14  /  AlkPhos  48            Urine Studies:  Urinalysis Basic - ( 2024 00:35 )    Color: x / Appearance: x / SG: x / pH: x  Gluc: 101 mg/dL / Ketone: x  / Bili: x / Urobili: x   Blood: x / Protein: x / Nitrite: x   Leuk Esterase: x / RBC: x / WBC x   Sq Epi: x / Non Sq Epi: x / Bacteria: x            RADIOLOGY & ADDITIONAL STUDIES:          < from: Xray Chest 1 View- PORTABLE-Routine (24 @ 05:08) >    ACC: [id number] EXAM:  XR CHEST PORTABLE ROUTINE 1V   ORDERED BY: MIRA MABRY     PROCEDURE DATE:  2024          INTERPRETATION:  EXAMINATION: XR CHEST    CLINICAL INDICATION: Post-cardiac surgery    TECHNIQUE: Single frontal, portable view of the chest was obtained.    COMPARISON: Chest x-ray 2024.    FINDINGS:  Midline sternotomy wires.  Left atrial appendage clip.  Mediastinal drains and chest tube in place.  Status post valve replacement.  Right IJ central venous catheter with tip terminating in the proximal SVC.  The heart is normal in size.  Patchy opacity at the left lung base. Small bilateral effusions  There is no pneumothorax. The right lung is clear.  No acute abnormalities in the visualized osseous structures.    IMPRESSION:  Small bilateral effusions and left basilar atelectasis.    --- End of Report ---           ROBERT CARR MD; Resident Radiologist  This document has been electronically signed.  ALISA BROWN MD; Attending Radiologist  This do    < end of copied text >

## 2024-01-27 LAB
ANION GAP SERPL CALC-SCNC: 11 MMOL/L — SIGNIFICANT CHANGE UP (ref 5–17)
BASOPHILS # BLD AUTO: 0.02 K/UL — SIGNIFICANT CHANGE UP (ref 0–0.2)
BASOPHILS NFR BLD AUTO: 0.3 % — SIGNIFICANT CHANGE UP (ref 0–2)
BUN SERPL-MCNC: 25 MG/DL — HIGH (ref 7–23)
CALCIUM SERPL-MCNC: 10.1 MG/DL — SIGNIFICANT CHANGE UP (ref 8.4–10.5)
CHLORIDE SERPL-SCNC: 104 MMOL/L — SIGNIFICANT CHANGE UP (ref 96–108)
CO2 SERPL-SCNC: 25 MMOL/L — SIGNIFICANT CHANGE UP (ref 22–31)
CREAT SERPL-MCNC: 1.76 MG/DL — HIGH (ref 0.5–1.3)
EGFR: 30 ML/MIN/1.73M2 — LOW
EOSINOPHIL # BLD AUTO: 0.06 K/UL — SIGNIFICANT CHANGE UP (ref 0–0.5)
EOSINOPHIL NFR BLD AUTO: 0.9 % — SIGNIFICANT CHANGE UP (ref 0–6)
GLUCOSE SERPL-MCNC: 134 MG/DL — HIGH (ref 70–99)
HCT VFR BLD CALC: 29.6 % — LOW (ref 34.5–45)
HGB BLD-MCNC: 9.6 G/DL — LOW (ref 11.5–15.5)
IMM GRANULOCYTES NFR BLD AUTO: 0.4 % — SIGNIFICANT CHANGE UP (ref 0–0.9)
LYMPHOCYTES # BLD AUTO: 1.84 K/UL — SIGNIFICANT CHANGE UP (ref 1–3.3)
LYMPHOCYTES # BLD AUTO: 26.7 % — SIGNIFICANT CHANGE UP (ref 13–44)
MCHC RBC-ENTMCNC: 31.8 PG — SIGNIFICANT CHANGE UP (ref 27–34)
MCHC RBC-ENTMCNC: 32.4 GM/DL — SIGNIFICANT CHANGE UP (ref 32–36)
MCV RBC AUTO: 98 FL — SIGNIFICANT CHANGE UP (ref 80–100)
MONOCYTES # BLD AUTO: 0.69 K/UL — SIGNIFICANT CHANGE UP (ref 0–0.9)
MONOCYTES NFR BLD AUTO: 10 % — SIGNIFICANT CHANGE UP (ref 2–14)
NEUTROPHILS # BLD AUTO: 4.26 K/UL — SIGNIFICANT CHANGE UP (ref 1.8–7.4)
NEUTROPHILS NFR BLD AUTO: 61.7 % — SIGNIFICANT CHANGE UP (ref 43–77)
NRBC # BLD: 0 /100 WBCS — SIGNIFICANT CHANGE UP (ref 0–0)
PLATELET # BLD AUTO: 106 K/UL — LOW (ref 150–400)
POTASSIUM SERPL-MCNC: 4.2 MMOL/L — SIGNIFICANT CHANGE UP (ref 3.5–5.3)
POTASSIUM SERPL-SCNC: 4.2 MMOL/L — SIGNIFICANT CHANGE UP (ref 3.5–5.3)
RBC # BLD: 3.02 M/UL — LOW (ref 3.8–5.2)
RBC # FLD: 15.7 % — HIGH (ref 10.3–14.5)
SODIUM SERPL-SCNC: 140 MMOL/L — SIGNIFICANT CHANGE UP (ref 135–145)
WBC # BLD: 6.9 K/UL — SIGNIFICANT CHANGE UP (ref 3.8–10.5)
WBC # FLD AUTO: 6.9 K/UL — SIGNIFICANT CHANGE UP (ref 3.8–10.5)

## 2024-01-27 PROCEDURE — 99233 SBSQ HOSP IP/OBS HIGH 50: CPT

## 2024-01-27 PROCEDURE — 93010 ELECTROCARDIOGRAM REPORT: CPT

## 2024-01-27 RX ORDER — DOBUTAMINE HCL 250MG/20ML
1.25 VIAL (ML) INTRAVENOUS
Qty: 500 | Refills: 0 | Status: DISCONTINUED | OUTPATIENT
Start: 2024-01-27 | End: 2024-01-29

## 2024-01-27 RX ORDER — AMIODARONE HYDROCHLORIDE 400 MG/1
200 TABLET ORAL
Refills: 0 | Status: DISCONTINUED | OUTPATIENT
Start: 2024-01-27 | End: 2024-01-27

## 2024-01-27 RX ORDER — ENOXAPARIN SODIUM 100 MG/ML
30 INJECTION SUBCUTANEOUS ONCE
Refills: 0 | Status: DISCONTINUED | OUTPATIENT
Start: 2024-01-28 | End: 2024-01-27

## 2024-01-27 RX ORDER — INSULIN LISPRO 100/ML
8 VIAL (ML) SUBCUTANEOUS
Refills: 0 | Status: DISCONTINUED | OUTPATIENT
Start: 2024-01-27 | End: 2024-01-29

## 2024-01-27 RX ADMIN — GABAPENTIN 100 MILLIGRAM(S): 400 CAPSULE ORAL at 06:07

## 2024-01-27 RX ADMIN — CHLORHEXIDINE GLUCONATE 1 APPLICATION(S): 213 SOLUTION TOPICAL at 12:14

## 2024-01-27 RX ADMIN — Medication 7 UNIT(S): at 07:58

## 2024-01-27 RX ADMIN — ATORVASTATIN CALCIUM 80 MILLIGRAM(S): 80 TABLET, FILM COATED ORAL at 21:15

## 2024-01-27 RX ADMIN — PANTOPRAZOLE SODIUM 40 MILLIGRAM(S): 20 TABLET, DELAYED RELEASE ORAL at 06:06

## 2024-01-27 RX ADMIN — GABAPENTIN 100 MILLIGRAM(S): 400 CAPSULE ORAL at 17:05

## 2024-01-27 RX ADMIN — SENNA PLUS 2 TABLET(S): 8.6 TABLET ORAL at 21:15

## 2024-01-27 RX ADMIN — MUPIROCIN 1 APPLICATION(S): 20 OINTMENT TOPICAL at 17:04

## 2024-01-27 RX ADMIN — Medication 81 MILLIGRAM(S): at 12:19

## 2024-01-27 RX ADMIN — OXYCODONE HYDROCHLORIDE 5 MILLIGRAM(S): 5 TABLET ORAL at 21:15

## 2024-01-27 RX ADMIN — Medication 2: at 07:58

## 2024-01-27 RX ADMIN — OXYCODONE HYDROCHLORIDE 10 MILLIGRAM(S): 5 TABLET ORAL at 06:51

## 2024-01-27 RX ADMIN — POLYETHYLENE GLYCOL 3350 17 GRAM(S): 17 POWDER, FOR SOLUTION ORAL at 12:19

## 2024-01-27 RX ADMIN — Medication 10 MILLIGRAM(S): at 06:06

## 2024-01-27 RX ADMIN — OXYCODONE HYDROCHLORIDE 5 MILLIGRAM(S): 5 TABLET ORAL at 21:45

## 2024-01-27 RX ADMIN — Medication 10 MILLIGRAM(S): at 17:04

## 2024-01-27 RX ADMIN — SODIUM CHLORIDE 3 MILLILITER(S): 9 INJECTION INTRAMUSCULAR; INTRAVENOUS; SUBCUTANEOUS at 22:23

## 2024-01-27 RX ADMIN — SODIUM CHLORIDE 3 MILLILITER(S): 9 INJECTION INTRAMUSCULAR; INTRAVENOUS; SUBCUTANEOUS at 14:18

## 2024-01-27 RX ADMIN — Medication 500 MILLIGRAM(S): at 06:06

## 2024-01-27 RX ADMIN — SODIUM CHLORIDE 3 MILLILITER(S): 9 INJECTION INTRAMUSCULAR; INTRAVENOUS; SUBCUTANEOUS at 06:59

## 2024-01-27 RX ADMIN — SPIRONOLACTONE 50 MILLIGRAM(S): 25 TABLET, FILM COATED ORAL at 06:06

## 2024-01-27 RX ADMIN — MUPIROCIN 1 APPLICATION(S): 20 OINTMENT TOPICAL at 06:07

## 2024-01-27 RX ADMIN — INSULIN GLARGINE 25 UNIT(S): 100 INJECTION, SOLUTION SUBCUTANEOUS at 22:11

## 2024-01-27 RX ADMIN — OXYCODONE HYDROCHLORIDE 10 MILLIGRAM(S): 5 TABLET ORAL at 06:12

## 2024-01-27 RX ADMIN — Medication 8 UNIT(S): at 17:04

## 2024-01-27 RX ADMIN — Medication 7 UNIT(S): at 12:19

## 2024-01-27 RX ADMIN — Medication 2: at 12:18

## 2024-01-27 RX ADMIN — SPIRONOLACTONE 50 MILLIGRAM(S): 25 TABLET, FILM COATED ORAL at 17:05

## 2024-01-27 RX ADMIN — Medication 2: at 17:04

## 2024-01-27 RX ADMIN — Medication 500 MILLIGRAM(S): at 17:05

## 2024-01-27 RX ADMIN — Medication 100 MILLIGRAM(S): at 12:20

## 2024-01-27 NOTE — PROGRESS NOTE ADULT - ASSESSMENT
Assessment  DMT2: 73y Female with DM T2 with hyperglycemia, A1C7% , was on oral meds at home, was using trulicity prior now on basal bolus insulin with coverage, postop CT surgery, sugars are improving.  Will need tight glycemic control for postop wound healing.   CAD: on medications, stable, monitored. s/p CABG/ MVR   HTN: on antihypertensive medications, monitored, asymptomatic.  Obesity: No strict exercise routines, not on any weight loss program, neither on low calorie diet.      Fay Farr MD  Cell: [phone]  Office: [phone]

## 2024-01-27 NOTE — PROGRESS NOTE ADULT - ASSESSMENT
73F w/ PMH morbid obesity (BMI 42.7 s/p gastric sleeve ), DMT2, CAD s/p PCI of LCX (), LEIDY (on CPAP), CKD (stage 3 - on farxiga), AF (on Eliquis) and rheumatic mitral valve disease reports c/o chronic BARBOZA for over 10 years that has progressively worsened over the past few months with the feeling of an "elephant on my chest." Her symptoms improved after starting Lasix daily. She gets dizzy when she bends over or when she stands up too fast. She denies CP or weight gain. She sleeps with 3 pillows on an incline at night. She cannot lay flat due to comfort as well as orthopnea. She can only walk one block before she has to stop to rest due to SOB. Pt now presents to PST for scheduled mitral valve replacement, maze procedure and left atrial appendage ligation with Dr. Dalal on 24.  s/p  MVR (t)/ C1V/ Maze/ RICHARD clip  post extubated / inotropic support  EP consulted for underlying junctional rhythm; no av nodals at this time; + epicardial pw; maintain    tx sdu; VSS; afib 60-70 on  2.5; maintain med ct's; diuresis as per renal; endo consulted for diabetic management  keep npo after midnight sat into sun for possible ppm as per EP; no av nodals at this time  24 VSS on  2.5 EP following for ?ppm   discharge planning- home pt when stable

## 2024-01-27 NOTE — PROGRESS NOTE ADULT - PROBLEM SELECTOR PLAN 3
continue postop care  continue asa and statin  maintain  @ 2.5 mcg/kg/ min  diuresis for hypervolemia  maintain meds ct- likely d/c in am if output minimal   + epicardial pw - DDD 50  pulm toilet  pain management  increase activity as tolerated   discharge planning- home pt

## 2024-01-27 NOTE — PROGRESS NOTE ADULT - PROBLEM SELECTOR PLAN 4
EP following  no av nodals at this time  + epicardial pw - DDD 50  As per EP:    - Patient placed on backup rate of 50 on the PPM, not currently paced. On telemetry appears to have accelerated junctional rhythm, HR in the 70s, narrow complex QRS. Likely Sinus bradycardia with junctional escape w/ V-A conduction  - Asymptomatic and hemodynamically stable, plan to transfer to stepdown unit  - Continue to monitor on telemetry, sinus node will likely recover   - Please Keep NPO at midnight on Sunday in the case the patient continues to be in the same rhythm and requires a PPM

## 2024-01-27 NOTE — PROGRESS NOTE ADULT - ASSESSMENT
73 year old female with PMH morbid obesity (BMI 42.7 s/p gastric sleeve 2015), DMT2, CAD s/p PCI of LCX (2012), LEIDY (on CPAP), CKD (stage 3 - on farxiga), valvular pAF (on Eliquis) and rheumatic mitral valve disease reports c/o chronic BARBOZA for over 10 years that has progressively worsened over the past few months. Pt now s/p scheduled mitral valve replacement, 1v CABG to LAD, maze procedure and left atrial appendage ligation with Dr. Dalal on 1/23/24. Pt with epicardial A and V wires    # Post operative AF with complete heart block, regular junctional escape rhythm in 70s. Epicardial backup wires in place  Obtained atrial EGM using patient's temporary atrial epicardial wire to V1 and ran rhythm strip. Rhythm strip from atrial EGM and 12 lead EKG consistent with afib with complete heart block with regular junctional escape in 70s.     -Pt remains on dobutamine  -EP will continue to follow for possible PPM, please keep NPO at midnight on Sunday in the case the patient continues to be in the same rhythm and requires a PPM   -Consider eventual restart of anticoagulation when safe from post-operative perspective       73 year old female with PMH morbid obesity (BMI 42.7 s/p gastric sleeve 2015), DMT2, CAD s/p PCI of LCX (2012), LEIDY (on CPAP), CKD (stage 3 - on farxiga), valvular pAF (on Eliquis) and rheumatic mitral valve disease reports c/o chronic BARBOZA for over 10 years that has progressively worsened over the past few months. Pt now s/p scheduled mitral valve replacement, 1v CABG to LAD, maze procedure and left atrial appendage ligation with Dr. Dalal on 1/23/24. Pt with epicardial A and V wires    # Post operative AF with complete heart block, regular junctional escape rhythm in 70s. Epicardial backup wires in place  Obtained atrial EGM using patient's temporary atrial epicardial wire to V1 and ran rhythm strip. Rhythm strip from atrial EGM and 12 lead EKG consistent with afib with complete heart block with regular junctional escape in 70s.     -Pt remains on dobutamine  -EP will continue to follow for possible PPM, please keep NPO at midnight on Sunday in the case the patient continues to be in the same rhythm and requires a PPM   -Consider eventual restart of anticoagulation when safe from post-operative perspective  -Avoid AV maribel blocking agents

## 2024-01-27 NOTE — PROGRESS NOTE ADULT - SUBJECTIVE AND OBJECTIVE BOX
Chief complaint    Patient is a 73y old  Female who presents with a chief complaint of sob (27 Jan 2024 06:41)   Review of systems  Patient appears comfortable.    Labs and Fingersticks  CAPILLARY BLOOD GLUCOSE      POCT Blood Glucose.: 159 mg/dL (27 Jan 2024 16:36)  POCT Blood Glucose.: 192 mg/dL (27 Jan 2024 11:33)  POCT Blood Glucose.: 158 mg/dL (27 Jan 2024 07:53)  POCT Blood Glucose.: 151 mg/dL (26 Jan 2024 21:30)      Anion Gap: 11 (01-27 @ 07:09)  Anion Gap: 14 (01-26 @ 00:35)      Calcium: 10.1 (01-27 @ 07:09)  Calcium: 9.3 (01-26 @ 00:35)  Albumin: 3.4 (01-26 @ 00:35)    Alanine Aminotransferase (ALT/SGPT): 14 (01-26 @ 00:35)  Alkaline Phosphatase: 48 (01-26 @ 00:35)  Aspartate Aminotransferase (AST/SGOT): 35 (01-26 @ 00:35)        01-27    140  |  104  |  25<H>  ----------------------------<  134<H>  4.2   |  25  |  1.76<H>    Ca    10.1      27 Jan 2024 07:09  Phos  2.9     01-26  Mg     2.1     01-26    TPro  5.4<L>  /  Alb  3.4  /  TBili  0.9  /  DBili  x   /  AST  35  /  ALT  14  /  AlkPhos  48  01-26                        9.6    6.90  )-----------( 106      ( 27 Jan 2024 07:13 )             29.6     Medications  MEDICATIONS  (STANDING):  allopurinol 100 milliGRAM(s) Oral daily  ascorbic acid 500 milliGRAM(s) Oral two times a day  aspirin enteric coated 81 milliGRAM(s) Oral daily  atorvastatin 80 milliGRAM(s) Oral at bedtime  bisacodyl Suppository 10 milliGRAM(s) Rectal once  chlorhexidine 2% Cloths 1 Application(s) Topical daily  dextrose 5%. 1000 milliLiter(s) (100 mL/Hr) IV Continuous <Continuous>  dextrose 5%. 1000 milliLiter(s) (50 mL/Hr) IV Continuous <Continuous>  dextrose 50% Injectable 25 milliLiter(s) IV Push every 15 minutes  dextrose 50% Injectable 25 Gram(s) IV Push once  dextrose 50% Injectable 12.5 Gram(s) IV Push once  dextrose 50% Injectable 25 Gram(s) IV Push once  dextrose 50% Injectable 50 milliLiter(s) IV Push every 15 minutes  DOBUTamine Infusion 1.25 MICROgram(s)/kG/Min (4.08 mL/Hr) IV Continuous <Continuous>  enoxaparin Injectable 30 milliGRAM(s) SubCutaneous once  gabapentin 100 milliGRAM(s) Oral every 12 hours  glucagon  Injectable 1 milliGRAM(s) IntraMuscular once  insulin glargine Injectable (LANTUS) 25 Unit(s) SubCutaneous at bedtime  insulin lispro (ADMELOG) corrective regimen sliding scale   SubCutaneous at bedtime  insulin lispro (ADMELOG) corrective regimen sliding scale   SubCutaneous three times a day before meals  insulin lispro Injectable (ADMELOG) 8 Unit(s) SubCutaneous three times a day before meals  mupirocin 2% Ointment 1 Application(s) Both Nostrils two times a day  pantoprazole    Tablet 40 milliGRAM(s) Oral before breakfast  polyethylene glycol 3350 17 Gram(s) Oral daily  senna 2 Tablet(s) Oral at bedtime  sodium chloride 0.9% lock flush 3 milliLiter(s) IV Push every 8 hours  sodium chloride 0.9%. 1000 milliLiter(s) (10 mL/Hr) IV Continuous <Continuous>  spironolactone 50 milliGRAM(s) Oral every 12 hours  torsemide 10 milliGRAM(s) Oral every 12 hours      Physical Exam  General: Patient appears comfortable.  Vital Signs Last 12 Hrs  T(F): 98 (01-27-24 @ 15:52), Max: 98.3 (01-27-24 @ 11:03)  HR: 70 (01-27-24 @ 15:52) (70 - 83)  BP: 134/62 (01-27-24 @ 15:52) (105/54 - 139/80)  BP(mean): 88 (01-27-24 @ 15:52) (88 - 88)  RR: 18 (01-27-24 @ 15:52) (18 - 18)  SpO2: 93% (01-27-24 @ 15:52) (93% - 96%)  Neck: No palpable thyroid nodules.  CVS: S1S2, No murmurs  Respiratory: No wheezing, no crepitations  GI: Abdomen soft, non tender.    Diagnostics        Radiology:

## 2024-01-27 NOTE — PROGRESS NOTE ADULT - SUBJECTIVE AND OBJECTIVE BOX
Patient seen and examined.  No new events.    REVIEW OF SYSTEMS:  As per HPI, otherwise 8 full 10 ROS were unremarkable    MEDICATIONS  (STANDING):  allopurinol 100 milliGRAM(s) Oral daily  ascorbic acid 500 milliGRAM(s) Oral two times a day  aspirin enteric coated 81 milliGRAM(s) Oral daily  atorvastatin 80 milliGRAM(s) Oral at bedtime  bisacodyl Suppository 10 milliGRAM(s) Rectal once  chlorhexidine 2% Cloths 1 Application(s) Topical daily  dextrose 5%. 1000 milliLiter(s) (50 mL/Hr) IV Continuous <Continuous>  dextrose 5%. 1000 milliLiter(s) (100 mL/Hr) IV Continuous <Continuous>  dextrose 50% Injectable 25 milliLiter(s) IV Push every 15 minutes  dextrose 50% Injectable 25 Gram(s) IV Push once  dextrose 50% Injectable 12.5 Gram(s) IV Push once  dextrose 50% Injectable 50 milliLiter(s) IV Push every 15 minutes  dextrose 50% Injectable 25 Gram(s) IV Push once  DOBUTamine Infusion 2.5 MICROgram(s)/kG/Min (8.17 mL/Hr) IV Continuous <Continuous>  enoxaparin Injectable 30 milliGRAM(s) SubCutaneous every 24 hours  gabapentin 100 milliGRAM(s) Oral every 12 hours  glucagon  Injectable 1 milliGRAM(s) IntraMuscular once  insulin glargine Injectable (LANTUS) 25 Unit(s) SubCutaneous at bedtime  insulin lispro (ADMELOG) corrective regimen sliding scale   SubCutaneous at bedtime  insulin lispro (ADMELOG) corrective regimen sliding scale   SubCutaneous three times a day before meals  insulin lispro Injectable (ADMELOG) 7 Unit(s) SubCutaneous three times a day before meals  mupirocin 2% Ointment 1 Application(s) Both Nostrils two times a day  pantoprazole    Tablet 40 milliGRAM(s) Oral before breakfast  polyethylene glycol 3350 17 Gram(s) Oral daily  senna 2 Tablet(s) Oral at bedtime  sodium chloride 0.9% lock flush 3 milliLiter(s) IV Push every 8 hours  sodium chloride 0.9%. 1000 milliLiter(s) (10 mL/Hr) IV Continuous <Continuous>  spironolactone 50 milliGRAM(s) Oral every 12 hours  torsemide 10 milliGRAM(s) Oral every 12 hours      VITAL:  T(C): , Max: 36.8 (01-26-24 @ 23:56)  T(F): , Max: 98.3 (01-26-24 @ 23:56)  HR: 83 (01-27-24 @ 11:03)  BP: 139/80 (01-27-24 @ 11:03)  BP(mean): 82 (01-27-24 @ 03:00)  RR: 18 (01-27-24 @ 11:03)  SpO2: 93% (01-27-24 @ 11:03)  Wt(kg): --    I and O's:    01-26 @ 07:01  -  01-27 @ 07:00  --------------------------------------------------------  IN: 907.2 mL / OUT: 1064 mL / NET: -156.8 mL    01-27 @ 07:01  -  01-27 @ 12:37  --------------------------------------------------------  IN: 240 mL / OUT: 400 mL / NET: -160 mL          PHYSICAL EXAM:    Constitutional: NAD  HEENT: PERRLA, EOMI,  MMM  Neck: No LAD, No JVD  Respiratory: CTAB  Cardiovascular: S1 and S2  Gastrointestinal: BS+, soft, NT/ND  Extremities: No peripheral edema  Neurological: A/O x 3, no focal deficits  Psychiatric: Normal mood, normal affect  : No Diallo  Skin: No rashes  Access: Not applicable    LABS:                        9.6    6.90  )-----------( 106      ( 27 Jan 2024 07:13 )             29.6     01-27    140  |  104  |  25<H>  ----------------------------<  134<H>  4.2   |  25  |  1.76<H>    Ca    10.1      27 Jan 2024 07:09  Phos  2.9     01-26  Mg     2.1     01-26    TPro  5.4<L>  /  Alb  3.4  /  TBili  0.9  /  DBili  x   /  AST  35  /  ALT  14  /  AlkPhos  48  01-26      Urine Studies:  Urinalysis Basic - ( 27 Jan 2024 07:09 )    Color: x / Appearance: x / SG: x / pH: x  Gluc: 134 mg/dL / Ketone: x  / Bili: x / Urobili: x   Blood: x / Protein: x / Nitrite: x   Leuk Esterase: x / RBC: x / WBC x   Sq Epi: x / Non Sq Epi: x / Bacteria: x        Assessment and Plan:   · Assessment	    73 year old female with PMH morbid obesity (BMI 42.7 s/p gastric sleeve 2015), DMT2, CAD s/p PCI of LCX (2012), LEIDY (on CPAP), CKD (stage 3 - on farxiga), AF (on Eliquis) and rheumatic mitral valve disease reports c/o chronic BARBOZA  1/23/24 SP Mitral valve replacement;  Hybrid Maze procedure Clipping, left atrial appendage and CABG, using 1 vein graft    CKD stage 3a   Hypernatremia     1 Renal - Creatinine baseline more in the 1.6-1.7 range;  renal fxn remains stable.  Patient remains on  PO Torsemide and Aldactone; no objection to continue   No need for renal sono  2 Endo-Farxiga and ARB are both held and to restarted as outpt   3 CVS-Off bblockers and on inotropes at present ;  Not on pressors ; BP acceptable  Trying to see underlying rhythm that was junctional   4 Pulm-Nasal canula   5 GI-Encourage PO intake and she can increase her free water intake   6 Increase activity as able

## 2024-01-27 NOTE — PROGRESS NOTE ADULT - SUBJECTIVE AND OBJECTIVE BOX
VITAL SIGNS-Telemetry:  Aflutter 60-70  Vital Signs Last 24 Hrs  T(C): 36.7 (24 @ 03:00), Max: 36.8 (24 @ 08:00)  T(F): 98 (24 @ 03:00), Max: 98.3 (24 @ 23:56)  HR: 76 (24 @ 06:19) (68 - 87)  BP: 120/57 (24 @ 03:00) (96/54 - 137/62)  RR: 18 (24 @ 03:00) (16 - 24)  SpO2: 96% (24 @ 06:19) (90% - 99%)          07:  -   @ 07:00  --------------------------------------------------------  IN: 738.8 mL / OUT: 735 mL / NET: 3.8 mL     @ 07:  -   @ 06:41  --------------------------------------------------------  IN: 682.7 mL / OUT: 1059 mL / NET: -376.3 mL    Daily     Daily Weight in k.3 (2024 06:32)    CAPILLARY BLOOD GLUCOSE  165 (2024 12:00)  162 (2024 08:00)  POCT Blood Glucose.: 151 mg/dL (2024 21:30)  POCT Blood Glucose.: 162 mg/dL (2024 16:29)  POCT Blood Glucose.: 165 mg/dL (2024 12:04)  POCT Blood Glucose.: 162 mg/dL (2024 07:57)        Drains:     MS bulb        [x  ] Drainage: 15/40cc           med x 2  [x  ]  Drainage:    10/70cc             Pacing Wires        [x ]   Settings:    [id number]                              Isolated  [  ]       PHYSICAL EXAM:  Neurology: alert and oriented x 3, nonfocal, no gross deficits  CV : S1S2  Sternal Wound :  CDI , Stable  Lungs:  Abdomen: soft, nontender, nondistended, positive bowel sounds, last bowel movement         Extremities:         acetaminophen     Tablet .. 650 milliGRAM(s) Oral every 6 hours PRN  allopurinol 100 milliGRAM(s) Oral daily  ascorbic acid 500 milliGRAM(s) Oral two times a day  aspirin enteric coated 81 milliGRAM(s) Oral daily  atorvastatin 80 milliGRAM(s) Oral at bedtime  bisacodyl Suppository 10 milliGRAM(s) Rectal once  chlorhexidine 2% Cloths 1 Application(s) Topical daily  dextrose 5%. 1000 milliLiter(s) IV Continuous <Continuous>  dextrose 5%. 1000 milliLiter(s) IV Continuous <Continuous>  dextrose 50% Injectable 25 milliLiter(s) IV Push every 15 minutes  dextrose 50% Injectable 12.5 Gram(s) IV Push once  dextrose 50% Injectable 25 Gram(s) IV Push once  dextrose 50% Injectable 25 Gram(s) IV Push once  dextrose 50% Injectable 50 milliLiter(s) IV Push every 15 minutes  dextrose Oral Gel 15 Gram(s) Oral once PRN  DOBUTamine Infusion 2.5 MICROgram(s)/kG/Min IV Continuous <Continuous>  enoxaparin Injectable 30 milliGRAM(s) SubCutaneous every 24 hours  gabapentin 100 milliGRAM(s) Oral every 12 hours  glucagon  Injectable 1 milliGRAM(s) IntraMuscular once  insulin glargine Injectable (LANTUS) 25 Unit(s) SubCutaneous at bedtime  insulin lispro (ADMELOG) corrective regimen sliding scale   SubCutaneous at bedtime  insulin lispro (ADMELOG) corrective regimen sliding scale   SubCutaneous three times a day before meals  insulin lispro Injectable (ADMELOG) 7 Unit(s) SubCutaneous three times a day before meals  mupirocin 2% Ointment 1 Application(s) Both Nostrils two times a day  oxyCODONE    IR 5 milliGRAM(s) Oral every 4 hours PRN  oxyCODONE    IR 10 milliGRAM(s) Oral every 4 hours PRN  pantoprazole    Tablet 40 milliGRAM(s) Oral before breakfast  polyethylene glycol 3350 17 Gram(s) Oral daily  senna 2 Tablet(s) Oral at bedtime  sodium chloride 0.9% lock flush 3 milliLiter(s) IV Push every 8 hours  sodium chloride 0.9%. 1000 milliLiter(s) IV Continuous <Continuous>  spironolactone 50 milliGRAM(s) Oral every 12 hours  torsemide 10 milliGRAM(s) Oral every 12 hours    Physical Therapy Rec:   Home  [x  ]   Home w/ PT  [  ]  Rehab  [  ]  Discussed with Cardiothoracic Team at AM rounds. VITAL SIGNS-Telemetry:  Aflutter 60-70  Vital Signs Last 24 Hrs  T(C): 36.7 (24 @ 03:00), Max: 36.8 (24 @ 08:00)  T(F): 98 (24 @ 03:00), Max: 98.3 (24 @ 23:56)  HR: 76 (24 @ 06:19) (68 - 87)  BP: 120/57 (24 @ 03:00) (96/54 - 137/62)  RR: 18 (24 @ 03:00) (16 - 24)  SpO2: 96% (24 @ 06:19) (90% - 99%)          07:  -   @ 07:00  --------------------------------------------------------  IN: 738.8 mL / OUT: 735 mL / NET: 3.8 mL     @ 07:  -   @ 06:41  --------------------------------------------------------  IN: 682.7 mL / OUT: 1059 mL / NET: -376.3 mL    Daily     Daily Weight in k.3 (2024 06:32)    CAPILLARY BLOOD GLUCOSE  165 (2024 12:00)  162 (2024 08:00)  POCT Blood Glucose.: 151 mg/dL (2024 21:30)  POCT Blood Glucose.: 162 mg/dL (2024 16:29)  POCT Blood Glucose.: 165 mg/dL (2024 12:04)  POCT Blood Glucose.: 162 mg/dL (2024 07:57)        Drains:     MS bulb        [x  ] Drainage: 15/40cc           med x 2  [x  ]  Drainage:    10/70cc             Pacing Wires        [x ]   Settings:    ddd50                              Isolated  [  ]       PHYSICAL EXAM:  Neurology: alert and oriented x 3, nonfocal, no gross deficits  CV : S1S2  Sternal Wound :  CDI , Stable  Lungs: cta- diminished bases  Abdomen: soft, nontender, nondistended, positive bowel sounds, last bowel movement       +bm  Extremities:     + edema b/l LLE inc cdi  no calf tenderness    acetaminophen     Tablet .. 650 milliGRAM(s) Oral every 6 hours PRN  allopurinol 100 milliGRAM(s) Oral daily  ascorbic acid 500 milliGRAM(s) Oral two times a day  aspirin enteric coated 81 milliGRAM(s) Oral daily  atorvastatin 80 milliGRAM(s) Oral at bedtime  bisacodyl Suppository 10 milliGRAM(s) Rectal once  chlorhexidine 2% Cloths 1 Application(s) Topical daily  dextrose 5%. 1000 milliLiter(s) IV Continuous <Continuous>  dextrose 5%. 1000 milliLiter(s) IV Continuous <Continuous>  dextrose 50% Injectable 25 milliLiter(s) IV Push every 15 minutes  dextrose 50% Injectable 12.5 Gram(s) IV Push once  dextrose 50% Injectable 25 Gram(s) IV Push once  dextrose 50% Injectable 25 Gram(s) IV Push once  dextrose 50% Injectable 50 milliLiter(s) IV Push every 15 minutes  dextrose Oral Gel 15 Gram(s) Oral once PRN  DOBUTamine Infusion 2.5 MICROgram(s)/kG/Min IV Continuous <Continuous>  enoxaparin Injectable 30 milliGRAM(s) SubCutaneous every 24 hours  gabapentin 100 milliGRAM(s) Oral every 12 hours  glucagon  Injectable 1 milliGRAM(s) IntraMuscular once  insulin glargine Injectable (LANTUS) 25 Unit(s) SubCutaneous at bedtime  insulin lispro (ADMELOG) corrective regimen sliding scale   SubCutaneous at bedtime  insulin lispro (ADMELOG) corrective regimen sliding scale   SubCutaneous three times a day before meals  insulin lispro Injectable (ADMELOG) 7 Unit(s) SubCutaneous three times a day before meals  mupirocin 2% Ointment 1 Application(s) Both Nostrils two times a day  oxyCODONE    IR 5 milliGRAM(s) Oral every 4 hours PRN  oxyCODONE    IR 10 milliGRAM(s) Oral every 4 hours PRN  pantoprazole    Tablet 40 milliGRAM(s) Oral before breakfast  polyethylene glycol 3350 17 Gram(s) Oral daily  senna 2 Tablet(s) Oral at bedtime  sodium chloride 0.9% lock flush 3 milliLiter(s) IV Push every 8 hours  sodium chloride 0.9%. 1000 milliLiter(s) IV Continuous <Continuous>  spironolactone 50 milliGRAM(s) Oral every 12 hours  torsemide 10 milliGRAM(s) Oral every 12 hours    Physical Therapy Rec:   Home  [x  ]   Home w/ PT  [  ]  Rehab  [  ]  Discussed with Cardiothoracic Team at AM rounds.

## 2024-01-27 NOTE — PROGRESS NOTE ADULT - SUBJECTIVE AND OBJECTIVE BOX
24H hour events: Pt feeling well, no acute complaints.     MEDICATIONS:  aMIOdarone    Tablet 200 milliGRAM(s) Oral two times a day  aspirin enteric coated 81 milliGRAM(s) Oral daily  DOBUTamine Infusion 1.25 MICROgram(s)/kG/Min IV Continuous <Continuous>  enoxaparin Injectable 30 milliGRAM(s) SubCutaneous every 24 hours  spironolactone 50 milliGRAM(s) Oral every 12 hours  torsemide 10 milliGRAM(s) Oral every 12 hours        acetaminophen     Tablet .. 650 milliGRAM(s) Oral every 6 hours PRN  gabapentin 100 milliGRAM(s) Oral every 12 hours  oxyCODONE    IR 10 milliGRAM(s) Oral every 4 hours PRN  oxyCODONE    IR 5 milliGRAM(s) Oral every 4 hours PRN    bisacodyl Suppository 10 milliGRAM(s) Rectal once  pantoprazole    Tablet 40 milliGRAM(s) Oral before breakfast  polyethylene glycol 3350 17 Gram(s) Oral daily  senna 2 Tablet(s) Oral at bedtime    allopurinol 100 milliGRAM(s) Oral daily  atorvastatin 80 milliGRAM(s) Oral at bedtime  dextrose 50% Injectable 12.5 Gram(s) IV Push once  dextrose 50% Injectable 25 milliLiter(s) IV Push every 15 minutes  dextrose 50% Injectable 50 milliLiter(s) IV Push every 15 minutes  dextrose 50% Injectable 25 Gram(s) IV Push once  dextrose 50% Injectable 25 Gram(s) IV Push once  dextrose Oral Gel 15 Gram(s) Oral once PRN  glucagon  Injectable 1 milliGRAM(s) IntraMuscular once  insulin glargine Injectable (LANTUS) 25 Unit(s) SubCutaneous at bedtime  insulin lispro (ADMELOG) corrective regimen sliding scale   SubCutaneous at bedtime  insulin lispro (ADMELOG) corrective regimen sliding scale   SubCutaneous three times a day before meals  insulin lispro Injectable (ADMELOG) 8 Unit(s) SubCutaneous three times a day before meals    ascorbic acid 500 milliGRAM(s) Oral two times a day  chlorhexidine 2% Cloths 1 Application(s) Topical daily  dextrose 5%. 1000 milliLiter(s) IV Continuous <Continuous>  dextrose 5%. 1000 milliLiter(s) IV Continuous <Continuous>  mupirocin 2% Ointment 1 Application(s) Both Nostrils two times a day  sodium chloride 0.9% lock flush 3 milliLiter(s) IV Push every 8 hours  sodium chloride 0.9%. 1000 milliLiter(s) IV Continuous <Continuous>      REVIEW OF SYSTEMS:  See HPI, otherwise ROS negative.    PHYSICAL EXAM:  T(C): 36.8 (01-27-24 @ 11:03), Max: 36.8 (01-26-24 @ 23:56)  HR: 83 (01-27-24 @ 11:03) (68 - 83)  BP: 139/80 (01-27-24 @ 11:03) (104/55 - 139/80)  RR: 18 (01-27-24 @ 11:03) (18 - 18)  SpO2: 93% (01-27-24 @ 11:03) (90% - 99%)  Wt(kg): --  I&O's Summary    26 Jan 2024 07:01  -  27 Jan 2024 07:00  --------------------------------------------------------  IN: 907.2 mL / OUT: 1064 mL / NET: -156.8 mL    27 Jan 2024 07:01  -  27 Jan 2024 14:05  --------------------------------------------------------  IN: 480 mL / OUT: 400 mL / NET: 80 mL        Appearance: Alert. NAD	  HEENT:   NC/AT	  Cardiovascular: +S1S2 RRR no m/g/r  Respiratory: breath sounds diminshed b/l	  Psychiatry: A & O x 3, Mood & affect appropriate  Gastrointestinal:  Soft	  Skin: No rashes	  Neurologic: Non-focal        LABS:	 	    CBC Full  -  ( 27 Jan 2024 07:13 )  WBC Count : 6.90 K/uL  Hemoglobin : 9.6 g/dL  Hematocrit : 29.6 %  Platelet Count - Automated : 106 K/uL  Mean Cell Volume : 98.0 fl  Mean Cell Hemoglobin : 31.8 pg  Mean Cell Hemoglobin Concentration : 32.4 gm/dL  Auto Neutrophil # : 4.26 K/uL  Auto Lymphocyte # : 1.84 K/uL  Auto Monocyte # : 0.69 K/uL  Auto Eosinophil # : 0.06 K/uL  Auto Basophil # : 0.02 K/uL  Auto Neutrophil % : 61.7 %  Auto Lymphocyte % : 26.7 %  Auto Monocyte % : 10.0 %  Auto Eosinophil % : 0.9 %  Auto Basophil % : 0.3 %    01-27    140  |  104  |  25<H>  ----------------------------<  134<H>  4.2   |  25  |  1.76<H>  01-26    146<H>  |  108  |  22  ----------------------------<  101<H>  4.2   |  24  |  1.69<H>    Ca    10.1      27 Jan 2024 07:09  Ca    9.3      26 Jan 2024 00:35  Phos  2.9     01-26  Mg     2.1     01-26    TPro  5.4<L>  /  Alb  3.4  /  TBili  0.9  /  DBili  x   /  AST  35  /  ALT  14  /  AlkPhos  48  01-26          TELEMETRY: afib with heart block, regular junctional escape rhythm in 70s  	    ECG:  	AF with regular junctional escape 70s      	  ASSESSMENT/PLAN:

## 2024-01-28 LAB
ANION GAP SERPL CALC-SCNC: 9 MMOL/L — SIGNIFICANT CHANGE UP (ref 5–17)
BLD GP AB SCN SERPL QL: NEGATIVE — SIGNIFICANT CHANGE UP
BUN SERPL-MCNC: 27 MG/DL — HIGH (ref 7–23)
CALCIUM SERPL-MCNC: 9.6 MG/DL — SIGNIFICANT CHANGE UP (ref 8.4–10.5)
CHLORIDE SERPL-SCNC: 106 MMOL/L — SIGNIFICANT CHANGE UP (ref 96–108)
CO2 SERPL-SCNC: 25 MMOL/L — SIGNIFICANT CHANGE UP (ref 22–31)
CREAT SERPL-MCNC: 1.77 MG/DL — HIGH (ref 0.5–1.3)
EGFR: 30 ML/MIN/1.73M2 — LOW
GLUCOSE SERPL-MCNC: 90 MG/DL — SIGNIFICANT CHANGE UP (ref 70–99)
HCT VFR BLD CALC: 26.2 % — LOW (ref 34.5–45)
HGB BLD-MCNC: 8.6 G/DL — LOW (ref 11.5–15.5)
MCHC RBC-ENTMCNC: 32 PG — SIGNIFICANT CHANGE UP (ref 27–34)
MCHC RBC-ENTMCNC: 32.8 GM/DL — SIGNIFICANT CHANGE UP (ref 32–36)
MCV RBC AUTO: 97.4 FL — SIGNIFICANT CHANGE UP (ref 80–100)
NRBC # BLD: 0 /100 WBCS — SIGNIFICANT CHANGE UP (ref 0–0)
PLATELET # BLD AUTO: 109 K/UL — LOW (ref 150–400)
POTASSIUM SERPL-MCNC: 4.5 MMOL/L — SIGNIFICANT CHANGE UP (ref 3.5–5.3)
POTASSIUM SERPL-SCNC: 4.5 MMOL/L — SIGNIFICANT CHANGE UP (ref 3.5–5.3)
RBC # BLD: 2.69 M/UL — LOW (ref 3.8–5.2)
RBC # FLD: 15.3 % — HIGH (ref 10.3–14.5)
RH IG SCN BLD-IMP: POSITIVE — SIGNIFICANT CHANGE UP
SODIUM SERPL-SCNC: 140 MMOL/L — SIGNIFICANT CHANGE UP (ref 135–145)
WBC # BLD: 6.54 K/UL — SIGNIFICANT CHANGE UP (ref 3.8–10.5)
WBC # FLD AUTO: 6.54 K/UL — SIGNIFICANT CHANGE UP (ref 3.8–10.5)

## 2024-01-28 PROCEDURE — 71045 X-RAY EXAM CHEST 1 VIEW: CPT | Mod: 26,76

## 2024-01-28 PROCEDURE — C1894: CPT

## 2024-01-28 PROCEDURE — 33208 INSRT HEART PM ATRIAL & VENT: CPT | Mod: KX

## 2024-01-28 PROCEDURE — 82962 GLUCOSE BLOOD TEST: CPT

## 2024-01-28 PROCEDURE — 93010 ELECTROCARDIOGRAM REPORT: CPT

## 2024-01-28 PROCEDURE — C1769: CPT

## 2024-01-28 PROCEDURE — C1887: CPT

## 2024-01-28 PROCEDURE — 85027 COMPLETE CBC AUTOMATED: CPT

## 2024-01-28 PROCEDURE — 80048 BASIC METABOLIC PNL TOTAL CA: CPT

## 2024-01-28 PROCEDURE — 93005 ELECTROCARDIOGRAM TRACING: CPT

## 2024-01-28 PROCEDURE — 93454 CORONARY ARTERY ANGIO S&I: CPT

## 2024-01-28 RX ORDER — METOPROLOL TARTRATE 50 MG
12.5 TABLET ORAL
Refills: 0 | Status: DISCONTINUED | OUTPATIENT
Start: 2024-01-28 | End: 2024-01-29

## 2024-01-28 RX ORDER — FENTANYL CITRATE 50 UG/ML
25 INJECTION INTRAVENOUS
Refills: 0 | Status: DISCONTINUED | OUTPATIENT
Start: 2024-01-28 | End: 2024-01-28

## 2024-01-28 RX ORDER — AMIODARONE HYDROCHLORIDE 400 MG/1
200 TABLET ORAL DAILY
Refills: 0 | Status: DISCONTINUED | OUTPATIENT
Start: 2024-02-01 | End: 2024-02-08

## 2024-01-28 RX ORDER — AMIODARONE HYDROCHLORIDE 400 MG/1
TABLET ORAL
Refills: 0 | Status: DISCONTINUED | OUTPATIENT
Start: 2024-01-28 | End: 2024-02-08

## 2024-01-28 RX ORDER — AMIODARONE HYDROCHLORIDE 400 MG/1
400 TABLET ORAL EVERY 8 HOURS
Refills: 0 | Status: COMPLETED | OUTPATIENT
Start: 2024-01-28 | End: 2024-02-01

## 2024-01-28 RX ORDER — ONDANSETRON 8 MG/1
4 TABLET, FILM COATED ORAL ONCE
Refills: 0 | Status: DISCONTINUED | OUTPATIENT
Start: 2024-01-28 | End: 2024-01-28

## 2024-01-28 RX ADMIN — Medication 500 MILLIGRAM(S): at 05:29

## 2024-01-28 RX ADMIN — SODIUM CHLORIDE 3 MILLILITER(S): 9 INJECTION INTRAMUSCULAR; INTRAVENOUS; SUBCUTANEOUS at 14:00

## 2024-01-28 RX ADMIN — Medication 81 MILLIGRAM(S): at 17:37

## 2024-01-28 RX ADMIN — SPIRONOLACTONE 50 MILLIGRAM(S): 25 TABLET, FILM COATED ORAL at 17:37

## 2024-01-28 RX ADMIN — Medication 12.5 MILLIGRAM(S): at 17:36

## 2024-01-28 RX ADMIN — SODIUM CHLORIDE 10 MILLILITER(S): 9 INJECTION INTRAMUSCULAR; INTRAVENOUS; SUBCUTANEOUS at 14:20

## 2024-01-28 RX ADMIN — OXYCODONE HYDROCHLORIDE 10 MILLIGRAM(S): 5 TABLET ORAL at 21:44

## 2024-01-28 RX ADMIN — Medication 10 MILLIGRAM(S): at 17:37

## 2024-01-28 RX ADMIN — OXYCODONE HYDROCHLORIDE 10 MILLIGRAM(S): 5 TABLET ORAL at 21:14

## 2024-01-28 RX ADMIN — PANTOPRAZOLE SODIUM 40 MILLIGRAM(S): 20 TABLET, DELAYED RELEASE ORAL at 05:29

## 2024-01-28 RX ADMIN — Medication 4.08 MICROGRAM(S)/KG/MIN: at 14:19

## 2024-01-28 RX ADMIN — Medication 4.08 MICROGRAM(S)/KG/MIN: at 17:44

## 2024-01-28 RX ADMIN — Medication 8 UNIT(S): at 17:37

## 2024-01-28 RX ADMIN — SPIRONOLACTONE 50 MILLIGRAM(S): 25 TABLET, FILM COATED ORAL at 05:29

## 2024-01-28 RX ADMIN — SODIUM CHLORIDE 3 MILLILITER(S): 9 INJECTION INTRAMUSCULAR; INTRAVENOUS; SUBCUTANEOUS at 05:06

## 2024-01-28 RX ADMIN — ATORVASTATIN CALCIUM 80 MILLIGRAM(S): 80 TABLET, FILM COATED ORAL at 21:13

## 2024-01-28 RX ADMIN — INSULIN GLARGINE 25 UNIT(S): 100 INJECTION, SOLUTION SUBCUTANEOUS at 21:38

## 2024-01-28 RX ADMIN — GABAPENTIN 100 MILLIGRAM(S): 400 CAPSULE ORAL at 05:29

## 2024-01-28 RX ADMIN — MUPIROCIN 1 APPLICATION(S): 20 OINTMENT TOPICAL at 05:28

## 2024-01-28 RX ADMIN — Medication 10 MILLIGRAM(S): at 05:29

## 2024-01-28 RX ADMIN — SODIUM CHLORIDE 3 MILLILITER(S): 9 INJECTION INTRAMUSCULAR; INTRAVENOUS; SUBCUTANEOUS at 21:09

## 2024-01-28 RX ADMIN — AMIODARONE HYDROCHLORIDE 400 MILLIGRAM(S): 400 TABLET ORAL at 21:13

## 2024-01-28 RX ADMIN — Medication 100 MILLIGRAM(S): at 17:37

## 2024-01-28 NOTE — PRE-ANESTHESIA EVALUATION ADULT - NSANTHPMHFT_GEN_ALL_CORE
Bradycardia s/p MVR
73F with rheumatic mitral valve stenosis with worsening BARBOZA, orthopnea. Other hx, CAD s/p PCI, stent 2012, A-fib on apixaban, LEIDY on CPAP, CKD4, DM2, obesity s/p gastric sleeve.  12/2023 TTE showing EF 77%, normal RV, mod-severe mitral valve stenosis, PASP 32.

## 2024-01-28 NOTE — PRE-ANESTHESIA EVALUATION ADULT - LAST ECHOCARDIOGRAM
12/1/23 with moderate to severe mitral valve stenosis, left ventricular systolic function is hyperdynamic, no regional wall abnormalities, EF estimated at 77%
reviewed

## 2024-01-28 NOTE — PACU DISCHARGE NOTE - MENTAL STATUS: PATIENT PARTICIPATION
Awake Posterior Auricular Interpolation Flap Text: A decision was made to reconstruct the defect utilizing an interpolation axial flap and a staged reconstruction.  A telfa template was made of the defect.  This telfa template was then used to outline the posterior auricular interpolation flap.  The donor area for the pedicle flap was then injected with anesthesia.  The flap was excised through the skin and subcutaneous tissue down to the layer of the underlying musculature.  The pedicle flap was carefully excised within this deep plane to maintain its blood supply.  The edges of the donor site were undermined.   The donor site was closed in a primary fashion.  The pedicle was then rotated into position and sutured.  Once the tube was sutured into place, adequate blood supply was confirmed with blanching and refill.  The pedicle was then wrapped with xeroform gauze and dressed appropriately with a telfa and gauze bandage to ensure continued blood supply and protect the attached pedicle.

## 2024-01-28 NOTE — PROGRESS NOTE ADULT - SUBJECTIVE AND OBJECTIVE BOX
Chief complaint    Patient is a 73y old  Female who presents with a chief complaint of sob (28 Jan 2024 07:06)   Review of systems  Patient appears comfortable.    Labs and Fingersticks  CAPILLARY BLOOD GLUCOSE      POCT Blood Glucose.: 124 mg/dL (28 Jan 2024 13:04)  POCT Blood Glucose.: 117 mg/dL (28 Jan 2024 08:52)  POCT Blood Glucose.: 171 mg/dL (27 Jan 2024 21:45)  POCT Blood Glucose.: 159 mg/dL (27 Jan 2024 16:36)      Anion Gap: 9 (01-28 @ 04:41)  Anion Gap: 11 (01-27 @ 07:09)      Calcium: 9.6 (01-28 @ 04:41)  Calcium: 10.1 (01-27 @ 07:09)          01-28    140  |  106  |  27<H>  ----------------------------<  90  4.5   |  25  |  1.77<H>    Ca    9.6      28 Jan 2024 04:41                          8.6    6.54  )-----------( 109      ( 28 Jan 2024 04:41 )             26.2     Medications  MEDICATIONS  (STANDING):  allopurinol 100 milliGRAM(s) Oral daily  aspirin enteric coated 81 milliGRAM(s) Oral daily  atorvastatin 80 milliGRAM(s) Oral at bedtime  bisacodyl Suppository 10 milliGRAM(s) Rectal once  chlorhexidine 2% Cloths 1 Application(s) Topical daily  dextrose 5%. 1000 milliLiter(s) (50 mL/Hr) IV Continuous <Continuous>  dextrose 5%. 1000 milliLiter(s) (100 mL/Hr) IV Continuous <Continuous>  dextrose 50% Injectable 25 milliLiter(s) IV Push every 15 minutes  dextrose 50% Injectable 25 Gram(s) IV Push once  dextrose 50% Injectable 12.5 Gram(s) IV Push once  dextrose 50% Injectable 25 Gram(s) IV Push once  dextrose 50% Injectable 50 milliLiter(s) IV Push every 15 minutes  DOBUTamine Infusion 1.25 MICROgram(s)/kG/Min (4.08 mL/Hr) IV Continuous <Continuous>  glucagon  Injectable 1 milliGRAM(s) IntraMuscular once  insulin glargine Injectable (LANTUS) 25 Unit(s) SubCutaneous at bedtime  insulin lispro (ADMELOG) corrective regimen sliding scale   SubCutaneous at bedtime  insulin lispro (ADMELOG) corrective regimen sliding scale   SubCutaneous three times a day before meals  insulin lispro Injectable (ADMELOG) 8 Unit(s) SubCutaneous three times a day before meals  pantoprazole    Tablet 40 milliGRAM(s) Oral before breakfast  polyethylene glycol 3350 17 Gram(s) Oral daily  senna 2 Tablet(s) Oral at bedtime  sodium chloride 0.9% lock flush 3 milliLiter(s) IV Push every 8 hours  sodium chloride 0.9%. 1000 milliLiter(s) (10 mL/Hr) IV Continuous <Continuous>  spironolactone 50 milliGRAM(s) Oral every 12 hours  torsemide 10 milliGRAM(s) Oral every 12 hours      Physical Exam  General: Patient appears comfortable.  Vital Signs Last 12 Hrs  T(F): 97.5 (01-28-24 @ 13:00), Max: 97.5 (01-28-24 @ 13:00)  HR: 74 (01-28-24 @ 15:30) (59 - 74)  BP: 137/76 (01-28-24 @ 15:30) (121/57 - 147/68)  BP(mean): 97 (01-28-24 @ 15:30) (82 - 100)  RR: 18 (01-28-24 @ 15:30) (18 - 18)  SpO2: 96% (01-28-24 @ 15:30) (96% - 100%)  Neck: No palpable thyroid nodules.  CVS: S1S2, No murmurs  Respiratory: No wheezing, no crepitations  GI: Abdomen soft, non tender.    Diagnostics        Radiology:

## 2024-01-28 NOTE — PROGRESS NOTE ADULT - ASSESSMENT
73F w/ PMH morbid obesity (BMI 42.7 s/p gastric sleeve ), DMT2, CAD s/p PCI of LCX (), LEIDY (on CPAP), CKD (stage 3 - on farxiga), AF (on Eliquis) and rheumatic mitral valve disease reports c/o chronic BARBOZA for over 10 years that has progressively worsened over the past few months with the feeling of an "elephant on my chest." Her symptoms improved after starting Lasix daily. She gets dizzy when she bends over or when she stands up too fast. She denies CP or weight gain. She sleeps with 3 pillows on an incline at night. She cannot lay flat due to comfort as well as orthopnea. She can only walk one block before she has to stop to rest due to SOB. Pt now presents to [facility] for scheduled mitral valve replacement, maze procedure and left atrial appendage ligation with Dr. Dalal on 24.  s/p  MVR (t)/ C1V/ Maze/ RICHARD clip  post extubated / inotropic support  EP consulted for underlying junctional rhythm; no av nodals at this time; + epicardial pw; maintain    tx sdu; VSS; afib 60-70 on  2.5; maintain med ct's; diuresis as per renal; endo consulted for diabetic management  keep npo after midnight sat into sun for possible ppm as per EP; no av nodals at this time  24 VSS on  2.5 EP following for ?ppm   VSS NPO for PPM for aflutter w/ chb  -   discharge planning- home pt when stable

## 2024-01-28 NOTE — PACU DISCHARGE NOTE - PAIN:
[facility]    Physical Therapy Daily Treatment Note  Date:  6/10/2019    Patient Name:  Sherryle Lame    :  1933  MRN: [id number]  Medical/Treatment Diagnosis Information:  · Diagnosis: M16.11 (ICD-10-CM) - Arthritis of right hip; s/p R NE dos: 19  · Treatment Diagnosis: R26.2 difficulty walking; R53.1 weakness  Insurance/Certification information:  PT Insurance Information: Anthem Medicare - orthonet auth  Physician Information:  Referring Practitioner: Dr. Pradeep Marques of care signed (Y/N):     Date of Patient follow up with Physician:     Progress Note: [x]  Yes  []  No  Next due by: Visit #10       Latex Allergy:  [x]NO      []YES  Preferred Language for Healthcare:   [x]English       []other:    Visit # Insurance Allowable Date Range   2 orthonet auth  8 visits 19-19     Pain level:  0-3/10     SUBJECTIVE:  Pt was 10 minutes late for appt this date. Had some difficulty performing hamstring stretch at home but otherwise states compliance with HEP. Using cane around house in short distances in community but still uses walker at night when getting up for restroom due to fear of falling. OBJECTIVE: See eval      RESTRICTIONS/PRECAUTIONS: FALL RISK, s/p R NE dos: 19    Exercises/Interventions:     Therapeutic Exercises  Resistance / level Sets/sec Reps Notes   Seated gastroc stretch  30\" 3    Seated HS stretch  30\" 3 Cueing needed for proper technique. Hip add sets PS 10\" 10    glute sets  10\" 10    Quad sets  10\" 10 Cueing to limit glute activation and focus on quad activation          LAQ 3# 3 10    Standing HS curls  3 10    SLR - flexion    Attempted 6/10 but unable   SAQ  2 10 Added 6/10   Standing hip abduction  3 10 Added 6/10   Standing HR/TR  3 10 Added 6/10                 Neuromuscular Re-ed / Therapeutic Activities         10'  Cane fitted properly, discussed use of cane in L UE, pt.  Able to Controlled with current regime ambulate, working on even ground walking, turning and transitional movements                  Manual Intervention       Knee mobs/PROM       Tib/Fem Mobs       Patella Mobs       Ankle mobs                         Pt. Education:  -pt educated on diagnosis, prognosis and expectations for rehab  -pt provided with written and illustrated instructions for HEP  -all pt questions were answered    Therapeutic Exercise and NMR EXR  [x] (66839) Provided verbal/tactile cueing for activities related to strengthening, flexibility, endurance, ROM for improvements in LE, proximal hip, and core control with self care, mobility, lifting, ambulation.  [] (52489) Provided verbal/tactile cueing for activities related to improving balance, coordination, kinesthetic sense, posture, motor skill, proprioception  to assist with LE, proximal hip, and core control in self care, mobility, lifting, ambulation and eccentric single leg control.      NMR and Therapeutic Activities:    [x] (82974 or 20280) Provided verbal/tactile cueing for activities related to improving balance, coordination, kinesthetic sense, posture, motor skill, proprioception and motor activation to allow for proper function of core, proximal hip and LE with self care and ADLs  [x] (06200) Gait Re-education- Provided training and instruction to the patient for proper LE, core and proximal hip recruitment and positioning and eccentric body weight control with ambulation re-education including up and down stairs     Home Exercise Program:    [x] (82810) Reviewed/Progressed HEP activities related to strengthening, flexibility, endurance, ROM of core, proximal hip and LE for functional self-care, mobility, lifting and ambulation/stair navigation   [] (23444)Reviewed/Progressed HEP activities related to improving balance, coordination, kinesthetic sense, posture, motor skill, proprioception of core, proximal hip and LE for self care, mobility, lifting, and ambulation/stair navigation      Manual Treatments:  PROM / STM / Oscillations-Mobs:  G-I, II, III, IV (PA's, Inf., Post.)  [] (39823) Provided manual therapy to mobilize LE, proximal hip and/or LS spine soft tissue/joints for the purpose of modulating pain, promoting relaxation,  increasing ROM, reducing/eliminating soft tissue swelling/inflammation/restriction, improving soft tissue extensibility and allowing for proper ROM for normal function with self care, mobility, lifting and ambulation. Modalities:  [] (39990) Vasopneumatic compression: Utilized vasopneumatic compression to decrease edema / swelling for the purpose of improving mobility and quad tone / recruitment which will allow for increased overall function including but not limited to self-care, transfers, ambulation, and ascending / descending stairs. Modalities:      Charges:  Timed Code Treatment Minutes: 40   Total Treatment Minutes: 45     [] EVAL - LOW (01311)   [] EVAL - MOD (23214)  [] EVAL - HIGH (27883)  [] RE-EVAL (54078)  [x] KT(34214) x 3     [] Ionto  [] NMR (04433) x      [] Vaso  [] Manual (45688) x      [] Ultrasound  [] TA x       [] Mech Traction (64636)  [] Aquatic Therapy x     [] ES (un) (44577):   [] Home Management Training x [] ES(attended) (02470)   [] Group:     [] Other: gait     GOALS:  Patient stated goal: \"To be normal again: to be active and be able to travel to [place]     Therapist goals for Patient:   Short Term Goals: To be achieved in: 2 weeks  1. Independent in HEP and progression per patient tolerance, in order to prevent re-injury. 2. Patient will have a decrease in pain to facilitate improvement in movement, function, and ADLs as indicated by Functional Deficits. Long Term Goals: To be achieved in: 10-12 weeks  1. Disability index score of 40% or less for the LEFS to assist with reaching prior level of function.    2. Patient will demonstrate increased AROM to R hip WFL to allow for proper joint functioning as Fair  [] Poor    Patient Requires Follow-up: [x] Yes  [] No    Return to Play:    [x]  N/A   []  Stage 1: Intro to Strength   []  Stage 2: Return to Run and Strength   []  Stage 3: Return to Jump and Strength   []  Stage 4: Dynamic Strength and Agility   []  Stage 5: Sport Specific Training   []  Ready to Return to Play, Meets All Above Stages   []  Not Ready for Return to Sports   Comments:            PLAN: See eval. PT 2x / week for 10-12 weeks.    [] Continue per plan of care [] Alter current plan (see comments)  [x] Plan of care initiated [] Hold pending MD visit [] Discharge    Electronically signed by: Christopher Del Rio [id number]

## 2024-01-28 NOTE — PROGRESS NOTE ADULT - SUBJECTIVE AND OBJECTIVE BOX
VITAL SIGNS-Telemetry:  Aflutter 60-80  Vital Signs Last 24 Hrs  T(C): 36.4 (24 @ 02:44), Max: 36.8 (24 @ 11:03)  T(F): 97.5 (24 @ 02:44), Max: 98.3 (24 @ 11:03)  HR: 59 (24 @ 04:00) (59 - 83)  BP: 130/64 (24 @ 02:44) (105/54 - 139/80)  RR: 18 (24 @ 02:44) (18 - 19)  SpO2: 97% (24 @ 04:00) (93% - 97%)          @ 07:01  -   @ 07:00  --------------------------------------------------------  IN: 810.2 mL / OUT: 1400 mL / NET: -589.8 mL    Daily     Daily Weight in k (2024 06:00)    CAPILLARY BLOOD GLUCOSE  POCT Blood Glucose.: 171 mg/dL (2024 21:45)  POCT Blood Glucose.: 159 mg/dL (2024 16:36)  POCT Blood Glucose.: 192 mg/dL (2024 11:33)  POCT Blood Glucose.: 158 mg/dL (2024 07:53)    Pacing Wires        [ x ]   Settings:                                  Isolated  [  ]        PHYSICAL EXAM:  Neurology: alert and oriented x 3, nonfocal, no gross deficits  CV : S1S2  Sternal Wound :  CDI , Stable  Lungs: crackles LLL  Abdomen: soft, nontender, nondistended, positive bowel sounds, last bowel movement         Extremities:     ++ edema pitting  lle inc cdi  no calf tenderness    acetaminophen     Tablet .. 650 milliGRAM(s) Oral every 6 hours PRN  allopurinol 100 milliGRAM(s) Oral daily  aspirin enteric coated 81 milliGRAM(s) Oral daily  atorvastatin 80 milliGRAM(s) Oral at bedtime  bisacodyl Suppository 10 milliGRAM(s) Rectal once  chlorhexidine 2% Cloths 1 Application(s) Topical daily  dextrose 5%. 1000 milliLiter(s) IV Continuous <Continuous>  dextrose 5%. 1000 milliLiter(s) IV Continuous <Continuous>  dextrose 50% Injectable 25 milliLiter(s) IV Push every 15 minutes  dextrose 50% Injectable 12.5 Gram(s) IV Push once  dextrose 50% Injectable 25 Gram(s) IV Push once  dextrose 50% Injectable 25 Gram(s) IV Push once  dextrose 50% Injectable 50 milliLiter(s) IV Push every 15 minutes  dextrose Oral Gel 15 Gram(s) Oral once PRN  DOBUTamine Infusion 1.25 MICROgram(s)/kG/Min IV Continuous <Continuous>  glucagon  Injectable 1 milliGRAM(s) IntraMuscular once  insulin glargine Injectable (LANTUS) 25 Unit(s) SubCutaneous at bedtime  insulin lispro (ADMELOG) corrective regimen sliding scale   SubCutaneous at bedtime  insulin lispro (ADMELOG) corrective regimen sliding scale   SubCutaneous three times a day before meals  insulin lispro Injectable (ADMELOG) 8 Unit(s) SubCutaneous three times a day before meals  oxyCODONE    IR 5 milliGRAM(s) Oral every 4 hours PRN  oxyCODONE    IR 10 milliGRAM(s) Oral every 4 hours PRN  pantoprazole    Tablet 40 milliGRAM(s) Oral before breakfast  polyethylene glycol 3350 17 Gram(s) Oral daily  senna 2 Tablet(s) Oral at bedtime  sodium chloride 0.9% lock flush 3 milliLiter(s) IV Push every 8 hours  sodium chloride 0.9%. 1000 milliLiter(s) IV Continuous <Continuous>  spironolactone 50 milliGRAM(s) Oral every 12 hours  torsemide 10 milliGRAM(s) Oral every 12 hours    Physical Therapy Rec:   Home  [ x ]   Home w/ PT  [  ]  Rehab  [  ]  Discussed with Cardiothoracic Team at AM rounds.

## 2024-01-28 NOTE — PROGRESS NOTE ADULT - SUBJECTIVE AND OBJECTIVE BOX
Patient seen and examined.  No new events.    REVIEW OF SYSTEMS:  As per HPI, otherwise 8 full 10 ROS were unremarkable    MEDICATIONS  (STANDING):  allopurinol 100 milliGRAM(s) Oral daily  aspirin enteric coated 81 milliGRAM(s) Oral daily  atorvastatin 80 milliGRAM(s) Oral at bedtime  bisacodyl Suppository 10 milliGRAM(s) Rectal once  chlorhexidine 2% Cloths 1 Application(s) Topical daily  dextrose 5%. 1000 milliLiter(s) (50 mL/Hr) IV Continuous <Continuous>  dextrose 5%. 1000 milliLiter(s) (100 mL/Hr) IV Continuous <Continuous>  dextrose 50% Injectable 25 milliLiter(s) IV Push every 15 minutes  dextrose 50% Injectable 25 Gram(s) IV Push once  dextrose 50% Injectable 25 Gram(s) IV Push once  dextrose 50% Injectable 12.5 Gram(s) IV Push once  dextrose 50% Injectable 50 milliLiter(s) IV Push every 15 minutes  DOBUTamine Infusion 1.25 MICROgram(s)/kG/Min (4.08 mL/Hr) IV Continuous <Continuous>  glucagon  Injectable 1 milliGRAM(s) IntraMuscular once  insulin glargine Injectable (LANTUS) 25 Unit(s) SubCutaneous at bedtime  insulin lispro (ADMELOG) corrective regimen sliding scale   SubCutaneous at bedtime  insulin lispro (ADMELOG) corrective regimen sliding scale   SubCutaneous three times a day before meals  insulin lispro Injectable (ADMELOG) 8 Unit(s) SubCutaneous three times a day before meals  pantoprazole    Tablet 40 milliGRAM(s) Oral before breakfast  polyethylene glycol 3350 17 Gram(s) Oral daily  senna 2 Tablet(s) Oral at bedtime  sodium chloride 0.9% lock flush 3 milliLiter(s) IV Push every 8 hours  sodium chloride 0.9%. 1000 milliLiter(s) (10 mL/Hr) IV Continuous <Continuous>  spironolactone 50 milliGRAM(s) Oral every 12 hours  torsemide 10 milliGRAM(s) Oral every 12 hours      VITAL:  T(C): , Max: 36.8 (01-27-24 @ 11:03)  T(F): , Max: 98.3 (01-27-24 @ 11:03)  HR: 62 (01-28-24 @ 09:24)  BP: 130/64 (01-28-24 @ 02:44)  BP(mean): 90 (01-28-24 @ 02:44)  RR: 18 (01-28-24 @ 02:44)  SpO2: 97% (01-28-24 @ 09:24)  Wt(kg): --    I and O's:    01-27 @ 07:01  -  01-28 @ 07:00  --------------------------------------------------------  IN: 810.2 mL / OUT: 1900 mL / NET: -1089.8 mL    01-28 @ 07:01  -  01-28 @ 10:25  --------------------------------------------------------  IN: 0 mL / OUT: 250 mL / NET: -250 mL          PHYSICAL EXAM:    Constitutional: NAD  HEENT: PERRLA, EOMI,  MMM  Neck: No LAD, No JVD  Respiratory: CTAB  Cardiovascular: S1 and S2  Gastrointestinal: BS+, soft, NT/ND  Extremities: No peripheral edema  Neurological: A/O x 3, no focal deficits  Psychiatric: Normal mood, normal affect  : No Diallo  Skin: No rashes  Access: Not applicable    LABS:                        8.6    6.54  )-----------( 109      ( 28 Jan 2024 04:41 )             26.2     01-28    140  |  106  |  27<H>  ----------------------------<  90  4.5   |  25  |  1.77<H>    Ca    9.6      28 Jan 2024 04:41        Urine Studies:  Urinalysis Basic - ( 28 Jan 2024 04:41 )    Color: x / Appearance: x / SG: x / pH: x  Gluc: 90 mg/dL / Ketone: x  / Bili: x / Urobili: x   Blood: x / Protein: x / Nitrite: x   Leuk Esterase: x / RBC: x / WBC x   Sq Epi: x / Non Sq Epi: x / Bacteria: x    Assessment and Plan:   · Assessment	    73 year old female with PMH morbid obesity (BMI 42.7 s/p gastric sleeve 2015), DMT2, CAD s/p PCI of LCX (2012), LEIDY (on CPAP), CKD (stage 3 - on farxiga), AF (on Eliquis) and rheumatic mitral valve disease reports c/o chronic BARBOZA  1/23/24 SP Mitral valve replacement;  Hybrid Maze procedure Clipping, left atrial appendage and CABG, using 1 vein graft    CKD stage 3a   Hypernatremia     1 Renal - Creatinine baseline more in the 1.6-1.7 range;  renal fxn remains stable.  Patient remains on  PO Torsemide and Aldactone; no objection to continue   No need for renal sono  2 Endo-Farxiga and ARB are both held and to restarted as outpt   3 CVS-Off bblockers and on inotropes at present ;  Not on pressors ; BP acceptable  Trying to see underlying rhythm that was junctional   4 Pulm-Nasal canula   5 GI-Encourage PO intake and she can increase her free water intake   6 Increase activity as able   7 NPO overnight for possible PPM placement tomorrow as per EP            RADIOLOGY & ADDITIONAL STUDIES:        ASSESSMENT      RECOMMENDATIONS

## 2024-01-28 NOTE — PRE-ANESTHESIA EVALUATION ADULT - NSANTHPEFT_GEN_ALL_CORE
CV reg, +b/l crackles, expiratory wheezing, no gross neuro deficits
PHYSICAL EXAM:  GENERAL: NAD, well-developed/nourished and groomed, afebrile  CHEST/LUNG: Clear to auscultation bilaterally; No wheeze/rhonchi/rales  HEART: Irregular  NEUROLOGY: AAO*3, non-focal

## 2024-01-28 NOTE — PROGRESS NOTE ADULT - ASSESSMENT
Assessment  DMT2: 73y Female with DM T2 with hyperglycemia, A1C7% , was on oral meds at home, was using trulicity prior, on basal bolus insulin with coverage, insulin dose adjusted, sugars are improving.  Will need tight glycemic control for postop wound healing.   CAD: on medications, stable, monitored. s/p CABG/ MVR   HTN: on antihypertensive medications, monitored, asymptomatic.  Obesity: No strict exercise routines, not on any weight loss program, neither on low calorie diet.      Fay Farr MD  Cell: [phone]  Office: [phone]

## 2024-01-28 NOTE — PRE-ANESTHESIA EVALUATION ADULT - NSANTHAIRWAYFT_ENT_ALL_CORE
Large neck, FROM
Mouth opening: >2cm  Thyromental distance: >3 FBs  Upper lip bite: adequate  Cervical ROM: grossly intact

## 2024-01-29 DIAGNOSIS — Z95.0 PRESENCE OF CARDIAC PACEMAKER: ICD-10-CM

## 2024-01-29 LAB
ANION GAP SERPL CALC-SCNC: 10 MMOL/L — SIGNIFICANT CHANGE UP (ref 5–17)
BUN SERPL-MCNC: 29 MG/DL — HIGH (ref 7–23)
CALCIUM SERPL-MCNC: 10.2 MG/DL — SIGNIFICANT CHANGE UP (ref 8.4–10.5)
CHLORIDE SERPL-SCNC: 105 MMOL/L — SIGNIFICANT CHANGE UP (ref 96–108)
CO2 SERPL-SCNC: 28 MMOL/L — SIGNIFICANT CHANGE UP (ref 22–31)
CREAT SERPL-MCNC: 1.86 MG/DL — HIGH (ref 0.5–1.3)
EGFR: 28 ML/MIN/1.73M2 — LOW
GLUCOSE SERPL-MCNC: 77 MG/DL — SIGNIFICANT CHANGE UP (ref 70–99)
HCT VFR BLD CALC: 26.3 % — LOW (ref 34.5–45)
HGB BLD-MCNC: 8.4 G/DL — LOW (ref 11.5–15.5)
MCHC RBC-ENTMCNC: 31.7 PG — SIGNIFICANT CHANGE UP (ref 27–34)
MCHC RBC-ENTMCNC: 31.9 GM/DL — LOW (ref 32–36)
MCV RBC AUTO: 99.2 FL — SIGNIFICANT CHANGE UP (ref 80–100)
NRBC # BLD: 0 /100 WBCS — SIGNIFICANT CHANGE UP (ref 0–0)
PLATELET # BLD AUTO: 126 K/UL — LOW (ref 150–400)
POTASSIUM SERPL-MCNC: 4.5 MMOL/L — SIGNIFICANT CHANGE UP (ref 3.5–5.3)
POTASSIUM SERPL-SCNC: 4.5 MMOL/L — SIGNIFICANT CHANGE UP (ref 3.5–5.3)
RBC # BLD: 2.65 M/UL — LOW (ref 3.8–5.2)
RBC # FLD: 15.3 % — HIGH (ref 10.3–14.5)
SODIUM SERPL-SCNC: 143 MMOL/L — SIGNIFICANT CHANGE UP (ref 135–145)
SURGICAL PATHOLOGY STUDY: SIGNIFICANT CHANGE UP
WBC # BLD: 7.28 K/UL — SIGNIFICANT CHANGE UP (ref 3.8–10.5)
WBC # FLD AUTO: 7.28 K/UL — SIGNIFICANT CHANGE UP (ref 3.8–10.5)

## 2024-01-29 PROCEDURE — 93306 TTE W/DOPPLER COMPLETE: CPT | Mod: 26

## 2024-01-29 PROCEDURE — 71045 X-RAY EXAM CHEST 1 VIEW: CPT | Mod: 26,XE

## 2024-01-29 PROCEDURE — 93010 ELECTROCARDIOGRAM REPORT: CPT

## 2024-01-29 PROCEDURE — 71046 X-RAY EXAM CHEST 2 VIEWS: CPT | Mod: 26

## 2024-01-29 RX ORDER — INSULIN LISPRO 100/ML
7 VIAL (ML) SUBCUTANEOUS
Refills: 0 | Status: DISCONTINUED | OUTPATIENT
Start: 2024-01-29 | End: 2024-01-30

## 2024-01-29 RX ORDER — INSULIN GLARGINE 100 [IU]/ML
22 INJECTION, SOLUTION SUBCUTANEOUS AT BEDTIME
Refills: 0 | Status: DISCONTINUED | OUTPATIENT
Start: 2024-01-29 | End: 2024-01-30

## 2024-01-29 RX ADMIN — Medication 81 MILLIGRAM(S): at 11:14

## 2024-01-29 RX ADMIN — Medication 8 UNIT(S): at 08:21

## 2024-01-29 RX ADMIN — SPIRONOLACTONE 50 MILLIGRAM(S): 25 TABLET, FILM COATED ORAL at 05:39

## 2024-01-29 RX ADMIN — ATORVASTATIN CALCIUM 80 MILLIGRAM(S): 80 TABLET, FILM COATED ORAL at 21:08

## 2024-01-29 RX ADMIN — SODIUM CHLORIDE 3 MILLILITER(S): 9 INJECTION INTRAMUSCULAR; INTRAVENOUS; SUBCUTANEOUS at 05:37

## 2024-01-29 RX ADMIN — AMIODARONE HYDROCHLORIDE 400 MILLIGRAM(S): 400 TABLET ORAL at 13:44

## 2024-01-29 RX ADMIN — Medication 10 MILLIGRAM(S): at 05:39

## 2024-01-29 RX ADMIN — SENNA PLUS 2 TABLET(S): 8.6 TABLET ORAL at 21:08

## 2024-01-29 RX ADMIN — CHLORHEXIDINE GLUCONATE 1 APPLICATION(S): 213 SOLUTION TOPICAL at 09:04

## 2024-01-29 RX ADMIN — OXYCODONE HYDROCHLORIDE 10 MILLIGRAM(S): 5 TABLET ORAL at 19:51

## 2024-01-29 RX ADMIN — OXYCODONE HYDROCHLORIDE 10 MILLIGRAM(S): 5 TABLET ORAL at 20:50

## 2024-01-29 RX ADMIN — Medication 7 UNIT(S): at 17:07

## 2024-01-29 RX ADMIN — Medication 12.5 MILLIGRAM(S): at 05:39

## 2024-01-29 RX ADMIN — POLYETHYLENE GLYCOL 3350 17 GRAM(S): 17 POWDER, FOR SOLUTION ORAL at 11:14

## 2024-01-29 RX ADMIN — SODIUM CHLORIDE 3 MILLILITER(S): 9 INJECTION INTRAMUSCULAR; INTRAVENOUS; SUBCUTANEOUS at 13:22

## 2024-01-29 RX ADMIN — Medication 8 UNIT(S): at 12:05

## 2024-01-29 RX ADMIN — SODIUM CHLORIDE 3 MILLILITER(S): 9 INJECTION INTRAMUSCULAR; INTRAVENOUS; SUBCUTANEOUS at 21:12

## 2024-01-29 RX ADMIN — Medication 100 MILLIGRAM(S): at 11:14

## 2024-01-29 RX ADMIN — AMIODARONE HYDROCHLORIDE 400 MILLIGRAM(S): 400 TABLET ORAL at 21:08

## 2024-01-29 RX ADMIN — PANTOPRAZOLE SODIUM 40 MILLIGRAM(S): 20 TABLET, DELAYED RELEASE ORAL at 05:38

## 2024-01-29 RX ADMIN — AMIODARONE HYDROCHLORIDE 400 MILLIGRAM(S): 400 TABLET ORAL at 05:39

## 2024-01-29 RX ADMIN — INSULIN GLARGINE 22 UNIT(S): 100 INJECTION, SOLUTION SUBCUTANEOUS at 22:02

## 2024-01-29 NOTE — PROGRESS NOTE ADULT - PROBLEM SELECTOR PLAN 5
EP following  s/p 1/28 ppm placement  d/c pw this am  d/c dobutamine  ck pa/ lateral chest xray s/p ppm

## 2024-01-29 NOTE — PROGRESS NOTE ADULT - PROBLEM SELECTOR PLAN 3
continue postop care  continue asa and statin  maintain  @ 2.5 mcg/kg/ min  diuresis for hypervolemia  maintain meds ct- likely d/c in am if output minimal   + epicardial pw - DDD 50  pulm toilet  pain management  increase activity as tolerated   discharge planning- home pt s/p  MVR(t)/ C1V   continue postop care  continue asa and statin  d/c  this am  d/c pw   diuretics d/c this am; ck echo as per Dr. Dalal   pulm toilet  pain management  wean O2 as tolerated  surgical bra   increase activity as tolerated   discharge planning- home when stable

## 2024-01-29 NOTE — PROGRESS NOTE ADULT - SUBJECTIVE AND OBJECTIVE BOX
Chief complaint  Patient is a 73y old  Female who presents with a chief complaint of sob (28 Jan 2024 07:06)         Labs and Fingersticks  CAPILLARY BLOOD GLUCOSE      POCT Blood Glucose.: 127 mg/dL (29 Jan 2024 11:55)  POCT Blood Glucose.: 101 mg/dL (29 Jan 2024 07:40)  POCT Blood Glucose.: 150 mg/dL (28 Jan 2024 21:04)  POCT Blood Glucose.: 112 mg/dL (28 Jan 2024 16:41)      Anion Gap: 10 (01-29 @ 06:40)  Anion Gap: 9 (01-28 @ 04:41)      Calcium: 10.2 (01-29 @ 06:40)  Calcium: 9.6 (01-28 @ 04:41)          01-29    143  |  105  |  29<H>  ----------------------------<  77  4.5   |  28  |  1.86<H>    Ca    10.2      29 Jan 2024 06:40                          8.4    7.28  )-----------( 126      ( 29 Jan 2024 06:40 )             26.3     Medications  MEDICATIONS  (STANDING):  allopurinol 100 milliGRAM(s) Oral daily  aMIOdarone    Tablet   Oral   aMIOdarone    Tablet 400 milliGRAM(s) Oral every 8 hours  aspirin enteric coated 81 milliGRAM(s) Oral daily  atorvastatin 80 milliGRAM(s) Oral at bedtime  bisacodyl Suppository 10 milliGRAM(s) Rectal once  chlorhexidine 2% Cloths 1 Application(s) Topical daily  dextrose 5%. 1000 milliLiter(s) (100 mL/Hr) IV Continuous <Continuous>  dextrose 5%. 1000 milliLiter(s) (50 mL/Hr) IV Continuous <Continuous>  dextrose 50% Injectable 25 milliLiter(s) IV Push every 15 minutes  dextrose 50% Injectable 25 Gram(s) IV Push once  dextrose 50% Injectable 12.5 Gram(s) IV Push once  dextrose 50% Injectable 25 Gram(s) IV Push once  dextrose 50% Injectable 50 milliLiter(s) IV Push every 15 minutes  glucagon  Injectable 1 milliGRAM(s) IntraMuscular once  insulin glargine Injectable (LANTUS) 22 Unit(s) SubCutaneous at bedtime  insulin lispro (ADMELOG) corrective regimen sliding scale   SubCutaneous three times a day before meals  insulin lispro (ADMELOG) corrective regimen sliding scale   SubCutaneous at bedtime  insulin lispro Injectable (ADMELOG) 7 Unit(s) SubCutaneous three times a day before meals  pantoprazole    Tablet 40 milliGRAM(s) Oral before breakfast  polyethylene glycol 3350 17 Gram(s) Oral daily  senna 2 Tablet(s) Oral at bedtime  sodium chloride 0.9% lock flush 3 milliLiter(s) IV Push every 8 hours  sodium chloride 0.9%. 1000 milliLiter(s) (10 mL/Hr) IV Continuous <Continuous>      Physical Exam  General: Patient comfortable in bed   Vital Signs Last 12 Hrs  T(F): 98.2 (01-29-24 @ 11:36), Max: 98.4 (01-29-24 @ 04:33)  HR: 60 (01-29-24 @ 14:00) (60 - 70)  BP: 123/59 (01-29-24 @ 11:36) (114/56 - 123/59)  BP(mean): 75 (01-29-24 @ 04:33) (75 - 75)  RR: 18 (01-29-24 @ 11:36) (18 - 18)  SpO2: 94% (01-29-24 @ 11:36) (94% - 99%)    CVS: S1S2   Respiratory: No wheezing, no crepitations  GI: Abdomen soft, bowel sounds positive  Musculoskeletal:  moves all extremities  : Voiding       Chief complaint  Patient is a 73y old  Female who presents with a chief complaint of sob (28 Jan 2024 07:06)         Labs and Fingersticks  CAPILLARY BLOOD GLUCOSE      POCT Blood Glucose.: 127 mg/dL (29 Jan 2024 11:55)  POCT Blood Glucose.: 101 mg/dL (29 Jan 2024 07:40)  POCT Blood Glucose.: 150 mg/dL (28 Jan 2024 21:04)  POCT Blood Glucose.: 112 mg/dL (28 Jan 2024 16:41)      Anion Gap: 10 (01-29 @ 06:40)  Anion Gap: 9 (01-28 @ 04:41)      Calcium: 10.2 (01-29 @ 06:40)  Calcium: 9.6 (01-28 @ 04:41)          01-29    143  |  105  |  29<H>  ----------------------------<  77  4.5   |  28  |  1.86<H>    Ca    10.2      29 Jan 2024 06:40                          8.4    7.28  )-----------( 126      ( 29 Jan 2024 06:40 )             26.3     Medications  MEDICATIONS  (STANDING):  allopurinol 100 milliGRAM(s) Oral daily  aMIOdarone    Tablet   Oral   aMIOdarone    Tablet 400 milliGRAM(s) Oral every 8 hours  aspirin enteric coated 81 milliGRAM(s) Oral daily  atorvastatin 80 milliGRAM(s) Oral at bedtime  bisacodyl Suppository 10 milliGRAM(s) Rectal once  chlorhexidine 2% Cloths 1 Application(s) Topical daily  dextrose 5%. 1000 milliLiter(s) (100 mL/Hr) IV Continuous <Continuous>  dextrose 5%. 1000 milliLiter(s) (50 mL/Hr) IV Continuous <Continuous>  dextrose 50% Injectable 25 milliLiter(s) IV Push every 15 minutes  dextrose 50% Injectable 25 Gram(s) IV Push once  dextrose 50% Injectable 12.5 Gram(s) IV Push once  dextrose 50% Injectable 25 Gram(s) IV Push once  dextrose 50% Injectable 50 milliLiter(s) IV Push every 15 minutes  glucagon  Injectable 1 milliGRAM(s) IntraMuscular once  insulin glargine Injectable (LANTUS) 22 Unit(s) SubCutaneous at bedtime  insulin lispro (ADMELOG) corrective regimen sliding scale   SubCutaneous three times a day before meals  insulin lispro (ADMELOG) corrective regimen sliding scale   SubCutaneous at bedtime  insulin lispro Injectable (ADMELOG) 7 Unit(s) SubCutaneous three times a day before meals  pantoprazole    Tablet 40 milliGRAM(s) Oral before breakfast  polyethylene glycol 3350 17 Gram(s) Oral daily  senna 2 Tablet(s) Oral at bedtime  sodium chloride 0.9% lock flush 3 milliLiter(s) IV Push every 8 hours  sodium chloride 0.9%. 1000 milliLiter(s) (10 mL/Hr) IV Continuous <Continuous>      Physical Exam  General: Patient comfortable in bed   Vital Signs Last 12 Hrs  T(F): 98.2 (01-29-24 @ 11:36), Max: 98.4 (01-29-24 @ 04:33)  HR: 60 (01-29-24 @ 14:00) (60 - 70)  BP: 123/59 (01-29-24 @ 11:36) (114/56 - 123/59)  BP(mean): 75 (01-29-24 @ 04:33) (75 - 75)  RR: 18 (01-29-24 @ 11:36) (18 - 18)  SpO2: 94% (01-29-24 @ 11:36) (94% - 99%)    CVS: S1S2   Respiratory: No wheezing, no crepitations  GI: Abdomen soft, bowel sounds positive  Musculoskeletal:  moves all extremities  : Voiding

## 2024-01-29 NOTE — PROGRESS NOTE ADULT - PROBLEM SELECTOR PLAN 1
Renal following  diuretics as per renal- torsemide 10 po bid; ald 50 po bid   trend bmp  strict I & O's  avoid nephrotoxic agents Renal following  trend bmp  strict I & O's  avoid nephrotoxic agents  d/c diuretics this am 1/29 as per Dr. Dalal  ck echo today

## 2024-01-29 NOTE — PROGRESS NOTE ADULT - PROBLEM SELECTOR PLAN 4
EP following  no av nodals at this time  + epicardial pw - DDD 50  As per EP:    1/28 ppm today npo EP following  s/p 1/28 ppm placement  d/c pw this am  d/c dobutamine  ck pa/ lateral chest xray s/p ppm

## 2024-01-29 NOTE — PROGRESS NOTE ADULT - ASSESSMENT
73 year old female with PMH morbid obesity (BMI 42.7 s/p gastric sleeve 2015), DMT2, CAD s/p PCI of LCX (2012), LEIDY (on CPAP), CKD (stage 3 - on farxiga), AF (on Eliquis) and rheumatic mitral valve disease reports c/o chronic BARBOZA  1/23/24 SP Mitral valve replacement;  Hybrid Maze procedure Clipping, left atrial appendage and CABG, using 1 vein graft    CKD stage 3a        1 Renal - Creatinine @ baseline more in the 1.6-1.7 range;  renal fxn remains stable.   No need for renal sono  2 Endo-Farxiga and ARB are both held and to restarted as outpt   3 CVS-Off bblockers ;  Not on pressors ; BP acceptable  4 Pulm-Nasal canula   5 EPS - NPO overnight for possible PPM placement     Sayed [facility]   [employer]   [phone]         73 year old female with PMH morbid obesity (BMI 42.7 s/p gastric sleeve 2015), DMT2, CAD s/p PCI of LCX (2012), LEIDY (on CPAP), CKD (stage 3 - on farxiga), AF (on Eliquis) and rheumatic mitral valve disease reports c/o chronic BRABOZA  1/23/24 SP Mitral valve replacement;  Hybrid Maze procedure Clipping, left atrial appendage and CABG, using 1 vein graft    CKD stage 3a        1 Renal - Creatinine @ baseline more in the 1.6-1.7 range;  renal fxn remains stable.   No need for renal sono  Restart Torsemide   2 Endo-Farxiga and ARB are both held and to restarted as outpt   3 CVS-Off bblockers ;  Not on pressors ; BP acceptable  4 Pulm-Nasal canula ;  CXR ordered to assess congestion   5 EPS -SP PPM placement     [facility]ayed [facility]   [facility]   [phone]

## 2024-01-29 NOTE — PROGRESS NOTE ADULT - ASSESSMENT
Assessment  DMT2: 73y Female with DM T2 with hyperglycemia, A1C7% , was on oral meds at home, was using trulicity prior, on basal bolus insulin with coverage, insulin dose adjusted, sugars are improving.  Will need tight glycemic control for postop wound healing.   CAD: on medications, stable, monitored. s/p CABG/ MVR   HTN: on antihypertensive medications, monitored, asymptomatic.  Obesity: No strict exercise routines, not on any weight loss program, neither on low calorie diet.      Discussed plan and management with Dr Chika Perry NP - TEAMS  Fay Farr MD  Cell: [phone]  Office: [phone]            Assessment  DMT2: 73y Female with DM T2 with hyperglycemia, A1C7% , was on oral meds at home, was using trulicity prior, on basal bolus insulin with coverage, insulin dose adjusted,  sugars are improving.  Will need tight glycemic control for postop wound healing.   CAD: on medications, stable, monitored. s/p CABG/ MVR   HTN: on antihypertensive medications, monitored, asymptomatic.  Obesity: No strict exercise routines, not on any weight loss program, neither on low calorie diet.      Discussed plan and management with Dr Chika Perry NP - TEAMS  Fay Farr MD  Cell: [phone]  Office: [phone]

## 2024-01-29 NOTE — PROGRESS NOTE ADULT - PROBLEM SELECTOR PLAN 1
Will decrease Lantus to 22 u at bedtime.  Will decrease Admelog to 7 u before each meal and continue Admelog correction scale coverage. Will continue monitoring FS and Follow up.   Patient counseled for compliance with consistent low carb diet.

## 2024-01-29 NOTE — PROGRESS NOTE ADULT - SUBJECTIVE AND OBJECTIVE BOX
NEPHROLOGY-[facility] [phone]        Patient seen and examined in bed,  She was the same         MEDICATIONS  (STANDING):  allopurinol 100 milliGRAM(s) Oral daily  aMIOdarone    Tablet   Oral   aMIOdarone    Tablet 400 milliGRAM(s) Oral every 8 hours  aspirin enteric coated 81 milliGRAM(s) Oral daily  atorvastatin 80 milliGRAM(s) Oral at bedtime  bisacodyl Suppository 10 milliGRAM(s) Rectal once  chlorhexidine 2% Cloths 1 Application(s) Topical daily  dextrose 5%. 1000 milliLiter(s) (100 mL/Hr) IV Continuous <Continuous>  dextrose 5%. 1000 milliLiter(s) (50 mL/Hr) IV Continuous <Continuous>  dextrose 50% Injectable 25 milliLiter(s) IV Push every 15 minutes  dextrose 50% Injectable 12.5 Gram(s) IV Push once  dextrose 50% Injectable 25 Gram(s) IV Push once  dextrose 50% Injectable 25 Gram(s) IV Push once  dextrose 50% Injectable 50 milliLiter(s) IV Push every 15 minutes  glucagon  Injectable 1 milliGRAM(s) IntraMuscular once  insulin glargine Injectable (LANTUS) 25 Unit(s) SubCutaneous at bedtime  insulin lispro (ADMELOG) corrective regimen sliding scale   SubCutaneous at bedtime  insulin lispro (ADMELOG) corrective regimen sliding scale   SubCutaneous three times a day before meals  insulin lispro Injectable (ADMELOG) 8 Unit(s) SubCutaneous three times a day before meals  pantoprazole    Tablet 40 milliGRAM(s) Oral before breakfast  polyethylene glycol 3350 17 Gram(s) Oral daily  senna 2 Tablet(s) Oral at bedtime  sodium chloride 0.9% lock flush 3 milliLiter(s) IV Push every 8 hours  sodium chloride 0.9%. 1000 milliLiter(s) (10 mL/Hr) IV Continuous <Continuous>      VITAL:  T(C): , Max: 36.9 (01-28-24 @ 20:33)  T(F): , Max: 98.5 (01-28-24 @ 20:33)  HR: 68 (01-29-24 @ 06:54)  BP: 114/56 (01-29-24 @ 04:33)  BP(mean): 75 (01-29-24 @ 04:33)  RR: 18 (01-29-24 @ 04:33)  SpO2: 98% (01-29-24 @ 06:54)  Wt(kg): --    I and O's:    01-28 @ 07:01  -  01-29 @ 07:00  --------------------------------------------------------  IN: 294.6 mL / OUT: 1950 mL / NET: -1655.4 mL      Height (cm): 158.8 (01-28 @ 11:26)  Weight (kg): 108.9 (01-28 @ 11:26)  BMI (kg/m2): 43.2 (01-28 @ 11:26)  BSA (m2): 2.08 (01-28 @ 11:26)    PHYSICAL EXAM:    Constitutional: NAD; obese   Neck:  No JVD  Respiratory: CTAB/L  Cardiovascular: S1 and S2  Gastrointestinal: BS+, soft, NT/ND  Extremities: No peripheral edema  Neurological: A/O x 3, no focal deficits  Psychiatric: Normal mood, normal affect  : No Diallo  Skin: No rashes  Access: Not applicable    LABS:                        8.4    7.28  )-----------( 126      ( 29 Jan 2024 06:40 )             26.3     01-29    143  |  105  |  29<H>  ----------------------------<  77  4.5   |  28  |  1.86<H>    Ca    10.2      29 Jan 2024 06:40            Urine Studies:  Urinalysis Basic - ( 29 Jan 2024 06:40 )    Color: x / Appearance: x / SG: x / pH: x  Gluc: 77 mg/dL / Ketone: x  / Bili: x / Urobili: x   Blood: x / Protein: x / Nitrite: x   Leuk Esterase: x / RBC: x / WBC x   Sq Epi: x / Non Sq Epi: x / Bacteria: x            RADIOLOGY & ADDITIONAL STUDIES:             NEPHROLOGY-[facility] [phone]        Patient seen and examined in bed,  She was the same   Slight SOB         MEDICATIONS  (STANDING):  allopurinol 100 milliGRAM(s) Oral daily  aMIOdarone    Tablet   Oral   aMIOdarone    Tablet 400 milliGRAM(s) Oral every 8 hours  aspirin enteric coated 81 milliGRAM(s) Oral daily  atorvastatin 80 milliGRAM(s) Oral at bedtime  bisacodyl Suppository 10 milliGRAM(s) Rectal once  chlorhexidine 2% Cloths 1 Application(s) Topical daily  dextrose 5%. 1000 milliLiter(s) (100 mL/Hr) IV Continuous <Continuous>  dextrose 5%. 1000 milliLiter(s) (50 mL/Hr) IV Continuous <Continuous>  dextrose 50% Injectable 25 milliLiter(s) IV Push every 15 minutes  dextrose 50% Injectable 12.5 Gram(s) IV Push once  dextrose 50% Injectable 25 Gram(s) IV Push once  dextrose 50% Injectable 25 Gram(s) IV Push once  dextrose 50% Injectable 50 milliLiter(s) IV Push every 15 minutes  glucagon  Injectable 1 milliGRAM(s) IntraMuscular once  insulin glargine Injectable (LANTUS) 25 Unit(s) SubCutaneous at bedtime  insulin lispro (ADMELOG) corrective regimen sliding scale   SubCutaneous at bedtime  insulin lispro (ADMELOG) corrective regimen sliding scale   SubCutaneous three times a day before meals  insulin lispro Injectable (ADMELOG) 8 Unit(s) SubCutaneous three times a day before meals  pantoprazole    Tablet 40 milliGRAM(s) Oral before breakfast  polyethylene glycol 3350 17 Gram(s) Oral daily  senna 2 Tablet(s) Oral at bedtime  sodium chloride 0.9% lock flush 3 milliLiter(s) IV Push every 8 hours  sodium chloride 0.9%. 1000 milliLiter(s) (10 mL/Hr) IV Continuous <Continuous>      VITAL:  T(C): , Max: 36.9 (01-28-24 @ 20:33)  T(F): , Max: 98.5 (01-28-24 @ 20:33)  HR: 68 (01-29-24 @ 06:54)  BP: 114/56 (01-29-24 @ 04:33)  BP(mean): 75 (01-29-24 @ 04:33)  RR: 18 (01-29-24 @ 04:33)  SpO2: 98% (01-29-24 @ 06:54)  Wt(kg): --    I and O's:    01-28 @ 07:01  -  01-29 @ 07:00  --------------------------------------------------------  IN: 294.6 mL / OUT: 1950 mL / NET: -1655.4 mL      Height (cm): 158.8 (01-28 @ 11:26)  Weight (kg): 108.9 (01-28 @ 11:26)  BMI (kg/m2): 43.2 (01-28 @ 11:26)  BSA (m2): 2.08 (01-28 @ 11:26)    PHYSICAL EXAM:    Constitutional: NAD; obese   Neck:  No JVD  Respiratory: reduced   Cardiovascular: S1 and S2  Gastrointestinal: BS+, soft, NT/ND  Extremities: No peripheral edema  Neurological: A/O x 3, no focal deficits  Psychiatric: Normal mood, normal affect  : No Diallo  Skin: No rashes  Access: Not applicable    LABS:                        8.4    7.28  )-----------( 126      ( 29 Jan 2024 06:40 )             26.3     01-29    143  |  105  |  29<H>  ----------------------------<  77  4.5   |  28  |  1.86<H>    Ca    10.2      29 Jan 2024 06:40            Urine Studies:  Urinalysis Basic - ( 29 Jan 2024 06:40 )    Color: x / Appearance: x / SG: x / pH: x  Gluc: 77 mg/dL / Ketone: x  / Bili: x / Urobili: x   Blood: x / Protein: x / Nitrite: x   Leuk Esterase: x / RBC: x / WBC x   Sq Epi: x / Non Sq Epi: x / Bacteria: x            RADIOLOGY & ADDITIONAL STUDIES:

## 2024-01-29 NOTE — PROGRESS NOTE ADULT - SUBJECTIVE AND OBJECTIVE BOX
24H hour events:     MEDICATIONS:  aMIOdarone    Tablet   Oral   aMIOdarone    Tablet 400 milliGRAM(s) Oral every 8 hours  aspirin enteric coated 81 milliGRAM(s) Oral daily        acetaminophen     Tablet .. 650 milliGRAM(s) Oral every 6 hours PRN  oxyCODONE    IR 5 milliGRAM(s) Oral every 4 hours PRN  oxyCODONE    IR 10 milliGRAM(s) Oral every 4 hours PRN    bisacodyl Suppository 10 milliGRAM(s) Rectal once  pantoprazole    Tablet 40 milliGRAM(s) Oral before breakfast  polyethylene glycol 3350 17 Gram(s) Oral daily  senna 2 Tablet(s) Oral at bedtime    allopurinol 100 milliGRAM(s) Oral daily  atorvastatin 80 milliGRAM(s) Oral at bedtime  dextrose 50% Injectable 12.5 Gram(s) IV Push once  dextrose 50% Injectable 25 Gram(s) IV Push once  dextrose 50% Injectable 50 milliLiter(s) IV Push every 15 minutes  dextrose 50% Injectable 25 milliLiter(s) IV Push every 15 minutes  dextrose 50% Injectable 25 Gram(s) IV Push once  dextrose Oral Gel 15 Gram(s) Oral once PRN  glucagon  Injectable 1 milliGRAM(s) IntraMuscular once  insulin glargine Injectable (LANTUS) 25 Unit(s) SubCutaneous at bedtime  insulin lispro (ADMELOG) corrective regimen sliding scale   SubCutaneous at bedtime  insulin lispro (ADMELOG) corrective regimen sliding scale   SubCutaneous three times a day before meals  insulin lispro Injectable (ADMELOG) 8 Unit(s) SubCutaneous three times a day before meals    chlorhexidine 2% Cloths 1 Application(s) Topical daily  dextrose 5%. 1000 milliLiter(s) IV Continuous <Continuous>  dextrose 5%. 1000 milliLiter(s) IV Continuous <Continuous>  sodium chloride 0.9% lock flush 3 milliLiter(s) IV Push every 8 hours  sodium chloride 0.9%. 1000 milliLiter(s) IV Continuous <Continuous>      REVIEW OF SYSTEMS:  Complete 12point ROS negative.    PHYSICAL EXAM:  T(C): 36.8 (01-29-24 @ 11:36), Max: 36.9 (01-28-24 @ 20:33)  HR: 60 (01-29-24 @ 11:36) (60 - 75)  BP: 123/59 (01-29-24 @ 11:36) (114/56 - 147/68)  RR: 18 (01-29-24 @ 11:36) (18 - 18)  SpO2: 94% (01-29-24 @ 11:36) (94% - 100%)  Wt(kg): --  I&O's Summary    28 Jan 2024 07:01  -  29 Jan 2024 07:00  --------------------------------------------------------  IN: 294.6 mL / OUT: 1950 mL / NET: -1655.4 mL    29 Jan 2024 07:01  -  29 Jan 2024 11:46  --------------------------------------------------------  IN: 200 mL / OUT: 600 mL / NET: -400 mL        Appearance: Normal	  HEENT:   Normal oral mucosa, PERRL, EOMI	  Lymphatic: No lymphadenopathy  Cardiovascular: Normal S1 S2, No JVD, No murmurs, No edema  Respiratory: Lungs clear to auscultation	  Psychiatry: A & O x 3, Mood & affect appropriate  Gastrointestinal:  Soft, Non-tender, + BS	  Skin: No rashes, No ecchymoses, No cyanosis	  Neurologic: Non-focal  Extremities: Normal range of motion, No clubbing, cyanosis or edema  Vascular: Peripheral pulses palpable 2+ bilaterally        LABS:	 	    CBC Full  -  ( 29 Jan 2024 06:40 )  WBC Count : 7.28 K/uL  Hemoglobin : 8.4 g/dL  Hematocrit : 26.3 %  Platelet Count - Automated : 126 K/uL  Mean Cell Volume : 99.2 fl  Mean Cell Hemoglobin : 31.7 pg  Mean Cell Hemoglobin Concentration : 31.9 gm/dL  Auto Neutrophil # : x  Auto Lymphocyte # : x  Auto Monocyte # : x  Auto Eosinophil # : x  Auto Basophil # : x  Auto Neutrophil % : x  Auto Lymphocyte % : x  Auto Monocyte % : x  Auto Eosinophil % : x  Auto Basophil % : x    01-29    143  |  105  |  29<H>  ----------------------------<  77  4.5   |  28  |  1.86<H>  01-28    140  |  106  |  27<H>  ----------------------------<  90  4.5   |  25  |  1.77<H>    Ca    10.2      29 Jan 2024 06:40  Ca    9.6      28 Jan 2024 04:41        proBNP:   Lipid Profile:   HgA1c:   TSH:       CARDIAC MARKERS:          TELEMETRY: 	    ECG:  	  RADIOLOGY:  OTHER: 	    PREVIOUS DIAGNOSTIC TESTING:    [ ] Echocardiogram:    [ ]  Catheterization:  [ ] Stress Test:  	  	  ASSESSMENT/PLAN: 	     24H hour events:   seen resting comfortably in bed; no acute changes overnight; tele AV pacing    MEDICATIONS:  aMIOdarone    Tablet   Oral   aMIOdarone    Tablet 400 milliGRAM(s) Oral every 8 hours  aspirin enteric coated 81 milliGRAM(s) Oral daily  acetaminophen     Tablet .. 650 milliGRAM(s) Oral every 6 hours PRN  oxyCODONE    IR 5 milliGRAM(s) Oral every 4 hours PRN  oxyCODONE    IR 10 milliGRAM(s) Oral every 4 hours PRN  bisacodyl Suppository 10 milliGRAM(s) Rectal once  pantoprazole    Tablet 40 milliGRAM(s) Oral before breakfast  polyethylene glycol 3350 17 Gram(s) Oral daily  senna 2 Tablet(s) Oral at bedtime  allopurinol 100 milliGRAM(s) Oral daily  atorvastatin 80 milliGRAM(s) Oral at bedtime  dextrose 50% Injectable 12.5 Gram(s) IV Push once  dextrose 50% Injectable 25 Gram(s) IV Push once  dextrose 50% Injectable 50 milliLiter(s) IV Push every 15 minutes  dextrose 50% Injectable 25 milliLiter(s) IV Push every 15 minutes  dextrose 50% Injectable 25 Gram(s) IV Push once  dextrose Oral Gel 15 Gram(s) Oral once PRN  glucagon  Injectable 1 milliGRAM(s) IntraMuscular once  insulin glargine Injectable (LANTUS) 25 Unit(s) SubCutaneous at bedtime  insulin lispro (ADMELOG) corrective regimen sliding scale   SubCutaneous at bedtime  insulin lispro (ADMELOG) corrective regimen sliding scale   SubCutaneous three times a day before meals  insulin lispro Injectable (ADMELOG) 8 Unit(s) SubCutaneous three times a day before meals  chlorhexidine 2% Cloths 1 Application(s) Topical daily  dextrose 5%. 1000 milliLiter(s) IV Continuous <Continuous>  dextrose 5%. 1000 milliLiter(s) IV Continuous <Continuous>  sodium chloride 0.9% lock flush 3 milliLiter(s) IV Push every 8 hours  sodium chloride 0.9%. 1000 milliLiter(s) IV Continuous <Continuous>      REVIEW OF SYSTEMS:  Complete 12point ROS negative.    PHYSICAL EXAM:  T(C): 36.8 (01-29-24 @ 11:36), Max: 36.9 (01-28-24 @ 20:33)  HR: 60 (01-29-24 @ 11:36) (60 - 75)  BP: 123/59 (01-29-24 @ 11:36) (114/56 - 147/68)  RR: 18 (01-29-24 @ 11:36) (18 - 18)  SpO2: 94% (01-29-24 @ 11:36) (94% - 100%)  Wt(kg): --  I&O's Summary    28 Jan 2024 07:01  -  29 Jan 2024 07:00  --------------------------------------------------------  IN: 294.6 mL / OUT: 1950 mL / NET: -1655.4 mL    29 Jan 2024 07:01  -  29 Jan 2024 11:46  --------------------------------------------------------  IN: 200 mL / OUT: 600 mL / NET: -400 mL        Appearance: obese, female, in NAD	  Head: normocephalic, atraumatic  Neck: supple  Cardiovascular: RRR S1 S2, no r/g  Respiratory: Lungs clear to auscultation	  Psychiatry: A & O x 3, Mood & affect appropriate  Gastrointestinal:  Soft, Non-tender, + BS	  : voiding  Skin: No rashes, No ecchymoses, left chest wall PPM site without hematoma	  Neurologic: Non-focal  Extremities: Normal range of motion, No clubbing, cyanosis or edema  Vascular: Peripheral pulses palpable 2+ bilaterally        LABS:	 	    CBC Full  -  ( 29 Jan 2024 06:40 )  WBC Count : 7.28 K/uL  Hemoglobin : 8.4 g/dL  Hematocrit : 26.3 %  Platelet Count - Automated : 126 K/uL  Mean Cell Volume : 99.2 fl  Mean Cell Hemoglobin : 31.7 pg  Mean Cell Hemoglobin Concentration : 31.9 gm/dL  Auto Neutrophil # : x  Auto Lymphocyte # : x  Auto Monocyte # : x  Auto Eosinophil # : x  Auto Basophil # : x  Auto Neutrophil % : x  Auto Lymphocyte % : x  Auto Monocyte % : x  Auto Eosinophil % : x  Auto Basophil % : x    01-29    143  |  105  |  29<H>  ----------------------------<  77  4.5   |  28  |  1.86<H>  01-28    140  |  106  |  27<H>  ----------------------------<  90  4.5   |  25  |  1.77<H>    Ca    10.2      29 Jan 2024 06:40  Ca    9.6      28 Jan 2024 04:41        proBNP:   Lipid Profile:   HgA1c:   TSH:       CARDIAC MARKERS:      TELEMETRY: AV Paced 60-70s  	    CXR 1/29/24:    IMPRESSION:  Small pleural effusions.No pneumothorax.

## 2024-01-29 NOTE — PROGRESS NOTE ADULT - SUBJECTIVE AND OBJECTIVE BOX
VITAL SIGNS    Telemetry:    Vital Signs Last 24 Hrs  T(C): 36.9 (01-29-24 @ 04:33), Max: 36.9 (01-28-24 @ 20:33)  T(F): 98.4 (01-29-24 @ 04:33), Max: 98.5 (01-28-24 @ 20:33)  HR: 68 (01-29-24 @ 06:54) (60 - 75)  BP: 114/56 (01-29-24 @ 04:33) (114/56 - 147/68)  RR: 18 (01-29-24 @ 04:33) (18 - 18)  SpO2: 98% (01-29-24 @ 06:54) (94% - 100%)            01-28 @ 07:01  -  01-29 @ 07:00  --------------------------------------------------------  IN: 294.6 mL / OUT: 1950 mL / NET: -1655.4 mL       Daily Height in cm: 158.8 (28 Jan 2024 11:26)    Daily   Admit Wt: Drug Dosing Weight  Height (cm): 158.8 (28 Jan 2024 11:26)  Weight (kg): 108.9 (28 Jan 2024 11:26)  BMI (kg/m2): 43.2 (28 Jan 2024 11:26)  BSA (m2): 2.08 (28 Jan 2024 11:26)      CAPILLARY BLOOD GLUCOSE      POCT Blood Glucose.: 150 mg/dL (28 Jan 2024 21:04)  POCT Blood Glucose.: 112 mg/dL (28 Jan 2024 16:41)  POCT Blood Glucose.: 124 mg/dL (28 Jan 2024 13:04)  POCT Blood Glucose.: 117 mg/dL (28 Jan 2024 08:52)          LABS:    01-28 @ 07:01  -  01-29 @ 07:00  --------------------------------------------------------  IN: 294.6 mL / OUT: 1950 mL / NET: -1655.4 mL    cret                        8.4    7.28  )-----------( 126      ( 29 Jan 2024 06:40 )             26.3     01-28    140  |  106  |  27<H>  ----------------------------<  90  4.5   |  25  |  1.77<H>    Ca    9.6      28 Jan 2024 04:41          acetaminophen     Tablet .. 650 milliGRAM(s) Oral every 6 hours PRN  allopurinol 100 milliGRAM(s) Oral daily  aMIOdarone    Tablet   Oral   aMIOdarone    Tablet 400 milliGRAM(s) Oral every 8 hours  aspirin enteric coated 81 milliGRAM(s) Oral daily  atorvastatin 80 milliGRAM(s) Oral at bedtime  bisacodyl Suppository 10 milliGRAM(s) Rectal once  chlorhexidine 2% Cloths 1 Application(s) Topical daily  dextrose 5%. 1000 milliLiter(s) IV Continuous <Continuous>  dextrose 5%. 1000 milliLiter(s) IV Continuous <Continuous>  dextrose 50% Injectable 25 Gram(s) IV Push once  dextrose 50% Injectable 12.5 Gram(s) IV Push once  dextrose 50% Injectable 25 Gram(s) IV Push once  dextrose 50% Injectable 50 milliLiter(s) IV Push every 15 minutes  dextrose 50% Injectable 25 milliLiter(s) IV Push every 15 minutes  dextrose Oral Gel 15 Gram(s) Oral once PRN  DOBUTamine Infusion 1.25 MICROgram(s)/kG/Min IV Continuous <Continuous>  glucagon  Injectable 1 milliGRAM(s) IntraMuscular once  insulin glargine Injectable (LANTUS) 25 Unit(s) SubCutaneous at bedtime  insulin lispro (ADMELOG) corrective regimen sliding scale   SubCutaneous three times a day before meals  insulin lispro (ADMELOG) corrective regimen sliding scale   SubCutaneous at bedtime  insulin lispro Injectable (ADMELOG) 8 Unit(s) SubCutaneous three times a day before meals  oxyCODONE    IR 10 milliGRAM(s) Oral every 4 hours PRN  oxyCODONE    IR 5 milliGRAM(s) Oral every 4 hours PRN  pantoprazole    Tablet 40 milliGRAM(s) Oral before breakfast  polyethylene glycol 3350 17 Gram(s) Oral daily  senna 2 Tablet(s) Oral at bedtime  sodium chloride 0.9% lock flush 3 milliLiter(s) IV Push every 8 hours  sodium chloride 0.9%. 1000 milliLiter(s) IV Continuous <Continuous>  spironolactone 50 milliGRAM(s) Oral every 12 hours  torsemide 10 milliGRAM(s) Oral every 12 hours      PHYSICAL EXAM    Subjective: "Hi.   Neurology: alert and oriented x 3, nonfocal, no gross deficits  CV : tele:  RSR  Sternal Wound :  CDI with dressing , Stable  Lungs: clear. RR easy, unlabored   Abdomen: soft, nontender, nondistended, positive bowel sounds, bowel movement   Neg N/V/D   :  pt voiding without difficulty   Extremities:   BRAUN; edema, neg calf tenderness.   PPP bilaterally      PW:  Chest tubes:                 VITAL SIGNS    Telemetry:  AV paced 60-80  Vital Signs Last 24 Hrs  T(C): 36.9 (01-29-24 @ 04:33), Max: 36.9 (01-28-24 @ 20:33)  T(F): 98.4 (01-29-24 @ 04:33), Max: 98.5 (01-28-24 @ 20:33)  HR: 68 (01-29-24 @ 06:54) (60 - 75)  BP: 114/56 (01-29-24 @ 04:33) (114/56 - 147/68)  RR: 18 (01-29-24 @ 04:33) (18 - 18)  SpO2: 98% (01-29-24 @ 06:54) (94% - 100%)            01-28 @ 07:01  -  01-29 @ 07:00  --------------------------------------------------------  IN: 294.6 mL / OUT: 1950 mL / NET: -1655.4 mL       Daily Height in cm: 158.8 (28 Jan 2024 11:26)    Daily   Admit Wt: Drug Dosing Weight  Height (cm): 158.8 (28 Jan 2024 11:26)  Weight (kg): 108.9 (28 Jan 2024 11:26)  BMI (kg/m2): 43.2 (28 Jan 2024 11:26)  BSA (m2): 2.08 (28 Jan 2024 11:26)      CAPILLARY BLOOD GLUCOSE      POCT Blood Glucose.: 150 mg/dL (28 Jan 2024 21:04)  POCT Blood Glucose.: 112 mg/dL (28 Jan 2024 16:41)  POCT Blood Glucose.: 124 mg/dL (28 Jan 2024 13:04)  POCT Blood Glucose.: 117 mg/dL (28 Jan 2024 08:52)          LABS:    01-28 @ 07:01  -  01-29 @ 07:00  --------------------------------------------------------  IN: 294.6 mL / OUT: 1950 mL / NET: -1655.4 mL    cret                        8.4    7.28  )-----------( 126      ( 29 Jan 2024 06:40 )             26.3     01-28    140  |  106  |  27<H>  ----------------------------<  90  4.5   |  25  |  1.77<H>    Ca    9.6      28 Jan 2024 04:41          acetaminophen     Tablet .. 650 milliGRAM(s) Oral every 6 hours PRN  allopurinol 100 milliGRAM(s) Oral daily  aMIOdarone    Tablet   Oral   aMIOdarone    Tablet 400 milliGRAM(s) Oral every 8 hours  aspirin enteric coated 81 milliGRAM(s) Oral daily  atorvastatin 80 milliGRAM(s) Oral at bedtime  bisacodyl Suppository 10 milliGRAM(s) Rectal once  chlorhexidine 2% Cloths 1 Application(s) Topical daily  dextrose 5%. 1000 milliLiter(s) IV Continuous <Continuous>  dextrose 5%. 1000 milliLiter(s) IV Continuous <Continuous>  dextrose 50% Injectable 25 Gram(s) IV Push once  dextrose 50% Injectable 12.5 Gram(s) IV Push once  dextrose 50% Injectable 25 Gram(s) IV Push once  dextrose 50% Injectable 50 milliLiter(s) IV Push every 15 minutes  dextrose 50% Injectable 25 milliLiter(s) IV Push every 15 minutes  dextrose Oral Gel 15 Gram(s) Oral once PRN  DOBUTamine Infusion 1.25 MICROgram(s)/kG/Min IV Continuous <Continuous>  glucagon  Injectable 1 milliGRAM(s) IntraMuscular once  insulin glargine Injectable (LANTUS) 25 Unit(s) SubCutaneous at bedtime  insulin lispro (ADMELOG) corrective regimen sliding scale   SubCutaneous three times a day before meals  insulin lispro (ADMELOG) corrective regimen sliding scale   SubCutaneous at bedtime  insulin lispro Injectable (ADMELOG) 8 Unit(s) SubCutaneous three times a day before meals  oxyCODONE    IR 10 milliGRAM(s) Oral every 4 hours PRN  oxyCODONE    IR 5 milliGRAM(s) Oral every 4 hours PRN  pantoprazole    Tablet 40 milliGRAM(s) Oral before breakfast  polyethylene glycol 3350 17 Gram(s) Oral daily  senna 2 Tablet(s) Oral at bedtime  sodium chloride 0.9% lock flush 3 milliLiter(s) IV Push every 8 hours  sodium chloride 0.9%. 1000 milliLiter(s) IV Continuous <Continuous>  spironolactone 50 milliGRAM(s) Oral every 12 hours  torsemide 10 milliGRAM(s) Oral every 12 hours      PHYSICAL EXAM    Subjective: "I feel ok."   Neurology: alert and oriented x 3, nonfocal, no gross deficits  CV : tele: AV paced 60-80  Sternal Wound :  CDI DONNIE- sternum stable  PPM site cdi donnie - neg drainage; hematoma   Lungs: clear diminished at the bases; RR easy, unlabored   Abdomen: soft, nontender, nondistended, positive bowel sounds, + bowel movement   Neg N/V/D; obese abdomen    :  pt voiding without difficulty   Extremities:   BRAUN; trace LE edema, neg calf tenderness.   PPP bilaterally; bilateral LE ace wrap       PW: d/c this am   Chest tubes: none

## 2024-01-29 NOTE — PROGRESS NOTE ADULT - ASSESSMENT
73 year old female with PMH morbid obesity (BMI 42.7 s/p gastric sleeve 2015), DMT2, CAD s/p PCI of LCX (2012), LEIDY (on CPAP), CKD (stage 3 - on farxiga), valvular pAF (on Eliquis) and rheumatic mitral valve disease reports c/o chronic BARBOZA for over 10 years that has progressively worsened over the past few months. Pt now s/p scheduled mitral valve replacement, 1v CABG to LAD, maze procedure and left atrial appendage ligation with Dr. Dalal on 1/23/24. Pt with epicardial A and V wires  Regularized AF with junctional escape at 70 bpm on dobutamine post op day 5. Plan for PPM tomorrow.    # Post operative AF with complete heart block, regular junctional escape rhythm in 70s. Epicardial backup wires in place  Obtained atrial EGM using patient's temporary atrial epicardial wire to V1 and ran rhythm strip. Rhythm strip from atrial EGM and 12 lead EKG consistent with afib with complete heart block with regular junctional escape in 70s.     - s/p PPM implant 1/28/24 ([employer])  - PPM check 1/29 with normal function  - Plan to d/c temporary wire today  - Post op PPM instructions reviewed with patient, ID and booklet given to patient  - CXR no pneumothorax, dual leads in place  - Consider eventual restart of anticoagulation when safe from post-operative perspective  - F/U with [facility] on 2/9/24 @ 9:40am  - EP will sign off    #[phone]

## 2024-01-29 NOTE — PROGRESS NOTE ADULT - ASSESSMENT
73F w/ PMH morbid obesity (BMI 42.7 s/p gastric sleeve ), DMT2, CAD s/p PCI of LCX (), LEIDY (on CPAP), CKD (stage 3 - on farxiga), AF (on Eliquis) and rheumatic mitral valve disease reports c/o chronic BARBOZA for over 10 years that has progressively worsened over the past few months with the feeling of an "elephant on my chest." Her symptoms improved after starting Lasix daily. She gets dizzy when she bends over or when she stands up too fast. She denies CP or weight gain. She sleeps with 3 pillows on an incline at night. She cannot lay flat due to comfort as well as orthopnea. She can only walk one block before she has to stop to rest due to SOB. Pt now presents to PST for scheduled mitral valve replacement, maze procedure and left atrial appendage ligation with Dr. Dalal on 24.  s/p  MVR (t)/ C1V/ Maze/ RICHARD clip  post extubated / inotropic support  EP consulted for underlying junctional rhythm; no av nodals at this time; + epicardial pw; maintain    tx sdu; VSS; afib 60-70 on  2.5; maintain med ct's; diuresis as per renal; endo consulted for diabetic management  keep npo after midnight sat into sun for possible ppm as per EP; no av nodals at this time  24 VSS on  2.5 EP following for ?ppm   VSS NPO for PPM for aflutter w/ chb  -   discharge planning- home pt when stable    73F w/ PMH morbid obesity (BMI 42.7 s/p gastric sleeve ), DMT2, CAD s/p PCI of LCX (), LEIDY (on CPAP), CKD (stage 3 - on farxiga), AF (on Eliquis) and rheumatic mitral valve disease reports c/o chronic BARBOZA for over 10 years that has progressively worsened over the past few months with the feeling of an "elephant on my chest." Her symptoms improved after starting Lasix daily. She gets dizzy when she bends over or when she stands up too fast. She denies CP or weight gain. She sleeps with 3 pillows on an incline at night. She cannot lay flat due to comfort as well as orthopnea. She can only walk one block before she has to stop to rest due to SOB. Pt now presents to PST for scheduled mitral valve replacement, maze procedure and left atrial appendage ligation with Dr. Dalal on 24.  s/p  MVR (t)/ C1V/ Maze/ RICHARD clip  post extubated / inotropic support  EP consulted for underlying junctional rhythm; no av nodals at this time; + epicardial pw; maintain    tx sdu; VSS; afib 60-70 on  2.5; maintain med ct's; diuresis as per renal; endo consulted for diabetic management  keep npo after midnight sat into sun for possible ppm as per EP; no av nodals at this time  24 VSS on  2.5 EP following for ?ppm   VSS NPO for PPM for aflutter w/ chb  - s/p PPM placement; tx floor   VSS: AV Paced 60-80; d/c  as per Dr. Dalal; bun/cr increased today --> d/c diuretics and ck echo as per Dr. Dalal  pa/lat chest xray s/p PPM ; d/w pw   wean O2 as tolerated; sugical bra   discharge planning- home when stable

## 2024-01-30 LAB
ANION GAP SERPL CALC-SCNC: 12 MMOL/L — SIGNIFICANT CHANGE UP (ref 5–17)
BUN SERPL-MCNC: 32 MG/DL — HIGH (ref 7–23)
CALCIUM SERPL-MCNC: 10.4 MG/DL — SIGNIFICANT CHANGE UP (ref 8.4–10.5)
CHLORIDE SERPL-SCNC: 103 MMOL/L — SIGNIFICANT CHANGE UP (ref 96–108)
CO2 SERPL-SCNC: 26 MMOL/L — SIGNIFICANT CHANGE UP (ref 22–31)
CREAT SERPL-MCNC: 1.73 MG/DL — HIGH (ref 0.5–1.3)
EGFR: 31 ML/MIN/1.73M2 — LOW
GLUCOSE SERPL-MCNC: 92 MG/DL — SIGNIFICANT CHANGE UP (ref 70–99)
HCT VFR BLD CALC: 27 % — LOW (ref 34.5–45)
HGB BLD-MCNC: 8.7 G/DL — LOW (ref 11.5–15.5)
MCHC RBC-ENTMCNC: 32 PG — SIGNIFICANT CHANGE UP (ref 27–34)
MCHC RBC-ENTMCNC: 32.2 GM/DL — SIGNIFICANT CHANGE UP (ref 32–36)
MCV RBC AUTO: 99.3 FL — SIGNIFICANT CHANGE UP (ref 80–100)
NRBC # BLD: 0 /100 WBCS — SIGNIFICANT CHANGE UP (ref 0–0)
PLATELET # BLD AUTO: 150 K/UL — SIGNIFICANT CHANGE UP (ref 150–400)
POTASSIUM SERPL-MCNC: 4.7 MMOL/L — SIGNIFICANT CHANGE UP (ref 3.5–5.3)
POTASSIUM SERPL-SCNC: 4.7 MMOL/L — SIGNIFICANT CHANGE UP (ref 3.5–5.3)
RBC # BLD: 2.72 M/UL — LOW (ref 3.8–5.2)
RBC # FLD: 15.4 % — HIGH (ref 10.3–14.5)
SODIUM SERPL-SCNC: 141 MMOL/L — SIGNIFICANT CHANGE UP (ref 135–145)
WBC # BLD: 8.47 K/UL — SIGNIFICANT CHANGE UP (ref 3.8–10.5)
WBC # FLD AUTO: 8.47 K/UL — SIGNIFICANT CHANGE UP (ref 3.8–10.5)

## 2024-01-30 RX ORDER — SPIRONOLACTONE 25 MG/1
50 TABLET, FILM COATED ORAL EVERY 12 HOURS
Refills: 0 | Status: DISCONTINUED | OUTPATIENT
Start: 2024-01-30 | End: 2024-01-31

## 2024-01-30 RX ORDER — INSULIN LISPRO 100/ML
6 VIAL (ML) SUBCUTANEOUS
Refills: 0 | Status: DISCONTINUED | OUTPATIENT
Start: 2024-01-30 | End: 2024-01-31

## 2024-01-30 RX ORDER — INSULIN GLARGINE 100 [IU]/ML
18 INJECTION, SOLUTION SUBCUTANEOUS AT BEDTIME
Refills: 0 | Status: DISCONTINUED | OUTPATIENT
Start: 2024-01-30 | End: 2024-01-31

## 2024-01-30 RX ADMIN — OXYCODONE HYDROCHLORIDE 10 MILLIGRAM(S): 5 TABLET ORAL at 21:45

## 2024-01-30 RX ADMIN — POLYETHYLENE GLYCOL 3350 17 GRAM(S): 17 POWDER, FOR SOLUTION ORAL at 10:58

## 2024-01-30 RX ADMIN — PANTOPRAZOLE SODIUM 40 MILLIGRAM(S): 20 TABLET, DELAYED RELEASE ORAL at 06:11

## 2024-01-30 RX ADMIN — ATORVASTATIN CALCIUM 80 MILLIGRAM(S): 80 TABLET, FILM COATED ORAL at 21:16

## 2024-01-30 RX ADMIN — SENNA PLUS 2 TABLET(S): 8.6 TABLET ORAL at 21:15

## 2024-01-30 RX ADMIN — OXYCODONE HYDROCHLORIDE 10 MILLIGRAM(S): 5 TABLET ORAL at 09:35

## 2024-01-30 RX ADMIN — AMIODARONE HYDROCHLORIDE 400 MILLIGRAM(S): 400 TABLET ORAL at 13:25

## 2024-01-30 RX ADMIN — Medication 6 UNIT(S): at 12:07

## 2024-01-30 RX ADMIN — OXYCODONE HYDROCHLORIDE 10 MILLIGRAM(S): 5 TABLET ORAL at 15:29

## 2024-01-30 RX ADMIN — Medication 100 MILLIGRAM(S): at 10:58

## 2024-01-30 RX ADMIN — INSULIN GLARGINE 18 UNIT(S): 100 INJECTION, SOLUTION SUBCUTANEOUS at 22:10

## 2024-01-30 RX ADMIN — SODIUM CHLORIDE 3 MILLILITER(S): 9 INJECTION INTRAMUSCULAR; INTRAVENOUS; SUBCUTANEOUS at 06:09

## 2024-01-30 RX ADMIN — OXYCODONE HYDROCHLORIDE 10 MILLIGRAM(S): 5 TABLET ORAL at 15:48

## 2024-01-30 RX ADMIN — AMIODARONE HYDROCHLORIDE 400 MILLIGRAM(S): 400 TABLET ORAL at 21:16

## 2024-01-30 RX ADMIN — SPIRONOLACTONE 50 MILLIGRAM(S): 25 TABLET, FILM COATED ORAL at 21:15

## 2024-01-30 RX ADMIN — Medication 81 MILLIGRAM(S): at 10:58

## 2024-01-30 RX ADMIN — SODIUM CHLORIDE 3 MILLILITER(S): 9 INJECTION INTRAMUSCULAR; INTRAVENOUS; SUBCUTANEOUS at 13:23

## 2024-01-30 RX ADMIN — SPIRONOLACTONE 50 MILLIGRAM(S): 25 TABLET, FILM COATED ORAL at 10:57

## 2024-01-30 RX ADMIN — AMIODARONE HYDROCHLORIDE 400 MILLIGRAM(S): 400 TABLET ORAL at 06:11

## 2024-01-30 RX ADMIN — OXYCODONE HYDROCHLORIDE 10 MILLIGRAM(S): 5 TABLET ORAL at 10:30

## 2024-01-30 RX ADMIN — Medication 6 UNIT(S): at 17:05

## 2024-01-30 RX ADMIN — CHLORHEXIDINE GLUCONATE 1 APPLICATION(S): 213 SOLUTION TOPICAL at 10:59

## 2024-01-30 RX ADMIN — Medication 20 MILLIGRAM(S): at 10:57

## 2024-01-30 RX ADMIN — Medication 6 UNIT(S): at 09:34

## 2024-01-30 RX ADMIN — OXYCODONE HYDROCHLORIDE 10 MILLIGRAM(S): 5 TABLET ORAL at 21:12

## 2024-01-30 RX ADMIN — SODIUM CHLORIDE 3 MILLILITER(S): 9 INJECTION INTRAMUSCULAR; INTRAVENOUS; SUBCUTANEOUS at 21:15

## 2024-01-30 NOTE — PROGRESS NOTE ADULT - PROBLEM SELECTOR PLAN 3
s/p  MVR(t)/ C1V   continue postop care  continue asa and statin  d/c  this am  d/c pw   diuretics d/c this am; ck echo as per Dr. Dalal   pulm toilet  pain management  wean O2 as tolerated  surgical bra   increase activity as tolerated   discharge planning- home when stable

## 2024-01-30 NOTE — PROGRESS NOTE ADULT - SUBJECTIVE AND OBJECTIVE BOX
Chief complaint  Patient is a 74y old  Female who presents with a chief complaint of sob (30 Jan 2024 06:16)         Labs and Fingersticks  CAPILLARY BLOOD GLUCOSE      POCT Blood Glucose.: 124 mg/dL (30 Jan 2024 11:39)  POCT Blood Glucose.: 106 mg/dL (30 Jan 2024 07:49)  POCT Blood Glucose.: 124 mg/dL (29 Jan 2024 21:23)  POCT Blood Glucose.: 122 mg/dL (29 Jan 2024 16:25)      Anion Gap: 12 (01-30 @ 07:11)  Anion Gap: 10 (01-29 @ 06:40)      Calcium: 10.4 (01-30 @ 07:11)  Calcium: 10.2 (01-29 @ 06:40)          01-30    141  |  103  |  32<H>  ----------------------------<  92  4.7   |  26  |  1.73<H>    Ca    10.4      30 Jan 2024 07:11                          8.7    8.47  )-----------( 150      ( 30 Jan 2024 07:11 )             27.0     Medications  MEDICATIONS  (STANDING):  allopurinol 100 milliGRAM(s) Oral daily  aMIOdarone    Tablet   Oral   aMIOdarone    Tablet 400 milliGRAM(s) Oral every 8 hours  aspirin enteric coated 81 milliGRAM(s) Oral daily  atorvastatin 80 milliGRAM(s) Oral at bedtime  bisacodyl Suppository 10 milliGRAM(s) Rectal once  chlorhexidine 2% Cloths 1 Application(s) Topical daily  dextrose 5%. 1000 milliLiter(s) (50 mL/Hr) IV Continuous <Continuous>  dextrose 5%. 1000 milliLiter(s) (100 mL/Hr) IV Continuous <Continuous>  dextrose 50% Injectable 12.5 Gram(s) IV Push once  dextrose 50% Injectable 25 Gram(s) IV Push once  dextrose 50% Injectable 25 milliLiter(s) IV Push every 15 minutes  dextrose 50% Injectable 25 Gram(s) IV Push once  dextrose 50% Injectable 50 milliLiter(s) IV Push every 15 minutes  glucagon  Injectable 1 milliGRAM(s) IntraMuscular once  insulin glargine Injectable (LANTUS) 18 Unit(s) SubCutaneous at bedtime  insulin lispro (ADMELOG) corrective regimen sliding scale   SubCutaneous three times a day before meals  insulin lispro (ADMELOG) corrective regimen sliding scale   SubCutaneous at bedtime  insulin lispro Injectable (ADMELOG) 6 Unit(s) SubCutaneous three times a day before meals  pantoprazole    Tablet 40 milliGRAM(s) Oral before breakfast  polyethylene glycol 3350 17 Gram(s) Oral daily  senna 2 Tablet(s) Oral at bedtime  sodium chloride 0.9% lock flush 3 milliLiter(s) IV Push every 8 hours  sodium chloride 0.9%. 1000 milliLiter(s) (10 mL/Hr) IV Continuous <Continuous>  spironolactone 50 milliGRAM(s) Oral every 12 hours  torsemide 20 milliGRAM(s) Oral daily      Physical Exam  General: Patient comfortable in bed   Vital Signs Last 12 Hrs  T(F): 97.3 (01-30-24 @ 05:18), Max: 97.3 (01-30-24 @ 05:18)  HR: 68 (01-30-24 @ 08:46) (60 - 68)  BP: 116/79 (01-30-24 @ 05:18) (116/79 - 116/79)  BP(mean): 91 (01-30-24 @ 05:18) (91 - 91)  RR: 18 (01-30-24 @ 05:18) (18 - 18)  SpO2: 99% (01-30-24 @ 08:46) (98% - 99%)    CVS: S1S2   Respiratory: No wheezing, no crepitations  GI: Abdomen soft, bowel sounds positive  Musculoskeletal:  moves all extremities  : Voiding        Chief complaint  Patient is a 74y old  Female who presents with a chief complaint of sob (30 Jan 2024 06:16)         Labs and Fingersticks  CAPILLARY BLOOD GLUCOSE      POCT Blood Glucose.: 124 mg/dL (30 Jan 2024 11:39)  POCT Blood Glucose.: 106 mg/dL (30 Jan 2024 07:49)  POCT Blood Glucose.: 124 mg/dL (29 Jan 2024 21:23)  POCT Blood Glucose.: 122 mg/dL (29 Jan 2024 16:25)      Anion Gap: 12 (01-30 @ 07:11)  Anion Gap: 10 (01-29 @ 06:40)      Calcium: 10.4 (01-30 @ 07:11)  Calcium: 10.2 (01-29 @ 06:40)          01-30    141  |  103  |  32<H>  ----------------------------<  92  4.7   |  26  |  1.73<H>    Ca    10.4      30 Jan 2024 07:11                          8.7    8.47  )-----------( 150      ( 30 Jan 2024 07:11 )             27.0     Medications  MEDICATIONS  (STANDING):  allopurinol 100 milliGRAM(s) Oral daily  aMIOdarone    Tablet   Oral   aMIOdarone    Tablet 400 milliGRAM(s) Oral every 8 hours  aspirin enteric coated 81 milliGRAM(s) Oral daily  atorvastatin 80 milliGRAM(s) Oral at bedtime  bisacodyl Suppository 10 milliGRAM(s) Rectal once  chlorhexidine 2% Cloths 1 Application(s) Topical daily  dextrose 5%. 1000 milliLiter(s) (50 mL/Hr) IV Continuous <Continuous>  dextrose 5%. 1000 milliLiter(s) (100 mL/Hr) IV Continuous <Continuous>  dextrose 50% Injectable 12.5 Gram(s) IV Push once  dextrose 50% Injectable 25 Gram(s) IV Push once  dextrose 50% Injectable 25 milliLiter(s) IV Push every 15 minutes  dextrose 50% Injectable 25 Gram(s) IV Push once  dextrose 50% Injectable 50 milliLiter(s) IV Push every 15 minutes  glucagon  Injectable 1 milliGRAM(s) IntraMuscular once  insulin glargine Injectable (LANTUS) 18 Unit(s) SubCutaneous at bedtime  insulin lispro (ADMELOG) corrective regimen sliding scale   SubCutaneous three times a day before meals  insulin lispro (ADMELOG) corrective regimen sliding scale   SubCutaneous at bedtime  insulin lispro Injectable (ADMELOG) 6 Unit(s) SubCutaneous three times a day before meals  pantoprazole    Tablet 40 milliGRAM(s) Oral before breakfast  polyethylene glycol 3350 17 Gram(s) Oral daily  senna 2 Tablet(s) Oral at bedtime  sodium chloride 0.9% lock flush 3 milliLiter(s) IV Push every 8 hours  sodium chloride 0.9%. 1000 milliLiter(s) (10 mL/Hr) IV Continuous <Continuous>  spironolactone 50 milliGRAM(s) Oral every 12 hours  torsemide 20 milliGRAM(s) Oral daily      Physical Exam  General: Patient comfortable in bed   Vital Signs Last 12 Hrs  T(F): 97.3 (01-30-24 @ 05:18), Max: 97.3 (01-30-24 @ 05:18)  HR: 68 (01-30-24 @ 08:46) (60 - 68)  BP: 116/79 (01-30-24 @ 05:18) (116/79 - 116/79)  BP(mean): 91 (01-30-24 @ 05:18) (91 - 91)  RR: 18 (01-30-24 @ 05:18) (18 - 18)  SpO2: 99% (01-30-24 @ 08:46) (98% - 99%)    CVS: S1S2   Respiratory: No wheezing, no crepitations  GI: Abdomen soft, bowel sounds positive  Musculoskeletal:  moves all extremities  : Voiding

## 2024-01-30 NOTE — PROGRESS NOTE ADULT - PROBLEM SELECTOR PLAN 1
Will decrease Lantus to 18 u at bedtime.  Will decrease Admelog to 6 u before each meal and continue Admelog correction scale coverage. Will continue monitoring FS and Follow up.   Patient counseled for compliance with consistent low carb diet.  DC plan  Suggest decrease Januvia 25 mg daily  Start 1 mg glimepiride in AM with breakfast  FU outpatient

## 2024-01-30 NOTE — PROGRESS NOTE ADULT - SUBJECTIVE AND OBJECTIVE BOX
VITAL SIGNS-Telemetry:   60-80  Vital Signs Last 24 Hrs  T(C): 36.3 (01-30-24 @ 05:18), Max: 36.8 (01-29-24 @ 11:36)  T(F): 97.3 (01-30-24 @ 05:18), Max: 98.2 (01-29-24 @ 11:36)  HR: 60 (01-30-24 @ 05:18) (60 - 71)  BP: 116/79 (01-30-24 @ 05:18) (116/79 - 123/59)  RR: 18 (01-30-24 @ 05:18) (18 - 18)  SpO2: 98% (01-30-24 @ 05:18) (94% - 99%)         01-28 @ 07:01  -  01-29 @ 07:00  --------------------------------------------------------  IN: 294.6 mL / OUT: 1950 mL / NET: -1655.4 mL    01-29 @ 07:01  -  01-30 @ 06:16  --------------------------------------------------------  IN: 750 mL / OUT: 1750 mL / NET: -1000 mL    Daily     Daily     CAPILLARY BLOOD GLUCOSE  POCT Blood Glucose.: 124 mg/dL (29 Jan 2024 21:23)  POCT Blood Glucose.: 122 mg/dL (29 Jan 2024 16:25)  POCT Blood Glucose.: 127 mg/dL (29 Jan 2024 11:55)  POCT Blood Glucose.: 101 mg/dL (29 Jan 2024 07:40)       PHYSICAL EXAM:  Neurology: alert and oriented x 3, nonfocal, no gross deficits  CV : S1S2  Sternal Wound :  CDI , Stable  Lungs: cta  Abdomen: soft, nontender, nondistended, positive bowel sounds, last bowel movement   1/28      Extremities:     +edema b/l  LLE inc cdi   no calf tenderness    acetaminophen     Tablet .. 650 milliGRAM(s) Oral every 6 hours PRN  allopurinol 100 milliGRAM(s) Oral daily  aMIOdarone    Tablet   Oral   aMIOdarone    Tablet 400 milliGRAM(s) Oral every 8 hours  aspirin enteric coated 81 milliGRAM(s) Oral daily  atorvastatin 80 milliGRAM(s) Oral at bedtime  bisacodyl Suppository 10 milliGRAM(s) Rectal once  chlorhexidine 2% Cloths 1 Application(s) Topical daily  dextrose 5%. 1000 milliLiter(s) IV Continuous <Continuous>  dextrose 5%. 1000 milliLiter(s) IV Continuous <Continuous>  dextrose 50% Injectable 50 milliLiter(s) IV Push every 15 minutes  dextrose 50% Injectable 12.5 Gram(s) IV Push once  dextrose 50% Injectable 25 milliLiter(s) IV Push every 15 minutes  dextrose 50% Injectable 25 Gram(s) IV Push once  dextrose 50% Injectable 25 Gram(s) IV Push once  dextrose Oral Gel 15 Gram(s) Oral once PRN  glucagon  Injectable 1 milliGRAM(s) IntraMuscular once  insulin glargine Injectable (LANTUS) 22 Unit(s) SubCutaneous at bedtime  insulin lispro (ADMELOG) corrective regimen sliding scale   SubCutaneous at bedtime  insulin lispro (ADMELOG) corrective regimen sliding scale   SubCutaneous three times a day before meals  insulin lispro Injectable (ADMELOG) 7 Unit(s) SubCutaneous three times a day before meals  oxyCODONE    IR 5 milliGRAM(s) Oral every 4 hours PRN  oxyCODONE    IR 10 milliGRAM(s) Oral every 4 hours PRN  pantoprazole    Tablet 40 milliGRAM(s) Oral before breakfast  polyethylene glycol 3350 17 Gram(s) Oral daily  senna 2 Tablet(s) Oral at bedtime  sodium chloride 0.9% lock flush 3 milliLiter(s) IV Push every 8 hours  sodium chloride 0.9%. 1000 milliLiter(s) IV Continuous <Continuous>    Physical Therapy Rec:   Home  [ x ]   Home w/ PT  [  ]  Rehab  [  ]  Discussed with Cardiothoracic Team at AM rounds. VITAL SIGNS-Telemetry:   60-80  Vital Signs Last 24 Hrs  T(C): 36.3 (01-30-24 @ 05:18), Max: 36.8 (01-29-24 @ 11:36)  T(F): 97.3 (01-30-24 @ 05:18), Max: 98.2 (01-29-24 @ 11:36)  HR: 60 (01-30-24 @ 05:18) (60 - 71)  BP: 116/79 (01-30-24 @ 05:18) (116/79 - 123/59)  RR: 18 (01-30-24 @ 05:18) (18 - 18)  SpO2: 98% (01-30-24 @ 05:18) (94% - 99%)         01-28 @ 07:01  -  01-29 @ 07:00  --------------------------------------------------------  IN: 294.6 mL / OUT: 1950 mL / NET: -1655.4 mL    01-29 @ 07:01  -  01-30 @ 06:16  --------------------------------------------------------  IN: 750 mL / OUT: 1750 mL / NET: -1000 mL    Daily     Daily     CAPILLARY BLOOD GLUCOSE  POCT Blood Glucose.: 124 mg/dL (29 Jan 2024 21:23)  POCT Blood Glucose.: 122 mg/dL (29 Jan 2024 16:25)  POCT Blood Glucose.: 127 mg/dL (29 Jan 2024 11:55)  POCT Blood Glucose.: 101 mg/dL (29 Jan 2024 07:40)       PHYSICAL EXAM:  Neurology: alert and oriented x 3, nonfocal, no gross deficits  CV : S1S2  Sternal Wound :  CDI , Stable  Lungs: diminshed bs bases  Abdomen: soft, nontender, nondistended, positive bowel sounds, last bowel movement   1/28      Extremities:     ++edema b/l  LLE inc cdi   no calf tenderness    acetaminophen     Tablet .. 650 milliGRAM(s) Oral every 6 hours PRN  allopurinol 100 milliGRAM(s) Oral daily  aMIOdarone    Tablet   Oral   aMIOdarone    Tablet 400 milliGRAM(s) Oral every 8 hours  aspirin enteric coated 81 milliGRAM(s) Oral daily  atorvastatin 80 milliGRAM(s) Oral at bedtime  bisacodyl Suppository 10 milliGRAM(s) Rectal once  chlorhexidine 2% Cloths 1 Application(s) Topical daily  dextrose 5%. 1000 milliLiter(s) IV Continuous <Continuous>  dextrose 5%. 1000 milliLiter(s) IV Continuous <Continuous>  dextrose 50% Injectable 50 milliLiter(s) IV Push every 15 minutes  dextrose 50% Injectable 12.5 Gram(s) IV Push once  dextrose 50% Injectable 25 milliLiter(s) IV Push every 15 minutes  dextrose 50% Injectable 25 Gram(s) IV Push once  dextrose 50% Injectable 25 Gram(s) IV Push once  dextrose Oral Gel 15 Gram(s) Oral once PRN  glucagon  Injectable 1 milliGRAM(s) IntraMuscular once  insulin glargine Injectable (LANTUS) 22 Unit(s) SubCutaneous at bedtime  insulin lispro (ADMELOG) corrective regimen sliding scale   SubCutaneous at bedtime  insulin lispro (ADMELOG) corrective regimen sliding scale   SubCutaneous three times a day before meals  insulin lispro Injectable (ADMELOG) 7 Unit(s) SubCutaneous three times a day before meals  oxyCODONE    IR 5 milliGRAM(s) Oral every 4 hours PRN  oxyCODONE    IR 10 milliGRAM(s) Oral every 4 hours PRN  pantoprazole    Tablet 40 milliGRAM(s) Oral before breakfast  polyethylene glycol 3350 17 Gram(s) Oral daily  senna 2 Tablet(s) Oral at bedtime  sodium chloride 0.9% lock flush 3 milliLiter(s) IV Push every 8 hours  sodium chloride 0.9%. 1000 milliLiter(s) IV Continuous <Continuous>    Physical Therapy Rec:   Home  [ x ]   Home w/ PT  [  ]  Rehab  [  ]  Discussed with Cardiothoracic Team at AM rounds.

## 2024-01-30 NOTE — PROGRESS NOTE ADULT - ASSESSMENT
Assessment  DMT2: 73y Female with DM T2 with hyperglycemia, A1C 7% , was on oral meds at home, was using trulicity prior, on basal bolus insulin with coverage, insulin dose adjusted,  sugars are down trending .  Will need tight glycemic control for postop wound healing.   CAD: on medications, stable, monitored. s/p CABG/ MVR   HTN: on antihypertensive medications, monitored, asymptomatic.  Obesity: No strict exercise routines, not on any weight loss program, neither on low calorie diet.      Discussed plan and management with Dr Chika Perry NP - TEAMS  Fay Farr MD  Cell: [phone]  Office: [phone]            Assessment  DMT2: 73y Female with DM T2 with hyperglycemia, A1C 7% , was on oral meds at home, was using trulicity prior, on basal bolus  insulin with coverage, insulin dose adjusted,  sugars are down trending .  Will need tight glycemic control for postop wound healing.   CAD: on medications, stable, monitored. s/p CABG/ MVR   HTN: on antihypertensive medications, monitored, asymptomatic.  Obesity: No strict exercise routines, not on any weight loss program, neither on low calorie diet.      Discussed plan and management with Dr Chika Perry NP - TEAMS  Fay Farr MD  Cell: [phone]  Office: [phone]

## 2024-01-30 NOTE — PROGRESS NOTE ADULT - PROBLEM SELECTOR PLAN 1
Renal following  trend bmp  strict I & O's  avoid nephrotoxic agents  d/c diuretics this am 1/29 as per Dr. Dalal  ck echo today Renal following-Dr. Nieves  trend bmp  strict I & O's  avoid nephrotoxic agents  1/30 diuretics resumed per Dr. Dalal- creatinine down to 1.7 this am  ck echo today

## 2024-01-30 NOTE — PROGRESS NOTE ADULT - ASSESSMENT
73F w/ PMH morbid obesity (BMI 42.7 s/p gastric sleeve ), DMT2, CAD s/p PCI of LCX (), LEIDY (on CPAP), CKD (stage 3 - on farxiga), AF (on Eliquis) and rheumatic mitral valve disease reports c/o chronic BARBOZA for over 10 years that has progressively worsened over the past few months with the feeling of an "elephant on my chest." Her symptoms improved after starting Lasix daily. She gets dizzy when she bends over or when she stands up too fast. She denies CP or weight gain. She sleeps with 3 pillows on an incline at night. She cannot lay flat due to comfort as well as orthopnea. She can only walk one block before she has to stop to rest due to SOB. Pt now presents to PST for scheduled mitral valve replacement, maze procedure and left atrial appendage ligation with Dr. Dalal on 24.  s/p  MVR (t)/ C1V/ Maze/ RICHARD clip  post extubated / inotropic support  EP consulted for underlying junctional rhythm; no av nodals at this time; + epicardial pw; maintain    tx sdu; VSS; afib 60-70 on  2.5; maintain med ct's; diuresis as per renal; endo consulted for diabetic management  keep npo after midnight sat into sun for possible ppm as per EP; no av nodals at this time  24 VSS on  2.5 EP following for ?ppm   VSS NPO for PPM for aflutter w/ chb  - s/p PPM placement; tx floor   VSS: AV Paced 60-80; d/c  as per Dr. Dalla; bun/cr increased today --> d/c diuretics and ck echo as per Dr. Dalal  pa/lat chest xray s/p PPM ; d/w pw   wean O2 as tolerated; sugical bra    VSS s/p ppm site cdi diuretics d/c'd yesterday- resume Eliquis?  will d/w Dr. Dalal.  discharge planning- home when stable ?wednesday/ Thursday    73F w/ PMH morbid obesity (BMI 42.7 s/p gastric sleeve ), DMT2, CAD s/p PCI of LCX (), LEIDY (on CPAP), CKD (stage 3 - on farxiga), AF (on Eliquis) and rheumatic mitral valve disease reports c/o chronic BARBOZA for over 10 years that has progressively worsened over the past few months with the feeling of an "elephant on my chest." Her symptoms improved after starting Lasix daily. She gets dizzy when she bends over or when she stands up too fast. She denies CP or weight gain. She sleeps with 3 pillows on an incline at night. She cannot lay flat due to comfort as well as orthopnea. She can only walk one block before she has to stop to rest due to SOB. Pt now presents to PST for scheduled mitral valve replacement, maze procedure and left atrial appendage ligation with Dr. Dalal on 24.  s/p  MVR (t)/ C1V/ Maze/ RICHARD clip  post extubated / inotropic support  EP consulted for underlying junctional rhythm; no av nodals at this time; + epicardial pw; maintain    tx sdu; VSS; afib 60-70 on  2.5; maintain med ct's; diuresis as per renal; endo consulted for diabetic management  keep npo after midnight sat into sun for possible ppm as per EP; no av nodals at this time  24 VSS on  2.5 EP following for ?ppm   VSS NPO for PPM for aflutter w/ chb  - s/p PPM placement; tx floor   VSS: AV Paced 60-80; d/c  as per Dr. Dalal; bun/cr increased today --> d/c diuretics and ck echo as per Dr. Dalal  pa/lat chest xray s/p PPM ; d/w pw   wean O2 as tolerated; sugical bra    VSS s/p ppm site cdi diuretics d/c'd yesterday- rounds made w/ Dr. Dalal - creatinine down to 1.7 - torsemide 20mg po qd & aldactone 50 bid resumed Dr. Nieves, renal following pt. discharge planning- home when stable Thursday

## 2024-01-31 DIAGNOSIS — I25.10 ATHEROSCLEROTIC HEART DISEASE OF NATIVE CORONARY ARTERY WITHOUT ANGINA PECTORIS: ICD-10-CM

## 2024-01-31 LAB
ANION GAP SERPL CALC-SCNC: 7 MMOL/L — SIGNIFICANT CHANGE UP (ref 5–17)
BUN SERPL-MCNC: 30 MG/DL — HIGH (ref 7–23)
CALCIUM SERPL-MCNC: 10.3 MG/DL — SIGNIFICANT CHANGE UP (ref 8.4–10.5)
CHLORIDE SERPL-SCNC: 104 MMOL/L — SIGNIFICANT CHANGE UP (ref 96–108)
CO2 SERPL-SCNC: 28 MMOL/L — SIGNIFICANT CHANGE UP (ref 22–31)
CREAT SERPL-MCNC: 1.62 MG/DL — HIGH (ref 0.5–1.3)
EGFR: 34 ML/MIN/1.73M2 — LOW
GLUCOSE SERPL-MCNC: 84 MG/DL — SIGNIFICANT CHANGE UP (ref 70–99)
HCT VFR BLD CALC: 28.5 % — LOW (ref 34.5–45)
HGB BLD-MCNC: 8.9 G/DL — LOW (ref 11.5–15.5)
MCHC RBC-ENTMCNC: 31.1 PG — SIGNIFICANT CHANGE UP (ref 27–34)
MCHC RBC-ENTMCNC: 31.2 GM/DL — LOW (ref 32–36)
MCV RBC AUTO: 99.7 FL — SIGNIFICANT CHANGE UP (ref 80–100)
NRBC # BLD: 0 /100 WBCS — SIGNIFICANT CHANGE UP (ref 0–0)
PLATELET # BLD AUTO: 158 K/UL — SIGNIFICANT CHANGE UP (ref 150–400)
POTASSIUM SERPL-MCNC: 4.7 MMOL/L — SIGNIFICANT CHANGE UP (ref 3.5–5.3)
POTASSIUM SERPL-SCNC: 4.7 MMOL/L — SIGNIFICANT CHANGE UP (ref 3.5–5.3)
RBC # BLD: 2.86 M/UL — LOW (ref 3.8–5.2)
RBC # FLD: 15.3 % — HIGH (ref 10.3–14.5)
SODIUM SERPL-SCNC: 139 MMOL/L — SIGNIFICANT CHANGE UP (ref 135–145)
WBC # BLD: 7.62 K/UL — SIGNIFICANT CHANGE UP (ref 3.8–10.5)
WBC # FLD AUTO: 7.62 K/UL — SIGNIFICANT CHANGE UP (ref 3.8–10.5)

## 2024-01-31 PROCEDURE — 71046 X-RAY EXAM CHEST 2 VIEWS: CPT | Mod: 26

## 2024-01-31 PROCEDURE — 71045 X-RAY EXAM CHEST 1 VIEW: CPT | Mod: 26,XE

## 2024-01-31 RX ORDER — SPIRONOLACTONE 25 MG/1
50 TABLET, FILM COATED ORAL
Refills: 0 | Status: DISCONTINUED | OUTPATIENT
Start: 2024-01-31 | End: 2024-02-02

## 2024-01-31 RX ORDER — OXYCODONE HYDROCHLORIDE 5 MG/1
10 TABLET ORAL EVERY 4 HOURS
Refills: 0 | Status: DISCONTINUED | OUTPATIENT
Start: 2024-01-31 | End: 2024-02-07

## 2024-01-31 RX ORDER — INSULIN LISPRO 100/ML
4 VIAL (ML) SUBCUTANEOUS
Refills: 0 | Status: DISCONTINUED | OUTPATIENT
Start: 2024-01-31 | End: 2024-02-08

## 2024-01-31 RX ORDER — INSULIN GLARGINE 100 [IU]/ML
15 INJECTION, SOLUTION SUBCUTANEOUS AT BEDTIME
Refills: 0 | Status: DISCONTINUED | OUTPATIENT
Start: 2024-01-31 | End: 2024-02-04

## 2024-01-31 RX ORDER — ERYTHROPOIETIN 10000 [IU]/ML
10000 INJECTION, SOLUTION INTRAVENOUS; SUBCUTANEOUS ONCE
Refills: 0 | Status: COMPLETED | OUTPATIENT
Start: 2024-01-31 | End: 2024-01-31

## 2024-01-31 RX ORDER — OXYCODONE HYDROCHLORIDE 5 MG/1
5 TABLET ORAL EVERY 4 HOURS
Refills: 0 | Status: DISCONTINUED | OUTPATIENT
Start: 2024-01-31 | End: 2024-02-07

## 2024-01-31 RX ADMIN — OXYCODONE HYDROCHLORIDE 10 MILLIGRAM(S): 5 TABLET ORAL at 12:50

## 2024-01-31 RX ADMIN — OXYCODONE HYDROCHLORIDE 10 MILLIGRAM(S): 5 TABLET ORAL at 21:19

## 2024-01-31 RX ADMIN — SODIUM CHLORIDE 3 MILLILITER(S): 9 INJECTION INTRAMUSCULAR; INTRAVENOUS; SUBCUTANEOUS at 13:05

## 2024-01-31 RX ADMIN — AMIODARONE HYDROCHLORIDE 400 MILLIGRAM(S): 400 TABLET ORAL at 13:12

## 2024-01-31 RX ADMIN — SPIRONOLACTONE 50 MILLIGRAM(S): 25 TABLET, FILM COATED ORAL at 05:47

## 2024-01-31 RX ADMIN — AMIODARONE HYDROCHLORIDE 400 MILLIGRAM(S): 400 TABLET ORAL at 21:20

## 2024-01-31 RX ADMIN — OXYCODONE HYDROCHLORIDE 10 MILLIGRAM(S): 5 TABLET ORAL at 17:58

## 2024-01-31 RX ADMIN — OXYCODONE HYDROCHLORIDE 10 MILLIGRAM(S): 5 TABLET ORAL at 18:30

## 2024-01-31 RX ADMIN — Medication 6 UNIT(S): at 08:10

## 2024-01-31 RX ADMIN — INSULIN GLARGINE 15 UNIT(S): 100 INJECTION, SOLUTION SUBCUTANEOUS at 22:18

## 2024-01-31 RX ADMIN — OXYCODONE HYDROCHLORIDE 10 MILLIGRAM(S): 5 TABLET ORAL at 21:53

## 2024-01-31 RX ADMIN — SODIUM CHLORIDE 3 MILLILITER(S): 9 INJECTION INTRAMUSCULAR; INTRAVENOUS; SUBCUTANEOUS at 05:14

## 2024-01-31 RX ADMIN — ERYTHROPOIETIN 10000 UNIT(S): 10000 INJECTION, SOLUTION INTRAVENOUS; SUBCUTANEOUS at 13:16

## 2024-01-31 RX ADMIN — OXYCODONE HYDROCHLORIDE 10 MILLIGRAM(S): 5 TABLET ORAL at 06:30

## 2024-01-31 RX ADMIN — PANTOPRAZOLE SODIUM 40 MILLIGRAM(S): 20 TABLET, DELAYED RELEASE ORAL at 05:47

## 2024-01-31 RX ADMIN — AMIODARONE HYDROCHLORIDE 400 MILLIGRAM(S): 400 TABLET ORAL at 05:46

## 2024-01-31 RX ADMIN — Medication 6 UNIT(S): at 12:29

## 2024-01-31 RX ADMIN — OXYCODONE HYDROCHLORIDE 10 MILLIGRAM(S): 5 TABLET ORAL at 12:20

## 2024-01-31 RX ADMIN — CHLORHEXIDINE GLUCONATE 1 APPLICATION(S): 213 SOLUTION TOPICAL at 12:28

## 2024-01-31 RX ADMIN — SPIRONOLACTONE 50 MILLIGRAM(S): 25 TABLET, FILM COATED ORAL at 21:20

## 2024-01-31 RX ADMIN — OXYCODONE HYDROCHLORIDE 10 MILLIGRAM(S): 5 TABLET ORAL at 05:56

## 2024-01-31 RX ADMIN — ATORVASTATIN CALCIUM 80 MILLIGRAM(S): 80 TABLET, FILM COATED ORAL at 21:20

## 2024-01-31 RX ADMIN — Medication 20 MILLIGRAM(S): at 05:47

## 2024-01-31 RX ADMIN — Medication 81 MILLIGRAM(S): at 12:20

## 2024-01-31 RX ADMIN — SENNA PLUS 2 TABLET(S): 8.6 TABLET ORAL at 21:20

## 2024-01-31 RX ADMIN — SODIUM CHLORIDE 3 MILLILITER(S): 9 INJECTION INTRAMUSCULAR; INTRAVENOUS; SUBCUTANEOUS at 21:23

## 2024-01-31 RX ADMIN — Medication 100 MILLIGRAM(S): at 12:20

## 2024-01-31 RX ADMIN — Medication 20 MILLIGRAM(S): at 21:20

## 2024-01-31 NOTE — PROGRESS NOTE ADULT - SUBJECTIVE AND OBJECTIVE BOX
NEPHROLOGY-[facility] [phone]        Patient seen and examined in bed.  She was the same   SOB with exertion         MEDICATIONS  (STANDING):  allopurinol 100 milliGRAM(s) Oral daily  aMIOdarone    Tablet   Oral   aMIOdarone    Tablet 400 milliGRAM(s) Oral every 8 hours  aspirin enteric coated 81 milliGRAM(s) Oral daily  atorvastatin 80 milliGRAM(s) Oral at bedtime  bisacodyl Suppository 10 milliGRAM(s) Rectal once  chlorhexidine 2% Cloths 1 Application(s) Topical daily  dextrose 5%. 1000 milliLiter(s) (100 mL/Hr) IV Continuous <Continuous>  dextrose 5%. 1000 milliLiter(s) (50 mL/Hr) IV Continuous <Continuous>  dextrose 50% Injectable 25 milliLiter(s) IV Push every 15 minutes  dextrose 50% Injectable 50 milliLiter(s) IV Push every 15 minutes  dextrose 50% Injectable 12.5 Gram(s) IV Push once  dextrose 50% Injectable 25 Gram(s) IV Push once  dextrose 50% Injectable 25 Gram(s) IV Push once  glucagon  Injectable 1 milliGRAM(s) IntraMuscular once  insulin glargine Injectable (LANTUS) 18 Unit(s) SubCutaneous at bedtime  insulin lispro (ADMELOG) corrective regimen sliding scale   SubCutaneous at bedtime  insulin lispro (ADMELOG) corrective regimen sliding scale   SubCutaneous three times a day before meals  insulin lispro Injectable (ADMELOG) 6 Unit(s) SubCutaneous three times a day before meals  pantoprazole    Tablet 40 milliGRAM(s) Oral before breakfast  polyethylene glycol 3350 17 Gram(s) Oral daily  senna 2 Tablet(s) Oral at bedtime  sodium chloride 0.9% lock flush 3 milliLiter(s) IV Push every 8 hours  sodium chloride 0.9%. 1000 milliLiter(s) (10 mL/Hr) IV Continuous <Continuous>  spironolactone 50 milliGRAM(s) Oral every 12 hours  torsemide 20 milliGRAM(s) Oral daily      VITAL:  T(C): , Max: 36.6 (01-30-24 @ 19:38)  T(F): , Max: 97.9 (01-30-24 @ 19:38)  HR: 62 (01-31-24 @ 06:10)  BP: 122/68 (01-31-24 @ 04:46)  BP(mean): 86 (01-31-24 @ 04:46)  RR: 18 (01-31-24 @ 04:46)  SpO2: 97% (01-31-24 @ 06:10)  Wt(kg): --    I and O's:    01-30 @ 07:01  -  01-31 @ 07:00  --------------------------------------------------------  IN: 520 mL / OUT: 1625 mL / NET: -1105 mL          PHYSICAL EXAM:    Constitutional: NAD; obese   Neck:  No JVD  Respiratory: CTAB/L  Cardiovascular: S1 and S2  Gastrointestinal: BS+, soft, NT/ND  Extremities: No peripheral edema  Neurological: A/O x 3, no focal deficits  Psychiatric: Normal mood, normal affect  : No Diallo  Skin: No rashes  Access: Not applicable    LABS:                        8.9    7.62  )-----------( 158      ( 31 Jan 2024 05:05 )             28.5     01-31    139  |  104  |  30<H>  ----------------------------<  84  4.7   |  28  |  1.62<H>    Ca    10.3      31 Jan 2024 05:05            Urine Studies:  Urinalysis Basic - ( 31 Jan 2024 05:05 )    Color: x / Appearance: x / SG: x / pH: x  Gluc: 84 mg/dL / Ketone: x  / Bili: x / Urobili: x   Blood: x / Protein: x / Nitrite: x   Leuk Esterase: x / RBC: x / WBC x   Sq Epi: x / Non Sq Epi: x / Bacteria: x            RADIOLOGY & ADDITIONAL STUDIES:

## 2024-01-31 NOTE — PROGRESS NOTE ADULT - PROBLEM SELECTOR PLAN 1
Will decrease Lantus to 15 u at bedtime.  Will decrease Admelog to 4 u before each meal and continue Admelog correction scale coverage. Will continue monitoring FS and Follow up.   Patient counseled for compliance with consistent low carb diet.  DC plan  Has CKD, decreased eGFR  Suggest decrease Januvia 25 mg daily  Start 1 mg glimepiride in AM with breakfast  FU outpatient

## 2024-01-31 NOTE — PROGRESS NOTE ADULT - PROBLEM SELECTOR PLAN 3
s/p 1/23 MVR(t)/ C1V   continue postop care  continue asa and statin  PPM 1/28  pulm toilet  pain management  wean O2 as tolerated  surgical bra   increase activity as tolerated   discharge planning- home when stable

## 2024-01-31 NOTE — PROGRESS NOTE ADULT - ASSESSMENT
Assessment  DMT2: 73y Female with DM T2 with hyperglycemia, A1C 7% , was on oral meds at home, was using trulicity prior, on basal bolus  insulin with coverage, insulin dose adjusted,  sugars are down trending  Will need tight glycemic control for postop wound healing.   CAD: on medications, stable, monitored. s/p CABG/ MVR   HTN: on antihypertensive medications, monitored, asymptomatic.  Obesity: No strict exercise routines, not on any weight loss program, neither on low calorie diet.      Discussed plan and management with Dr Chika Perry NP - TEAMS  Fay Farr MD  Cell: [phone]  Office: [phone]            Assessment  DMT2: 73y Female with DM T2 with hyperglycemia, A1C 7% , was on oral meds at home, was using trulicity prior, on basal bolus  insulin  with coverage, insulin dose adjusted,  sugars are down trending  Will need tight glycemic control for postop wound healing.   CAD: on medications, stable, monitored. s/p CABG/ MVR   HTN: on antihypertensive medications, monitored, asymptomatic.  Obesity: No strict exercise routines, not on any weight loss program, neither on low calorie diet.      Discussed plan and management with Dr Chika Perry NP - TEAMS  Fay Farr MD  Cell: [phone]  Office: [phone]

## 2024-01-31 NOTE — PROGRESS NOTE ADULT - SUBJECTIVE AND OBJECTIVE BOX
Patient discussed on morning rounds with Dr. Dalal     Operation / Date:  1/23: MVR-t, C1V, MAZE, RICHARD clip EF NL     SUBJECTIVE ASSESSMENT:  74y Female seen and examined. Reports breathing is improved since yesterday, using IS pulling 500cc, tolerating PO Diet.  Denies fever, chest pain, palpitations, SOB.         Vital Signs Last 24 Hrs  T(C): 36.4 (31 Jan 2024 04:46), Max: 36.6 (30 Jan 2024 19:38)  T(F): 97.5 (31 Jan 2024 04:46), Max: 97.9 (30 Jan 2024 19:38)  HR: 60 (31 Jan 2024 10:46) (58 - 70)  BP: 132/74 (31 Jan 2024 10:46) (122/68 - 132/74)  BP(mean): 86 (31 Jan 2024 04:46) (86 - 94)  RR: 18 (31 Jan 2024 10:46) (18 - 18)  SpO2: 97% (31 Jan 2024 10:46) (94% - 99%)    Parameters below as of 31 Jan 2024 10:46  Patient On (Oxygen Delivery Method): nasal cannula  O2 Flow (L/min): 3    I&O's Detail    30 Jan 2024 07:01  -  31 Jan 2024 07:00  --------------------------------------------------------  IN:    Oral Fluid: 520 mL  Total IN: 520 mL    OUT:    Voided (mL): 1625 mL  Total OUT: 1625 mL    Total NET: -1105 mL    PHYSICAL EXAM:    General: Patient lying comfortably in bed, no acute distress, obese     Neurological: Alert and oriented. No focal neurological deficits     Cardiovascular: S1S2, rrr    Respiratory: decreased bilaterally, no wheeze/rhonchi/rales    Gastrointestinal: + BS, soft, non tender, non distended     Extremities: Warm and well perfused. 1+ b/L LE edema, no calf tenderness     Vascular:  palpable peripheral pulses b/l     Incision Sites: MSI: clean, no signs of infection/dehiscence/click. B/l legs wrapped     LABS:                        8.9    7.62  )-----------( 158      ( 31 Jan 2024 05:05 )             28.5       COUMADIN:          01-31    139  |  104  |  30<H>  ----------------------------<  84  4.7   |  28  |  1.62<H>    Ca    10.3      31 Jan 2024 05:05        Urinalysis Basic - ( 31 Jan 2024 05:05 )    Color: x / Appearance: x / SG: x / pH: x  Gluc: 84 mg/dL / Ketone: x  / Bili: x / Urobili: x   Blood: x / Protein: x / Nitrite: x   Leuk Esterase: x / RBC: x / WBC x   Sq Epi: x / Non Sq Epi: x / Bacteria: x        MEDICATIONS  (STANDING):  allopurinol 100 milliGRAM(s) Oral daily  aMIOdarone    Tablet   Oral   aMIOdarone    Tablet 400 milliGRAM(s) Oral every 8 hours  aspirin enteric coated 81 milliGRAM(s) Oral daily  atorvastatin 80 milliGRAM(s) Oral at bedtime  bisacodyl Suppository 10 milliGRAM(s) Rectal once  chlorhexidine 2% Cloths 1 Application(s) Topical daily  dextrose 5%. 1000 milliLiter(s) (50 mL/Hr) IV Continuous <Continuous>  dextrose 5%. 1000 milliLiter(s) (100 mL/Hr) IV Continuous <Continuous>  dextrose 50% Injectable 50 milliLiter(s) IV Push every 15 minutes  dextrose 50% Injectable 25 milliLiter(s) IV Push every 15 minutes  dextrose 50% Injectable 25 Gram(s) IV Push once  dextrose 50% Injectable 12.5 Gram(s) IV Push once  dextrose 50% Injectable 25 Gram(s) IV Push once  epoetin eve (EPOGEN) Injectable 78012 Unit(s) SubCutaneous once  glucagon  Injectable 1 milliGRAM(s) IntraMuscular once  insulin glargine Injectable (LANTUS) 18 Unit(s) SubCutaneous at bedtime  insulin lispro (ADMELOG) corrective regimen sliding scale   SubCutaneous three times a day before meals  insulin lispro (ADMELOG) corrective regimen sliding scale   SubCutaneous at bedtime  insulin lispro Injectable (ADMELOG) 6 Unit(s) SubCutaneous three times a day before meals  metolazone 5 milliGRAM(s) Oral once  pantoprazole    Tablet 40 milliGRAM(s) Oral before breakfast  polyethylene glycol 3350 17 Gram(s) Oral daily  senna 2 Tablet(s) Oral at bedtime  sodium chloride 0.9% lock flush 3 milliLiter(s) IV Push every 8 hours  sodium chloride 0.9%. 1000 milliLiter(s) (10 mL/Hr) IV Continuous <Continuous>  spironolactone 50 milliGRAM(s) Oral every 12 hours  torsemide 20 milliGRAM(s) Oral daily    MEDICATIONS  (PRN):  acetaminophen     Tablet .. 650 milliGRAM(s) Oral every 6 hours PRN Mild Pain (1 - 3)  dextrose Oral Gel 15 Gram(s) Oral once PRN Blood Glucose LESS THAN 70 milliGRAM(s)/deciliter  oxyCODONE    IR 5 milliGRAM(s) Oral every 4 hours PRN Moderate Pain (4 - 6)  oxyCODONE    IR 10 milliGRAM(s) Oral every 4 hours PRN Severe Pain (7 - 10)        RADIOLOGY & ADDITIONAL TESTS:

## 2024-01-31 NOTE — PROGRESS NOTE ADULT - PROBLEM SELECTOR PLAN 1
Renal following-Dr. Nieves  trend bmp  strict I & O's  avoid nephrotoxic agents  1/30 diuretics resumed per Dr. Dalal- creatinine down to 1.6, negative 1.8L last 24 hours   echo negative for effusion 1/29

## 2024-01-31 NOTE — PROGRESS NOTE ADULT - ASSESSMENT
73 year old female with PMH morbid obesity (BMI 42.7 s/p gastric sleeve 2015), DMT2, CAD s/p PCI of LCX (2012), LEIDY (on CPAP), CKD (stage 3 - on farxiga), AF (on Eliquis) and rheumatic mitral valve disease reports c/o chronic BARBOZA  1/23/24 SP Mitral valve replacement;  Hybrid Maze procedure Clipping, left atrial appendage and CABG, using 1 vein graft    CKD stage 3a        1 Renal - Creatinine @ baseline more in the 1.6-1.7 range;  renal fxn remains stable.   No need for renal sono   Torsemide started and one dose of zaroxolyn for synergy   2 Endo-Farxiga and ARB are both held and to restarted as outpt   3 CVS-Off bblockers ;  Not on pressors ; BP acceptable  4 Pulm-Nasal canula ;  CXR ordered to assess congestion   5 EPS -SP PPM placement   6 Anemia- Epogen for anemia management     Sayed [facility]   [phone]

## 2024-01-31 NOTE — PROGRESS NOTE ADULT - ASSESSMENT
73F w/ PMH morbid obesity (BMI 42.7 s/p gastric sleeve ), DMT2, CAD s/p PCI of LCX (), LEIDY (on CPAP), CKD (stage 3 - on farxiga), AF (on Eliquis) and rheumatic mitral valve disease reports c/o chronic BARBOZA for over 10 years that has progressively worsened over the past few months with the feeling of an "elephant on my chest." Her symptoms improved after starting Lasix daily. She gets dizzy when she bends over or when she stands up too fast. She denies CP or weight gain. She sleeps with 3 pillows on an incline at night. She cannot lay flat due to comfort as well as orthopnea. She can only walk one block before she has to stop to rest due to SOB. Pt now presents to [facility] for scheduled mitral valve replacement, maze procedure and left atrial appendage ligation with Dr. Dalal on 24.  s/p  MVR (t)/ C1V/ Maze/ RICHARD clip  post extubated / inotropic support  EP consulted for underlying junctional rhythm; no av nodals at this time; + epicardial pw; maintain    tx sdu; VSS; afib 60-70 on  2.5; maintain med ct's; diuresis as per renal; endo consulted for diabetic management  keep npo after midnight sat into sun for possible ppm as per EP; no av nodals at this time  24 VSS on  2.5 EP following for ?ppm   VSS NPO for PPM for aflutter w/ chb  - s/p PPM placement; tx floor   VSS: AV Paced 60-80; d/c  as per Dr. Dalal; bun/cr increased today --> d/c diuretics and ck echo as per Dr. Dalal  pa/lat chest xray s/p PPM ; d/w pw   wean O2 as tolerated; sugical bra    VSS s/p ppm site cdi diuretics d/c'd yesterday- rounds made w/ Dr. Dalal - creatinine down to 1.7 - torsemide 20mg po qd & aldactone 50 bid resumed Dr. Nieves, renal following pt. discharge planning- home when stable Thursday  1/31: VSS; Cr coming down, 1.62 today, negative 1.8L, renal following, CXR PA/LAT today

## 2024-01-31 NOTE — PROGRESS NOTE ADULT - SUBJECTIVE AND OBJECTIVE BOX
Chief complaint  Patient is a 74y old  Female who presents with a chief complaint of sob (30 Jan 2024 06:16)         Labs and Fingersticks  CAPILLARY BLOOD GLUCOSE      POCT Blood Glucose.: 94 mg/dL (31 Jan 2024 12:27)  POCT Blood Glucose.: 105 mg/dL (31 Jan 2024 07:40)  POCT Blood Glucose.: 113 mg/dL (30 Jan 2024 21:58)  POCT Blood Glucose.: 149 mg/dL (30 Jan 2024 16:29)      Anion Gap: 7 (01-31 @ 05:05)  Anion Gap: 12 (01-30 @ 07:11)      Calcium: 10.3 (01-31 @ 05:05)  Calcium: 10.4 (01-30 @ 07:11)          01-31    139  |  104  |  30<H>  ----------------------------<  84  4.7   |  28  |  1.62<H>    Ca    10.3      31 Jan 2024 05:05                          8.9    7.62  )-----------( 158      ( 31 Jan 2024 05:05 )             28.5     Medications  MEDICATIONS  (STANDING):  allopurinol 100 milliGRAM(s) Oral daily  aMIOdarone    Tablet   Oral   aMIOdarone    Tablet 400 milliGRAM(s) Oral every 8 hours  aspirin enteric coated 81 milliGRAM(s) Oral daily  atorvastatin 80 milliGRAM(s) Oral at bedtime  bisacodyl Suppository 10 milliGRAM(s) Rectal once  chlorhexidine 2% Cloths 1 Application(s) Topical daily  dextrose 5%. 1000 milliLiter(s) (50 mL/Hr) IV Continuous <Continuous>  dextrose 5%. 1000 milliLiter(s) (100 mL/Hr) IV Continuous <Continuous>  dextrose 50% Injectable 25 Gram(s) IV Push once  dextrose 50% Injectable 12.5 Gram(s) IV Push once  dextrose 50% Injectable 25 milliLiter(s) IV Push every 15 minutes  dextrose 50% Injectable 25 Gram(s) IV Push once  dextrose 50% Injectable 50 milliLiter(s) IV Push every 15 minutes  glucagon  Injectable 1 milliGRAM(s) IntraMuscular once  insulin glargine Injectable (LANTUS) 15 Unit(s) SubCutaneous at bedtime  insulin lispro (ADMELOG) corrective regimen sliding scale   SubCutaneous three times a day before meals  insulin lispro (ADMELOG) corrective regimen sliding scale   SubCutaneous at bedtime  insulin lispro Injectable (ADMELOG) 4 Unit(s) SubCutaneous three times a day before meals  pantoprazole    Tablet 40 milliGRAM(s) Oral before breakfast  polyethylene glycol 3350 17 Gram(s) Oral daily  senna 2 Tablet(s) Oral at bedtime  sodium chloride 0.9% lock flush 3 milliLiter(s) IV Push every 8 hours  sodium chloride 0.9%. 1000 milliLiter(s) (10 mL/Hr) IV Continuous <Continuous>  spironolactone 50 milliGRAM(s) Oral every 12 hours  torsemide 20 milliGRAM(s) Oral daily      Physical Exam  General: Patient comfortable in bed   Vital Signs Last 12 Hrs  T(F): 97.5 (01-31-24 @ 04:46), Max: 97.5 (01-31-24 @ 04:46)  HR: 60 (01-31-24 @ 10:46) (59 - 62)  BP: 132/74 (01-31-24 @ 10:46) (122/68 - 132/74)  BP(mean): 86 (01-31-24 @ 04:46) (86 - 86)  RR: 18 (01-31-24 @ 10:46) (18 - 18)  SpO2: 97% (01-31-24 @ 10:46) (94% - 99%)    CVS: S1S2   Respiratory: No wheezing, no crepitations  GI: Abdomen soft, bowel sounds positive  Musculoskeletal:  moves all extremities       Chief complaint  Patient is a 74y old  Female who presents with a chief complaint of sob (30 Jan 2024 06:16)         Labs and Fingersticks  CAPILLARY BLOOD GLUCOSE      POCT Blood Glucose.: 94 mg/dL (31 Jan 2024 12:27)  POCT Blood Glucose.: 105 mg/dL (31 Jan 2024 07:40)  POCT Blood Glucose.: 113 mg/dL (30 Jan 2024 21:58)  POCT Blood Glucose.: 149 mg/dL (30 Jan 2024 16:29)      Anion Gap: 7 (01-31 @ 05:05)  Anion Gap: 12 (01-30 @ 07:11)      Calcium: 10.3 (01-31 @ 05:05)  Calcium: 10.4 (01-30 @ 07:11)          01-31    139  |  104  |  30<H>  ----------------------------<  84  4.7   |  28  |  1.62<H>    Ca    10.3      31 Jan 2024 05:05                          8.9    7.62  )-----------( 158      ( 31 Jan 2024 05:05 )             28.5     Medications  MEDICATIONS  (STANDING):  allopurinol 100 milliGRAM(s) Oral daily  aMIOdarone    Tablet   Oral   aMIOdarone    Tablet 400 milliGRAM(s) Oral every 8 hours  aspirin enteric coated 81 milliGRAM(s) Oral daily  atorvastatin 80 milliGRAM(s) Oral at bedtime  bisacodyl Suppository 10 milliGRAM(s) Rectal once  chlorhexidine 2% Cloths 1 Application(s) Topical daily  dextrose 5%. 1000 milliLiter(s) (50 mL/Hr) IV Continuous <Continuous>  dextrose 5%. 1000 milliLiter(s) (100 mL/Hr) IV Continuous <Continuous>  dextrose 50% Injectable 25 Gram(s) IV Push once  dextrose 50% Injectable 12.5 Gram(s) IV Push once  dextrose 50% Injectable 25 milliLiter(s) IV Push every 15 minutes  dextrose 50% Injectable 25 Gram(s) IV Push once  dextrose 50% Injectable 50 milliLiter(s) IV Push every 15 minutes  glucagon  Injectable 1 milliGRAM(s) IntraMuscular once  insulin glargine Injectable (LANTUS) 15 Unit(s) SubCutaneous at bedtime  insulin lispro (ADMELOG) corrective regimen sliding scale   SubCutaneous three times a day before meals  insulin lispro (ADMELOG) corrective regimen sliding scale   SubCutaneous at bedtime  insulin lispro Injectable (ADMELOG) 4 Unit(s) SubCutaneous three times a day before meals  pantoprazole    Tablet 40 milliGRAM(s) Oral before breakfast  polyethylene glycol 3350 17 Gram(s) Oral daily  senna 2 Tablet(s) Oral at bedtime  sodium chloride 0.9% lock flush 3 milliLiter(s) IV Push every 8 hours  sodium chloride 0.9%. 1000 milliLiter(s) (10 mL/Hr) IV Continuous <Continuous>  spironolactone 50 milliGRAM(s) Oral every 12 hours  torsemide 20 milliGRAM(s) Oral daily      Physical Exam  General: Patient comfortable in bed   Vital Signs Last 12 Hrs  T(F): 97.5 (01-31-24 @ 04:46), Max: 97.5 (01-31-24 @ 04:46)  HR: 60 (01-31-24 @ 10:46) (59 - 62)  BP: 132/74 (01-31-24 @ 10:46) (122/68 - 132/74)  BP(mean): 86 (01-31-24 @ 04:46) (86 - 86)  RR: 18 (01-31-24 @ 10:46) (18 - 18)  SpO2: 97% (01-31-24 @ 10:46) (94% - 99%)    CVS: S1S2   Respiratory: No wheezing, no crepitations  GI: Abdomen soft, bowel sounds positive  Musculoskeletal:  moves all extremities

## 2024-02-01 LAB
ANION GAP SERPL CALC-SCNC: 13 MMOL/L — SIGNIFICANT CHANGE UP (ref 5–17)
BUN SERPL-MCNC: 32 MG/DL — HIGH (ref 7–23)
CALCIUM SERPL-MCNC: 10.3 MG/DL — SIGNIFICANT CHANGE UP (ref 8.4–10.5)
CHLORIDE SERPL-SCNC: 99 MMOL/L — SIGNIFICANT CHANGE UP (ref 96–108)
CO2 SERPL-SCNC: 31 MMOL/L — SIGNIFICANT CHANGE UP (ref 22–31)
CREAT SERPL-MCNC: 1.76 MG/DL — HIGH (ref 0.5–1.3)
EGFR: 30 ML/MIN/1.73M2 — LOW
GLUCOSE SERPL-MCNC: 96 MG/DL — SIGNIFICANT CHANGE UP (ref 70–99)
HCT VFR BLD CALC: 29.3 % — LOW (ref 34.5–45)
HGB BLD-MCNC: 9.4 G/DL — LOW (ref 11.5–15.5)
MCHC RBC-ENTMCNC: 31 PG — SIGNIFICANT CHANGE UP (ref 27–34)
MCHC RBC-ENTMCNC: 32.1 GM/DL — SIGNIFICANT CHANGE UP (ref 32–36)
MCV RBC AUTO: 96.7 FL — SIGNIFICANT CHANGE UP (ref 80–100)
NRBC # BLD: 0 /100 WBCS — SIGNIFICANT CHANGE UP (ref 0–0)
PLATELET # BLD AUTO: 169 K/UL — SIGNIFICANT CHANGE UP (ref 150–400)
POTASSIUM SERPL-MCNC: 4.3 MMOL/L — SIGNIFICANT CHANGE UP (ref 3.5–5.3)
POTASSIUM SERPL-SCNC: 4.3 MMOL/L — SIGNIFICANT CHANGE UP (ref 3.5–5.3)
RBC # BLD: 3.03 M/UL — LOW (ref 3.8–5.2)
RBC # FLD: 15.2 % — HIGH (ref 10.3–14.5)
SODIUM SERPL-SCNC: 143 MMOL/L — SIGNIFICANT CHANGE UP (ref 135–145)
WBC # BLD: 6.44 K/UL — SIGNIFICANT CHANGE UP (ref 3.8–10.5)
WBC # FLD AUTO: 6.44 K/UL — SIGNIFICANT CHANGE UP (ref 3.8–10.5)

## 2024-02-01 RX ORDER — ONDANSETRON 8 MG/1
4 TABLET, FILM COATED ORAL ONCE
Refills: 0 | Status: COMPLETED | OUTPATIENT
Start: 2024-02-01 | End: 2024-02-01

## 2024-02-01 RX ADMIN — OXYCODONE HYDROCHLORIDE 10 MILLIGRAM(S): 5 TABLET ORAL at 08:24

## 2024-02-01 RX ADMIN — SENNA PLUS 2 TABLET(S): 8.6 TABLET ORAL at 21:55

## 2024-02-01 RX ADMIN — AMIODARONE HYDROCHLORIDE 400 MILLIGRAM(S): 400 TABLET ORAL at 05:50

## 2024-02-01 RX ADMIN — AMIODARONE HYDROCHLORIDE 400 MILLIGRAM(S): 400 TABLET ORAL at 13:02

## 2024-02-01 RX ADMIN — PANTOPRAZOLE SODIUM 40 MILLIGRAM(S): 20 TABLET, DELAYED RELEASE ORAL at 05:50

## 2024-02-01 RX ADMIN — Medication 81 MILLIGRAM(S): at 10:39

## 2024-02-01 RX ADMIN — ONDANSETRON 4 MILLIGRAM(S): 8 TABLET, FILM COATED ORAL at 20:01

## 2024-02-01 RX ADMIN — SODIUM CHLORIDE 3 MILLILITER(S): 9 INJECTION INTRAMUSCULAR; INTRAVENOUS; SUBCUTANEOUS at 14:52

## 2024-02-01 RX ADMIN — OXYCODONE HYDROCHLORIDE 10 MILLIGRAM(S): 5 TABLET ORAL at 14:35

## 2024-02-01 RX ADMIN — SPIRONOLACTONE 50 MILLIGRAM(S): 25 TABLET, FILM COATED ORAL at 21:56

## 2024-02-01 RX ADMIN — ATORVASTATIN CALCIUM 80 MILLIGRAM(S): 80 TABLET, FILM COATED ORAL at 21:55

## 2024-02-01 RX ADMIN — POLYETHYLENE GLYCOL 3350 17 GRAM(S): 17 POWDER, FOR SOLUTION ORAL at 10:39

## 2024-02-01 RX ADMIN — SODIUM CHLORIDE 3 MILLILITER(S): 9 INJECTION INTRAMUSCULAR; INTRAVENOUS; SUBCUTANEOUS at 05:52

## 2024-02-01 RX ADMIN — OXYCODONE HYDROCHLORIDE 10 MILLIGRAM(S): 5 TABLET ORAL at 15:38

## 2024-02-01 RX ADMIN — Medication 4 UNIT(S): at 08:16

## 2024-02-01 RX ADMIN — Medication 4 UNIT(S): at 11:55

## 2024-02-01 RX ADMIN — OXYCODONE HYDROCHLORIDE 10 MILLIGRAM(S): 5 TABLET ORAL at 20:02

## 2024-02-01 RX ADMIN — CHLORHEXIDINE GLUCONATE 1 APPLICATION(S): 213 SOLUTION TOPICAL at 10:42

## 2024-02-01 RX ADMIN — ONDANSETRON 4 MILLIGRAM(S): 8 TABLET, FILM COATED ORAL at 09:07

## 2024-02-01 RX ADMIN — Medication 20 MILLIGRAM(S): at 05:50

## 2024-02-01 RX ADMIN — Medication 20 MILLIGRAM(S): at 13:03

## 2024-02-01 RX ADMIN — SODIUM CHLORIDE 3 MILLILITER(S): 9 INJECTION INTRAMUSCULAR; INTRAVENOUS; SUBCUTANEOUS at 21:51

## 2024-02-01 RX ADMIN — INSULIN GLARGINE 15 UNIT(S): 100 INJECTION, SOLUTION SUBCUTANEOUS at 21:56

## 2024-02-01 RX ADMIN — SPIRONOLACTONE 50 MILLIGRAM(S): 25 TABLET, FILM COATED ORAL at 13:03

## 2024-02-01 RX ADMIN — Medication 4 UNIT(S): at 16:56

## 2024-02-01 RX ADMIN — Medication 100 MILLIGRAM(S): at 10:39

## 2024-02-01 RX ADMIN — OXYCODONE HYDROCHLORIDE 10 MILLIGRAM(S): 5 TABLET ORAL at 20:32

## 2024-02-01 RX ADMIN — OXYCODONE HYDROCHLORIDE 10 MILLIGRAM(S): 5 TABLET ORAL at 09:24

## 2024-02-01 RX ADMIN — SPIRONOLACTONE 50 MILLIGRAM(S): 25 TABLET, FILM COATED ORAL at 05:50

## 2024-02-01 NOTE — PROGRESS NOTE ADULT - ASSESSMENT
Assessment  DMT2: 73y Female with DM T2 with hyperglycemia, A1C 7% , was on oral meds at home, was using trulicity prior, on basal bolus  insulin  with coverage, insulin dose adjusted,  sugars are down trending down.   Will need tight glycemic control for postop wound healing.   CAD: on medications, stable, monitored. s/p CABG/ MVR   HTN: on antihypertensive medications, monitored, asymptomatic.  Obesity: No strict exercise routines, not on any weight loss program, neither on low calorie diet.      Discussed plan and management with Dr Chika Perry NP - TEAMS  Fay Farr MD  Cell: [phone]  Office: [phone]          Assessment  DMT2: 73y Female with DM T2 with hyperglycemia, A1C 7% , was on oral meds at home, was using trulicity prior, on basal bolus  insulin  with coverage,  insulin dose adjusted,  sugars are down trending down.   Will need tight glycemic control for postop wound healing.   CAD: on medications, stable, monitored. s/p CABG/ MVR   HTN: on antihypertensive medications, monitored, asymptomatic.  Obesity: No strict exercise routines, not on any weight loss program, neither on low calorie diet.      Discussed plan and management with Dr Chika Perry NP - TEAMS  Fay Farr MD  Cell: [phone]  Office: [phone]

## 2024-02-01 NOTE — PROGRESS NOTE ADULT - ASSESSMENT
73 year old female with PMH morbid obesity (BMI 42.7 s/p gastric sleeve 2015), DMT2, CAD s/p PCI of LCX (2012), LEIDY (on CPAP), CKD (stage 3 - on farxiga), AF (on Eliquis) and rheumatic mitral valve disease reports c/o chronic BARBOZA  1/23/24 SP Mitral valve replacement;  Hybrid Maze procedure Clipping, left atrial appendage and CABG, using 1 vein graft    CKD stage 3a        1 Renal - Creatinine @ baseline more in the 1.6-1.7 range;  renal fxn remains stable.   No need for renal sono   Torsemide started and one dose of zaroxolyn for synergy today again  2 Endo-Farxiga and ARB are both held and to restarted as outpt   3 CVS-Off bblockers ;  Not on pressors ; BP acceptable  4 Pulm-Nasal canula ;     5 EPS -SP PPM placement   6 Anemia-SP  Epogen yesterday     Sayed [facility]   [employer]   [id number]

## 2024-02-01 NOTE — PROGRESS NOTE ADULT - SUBJECTIVE AND OBJECTIVE BOX
Chief complaint  Patient is a 74y old  Female who presents with a chief complaint of sob (01 Feb 2024 07:12)         Labs and Fingersticks  CAPILLARY BLOOD GLUCOSE      POCT Blood Glucose.: 138 mg/dL (01 Feb 2024 11:37)  POCT Blood Glucose.: 119 mg/dL (01 Feb 2024 07:52)  POCT Blood Glucose.: 143 mg/dL (31 Jan 2024 21:47)  POCT Blood Glucose.: 163 mg/dL (31 Jan 2024 17:28)  POCT Blood Glucose.: 69 mg/dL (31 Jan 2024 16:28)      Anion Gap: 13 (02-01 @ 07:12)  Anion Gap: 7 (01-31 @ 05:05)      Calcium: 10.3 (02-01 @ 07:12)  Calcium: 10.3 (01-31 @ 05:05)          02-01    143  |  99  |  32<H>  ----------------------------<  96  4.3   |  31  |  1.76<H>    Ca    10.3      01 Feb 2024 07:12                          9.4    6.44  )-----------( 169      ( 01 Feb 2024 07:12 )             29.3     Medications  MEDICATIONS  (STANDING):  allopurinol 100 milliGRAM(s) Oral daily  aMIOdarone    Tablet   Oral   aMIOdarone    Tablet 200 milliGRAM(s) Oral daily  aspirin enteric coated 81 milliGRAM(s) Oral daily  atorvastatin 80 milliGRAM(s) Oral at bedtime  bisacodyl Suppository 10 milliGRAM(s) Rectal once  chlorhexidine 2% Cloths 1 Application(s) Topical daily  dextrose 5%. 1000 milliLiter(s) (100 mL/Hr) IV Continuous <Continuous>  dextrose 5%. 1000 milliLiter(s) (50 mL/Hr) IV Continuous <Continuous>  dextrose 50% Injectable 25 Gram(s) IV Push once  dextrose 50% Injectable 25 milliLiter(s) IV Push every 15 minutes  dextrose 50% Injectable 25 Gram(s) IV Push once  dextrose 50% Injectable 50 milliLiter(s) IV Push every 15 minutes  dextrose 50% Injectable 12.5 Gram(s) IV Push once  glucagon  Injectable 1 milliGRAM(s) IntraMuscular once  insulin glargine Injectable (LANTUS) 15 Unit(s) SubCutaneous at bedtime  insulin lispro (ADMELOG) corrective regimen sliding scale   SubCutaneous three times a day before meals  insulin lispro (ADMELOG) corrective regimen sliding scale   SubCutaneous at bedtime  insulin lispro Injectable (ADMELOG) 4 Unit(s) SubCutaneous three times a day before meals  pantoprazole    Tablet 40 milliGRAM(s) Oral before breakfast  polyethylene glycol 3350 17 Gram(s) Oral daily  senna 2 Tablet(s) Oral at bedtime  sodium chloride 0.9% lock flush 3 milliLiter(s) IV Push every 8 hours  sodium chloride 0.9%. 1000 milliLiter(s) (10 mL/Hr) IV Continuous <Continuous>  spironolactone 50 milliGRAM(s) Oral <User Schedule>  torsemide 20 milliGRAM(s) Oral two times a day      Physical Exam  General: Patient comfortable in bed   Vital Signs Last 12 Hrs  T(F): 97.3 (02-01-24 @ 04:47), Max: 97.3 (02-01-24 @ 04:47)  HR: 81 (02-01-24 @ 09:30) (70 - 81)  BP: 112/72 (02-01-24 @ 04:47) (112/72 - 112/72)  BP(mean): 85 (02-01-24 @ 04:47) (85 - 85)  RR: 18 (02-01-24 @ 04:47) (18 - 18)  SpO2: 92% (02-01-24 @ 09:30) (92% - 98%)    CVS: S1S2   Respiratory: No wheezing, no crepitations  GI: Abdomen soft, bowel sounds positive  Musculoskeletal:  moves all extremities  : Voiding       Chief complaint  Patient is a 74y old  Female who presents with a chief complaint of sob (01 Feb 2024 07:12)         Labs and Fingersticks  CAPILLARY BLOOD GLUCOSE      POCT Blood Glucose.: 138 mg/dL (01 Feb 2024 11:37)  POCT Blood Glucose.: 119 mg/dL (01 Feb 2024 07:52)  POCT Blood Glucose.: 143 mg/dL (31 Jan 2024 21:47)  POCT Blood Glucose.: 163 mg/dL (31 Jan 2024 17:28)  POCT Blood Glucose.: 69 mg/dL (31 Jan 2024 16:28)      Anion Gap: 13 (02-01 @ 07:12)  Anion Gap: 7 (01-31 @ 05:05)      Calcium: 10.3 (02-01 @ 07:12)  Calcium: 10.3 (01-31 @ 05:05)          02-01    143  |  99  |  32<H>  ----------------------------<  96  4.3   |  31  |  1.76<H>    Ca    10.3      01 Feb 2024 07:12                          9.4    6.44  )-----------( 169      ( 01 Feb 2024 07:12 )             29.3     Medications  MEDICATIONS  (STANDING):  allopurinol 100 milliGRAM(s) Oral daily  aMIOdarone    Tablet   Oral   aMIOdarone    Tablet 200 milliGRAM(s) Oral daily  aspirin enteric coated 81 milliGRAM(s) Oral daily  atorvastatin 80 milliGRAM(s) Oral at bedtime  bisacodyl Suppository 10 milliGRAM(s) Rectal once  chlorhexidine 2% Cloths 1 Application(s) Topical daily  dextrose 5%. 1000 milliLiter(s) (100 mL/Hr) IV Continuous <Continuous>  dextrose 5%. 1000 milliLiter(s) (50 mL/Hr) IV Continuous <Continuous>  dextrose 50% Injectable 25 Gram(s) IV Push once  dextrose 50% Injectable 25 milliLiter(s) IV Push every 15 minutes  dextrose 50% Injectable 25 Gram(s) IV Push once  dextrose 50% Injectable 50 milliLiter(s) IV Push every 15 minutes  dextrose 50% Injectable 12.5 Gram(s) IV Push once  glucagon  Injectable 1 milliGRAM(s) IntraMuscular once  insulin glargine Injectable (LANTUS) 15 Unit(s) SubCutaneous at bedtime  insulin lispro (ADMELOG) corrective regimen sliding scale   SubCutaneous three times a day before meals  insulin lispro (ADMELOG) corrective regimen sliding scale   SubCutaneous at bedtime  insulin lispro Injectable (ADMELOG) 4 Unit(s) SubCutaneous three times a day before meals  pantoprazole    Tablet 40 milliGRAM(s) Oral before breakfast  polyethylene glycol 3350 17 Gram(s) Oral daily  senna 2 Tablet(s) Oral at bedtime  sodium chloride 0.9% lock flush 3 milliLiter(s) IV Push every 8 hours  sodium chloride 0.9%. 1000 milliLiter(s) (10 mL/Hr) IV Continuous <Continuous>  spironolactone 50 milliGRAM(s) Oral <User Schedule>  torsemide 20 milliGRAM(s) Oral two times a day      Physical Exam  General: Patient comfortable in bed   Vital Signs Last 12 Hrs  T(F): 97.3 (02-01-24 @ 04:47), Max: 97.3 (02-01-24 @ 04:47)  HR: 81 (02-01-24 @ 09:30) (70 - 81)  BP: 112/72 (02-01-24 @ 04:47) (112/72 - 112/72)  BP(mean): 85 (02-01-24 @ 04:47) (85 - 85)  RR: 18 (02-01-24 @ 04:47) (18 - 18)  SpO2: 92% (02-01-24 @ 09:30) (92% - 98%)    CVS: S1S2   Respiratory: No wheezing, no crepitations  GI: Abdomen soft, bowel sounds positive  Musculoskeletal:  moves all extremities  : Voiding

## 2024-02-01 NOTE — PROGRESS NOTE ADULT - SUBJECTIVE AND OBJECTIVE BOX
VITAL SIGNS-Telemetry:  SR 60-70  Vital Signs Last 24 Hrs  T(C): 36.3 (24 @ 04:47), Max: 36.9 (24 @ 19:30)  T(F): 97.3 (24 @ 04:47), Max: 98.4 (24 @ 19:30)  HR: 70 (24 @ 06:11) (60 - 72)  BP: 112/72 (24 @ 04:47) (112/72 - 132/74)  RR: 18 (24 @ 04:47) (18 - 18)  SpO2: 98% (24 @ 06:11) (94% - 98%)          @ 07:01  -   @ 07:00  --------------------------------------------------------  IN: 820 mL / OUT: 2200 mL / NET: -1380 mL    Daily     Daily Weight in k.4 (2024 09:04)    CAPILLARY BLOOD GLUCOSE  POCT Blood Glucose.: 143 mg/dL (2024 21:47)  POCT Blood Glucose.: 163 mg/dL (2024 17:28)  POCT Blood Glucose.: 69 mg/dL (2024 16:28)  POCT Blood Glucose.: 94 mg/dL (2024 12:27)  POCT Blood Glucose.: 105 mg/dL (2024 07:40)        PHYSICAL EXAM:  Neurology: alert and oriented x 3, nonfocal, no gross deficits  CV : S1S2  Sternal Wound :  CDI , Stable  Lungs: diminished bs bases  Abdomen: soft, nontender, nondistended, positive bowel sounds, last bowel movement         Extremities:     + edema b/l  lle inc cdi, no calf tenderness    acetaminophen     Tablet .. 650 milliGRAM(s) Oral every 6 hours PRN  allopurinol 100 milliGRAM(s) Oral daily  aMIOdarone    Tablet   Oral   aMIOdarone    Tablet 400 milliGRAM(s) Oral every 8 hours  aMIOdarone    Tablet 200 milliGRAM(s) Oral daily  aspirin enteric coated 81 milliGRAM(s) Oral daily  atorvastatin 80 milliGRAM(s) Oral at bedtime  bisacodyl Suppository 10 milliGRAM(s) Rectal once  chlorhexidine 2% Cloths 1 Application(s) Topical daily  dextrose 5%. 1000 milliLiter(s) IV Continuous <Continuous>  dextrose 5%. 1000 milliLiter(s) IV Continuous <Continuous>  dextrose 50% Injectable 50 milliLiter(s) IV Push every 15 minutes  dextrose 50% Injectable 25 Gram(s) IV Push once  dextrose 50% Injectable 12.5 Gram(s) IV Push once  dextrose 50% Injectable 25 Gram(s) IV Push once  dextrose 50% Injectable 25 milliLiter(s) IV Push every 15 minutes  dextrose Oral Gel 15 Gram(s) Oral once PRN  glucagon  Injectable 1 milliGRAM(s) IntraMuscular once  insulin glargine Injectable (LANTUS) 15 Unit(s) SubCutaneous at bedtime  insulin lispro (ADMELOG) corrective regimen sliding scale   SubCutaneous at bedtime  insulin lispro (ADMELOG) corrective regimen sliding scale   SubCutaneous three times a day before meals  insulin lispro Injectable (ADMELOG) 4 Unit(s) SubCutaneous three times a day before meals  oxyCODONE    IR 5 milliGRAM(s) Oral every 4 hours PRN  oxyCODONE    IR 10 milliGRAM(s) Oral every 4 hours PRN  pantoprazole    Tablet 40 milliGRAM(s) Oral before breakfast  polyethylene glycol 3350 17 Gram(s) Oral daily  senna 2 Tablet(s) Oral at bedtime  sodium chloride 0.9% lock flush 3 milliLiter(s) IV Push every 8 hours  sodium chloride 0.9%. 1000 milliLiter(s) IV Continuous <Continuous>  spironolactone 50 milliGRAM(s) Oral <User Schedule>  torsemide 20 milliGRAM(s) Oral two times a day    Physical Therapy Rec:   Home  [  ]   Home w/ PT  [ x ]  Rehab  [  ]  Discussed with Cardiothoracic Team at AM rounds.

## 2024-02-01 NOTE — PROGRESS NOTE ADULT - PROBLEM SELECTOR PLAN 1
Will continue Lantus 15 u at bedtime.  Will continue Admelog 4 u before each meal and continue Admelog correction scale coverage. Will continue monitoring FS and Follow up.   Patient counseled for compliance with consistent low carb diet.  DC plan  Has CKD, decreased eGFR  Suggest decrease Januvia 25 mg daily  Start 1 mg glimepiride in AM with breakfast  FU outpatient

## 2024-02-01 NOTE — PROGRESS NOTE ADULT - ASSESSMENT
73F w/ PMH morbid obesity (BMI 42.7 s/p gastric sleeve ), DMT2, CAD s/p PCI of LCX (), LEIDY (on CPAP), CKD (stage 3 - on farxiga), AF (on Eliquis) and rheumatic mitral valve disease reports c/o chronic BARBOZA for over 10 years that has progressively worsened over the past few months with the feeling of an "elephant on my chest." Her symptoms improved after starting Lasix daily. She gets dizzy when she bends over or when she stands up too fast. She denies CP or weight gain. She sleeps with 3 pillows on an incline at night. She cannot lay flat due to comfort as well as orthopnea. She can only walk one block before she has to stop to rest due to SOB. Pt now presents to [facility] for scheduled mitral valve replacement, maze procedure and left atrial appendage ligation with Dr. Dalal on 24.  s/p  MVR (t)/ C1V/ Maze/ RICHARD clip  post extubated / inotropic support  EP consulted for underlying junctional rhythm; no av nodals at this time; + epicardial pw; maintain    tx sdu; VSS; afib 60-70 on  2.5; maintain med ct's; diuresis as per renal; endo consulted for diabetic management  keep npo after midnight sat into sun for possible ppm as per EP; no av nodals at this time  24 VSS on  2.5 EP following for ?ppm   VSS NPO for PPM for aflutter w/ chb  - s/p PPM placement; tx floor   VSS: AV Paced 60-80; d/c  as per Dr. Dalal; bun/cr increased today --> d/c diuretics and ck echo as per Dr. Dalal  pa/lat chest xray s/p PPM ; d/w pw   wean O2 as tolerated; sugical bra    VSS s/p ppm site cdi diuretics d/c'd yesterday- rounds made w/ Dr. Dalal - creatinine down to 1.7 - torsemide 20mg po qd & aldactone 50 bid resumed Dr. Nieves, renal following pt. discharge planning- home when stable Thursday  1/31: VSS; Cr coming down, 1.62 today, negative 1.8L, renal following, CXR PA/LAT today    VSS diuresing  b/l pl effusions  ambulate d/c planning home - ween O2

## 2024-02-01 NOTE — PROGRESS NOTE ADULT - SUBJECTIVE AND OBJECTIVE BOX
[facility] [phone]        Patient seen and examined in the chair.  She was in good spirits         MEDICATIONS  (STANDING):  allopurinol 100 milliGRAM(s) Oral daily  aMIOdarone    Tablet   Oral   aMIOdarone    Tablet 400 milliGRAM(s) Oral every 8 hours  aMIOdarone    Tablet 200 milliGRAM(s) Oral daily  aspirin enteric coated 81 milliGRAM(s) Oral daily  atorvastatin 80 milliGRAM(s) Oral at bedtime  bisacodyl Suppository 10 milliGRAM(s) Rectal once  chlorhexidine 2% Cloths 1 Application(s) Topical daily  dextrose 5%. 1000 milliLiter(s) (50 mL/Hr) IV Continuous <Continuous>  dextrose 5%. 1000 milliLiter(s) (100 mL/Hr) IV Continuous <Continuous>  dextrose 50% Injectable 25 milliLiter(s) IV Push every 15 minutes  dextrose 50% Injectable 12.5 Gram(s) IV Push once  dextrose 50% Injectable 50 milliLiter(s) IV Push every 15 minutes  dextrose 50% Injectable 25 Gram(s) IV Push once  dextrose 50% Injectable 25 Gram(s) IV Push once  glucagon  Injectable 1 milliGRAM(s) IntraMuscular once  insulin glargine Injectable (LANTUS) 15 Unit(s) SubCutaneous at bedtime  insulin lispro (ADMELOG) corrective regimen sliding scale   SubCutaneous three times a day before meals  insulin lispro (ADMELOG) corrective regimen sliding scale   SubCutaneous at bedtime  insulin lispro Injectable (ADMELOG) 4 Unit(s) SubCutaneous three times a day before meals  pantoprazole    Tablet 40 milliGRAM(s) Oral before breakfast  polyethylene glycol 3350 17 Gram(s) Oral daily  senna 2 Tablet(s) Oral at bedtime  sodium chloride 0.9% lock flush 3 milliLiter(s) IV Push every 8 hours  sodium chloride 0.9%. 1000 milliLiter(s) (10 mL/Hr) IV Continuous <Continuous>  spironolactone 50 milliGRAM(s) Oral <User Schedule>  torsemide 20 milliGRAM(s) Oral two times a day      VITAL:  T(C): , Max: 36.9 (01-31-24 @ 19:30)  T(F): , Max: 98.4 (01-31-24 @ 19:30)  HR: 70 (02-01-24 @ 06:11)  BP: 112/72 (02-01-24 @ 04:47)  BP(mean): 85 (02-01-24 @ 04:47)  RR: 18 (02-01-24 @ 04:47)  SpO2: 98% (02-01-24 @ 06:11)  Wt(kg): --    I and O's:    01-31 @ 07:01  -  02-01 @ 07:00  --------------------------------------------------------  IN: 820 mL / OUT: 2200 mL / NET: -1380 mL    02-01 @ 07:01  -  02-01 @ 09:37  --------------------------------------------------------  IN: 0 mL / OUT: 600 mL / NET: -600 mL          PHYSICAL EXAM:    Constitutional: NAD; obese   Neck:  No JVD  Respiratory: CTAB/L  Cardiovascular: S1 and S2  Gastrointestinal: BS+, soft, NT/ND  Extremities: No peripheral edema  Neurological: A/O x 3, no focal deficits  Psychiatric: Normal mood, normal affect  : No Diallo  Skin: No rashes  Access: Not applicable    LABS:                        9.4    6.44  )-----------( 169      ( 01 Feb 2024 07:12 )             29.3     02-01    143  |  99  |  32<H>  ----------------------------<  96  4.3   |  31  |  1.76<H>    Ca    10.3      01 Feb 2024 07:12            Urine Studies:  Urinalysis Basic - ( 01 Feb 2024 07:12 )    Color: x / Appearance: x / SG: x / pH: x  Gluc: 96 mg/dL / Ketone: x  / Bili: x / Urobili: x   Blood: x / Protein: x / Nitrite: x   Leuk Esterase: x / RBC: x / WBC x   Sq Epi: x / Non Sq Epi: x / Bacteria: x            RADIOLOGY & ADDITIONAL STUDIES:

## 2024-02-02 LAB
ANION GAP SERPL CALC-SCNC: 13 MMOL/L — SIGNIFICANT CHANGE UP (ref 5–17)
BASOPHILS # BLD AUTO: 0.04 K/UL — SIGNIFICANT CHANGE UP (ref 0–0.2)
BASOPHILS NFR BLD AUTO: 0.4 % — SIGNIFICANT CHANGE UP (ref 0–2)
BUN SERPL-MCNC: 38 MG/DL — HIGH (ref 7–23)
CALCIUM SERPL-MCNC: 10.9 MG/DL — HIGH (ref 8.4–10.5)
CHLORIDE SERPL-SCNC: 91 MMOL/L — LOW (ref 96–108)
CO2 SERPL-SCNC: 33 MMOL/L — HIGH (ref 22–31)
CREAT SERPL-MCNC: 2.15 MG/DL — HIGH (ref 0.5–1.3)
EGFR: 24 ML/MIN/1.73M2 — LOW
EOSINOPHIL # BLD AUTO: 0.15 K/UL — SIGNIFICANT CHANGE UP (ref 0–0.5)
EOSINOPHIL NFR BLD AUTO: 1.5 % — SIGNIFICANT CHANGE UP (ref 0–6)
GLUCOSE SERPL-MCNC: 119 MG/DL — HIGH (ref 70–99)
HCT VFR BLD CALC: 31.5 % — LOW (ref 34.5–45)
HGB BLD-MCNC: 10.1 G/DL — LOW (ref 11.5–15.5)
IMM GRANULOCYTES NFR BLD AUTO: 0.8 % — SIGNIFICANT CHANGE UP (ref 0–0.9)
LYMPHOCYTES # BLD AUTO: 2.56 K/UL — SIGNIFICANT CHANGE UP (ref 1–3.3)
LYMPHOCYTES # BLD AUTO: 25.3 % — SIGNIFICANT CHANGE UP (ref 13–44)
MCHC RBC-ENTMCNC: 31 PG — SIGNIFICANT CHANGE UP (ref 27–34)
MCHC RBC-ENTMCNC: 32.1 GM/DL — SIGNIFICANT CHANGE UP (ref 32–36)
MCV RBC AUTO: 96.6 FL — SIGNIFICANT CHANGE UP (ref 80–100)
MONOCYTES # BLD AUTO: 0.63 K/UL — SIGNIFICANT CHANGE UP (ref 0–0.9)
MONOCYTES NFR BLD AUTO: 6.2 % — SIGNIFICANT CHANGE UP (ref 2–14)
NEUTROPHILS # BLD AUTO: 6.67 K/UL — SIGNIFICANT CHANGE UP (ref 1.8–7.4)
NEUTROPHILS NFR BLD AUTO: 65.8 % — SIGNIFICANT CHANGE UP (ref 43–77)
NRBC # BLD: 0 /100 WBCS — SIGNIFICANT CHANGE UP (ref 0–0)
PLATELET # BLD AUTO: 246 K/UL — SIGNIFICANT CHANGE UP (ref 150–400)
POTASSIUM SERPL-MCNC: 4.2 MMOL/L — SIGNIFICANT CHANGE UP (ref 3.5–5.3)
POTASSIUM SERPL-SCNC: 4.2 MMOL/L — SIGNIFICANT CHANGE UP (ref 3.5–5.3)
RBC # BLD: 3.26 M/UL — LOW (ref 3.8–5.2)
RBC # FLD: 15.2 % — HIGH (ref 10.3–14.5)
SODIUM SERPL-SCNC: 137 MMOL/L — SIGNIFICANT CHANGE UP (ref 135–145)
WBC # BLD: 10.13 K/UL — SIGNIFICANT CHANGE UP (ref 3.8–10.5)
WBC # FLD AUTO: 10.13 K/UL — SIGNIFICANT CHANGE UP (ref 3.8–10.5)

## 2024-02-02 PROCEDURE — 71045 X-RAY EXAM CHEST 1 VIEW: CPT | Mod: 26

## 2024-02-02 RX ORDER — METOPROLOL TARTRATE 50 MG
12.5 TABLET ORAL
Refills: 0 | Status: DISCONTINUED | OUTPATIENT
Start: 2024-02-02 | End: 2024-02-08

## 2024-02-02 RX ORDER — ERYTHROPOIETIN 10000 [IU]/ML
10000 INJECTION, SOLUTION INTRAVENOUS; SUBCUTANEOUS ONCE
Refills: 0 | Status: COMPLETED | OUTPATIENT
Start: 2024-02-03 | End: 2024-02-03

## 2024-02-02 RX ORDER — SPIRONOLACTONE 25 MG/1
50 TABLET, FILM COATED ORAL DAILY
Refills: 0 | Status: DISCONTINUED | OUTPATIENT
Start: 2024-02-03 | End: 2024-02-05

## 2024-02-02 RX ADMIN — OXYCODONE HYDROCHLORIDE 5 MILLIGRAM(S): 5 TABLET ORAL at 13:37

## 2024-02-02 RX ADMIN — Medication 12.5 MILLIGRAM(S): at 17:53

## 2024-02-02 RX ADMIN — OXYCODONE HYDROCHLORIDE 5 MILLIGRAM(S): 5 TABLET ORAL at 13:07

## 2024-02-02 RX ADMIN — OXYCODONE HYDROCHLORIDE 5 MILLIGRAM(S): 5 TABLET ORAL at 20:45

## 2024-02-02 RX ADMIN — INSULIN GLARGINE 15 UNIT(S): 100 INJECTION, SOLUTION SUBCUTANEOUS at 21:25

## 2024-02-02 RX ADMIN — SODIUM CHLORIDE 3 MILLILITER(S): 9 INJECTION INTRAMUSCULAR; INTRAVENOUS; SUBCUTANEOUS at 13:42

## 2024-02-02 RX ADMIN — AMIODARONE HYDROCHLORIDE 200 MILLIGRAM(S): 400 TABLET ORAL at 05:40

## 2024-02-02 RX ADMIN — OXYCODONE HYDROCHLORIDE 5 MILLIGRAM(S): 5 TABLET ORAL at 06:57

## 2024-02-02 RX ADMIN — Medication 4 UNIT(S): at 17:53

## 2024-02-02 RX ADMIN — OXYCODONE HYDROCHLORIDE 5 MILLIGRAM(S): 5 TABLET ORAL at 19:49

## 2024-02-02 RX ADMIN — OXYCODONE HYDROCHLORIDE 5 MILLIGRAM(S): 5 TABLET ORAL at 05:45

## 2024-02-02 RX ADMIN — Medication 4 UNIT(S): at 08:15

## 2024-02-02 RX ADMIN — SENNA PLUS 2 TABLET(S): 8.6 TABLET ORAL at 21:25

## 2024-02-02 RX ADMIN — Medication 20 MILLIGRAM(S): at 05:41

## 2024-02-02 RX ADMIN — Medication 4 UNIT(S): at 13:08

## 2024-02-02 RX ADMIN — POLYETHYLENE GLYCOL 3350 17 GRAM(S): 17 POWDER, FOR SOLUTION ORAL at 13:07

## 2024-02-02 RX ADMIN — Medication 100 MILLIGRAM(S): at 13:07

## 2024-02-02 RX ADMIN — PANTOPRAZOLE SODIUM 40 MILLIGRAM(S): 20 TABLET, DELAYED RELEASE ORAL at 05:40

## 2024-02-02 RX ADMIN — CHLORHEXIDINE GLUCONATE 1 APPLICATION(S): 213 SOLUTION TOPICAL at 13:42

## 2024-02-02 RX ADMIN — SODIUM CHLORIDE 3 MILLILITER(S): 9 INJECTION INTRAMUSCULAR; INTRAVENOUS; SUBCUTANEOUS at 05:35

## 2024-02-02 RX ADMIN — SPIRONOLACTONE 50 MILLIGRAM(S): 25 TABLET, FILM COATED ORAL at 05:41

## 2024-02-02 RX ADMIN — ATORVASTATIN CALCIUM 80 MILLIGRAM(S): 80 TABLET, FILM COATED ORAL at 21:25

## 2024-02-02 RX ADMIN — Medication 81 MILLIGRAM(S): at 13:07

## 2024-02-02 NOTE — PROGRESS NOTE ADULT - SUBJECTIVE AND OBJECTIVE BOX
[facility] [phone]        Patient seen and examined in bed.  She was the same         MEDICATIONS  (STANDING):  allopurinol 100 milliGRAM(s) Oral daily  aMIOdarone    Tablet   Oral   aMIOdarone    Tablet 200 milliGRAM(s) Oral daily  aspirin enteric coated 81 milliGRAM(s) Oral daily  atorvastatin 80 milliGRAM(s) Oral at bedtime  bisacodyl Suppository 10 milliGRAM(s) Rectal once  chlorhexidine 2% Cloths 1 Application(s) Topical daily  dextrose 5%. 1000 milliLiter(s) (100 mL/Hr) IV Continuous <Continuous>  dextrose 5%. 1000 milliLiter(s) (50 mL/Hr) IV Continuous <Continuous>  dextrose 50% Injectable 25 Gram(s) IV Push once  dextrose 50% Injectable 12.5 Gram(s) IV Push once  dextrose 50% Injectable 50 milliLiter(s) IV Push every 15 minutes  dextrose 50% Injectable 25 milliLiter(s) IV Push every 15 minutes  dextrose 50% Injectable 25 Gram(s) IV Push once  glucagon  Injectable 1 milliGRAM(s) IntraMuscular once  insulin glargine Injectable (LANTUS) 15 Unit(s) SubCutaneous at bedtime  insulin lispro (ADMELOG) corrective regimen sliding scale   SubCutaneous at bedtime  insulin lispro (ADMELOG) corrective regimen sliding scale   SubCutaneous three times a day before meals  insulin lispro Injectable (ADMELOG) 4 Unit(s) SubCutaneous three times a day before meals  pantoprazole    Tablet 40 milliGRAM(s) Oral before breakfast  polyethylene glycol 3350 17 Gram(s) Oral daily  senna 2 Tablet(s) Oral at bedtime  sodium chloride 0.9% lock flush 3 milliLiter(s) IV Push every 8 hours  sodium chloride 0.9%. 1000 milliLiter(s) (10 mL/Hr) IV Continuous <Continuous>  spironolactone 50 milliGRAM(s) Oral <User Schedule>  torsemide 20 milliGRAM(s) Oral two times a day      VITAL:  T(C): --  T(F): --  HR: 59 (02-02-24 @ 04:24)  BP: 104/69 (02-02-24 @ 04:24)  BP(mean): --  RR: 18 (02-02-24 @ 04:24)  SpO2: 98% (02-02-24 @ 04:24)  Wt(kg): --    I and O's:    02-01 @ 07:01  -  02-02 @ 07:00  --------------------------------------------------------  IN: 680 mL / OUT: 2300 mL / NET: -1620 mL          PHYSICAL EXAM:    Constitutional: NAD; obese   Neck:  No JVD  Respiratory: reduced   Cardiovascular: S1 and S2  Gastrointestinal: BS+, soft, NT/ND  Extremities: No peripheral edema  Neurological: A/O x 3, no focal deficits  Psychiatric: Normal mood, normal affect  : No Diallo  Skin: No rashes  Access: Not applicable    LABS:                        9.4    6.44  )-----------( 169      ( 01 Feb 2024 07:12 )             29.3     02-01    143  |  99  |  32<H>  ----------------------------<  96  4.3   |  31  |  1.76<H>    Ca    10.3      01 Feb 2024 07:12            Urine Studies:  Urinalysis Basic - ( 01 Feb 2024 07:12 )    Color: x / Appearance: x / SG: x / pH: x  Gluc: 96 mg/dL / Ketone: x  / Bili: x / Urobili: x   Blood: x / Protein: x / Nitrite: x   Leuk Esterase: x / RBC: x / WBC x   Sq Epi: x / Non Sq Epi: x / Bacteria: x            RADIOLOGY & ADDITIONAL STUDIES:      < from: Xray Chest 2 Views PA/Lat (01.31.24 @ 11:04) >    ACC: [id number] EXAM:  XR CHEST PA LAT 2V   ORDERED BY: SHAI ABRAMS     ACC: [id number] EXAM:  XR CHEST PORTABLE URGENT 1V   ORDERED BY: GUY ROTHMAN     PROCEDURE DATE:  01/31/2024          INTERPRETATION:  EXAMINATION: XR CHEST URGENT, XR CHEST PA AND LATERAL    CLINICAL INDICATION: s/pohs    TECHNIQUE: Series of single multiple views of the chest were obtained.    COMPARISON: Chest x-ray 1/29/2024.    FINDINGS:    1/31/2024 6:20 AM:  Status post median sternotomy with cardiac valve repairs and left atrial   appendage clip.  Left chest wall AICD with intact leads.  The heart is enlarged.  The lungs are clear.  Small bilateral pleural effusions, unchanged.  No pneumothorax.  No acute bony abnormality.    1/31/2024 10:56 AM:  No significant interval changes.    IMPRESSION:  Small bilateral pleural effusions, unchanged.    --- End of Report ---          OMAIRA CISNEROS MD; Resident Radiologist  This document has been electronically signed.  ALISA BROWN MD; Attending Radiologist  This document has been electronically signed. Jan 31 2024 11:05PM    < end of copied text >         [facility] [phone]        Patient seen and examined in bed.  She was the same   Urinating well         MEDICATIONS  (STANDING):  allopurinol 100 milliGRAM(s) Oral daily  aMIOdarone    Tablet   Oral   aMIOdarone    Tablet 200 milliGRAM(s) Oral daily  aspirin enteric coated 81 milliGRAM(s) Oral daily  atorvastatin 80 milliGRAM(s) Oral at bedtime  bisacodyl Suppository 10 milliGRAM(s) Rectal once  chlorhexidine 2% Cloths 1 Application(s) Topical daily  dextrose 5%. 1000 milliLiter(s) (100 mL/Hr) IV Continuous <Continuous>  dextrose 5%. 1000 milliLiter(s) (50 mL/Hr) IV Continuous <Continuous>  dextrose 50% Injectable 25 Gram(s) IV Push once  dextrose 50% Injectable 12.5 Gram(s) IV Push once  dextrose 50% Injectable 50 milliLiter(s) IV Push every 15 minutes  dextrose 50% Injectable 25 milliLiter(s) IV Push every 15 minutes  dextrose 50% Injectable 25 Gram(s) IV Push once  glucagon  Injectable 1 milliGRAM(s) IntraMuscular once  insulin glargine Injectable (LANTUS) 15 Unit(s) SubCutaneous at bedtime  insulin lispro (ADMELOG) corrective regimen sliding scale   SubCutaneous at bedtime  insulin lispro (ADMELOG) corrective regimen sliding scale   SubCutaneous three times a day before meals  insulin lispro Injectable (ADMELOG) 4 Unit(s) SubCutaneous three times a day before meals  pantoprazole    Tablet 40 milliGRAM(s) Oral before breakfast  polyethylene glycol 3350 17 Gram(s) Oral daily  senna 2 Tablet(s) Oral at bedtime  sodium chloride 0.9% lock flush 3 milliLiter(s) IV Push every 8 hours  sodium chloride 0.9%. 1000 milliLiter(s) (10 mL/Hr) IV Continuous <Continuous>  spironolactone 50 milliGRAM(s) Oral <User Schedule>  torsemide 20 milliGRAM(s) Oral two times a day      VITAL:  T(C): --  T(F): --  HR: 59 (02-02-24 @ 04:24)  BP: 104/69 (02-02-24 @ 04:24)  BP(mean): --  RR: 18 (02-02-24 @ 04:24)  SpO2: 98% (02-02-24 @ 04:24)  Wt(kg): --    I and O's:    02-01 @ 07:01  -  02-02 @ 07:00  --------------------------------------------------------  IN: 680 mL / OUT: 2300 mL / NET: -1620 mL          PHYSICAL EXAM:    Constitutional: NAD; obese   Neck:  No JVD  Respiratory: reduced   Cardiovascular: S1 and S2  Gastrointestinal: BS+, soft, NT/ND  Extremities: No peripheral edema  Neurological: A/O x 3, no focal deficits  Psychiatric: Normal mood, normal affect  : No Diallo  Skin: No rashes  Access: Not applicable    LABS:                        9.4    6.44  )-----------( 169      ( 01 Feb 2024 07:12 )             29.3     02-01    143  |  99  |  32<H>  ----------------------------<  96  4.3   |  31  |  1.76<H>    Ca    10.3      01 Feb 2024 07:12            Urine Studies:  Urinalysis Basic - ( 01 Feb 2024 07:12 )    Color: x / Appearance: x / SG: x / pH: x  Gluc: 96 mg/dL / Ketone: x  / Bili: x / Urobili: x   Blood: x / Protein: x / Nitrite: x   Leuk Esterase: x / RBC: x / WBC x   Sq Epi: x / Non Sq Epi: x / Bacteria: x            RADIOLOGY & ADDITIONAL STUDIES:      < from: Xray Chest 2 Views PA/Lat (01.31.24 @ 11:04) >    ACC: [id number] EXAM:  XR CHEST PA LAT 2V   ORDERED BY: SHAI ABRAMS     ACC: [id number] EXAM:  XR CHEST PORTABLE URGENT 1V   ORDERED BY: GUY ROTHMAN     PROCEDURE DATE:  01/31/2024          INTERPRETATION:  EXAMINATION: XR CHEST URGENT, XR CHEST PA AND LATERAL    CLINICAL INDICATION: s/pohs    TECHNIQUE: Series of single multiple views of the chest were obtained.    COMPARISON: Chest x-ray 1/29/2024.    FINDINGS:    1/31/2024 6:20 AM:  Status post median sternotomy with cardiac valve repairs and left atrial   appendage clip.  Left chest wall AICD with intact leads.  The heart is enlarged.  The lungs are clear.  Small bilateral pleural effusions, unchanged.  No pneumothorax.  No acute bony abnormality.    1/31/2024 10:56 AM:  No significant interval changes.    IMPRESSION:  Small bilateral pleural effusions, unchanged.    --- End of Report ---          OMAIRA CISNEROS MD; Resident Radiologist  This document has been electronically signed.  ALISA BROWN MD; Attending Radiologist  This document has been electronically signed. Jan 31 2024 11:05PM    < end of copied text >

## 2024-02-02 NOTE — PROGRESS NOTE ADULT - SUBJECTIVE AND OBJECTIVE BOX
VITAL SIGNS    Telemetry:    Vital Signs Last 24 Hrs  T(C): --  T(F): --  HR: 59 (02-02-24 @ 04:24) (59 - 84)  BP: 104/69 (02-02-24 @ 04:24) (104/69 - 104/69)  RR: 18 (02-02-24 @ 04:24) (18 - 18)  SpO2: 98% (02-02-24 @ 04:24) (92% - 99%)            02-01 @ 07:01  -  02-02 @ 07:00  --------------------------------------------------------  IN: 680 mL / OUT: 2300 mL / NET: -1620 mL       Daily     Daily   Admit Wt: Drug Dosing Weight  Height (cm): 158.8 (28 Jan 2024 11:26)  Weight (kg): 108.9 (28 Jan 2024 11:26)  BMI (kg/m2): 43.2 (28 Jan 2024 11:26)  BSA (m2): 2.08 (28 Jan 2024 11:26)      CAPILLARY BLOOD GLUCOSE      POCT Blood Glucose.: 112 mg/dL (01 Feb 2024 21:46)  POCT Blood Glucose.: 127 mg/dL (01 Feb 2024 16:42)  POCT Blood Glucose.: 138 mg/dL (01 Feb 2024 11:37)  POCT Blood Glucose.: 119 mg/dL (01 Feb 2024 07:52)          LABS:    02-01 @ 07:01  -  02-02 @ 07:00  --------------------------------------------------------  IN: 680 mL / OUT: 2300 mL / NET: -1620 mL    cret                        9.4    6.44  )-----------( 169      ( 01 Feb 2024 07:12 )             29.3     02-01    143  |  99  |  32<H>  ----------------------------<  96  4.3   |  31  |  1.76<H>    Ca    10.3      01 Feb 2024 07:12          acetaminophen     Tablet .. 650 milliGRAM(s) Oral every 6 hours PRN  allopurinol 100 milliGRAM(s) Oral daily  aMIOdarone    Tablet   Oral   aMIOdarone    Tablet 200 milliGRAM(s) Oral daily  aspirin enteric coated 81 milliGRAM(s) Oral daily  atorvastatin 80 milliGRAM(s) Oral at bedtime  bisacodyl Suppository 10 milliGRAM(s) Rectal once  chlorhexidine 2% Cloths 1 Application(s) Topical daily  dextrose 5%. 1000 milliLiter(s) IV Continuous <Continuous>  dextrose 5%. 1000 milliLiter(s) IV Continuous <Continuous>  dextrose 50% Injectable 50 milliLiter(s) IV Push every 15 minutes  dextrose 50% Injectable 25 milliLiter(s) IV Push every 15 minutes  dextrose 50% Injectable 12.5 Gram(s) IV Push once  dextrose 50% Injectable 25 Gram(s) IV Push once  dextrose 50% Injectable 25 Gram(s) IV Push once  dextrose Oral Gel 15 Gram(s) Oral once PRN  glucagon  Injectable 1 milliGRAM(s) IntraMuscular once  insulin glargine Injectable (LANTUS) 15 Unit(s) SubCutaneous at bedtime  insulin lispro (ADMELOG) corrective regimen sliding scale   SubCutaneous three times a day before meals  insulin lispro (ADMELOG) corrective regimen sliding scale   SubCutaneous at bedtime  insulin lispro Injectable (ADMELOG) 4 Unit(s) SubCutaneous three times a day before meals  oxyCODONE    IR 5 milliGRAM(s) Oral every 4 hours PRN  oxyCODONE    IR 10 milliGRAM(s) Oral every 4 hours PRN  pantoprazole    Tablet 40 milliGRAM(s) Oral before breakfast  polyethylene glycol 3350 17 Gram(s) Oral daily  senna 2 Tablet(s) Oral at bedtime  sodium chloride 0.9% lock flush 3 milliLiter(s) IV Push every 8 hours  sodium chloride 0.9%. 1000 milliLiter(s) IV Continuous <Continuous>  spironolactone 50 milliGRAM(s) Oral <User Schedule>  torsemide 20 milliGRAM(s) Oral two times a day      PHYSICAL EXAM    Subjective: "Hi.   Neurology: alert and oriented x 3, nonfocal, no gross deficits  CV : tele:  RSR  Sternal Wound :  CDI with dressing , Stable  Lungs: clear. RR easy, unlabored   Abdomen: soft, nontender, nondistended, positive bowel sounds, bowel movement   Neg N/V/D   :  pt voiding without difficulty   Extremities:   BRAUN; edema, neg calf tenderness.   PPP bilaterally      PW:  Chest tubes:                 VITAL SIGNS    Telemetry:  A. PACED/ V PACED   Vital Signs Last 24 Hrs  T(C): --  T(F): --  HR: 59 (02-02-24 @ 04:24) (59 - 84)  BP: 104/69 (02-02-24 @ 04:24) (104/69 - 104/69)  RR: 18 (02-02-24 @ 04:24) (18 - 18)  SpO2: 98% (02-02-24 @ 04:24) (92% - 99%)            02-01 @ 07:01  -  02-02 @ 07:00  --------------------------------------------------------  IN: 680 mL / OUT: 2300 mL / NET: -1620 mL       Daily     Daily   Admit Wt: Drug Dosing Weight  Height (cm): 158.8 (28 Jan 2024 11:26)  Weight (kg): 108.9 (28 Jan 2024 11:26)  BMI (kg/m2): 43.2 (28 Jan 2024 11:26)  BSA (m2): 2.08 (28 Jan 2024 11:26)      CAPILLARY BLOOD GLUCOSE      POCT Blood Glucose.: 112 mg/dL (01 Feb 2024 21:46)  POCT Blood Glucose.: 127 mg/dL (01 Feb 2024 16:42)  POCT Blood Glucose.: 138 mg/dL (01 Feb 2024 11:37)  POCT Blood Glucose.: 119 mg/dL (01 Feb 2024 07:52)          LABS:    02-01 @ 07:01  -  02-02 @ 07:00  --------------------------------------------------------  IN: 680 mL / OUT: 2300 mL / NET: -1620 mL    cret                        9.4    6.44  )-----------( 169      ( 01 Feb 2024 07:12 )             29.3     02-01    143  |  99  |  32<H>  ----------------------------<  96  4.3   |  31  |  1.76<H>    Ca    10.3      01 Feb 2024 07:12          acetaminophen     Tablet .. 650 milliGRAM(s) Oral every 6 hours PRN  allopurinol 100 milliGRAM(s) Oral daily  aMIOdarone    Tablet   Oral   aMIOdarone    Tablet 200 milliGRAM(s) Oral daily  aspirin enteric coated 81 milliGRAM(s) Oral daily  atorvastatin 80 milliGRAM(s) Oral at bedtime  bisacodyl Suppository 10 milliGRAM(s) Rectal once  chlorhexidine 2% Cloths 1 Application(s) Topical daily  dextrose 5%. 1000 milliLiter(s) IV Continuous <Continuous>  dextrose 5%. 1000 milliLiter(s) IV Continuous <Continuous>  dextrose 50% Injectable 50 milliLiter(s) IV Push every 15 minutes  dextrose 50% Injectable 25 milliLiter(s) IV Push every 15 minutes  dextrose 50% Injectable 12.5 Gram(s) IV Push once  dextrose 50% Injectable 25 Gram(s) IV Push once  dextrose 50% Injectable 25 Gram(s) IV Push once  dextrose Oral Gel 15 Gram(s) Oral once PRN  glucagon  Injectable 1 milliGRAM(s) IntraMuscular once  insulin glargine Injectable (LANTUS) 15 Unit(s) SubCutaneous at bedtime  insulin lispro (ADMELOG) corrective regimen sliding scale   SubCutaneous three times a day before meals  insulin lispro (ADMELOG) corrective regimen sliding scale   SubCutaneous at bedtime  insulin lispro Injectable (ADMELOG) 4 Unit(s) SubCutaneous three times a day before meals  oxyCODONE    IR 5 milliGRAM(s) Oral every 4 hours PRN  oxyCODONE    IR 10 milliGRAM(s) Oral every 4 hours PRN  pantoprazole    Tablet 40 milliGRAM(s) Oral before breakfast  polyethylene glycol 3350 17 Gram(s) Oral daily  senna 2 Tablet(s) Oral at bedtime  sodium chloride 0.9% lock flush 3 milliLiter(s) IV Push every 8 hours  sodium chloride 0.9%. 1000 milliLiter(s) IV Continuous <Continuous>  spironolactone 50 milliGRAM(s) Oral <User Schedule>  torsemide 20 milliGRAM(s) Oral two times a day      PHYSICAL EXAM    Subjective: "I'm ok."   Neurology: alert and oriented x 3, nonfocal, no gross deficits  CV : tele:  A. PACED/ V PACED  PPM site cdi donnie   Sternal Wound :  CDI DONNIE- sternum stable; + surgical bra   Lungs: clear diminished at the bases. RR easy, unlabored   Abdomen: soft, nontender, nondistended, positive bowel sounds, bowel movement   Neg N/V/D   :  pt voiding without difficulty   Extremities:   BRAUN; +1 LE edema, neg calf tenderness.   PPP bilaterally; SVG site cdi donnie       PW: no  Chest tubes: none

## 2024-02-02 NOTE — PROGRESS NOTE ADULT - PROBLEM SELECTOR PLAN 4
EP following  s/p 1/28 ppm placement EP following  s/p 1/28 ppm placement  start low dose bb lop 12.5 bid  amio 200 qd

## 2024-02-02 NOTE — PROGRESS NOTE ADULT - PROBLEM SELECTOR PLAN 1
Renal following-Dr. Nieves  trend bmp  strict I & O's  avoid nephrotoxic agents  1/30 diuretics resumed per Dr. Dalal- creatinine down to 1.6, negative 1.8L last 24 hours   echo negative for effusion 1/29 Renal following-Dr. Nieves  trend bmp  strict I & O's  avoid nephrotoxic agents  echo negative for effusion 1/29  renal following; diuretics decreased to daily today a per Dr. Dalal

## 2024-02-02 NOTE — PROGRESS NOTE ADULT - ASSESSMENT
73F w/ PMH morbid obesity (BMI 42.7 s/p gastric sleeve ), DMT2, CAD s/p PCI of LCX (), LEIDY (on CPAP), CKD (stage 3 - on farxiga), AF (on Eliquis) and rheumatic mitral valve disease reports c/o chronic BARBOZA for over 10 years that has progressively worsened over the past few months with the feeling of an "elephant on my chest." Her symptoms improved after starting Lasix daily. She gets dizzy when she bends over or when she stands up too fast. She denies CP or weight gain. She sleeps with 3 pillows on an incline at night. She cannot lay flat due to comfort as well as orthopnea. She can only walk one block before she has to stop to rest due to SOB. Pt now presents to PST for scheduled mitral valve replacement, maze procedure and left atrial appendage ligation with Dr. Dalal on 24.  s/p  MVR (t)/ C1V/ Maze/ RICHARD clip  post extubated / inotropic support  EP consulted for underlying junctional rhythm; no av nodals at this time; + epicardial pw; maintain    tx sdu; VSS; afib 60-70 on  2.5; maintain med ct's; diuresis as per renal; endo consulted for diabetic management  keep npo after midnight sat into sun for possible ppm as per EP; no av nodals at this time  24 VSS on  2.5 EP following for ?ppm   VSS NPO for PPM for aflutter w/ chb  - s/p PPM placement; tx floor   VSS: AV Paced 60-80; d/c  as per Dr. Dalal; bun/cr increased today --> d/c diuretics and ck echo as per Dr. Dalal  pa/lat chest xray s/p PPM ; d/w pw   wean O2 as tolerated; sugical bra    VSS s/p ppm site cdi diuretics d/c'd yesterday- rounds made w/ Dr. Dalal - creatinine down to 1.7 - torsemide 20mg po qd & aldactone 50 bid resumed Dr. Nieves, renal following pt. discharge planning- home when stable Thursday  1/31: VSS; Cr coming down, 1.62 today, negative 1.8L, renal following, CXR PA/LAT today    VSS diuresing  b/l pl effusions  ambulate d/c planning home - ween O2   73F w/ PMH morbid obesity (BMI 42.7 s/p gastric sleeve ), DMT2, CAD s/p PCI of LCX (), LEIDY (on CPAP), CKD (stage 3 - on farxiga), AF (on Eliquis) and rheumatic mitral valve disease reports c/o chronic BARBOZA for over 10 years that has progressively worsened over the past few months with the feeling of an "elephant on my chest." Her symptoms improved after starting Lasix daily. She gets dizzy when she bends over or when she stands up too fast. She denies CP or weight gain. She sleeps with 3 pillows on an incline at night. She cannot lay flat due to comfort as well as orthopnea. She can only walk one block before she has to stop to rest due to SOB. Pt now presents to PST for scheduled mitral valve replacement, maze procedure and left atrial appendage ligation with Dr. Dalal on 24.  s/p  MVR (t)/ C1V/ Maze/ RICHARD clip  post extubated / inotropic support  EP consulted for underlying junctional rhythm; no av nodals at this time; + epicardial pw; maintain    tx sdu; VSS; afib 60-70 on  2.5; maintain med ct's; diuresis as per renal; endo consulted for diabetic management  keep npo after midnight sat into sun for possible ppm as per EP; no av nodals at this time  24 VSS on  2.5 EP following for ?ppm   VSS NPO for PPM for aflutter w/ chb  - s/p PPM placement; tx floor   VSS: AV Paced 60-80; d/c  as per Dr. Dalal; bun/cr increased today --> d/c diuretics and ck echo as per Dr. Dalal  pa/lat chest xray s/p PPM ; d/w pw   wean O2 as tolerated; sugical bra    VSS s/p ppm site cdi diuretics d/c'd yesterday- rounds made w/ Dr. Dalal - creatinine down to 1.7 - torsemide 20mg po qd & aldactone 50 bid resumed Dr. Nieves, renal following pt. discharge planning- home when stable Thursday  1/31: VSS; Cr coming down, 1.62 today, negative 1.8L, renal following, CXR PA/LAT today    VSS diuresing  b/l pl effusions  ambulate d/c planning home - ween O2   VSS; A paced; V.paced; bun/cr 38/2.1; renal following; diuretics decreased to qd; ace wrap LE ; wean O2 as tolerated; ck chest xray   discharge planning- home pt when stable

## 2024-02-02 NOTE — PROGRESS NOTE ADULT - ASSESSMENT
73 year old female with PMH morbid obesity (BMI 42.7 s/p gastric sleeve 2015), DMT2, CAD s/p PCI of LCX (2012), LEIDY (on CPAP), CKD (stage 3 - on farxiga), AF (on Eliquis) and rheumatic mitral valve disease reports c/o chronic BARBOZA  1/23/24 SP Mitral valve replacement;  Hybrid Maze procedure Clipping, left atrial appendage and CABG, using 1 vein graft    CKD stage 3a        1 Renal - Creatinine @ baseline more in the 1.6-1.7 range;  renal fxn remains stable.   No need for renal sono   Torsemide started and one dose of zaroxolyn for synergy today again  2 Endo-Farxiga and ARB are both held and to restarted as outpt   3 CVS-Off bblockers ;  Not on pressors ; BP acceptable  4 Pulm-Nasal canula ;     5 EPS -SP PPM placement   6 Anemia- Epogen 10000 x 1 in am      [employer]   [phone]         73 year old female with PMH morbid obesity (BMI 42.7 s/p gastric sleeve 2015), DMT2, CAD s/p PCI of LCX (2012), LEIDY (on CPAP), CKD (stage 3 - on farxiga), AF (on Eliquis) and rheumatic mitral valve disease reports c/o chronic BARBOZA  1/23/24 SP Mitral valve replacement;  Hybrid Maze procedure Clipping, left atrial appendage and CABG, using 1 vein graft    CKD stage 3a        1 Renal - Creatinine @ baseline more in the 1.6-1.7 range;  renal fxn remains stable.   No need for renal sono   Torsemide and one dose of zaroxolyn for synergy today again  2 Endo-Farxiga and ARB are both held and to restarted as outpt   3 CVS-Off bblockers ;  Not on pressors ; BP acceptable  4 Pulm-Nasal canula ;     5 EPS -SP PPM placement   6 Anemia- Epogen 10000 x 1 in am      [employer]   [phone]

## 2024-02-02 NOTE — PROGRESS NOTE ADULT - SUBJECTIVE AND OBJECTIVE BOX
Chief complaint  Patient is a 74y old  Female who presents with a chief complaint of I need surgery (02 Feb 2024 08:26)         Labs and Fingersticks  CAPILLARY BLOOD GLUCOSE      POCT Blood Glucose.: 145 mg/dL (02 Feb 2024 11:59)  POCT Blood Glucose.: 115 mg/dL (02 Feb 2024 08:28)  POCT Blood Glucose.: 112 mg/dL (01 Feb 2024 21:46)  POCT Blood Glucose.: 127 mg/dL (01 Feb 2024 16:42)      Anion Gap: 13 (02-02 @ 09:19)  Anion Gap: 13 (02-01 @ 07:12)      Calcium: 10.9 *H* (02-02 @ 09:19)  Calcium: 10.3 (02-01 @ 07:12)          02-02    137  |  91<L>  |  38<H>  ----------------------------<  119<H>  4.2   |  33<H>  |  2.15<H>    Ca    10.9<H>      02 Feb 2024 09:19                          10.1   10.13 )-----------( 246      ( 02 Feb 2024 09:19 )             31.5     Medications  MEDICATIONS  (STANDING):  allopurinol 100 milliGRAM(s) Oral daily  aMIOdarone    Tablet   Oral   aMIOdarone    Tablet 200 milliGRAM(s) Oral daily  aspirin enteric coated 81 milliGRAM(s) Oral daily  atorvastatin 80 milliGRAM(s) Oral at bedtime  bisacodyl Suppository 10 milliGRAM(s) Rectal once  chlorhexidine 2% Cloths 1 Application(s) Topical daily  dextrose 5%. 1000 milliLiter(s) (100 mL/Hr) IV Continuous <Continuous>  dextrose 5%. 1000 milliLiter(s) (50 mL/Hr) IV Continuous <Continuous>  dextrose 50% Injectable 50 milliLiter(s) IV Push every 15 minutes  dextrose 50% Injectable 25 milliLiter(s) IV Push every 15 minutes  dextrose 50% Injectable 25 Gram(s) IV Push once  dextrose 50% Injectable 25 Gram(s) IV Push once  dextrose 50% Injectable 12.5 Gram(s) IV Push once  glucagon  Injectable 1 milliGRAM(s) IntraMuscular once  insulin glargine Injectable (LANTUS) 15 Unit(s) SubCutaneous at bedtime  insulin lispro (ADMELOG) corrective regimen sliding scale   SubCutaneous at bedtime  insulin lispro (ADMELOG) corrective regimen sliding scale   SubCutaneous three times a day before meals  insulin lispro Injectable (ADMELOG) 4 Unit(s) SubCutaneous three times a day before meals  metoprolol tartrate 12.5 milliGRAM(s) Oral two times a day  pantoprazole    Tablet 40 milliGRAM(s) Oral before breakfast  polyethylene glycol 3350 17 Gram(s) Oral daily  senna 2 Tablet(s) Oral at bedtime  sodium chloride 0.9% lock flush 3 milliLiter(s) IV Push every 8 hours  sodium chloride 0.9%. 1000 milliLiter(s) (10 mL/Hr) IV Continuous <Continuous>      Physical Exam  General: Patient comfortable in bed   Vital Signs Last 12 Hrs  T(F): --  HR: 62 (02-02-24 @ 09:41) (59 - 62)  BP: 104/69 (02-02-24 @ 04:24) (104/69 - 104/69)  BP(mean): --  RR: 18 (02-02-24 @ 04:24) (18 - 18)  SpO2: 98% (02-02-24 @ 09:41) (98% - 99%)    CVS: S1S2   Respiratory: No wheezing, no crepitations  GI: Abdomen soft, bowel sounds positive  Musculoskeletal:  moves all extremities  : Voiding          Chief complaint  Patient is a 74y old  Female who presents with a chief complaint of I need surgery (02 Feb 2024 08:26)     Labs and Fingersticks  CAPILLARY BLOOD GLUCOSE      POCT Blood Glucose.: 145 mg/dL (02 Feb 2024 11:59)  POCT Blood Glucose.: 115 mg/dL (02 Feb 2024 08:28)  POCT Blood Glucose.: 112 mg/dL (01 Feb 2024 21:46)  POCT Blood Glucose.: 127 mg/dL (01 Feb 2024 16:42)      Anion Gap: 13 (02-02 @ 09:19)  Anion Gap: 13 (02-01 @ 07:12)      Calcium: 10.9 *H* (02-02 @ 09:19)  Calcium: 10.3 (02-01 @ 07:12)          02-02    137  |  91<L>  |  38<H>  ----------------------------<  119<H>  4.2   |  33<H>  |  2.15<H>    Ca    10.9<H>      02 Feb 2024 09:19                          10.1   10.13 )-----------( 246      ( 02 Feb 2024 09:19 )             31.5     Medications  MEDICATIONS  (STANDING):  allopurinol 100 milliGRAM(s) Oral daily  aMIOdarone    Tablet   Oral   aMIOdarone    Tablet 200 milliGRAM(s) Oral daily  aspirin enteric coated 81 milliGRAM(s) Oral daily  atorvastatin 80 milliGRAM(s) Oral at bedtime  bisacodyl Suppository 10 milliGRAM(s) Rectal once  chlorhexidine 2% Cloths 1 Application(s) Topical daily  dextrose 5%. 1000 milliLiter(s) (100 mL/Hr) IV Continuous <Continuous>  dextrose 5%. 1000 milliLiter(s) (50 mL/Hr) IV Continuous <Continuous>  dextrose 50% Injectable 50 milliLiter(s) IV Push every 15 minutes  dextrose 50% Injectable 25 milliLiter(s) IV Push every 15 minutes  dextrose 50% Injectable 25 Gram(s) IV Push once  dextrose 50% Injectable 25 Gram(s) IV Push once  dextrose 50% Injectable 12.5 Gram(s) IV Push once  glucagon  Injectable 1 milliGRAM(s) IntraMuscular once  insulin glargine Injectable (LANTUS) 15 Unit(s) SubCutaneous at bedtime  insulin lispro (ADMELOG) corrective regimen sliding scale   SubCutaneous at bedtime  insulin lispro (ADMELOG) corrective regimen sliding scale   SubCutaneous three times a day before meals  insulin lispro Injectable (ADMELOG) 4 Unit(s) SubCutaneous three times a day before meals  metoprolol tartrate 12.5 milliGRAM(s) Oral two times a day  pantoprazole    Tablet 40 milliGRAM(s) Oral before breakfast  polyethylene glycol 3350 17 Gram(s) Oral daily  senna 2 Tablet(s) Oral at bedtime  sodium chloride 0.9% lock flush 3 milliLiter(s) IV Push every 8 hours  sodium chloride 0.9%. 1000 milliLiter(s) (10 mL/Hr) IV Continuous <Continuous>      Physical Exam  General: Patient comfortable in bed   Vital Signs Last 12 Hrs  T(F): --  HR: 62 (02-02-24 @ 09:41) (59 - 62)  BP: 104/69 (02-02-24 @ 04:24) (104/69 - 104/69)  BP(mean): --  RR: 18 (02-02-24 @ 04:24) (18 - 18)  SpO2: 98% (02-02-24 @ 09:41) (98% - 99%)    CVS: S1S2   Respiratory: No wheezing, no crepitations  GI: Abdomen soft, bowel sounds positive  Musculoskeletal:  moves all extremities  : Voiding

## 2024-02-02 NOTE — PROGRESS NOTE ADULT - PROBLEM SELECTOR PLAN 3
s/p 1/23 MVR(t)/ C1V   continue postop care  continue asa and statin  PPM 1/28  pulm toilet  pain management  wean O2 as tolerated  surgical bra   increase activity as tolerated   discharge planning- home when stable s/p 1/23 MVR(t)/ C1V   continue postop care  continue asa and statin  amio 200 qd   lop 12.5 bid   PPM 1/28  pulm toilet  pain management  wean O2 as tolerated  surgical bra   increase activity as tolerated   discharge planning- home when stable

## 2024-02-02 NOTE — CHART NOTE - NSCHARTNOTEFT_GEN_A_CORE
Patient s/p CABG x1, MVR, MAAZE, and LAAL on 1/23. Patient required inotropes likely due to postop myocardial stunning, as the biventricular function on ETTA was normal. Otherwise, it cannot be clinically determined.    Patient required diuretics postop for acute pulmonary edema 2/2 hypervolemia from previous volume resuscitation.    Patient required extended O2 supplementation 2/2 acute postop pulmonary insufficiency.

## 2024-02-02 NOTE — PROGRESS NOTE ADULT - ASSESSMENT
Assessment  DMT2: 73y Female with DM T2 with hyperglycemia, A1C 7% , was on oral meds at home, was using trulicity prior, on basal bolus insulin  with coverage,  insulin dose adjusted,  sugars are trending stable.   Will need tight glycemic control for postop wound healing.   CAD: on medications, stable, monitored. s/p CABG/ MVR   HTN: on antihypertensive medications, monitored, asymptomatic.  Obesity: No strict exercise routines, not on any weight loss program, neither on low calorie diet.      Discussed plan and management with Dr Chika Perry NP - TEAMS  Fay Farr MD  Cell: [phone]  Office: [phone]          Assessment  DMT2: 73y Female with DM T2 with hyperglycemia, A1C 7% , was on oral meds at home, was using trulicity prior, on basal bolus insulin  with coverage,  insulin dose adjusted,  sugars are  trending stable.   Will need tight glycemic control for postop wound healing.   CAD: on medications, stable, monitored. s/p CABG/ MVR   HTN: on antihypertensive medications, monitored, asymptomatic.  Obesity: No strict exercise routines, not on any weight loss program, neither on low calorie diet.      Discussed plan and management with Dr Chika Perry NP - TEAMS  Fay Farr MD  Cell: [phone]  Office: [phone]

## 2024-02-03 LAB
ANION GAP SERPL CALC-SCNC: 15 MMOL/L — SIGNIFICANT CHANGE UP (ref 5–17)
BUN SERPL-MCNC: 45 MG/DL — HIGH (ref 7–23)
CALCIUM SERPL-MCNC: 10.5 MG/DL — SIGNIFICANT CHANGE UP (ref 8.4–10.5)
CALCIUM SERPL-MCNC: 10.8 MG/DL — HIGH (ref 8.4–10.5)
CHLORIDE SERPL-SCNC: 88 MMOL/L — LOW (ref 96–108)
CO2 SERPL-SCNC: 31 MMOL/L — SIGNIFICANT CHANGE UP (ref 22–31)
CREAT SERPL-MCNC: 2.52 MG/DL — HIGH (ref 0.5–1.3)
EGFR: 20 ML/MIN/1.73M2 — LOW
GLUCOSE SERPL-MCNC: 141 MG/DL — HIGH (ref 70–99)
HCT VFR BLD CALC: 30.8 % — LOW (ref 34.5–45)
HGB BLD-MCNC: 9.8 G/DL — LOW (ref 11.5–15.5)
MCHC RBC-ENTMCNC: 30.4 PG — SIGNIFICANT CHANGE UP (ref 27–34)
MCHC RBC-ENTMCNC: 31.8 GM/DL — LOW (ref 32–36)
MCV RBC AUTO: 95.7 FL — SIGNIFICANT CHANGE UP (ref 80–100)
NRBC # BLD: 0 /100 WBCS — SIGNIFICANT CHANGE UP (ref 0–0)
PLATELET # BLD AUTO: 260 K/UL — SIGNIFICANT CHANGE UP (ref 150–400)
POTASSIUM SERPL-MCNC: 3.9 MMOL/L — SIGNIFICANT CHANGE UP (ref 3.5–5.3)
POTASSIUM SERPL-SCNC: 3.9 MMOL/L — SIGNIFICANT CHANGE UP (ref 3.5–5.3)
PTH-INTACT FLD-MCNC: 89 PG/ML — HIGH (ref 15–65)
RBC # BLD: 3.22 M/UL — LOW (ref 3.8–5.2)
RBC # FLD: 14.9 % — HIGH (ref 10.3–14.5)
SODIUM SERPL-SCNC: 134 MMOL/L — LOW (ref 135–145)
WBC # BLD: 10.33 K/UL — SIGNIFICANT CHANGE UP (ref 3.8–10.5)
WBC # FLD AUTO: 10.33 K/UL — SIGNIFICANT CHANGE UP (ref 3.8–10.5)

## 2024-02-03 RX ORDER — APIXABAN 2.5 MG/1
2.5 TABLET, FILM COATED ORAL EVERY 12 HOURS
Refills: 0 | Status: DISCONTINUED | OUTPATIENT
Start: 2024-02-03 | End: 2024-02-05

## 2024-02-03 RX ADMIN — OXYCODONE HYDROCHLORIDE 5 MILLIGRAM(S): 5 TABLET ORAL at 13:05

## 2024-02-03 RX ADMIN — Medication 4 UNIT(S): at 08:07

## 2024-02-03 RX ADMIN — Medication 2: at 12:09

## 2024-02-03 RX ADMIN — Medication 4 UNIT(S): at 12:09

## 2024-02-03 RX ADMIN — POLYETHYLENE GLYCOL 3350 17 GRAM(S): 17 POWDER, FOR SOLUTION ORAL at 12:10

## 2024-02-03 RX ADMIN — APIXABAN 2.5 MILLIGRAM(S): 2.5 TABLET, FILM COATED ORAL at 17:11

## 2024-02-03 RX ADMIN — OXYCODONE HYDROCHLORIDE 5 MILLIGRAM(S): 5 TABLET ORAL at 06:48

## 2024-02-03 RX ADMIN — Medication 100 MILLIGRAM(S): at 12:09

## 2024-02-03 RX ADMIN — Medication 12.5 MILLIGRAM(S): at 05:39

## 2024-02-03 RX ADMIN — ERYTHROPOIETIN 10000 UNIT(S): 10000 INJECTION, SOLUTION INTRAVENOUS; SUBCUTANEOUS at 06:51

## 2024-02-03 RX ADMIN — Medication 81 MILLIGRAM(S): at 12:08

## 2024-02-03 RX ADMIN — Medication 20 MILLIGRAM(S): at 05:39

## 2024-02-03 RX ADMIN — OXYCODONE HYDROCHLORIDE 5 MILLIGRAM(S): 5 TABLET ORAL at 20:35

## 2024-02-03 RX ADMIN — OXYCODONE HYDROCHLORIDE 5 MILLIGRAM(S): 5 TABLET ORAL at 21:18

## 2024-02-03 RX ADMIN — SODIUM CHLORIDE 3 MILLILITER(S): 9 INJECTION INTRAMUSCULAR; INTRAVENOUS; SUBCUTANEOUS at 20:37

## 2024-02-03 RX ADMIN — Medication 12.5 MILLIGRAM(S): at 17:11

## 2024-02-03 RX ADMIN — ATORVASTATIN CALCIUM 80 MILLIGRAM(S): 80 TABLET, FILM COATED ORAL at 20:35

## 2024-02-03 RX ADMIN — PANTOPRAZOLE SODIUM 40 MILLIGRAM(S): 20 TABLET, DELAYED RELEASE ORAL at 05:40

## 2024-02-03 RX ADMIN — SODIUM CHLORIDE 3 MILLILITER(S): 9 INJECTION INTRAMUSCULAR; INTRAVENOUS; SUBCUTANEOUS at 05:58

## 2024-02-03 RX ADMIN — SPIRONOLACTONE 50 MILLIGRAM(S): 25 TABLET, FILM COATED ORAL at 05:39

## 2024-02-03 RX ADMIN — OXYCODONE HYDROCHLORIDE 5 MILLIGRAM(S): 5 TABLET ORAL at 07:20

## 2024-02-03 RX ADMIN — Medication 4 UNIT(S): at 17:10

## 2024-02-03 RX ADMIN — AMIODARONE HYDROCHLORIDE 200 MILLIGRAM(S): 400 TABLET ORAL at 05:39

## 2024-02-03 RX ADMIN — CHLORHEXIDINE GLUCONATE 1 APPLICATION(S): 213 SOLUTION TOPICAL at 12:15

## 2024-02-03 RX ADMIN — INSULIN GLARGINE 15 UNIT(S): 100 INJECTION, SOLUTION SUBCUTANEOUS at 21:37

## 2024-02-03 RX ADMIN — SODIUM CHLORIDE 3 MILLILITER(S): 9 INJECTION INTRAMUSCULAR; INTRAVENOUS; SUBCUTANEOUS at 14:05

## 2024-02-03 NOTE — PROGRESS NOTE ADULT - PROBLEM SELECTOR PLAN 3
Complains of vaginal itching and \"cottage cheese\" like discharge. States started last Thursday and has been getting worse.      Has tried eating yogurt.     Pharmacy: [facility]    Will Discuss with Dr. Fields and call back     On medications,  no chest pain, stable, monitored and followed up by cardiology team

## 2024-02-03 NOTE — PROGRESS NOTE ADULT - PROBLEM SELECTOR PLAN 1
Will continue current insulin regimen for now. Will continue monitoring  blood sugars, will Follow up.  Patient counseled for compliance with consistent low carb diet.  .   DC plan  Has CKD, decreased eGFR  Suggest decrease Januvia 25 mg daily  Start 1 mg glimepiride in AM with breakfast  FU outpatient

## 2024-02-03 NOTE — PROGRESS NOTE ADULT - ASSESSMENT
oob to chair on nasal cannula  states she feels  better but has poor appetite   updated at bedside    acetaminophen     Tablet .. 650 milliGRAM(s) Oral every 6 hours PRN  allopurinol 100 milliGRAM(s) Oral daily  aMIOdarone    Tablet   Oral   aMIOdarone    Tablet 200 milliGRAM(s) Oral daily  apixaban 2.5 milliGRAM(s) Oral every 12 hours  aspirin enteric coated 81 milliGRAM(s) Oral daily  atorvastatin 80 milliGRAM(s) Oral at bedtime  bisacodyl Suppository 10 milliGRAM(s) Rectal once  chlorhexidine 2% Cloths 1 Application(s) Topical daily  dextrose 5%. 1000 milliLiter(s) IV Continuous <Continuous>  dextrose 5%. 1000 milliLiter(s) IV Continuous <Continuous>  dextrose 50% Injectable 25 milliLiter(s) IV Push every 15 minutes  dextrose 50% Injectable 25 Gram(s) IV Push once  dextrose 50% Injectable 50 milliLiter(s) IV Push every 15 minutes  dextrose 50% Injectable 25 Gram(s) IV Push once  dextrose 50% Injectable 12.5 Gram(s) IV Push once  dextrose Oral Gel 15 Gram(s) Oral once PRN  glucagon  Injectable 1 milliGRAM(s) IntraMuscular once  insulin glargine Injectable (LANTUS) 15 Unit(s) SubCutaneous at bedtime  insulin lispro (ADMELOG) corrective regimen sliding scale   SubCutaneous three times a day before meals  insulin lispro (ADMELOG) corrective regimen sliding scale   SubCutaneous at bedtime  insulin lispro Injectable (ADMELOG) 4 Unit(s) SubCutaneous three times a day before meals  metoprolol tartrate 12.5 milliGRAM(s) Oral two times a day  oxyCODONE    IR 5 milliGRAM(s) Oral every 4 hours PRN  oxyCODONE    IR 10 milliGRAM(s) Oral every 4 hours PRN  pantoprazole    Tablet 40 milliGRAM(s) Oral before breakfast  polyethylene glycol 3350 17 Gram(s) Oral daily  senna 2 Tablet(s) Oral at bedtime  sodium chloride 0.9% lock flush 3 milliLiter(s) IV Push every 8 hours  sodium chloride 0.9%. 1000 milliLiter(s) IV Continuous <Continuous>  spironolactone 50 milliGRAM(s) Oral daily  torsemide 20 milliGRAM(s) Oral daily      VITAL:  T(C): , Max: 37.1 (02-03-24 @ 13:00)  T(F): , Max: 98.8 (02-03-24 @ 13:00)  HR: 60 (02-03-24 @ 13:15)  BP: 102/60 (02-03-24 @ 13:15)  BP(mean): 73 (02-02-24 @ 17:55)  RR: 18 (02-03-24 @ 13:15)  SpO2: 92% (02-03-24 @ 13:15)  Wt(kg): --    02-02-24 @ 07:01  -  02-03-24 @ 07:00  --------------------------------------------------------  IN: 600 mL / OUT: 1200 mL / NET: -600 mL    02-03-24 @ 07:01  -  02-03-24 @ 15:23  --------------------------------------------------------  IN: 320 mL / OUT: 700 mL / NET: -380 mL        PHYSICAL EXAM:  Constitutional: NAD; obese   Neck:  No JVD  Respiratory: reduced   Cardiovascular: S1 and S2  Gastrointestinal: BS+, soft, NT/ND  Extremities: No peripheral edema  Neurological: A/O x 3, no focal deficits  Psychiatric: Normal mood, normal affect  : No Diallo  Skin: No rashes  Access: Not applicable    LABS:                          9.8    10.33 )-----------( 260      ( 03 Feb 2024 12:06 )             30.8     Na(134)/K(3.9)/Cl(88)/HCO3(31)/BUN(45)/Cr(2.52)Glu(141)/Ca(10.8)/Mg(--)/PO4(--)    02-03 @ 12:06  Na(137)/K(4.2)/Cl(91)/HCO3(33)/BUN(38)/Cr(2.15)Glu(119)/Ca(10.9)/Mg(--)/PO4(--)    02-02 @ 09:19  Na(143)/K(4.3)/Cl(99)/HCO3(31)/BUN(32)/Cr(1.76)Glu(96)/Ca(10.3)/Mg(--)/PO4(--)    02-01 @ 07:12    Urinalysis Basic - ( 03 Feb 2024 12:06 )    Color: x / Appearance: x / SG: x / pH: x  Gluc: 141 mg/dL / Ketone: x  / Bili: x / Urobili: x   Blood: x / Protein: x / Nitrite: x   Leuk Esterase: x / RBC: x / WBC x   Sq Epi: x / Non Sq Epi: x / Bacteria: x            ASSESSMENT/PLAN  73 year old female with PMH morbid obesity (BMI 42.7 s/p gastric sleeve 2015), DMT2, CAD s/p PCI of LCX (2012), LEIDY (on CPAP), CKD (stage 3 - on farxiga), AF (on Eliquis) and rheumatic mitral valve disease reports c/o chronic BARBOZA  1/23/24 SP Mitral valve replacement;  Hybrid Maze procedure Clipping, left atrial appendage and CABG, using 1 vein graft    CKD stage 3a        1 Renal - Creatinine @ baseline more in the 1.6-1.7 range;   scr worse today   No need for renal sono   Torsemide and one dose of zaroxolyn for synergy yesteday  2 Endo-Farxiga and ARB are both held and to restarted as outpt   3 CVS-Off bblockers ;  Not on pressors ; BP acceptable  4 Pulm-Nasal canula ;     5 EPS -SP PPM placement   6 Anemia-  hgb down  s/p Epogen 10000 x 1 today  7 Hyponatremia- trend for now      Dirk Maciel NP-BC  [employer]  [phone]

## 2024-02-03 NOTE — PROGRESS NOTE ADULT - ASSESSMENT
Assessment  DMT2: 73y Female with DM T2 with hyperglycemia, A1C 7% , was on oral meds at home, was using trulicity prior, on basal bolus insulin with coverage, sugars are improving.  Will need tight glycemic control for postop wound healing.   CAD: on medications, stable, monitored. s/p CABG/ MVR   HTN: on antihypertensive medications, monitored, asymptomatic.  Obesity: No strict exercise routines, not on any weight loss program, neither on low calorie diet.        Fay Farr MD  Cell: [phone]  Office: [phone]

## 2024-02-03 NOTE — PROGRESS NOTE ADULT - SUBJECTIVE AND OBJECTIVE BOX
S:  feels ok.  still some sob when exerting herself    Telemetry:   60    Vital Signs Last 24 Hrs  T(C): 36.5 (24 @ 04:24), Max: 36.8 (24 @ 20:04)  T(F): 97.7 (24 @ 04:24), Max: 98.2 (24 @ 20:04)  HR: 60 (24 @ 08:50) (60 - 70)  BP: 119/64 (24 @ 04:24) (102/59 - 126/71)  RR: 18 (24 @ 04:24) (18 - 18)  SpO2: 95% (24 @ 08:50) (92% - 100%)                    @ 07:01  -   @ 07:00  --------------------------------------------------------  IN: 600 mL / OUT: 1200 mL / NET: -600 mL     @ 07:01  -   @ 12:08  --------------------------------------------------------  IN: 200 mL / OUT: 700 mL / NET: -500 mL        Daily Weight in k.1 (2024 08:22)        CAPILLARY BLOOD GLUCOSE      POCT Blood Glucose.: 178 mg/dL (2024 12:06)  POCT Blood Glucose.: 110 mg/dL (2024 07:53)  POCT Blood Glucose.: 142 mg/dL (2024 21:24)  POCT Blood Glucose.: 118 mg/dL (2024 16:46)          Drains:     MS         [  ] Drainage:                 L Pleural  [  ]  Drainage:                R Pleural  [  ]  Drainage:    Pacing Wires        [  ]   Settings:                                  Isolated  [  ]    Coumadin    [ ] YES          [ x ]      NO         Reason:                                 10.1   10.13 )-----------( 246      ( 2024 09:19 )             31.5       02-    137  |  91<L>  |  38<H>  ----------------------------<  119<H>  4.2   |  33<H>  |  2.15<H>    Ca    10.9<H>      2024 09:19            PHYSICAL EXAM:    Neurology: alert and oriented x 3, nonfocal, no gross deficits    CV :  S1S2 RRR    Sternal Wound :  CDI , Stable    Lungs:  clear to ausc.  decreased BS at bases.  no w/r/r    Abdomen: soft, nontender, nondistended, positive bowel sounds, + bowel movement              Extremities:     1+ edema          acetaminophen     Tablet .. 650 milliGRAM(s) Oral every 6 hours PRN  allopurinol 100 milliGRAM(s) Oral daily  aMIOdarone    Tablet   Oral   aMIOdarone    Tablet 200 milliGRAM(s) Oral daily  aspirin enteric coated 81 milliGRAM(s) Oral daily  atorvastatin 80 milliGRAM(s) Oral at bedtime  bisacodyl Suppository 10 milliGRAM(s) Rectal once  chlorhexidine 2% Cloths 1 Application(s) Topical daily  dextrose 5%. 1000 milliLiter(s) IV Continuous <Continuous>  dextrose 5%. 1000 milliLiter(s) IV Continuous <Continuous>  dextrose 50% Injectable 25 Gram(s) IV Push once  dextrose 50% Injectable 25 milliLiter(s) IV Push every 15 minutes  dextrose 50% Injectable 25 Gram(s) IV Push once  dextrose 50% Injectable 50 milliLiter(s) IV Push every 15 minutes  dextrose 50% Injectable 12.5 Gram(s) IV Push once  dextrose Oral Gel 15 Gram(s) Oral once PRN  glucagon  Injectable 1 milliGRAM(s) IntraMuscular once  insulin glargine Injectable (LANTUS) 15 Unit(s) SubCutaneous at bedtime  insulin lispro (ADMELOG) corrective regimen sliding scale   SubCutaneous at bedtime  insulin lispro (ADMELOG) corrective regimen sliding scale   SubCutaneous three times a day before meals  insulin lispro Injectable (ADMELOG) 4 Unit(s) SubCutaneous three times a day before meals  metoprolol tartrate 12.5 milliGRAM(s) Oral two times a day  oxyCODONE    IR 5 milliGRAM(s) Oral every 4 hours PRN  oxyCODONE    IR 10 milliGRAM(s) Oral every 4 hours PRN  pantoprazole    Tablet 40 milliGRAM(s) Oral before breakfast  polyethylene glycol 3350 17 Gram(s) Oral daily  senna 2 Tablet(s) Oral at bedtime  sodium chloride 0.9% lock flush 3 milliLiter(s) IV Push every 8 hours  sodium chloride 0.9%. 1000 milliLiter(s) IV Continuous <Continuous>  spironolactone 50 milliGRAM(s) Oral daily  torsemide 20 milliGRAM(s) Oral daily                              Physical Therapy Rec:   Home  [ x ]   Home w/ PT  [  ]  Rehab  [  ]    Discussed with Cardiothoracic Team at AM rounds.

## 2024-02-03 NOTE — PROGRESS NOTE ADULT - SUBJECTIVE AND OBJECTIVE BOX
Chief complaint    Patient is a 74y old  Female who presents with a chief complaint of I need surgery (02 Feb 2024 08:26)   Review of systems  Patient appears comfortable.    Labs and Fingersticks  CAPILLARY BLOOD GLUCOSE      POCT Blood Glucose.: 178 mg/dL (03 Feb 2024 12:06)  POCT Blood Glucose.: 110 mg/dL (03 Feb 2024 07:53)  POCT Blood Glucose.: 142 mg/dL (02 Feb 2024 21:24)  POCT Blood Glucose.: 118 mg/dL (02 Feb 2024 16:46)      Anion Gap: 15 (02-03 @ 12:06)  Anion Gap: 13 (02-02 @ 09:19)      Calcium: 10.8 *H* (02-03 @ 12:06)  Calcium: 10.9 *H* (02-02 @ 09:19)          02-03    134<L>  |  88<L>  |  45<H>  ----------------------------<  141<H>  3.9   |  31  |  2.52<H>    Ca    10.8<H>      03 Feb 2024 12:06                          9.8    10.33 )-----------( 260      ( 03 Feb 2024 12:06 )             30.8     Medications  MEDICATIONS  (STANDING):  allopurinol 100 milliGRAM(s) Oral daily  aMIOdarone    Tablet   Oral   aMIOdarone    Tablet 200 milliGRAM(s) Oral daily  apixaban 2.5 milliGRAM(s) Oral every 12 hours  aspirin enteric coated 81 milliGRAM(s) Oral daily  atorvastatin 80 milliGRAM(s) Oral at bedtime  bisacodyl Suppository 10 milliGRAM(s) Rectal once  chlorhexidine 2% Cloths 1 Application(s) Topical daily  dextrose 5%. 1000 milliLiter(s) (100 mL/Hr) IV Continuous <Continuous>  dextrose 5%. 1000 milliLiter(s) (50 mL/Hr) IV Continuous <Continuous>  dextrose 50% Injectable 25 Gram(s) IV Push once  dextrose 50% Injectable 25 milliLiter(s) IV Push every 15 minutes  dextrose 50% Injectable 25 Gram(s) IV Push once  dextrose 50% Injectable 50 milliLiter(s) IV Push every 15 minutes  dextrose 50% Injectable 12.5 Gram(s) IV Push once  glucagon  Injectable 1 milliGRAM(s) IntraMuscular once  insulin glargine Injectable (LANTUS) 15 Unit(s) SubCutaneous at bedtime  insulin lispro (ADMELOG) corrective regimen sliding scale   SubCutaneous three times a day before meals  insulin lispro (ADMELOG) corrective regimen sliding scale   SubCutaneous at bedtime  insulin lispro Injectable (ADMELOG) 4 Unit(s) SubCutaneous three times a day before meals  metoprolol tartrate 12.5 milliGRAM(s) Oral two times a day  pantoprazole    Tablet 40 milliGRAM(s) Oral before breakfast  polyethylene glycol 3350 17 Gram(s) Oral daily  senna 2 Tablet(s) Oral at bedtime  sodium chloride 0.9% lock flush 3 milliLiter(s) IV Push every 8 hours  sodium chloride 0.9%. 1000 milliLiter(s) (10 mL/Hr) IV Continuous <Continuous>  spironolactone 50 milliGRAM(s) Oral daily  torsemide 20 milliGRAM(s) Oral daily      Physical Exam  General: Patient appears comfortable.  Vital Signs Last 12 Hrs  T(F): 98.8 (02-03-24 @ 13:00), Max: 98.8 (02-03-24 @ 13:00)  HR: 60 (02-03-24 @ 13:15) (60 - 60)  BP: 102/60 (02-03-24 @ 13:15) (102/60 - 119/64)  BP(mean): --  RR: 18 (02-03-24 @ 13:15) (18 - 18)  SpO2: 92% (02-03-24 @ 13:15) (92% - 100%)  Neck: No palpable thyroid nodules.  CVS: S1S2, No murmurs  Respiratory: No wheezing, no crepitations  GI: Abdomen soft, non tender.    Diagnostics    Parathyroid Hormone Intact, Serum: AM Sched. Collection: 03-Feb-2024 06:00 (02-02 @ 10:37)      Radiology:

## 2024-02-03 NOTE — PROGRESS NOTE ADULT - PROBLEM SELECTOR PLAN 1
Renal following-Dr. Nieves  trend bmp  strict I & O's  avoid nephrotoxic agents  echo negative for effusion 1/29  renal following; diuretics torsemide 20/aldactone 50 QD

## 2024-02-03 NOTE — PROGRESS NOTE ADULT - ASSESSMENT
73F w/ PMH morbid obesity (BMI 42.7 s/p gastric sleeve ), DMT2, CAD s/p PCI of LCX (), LEIDY (on CPAP), CKD (stage 3 - on farxiga), AF (on Eliquis) and rheumatic mitral valve disease reports c/o chronic BARBOZA for over 10 years that has progressively worsened over the past few months with the feeling of an "elephant on my chest." Her symptoms improved after starting Lasix daily. She gets dizzy when she bends over or when she stands up too fast. She denies CP or weight gain. She sleeps with 3 pillows on an incline at night. She cannot lay flat due to comfort as well as orthopnea. She can only walk one block before she has to stop to rest due to SOB. Pt now presents to [facility] for scheduled mitral valve replacement, maze procedure and left atrial appendage ligation with Dr. Dalal on 24.  s/p  MVR (t)/ C1V/ Maze/ RICHARD clip  post extubated / inotropic support  EP consulted for underlying junctional rhythm; no av nodals at this time; + epicardial pw; maintain    tx sdu; VSS; afib 60-70 on  2.5; maintain med ct's; diuresis as per renal; endo consulted for diabetic management  keep npo after midnight sat into sun for possible ppm as per EP; no av nodals at this time  24 VSS on  2.5 EP following for ?ppm   VSS NPO for PPM for aflutter w/ chb  - s/p PPM placement; tx floor   VSS: AV Paced 60-80; d/c  as per Dr. Dalal; bun/cr increased today --> d/c diuretics and ck echo as per Dr. Dalal  pa/lat chest xray s/p PPM ; d/w pw   wean O2 as tolerated; sugical bra    VSS s/p ppm site cdi diuretics d/c'd yesterday- rounds made w/ Dr. Dalal - creatinine down to 1.7 - torsemide 20mg po qd & aldactone 50 bid resumed Dr. Nieves, renal following pt. discharge planning- home when stable Thursday  1/31: VSS; Cr coming down, 1.62 today, negative 1.8L, renal following, CXR PA/LAT today    VSS diuresing  b/l pl effusions  ambulate d/c planning home - ween O2  2/2 VSS; A paced; V.paced; bun/cr 38/2.1; renal following; diuretics decreased to qd; ace wrap LE ; wean O2 as tolerated; ck chest xray   2/3VSS   60's.  -600cc/24hrs.  bun/cr 38/2.15 ( will dw renal ).  CXR stable b/l effusions.  Resume eliquis  discharge planning- home pt when stable

## 2024-02-03 NOTE — PROGRESS NOTE ADULT - PROBLEM SELECTOR PLAN 3
Time-based billing (NON-critical care) Time-based billing (NON-critical care) Time-based billing (NON-critical care) Time-based billing (NON-critical care) Time-based billing (NON-critical care) Time-based billing (NON-critical care) Time-based billing (NON-critical care) s/p 1/23 MVR(t)/ C1V   continue postop care  continue asa and statin  amio 200 qd   lop 12.5 bid   PPM 1/28  pulm toilet  pain management  wean O2 as tolerated  surgical bra   increase activity as tolerated   discharge planning- home when stable

## 2024-02-04 LAB
ANION GAP SERPL CALC-SCNC: 12 MMOL/L — SIGNIFICANT CHANGE UP (ref 5–17)
BUN SERPL-MCNC: 44 MG/DL — HIGH (ref 7–23)
CALCIUM SERPL-MCNC: 11.2 MG/DL — HIGH (ref 8.4–10.5)
CHLORIDE SERPL-SCNC: 90 MMOL/L — LOW (ref 96–108)
CO2 SERPL-SCNC: 34 MMOL/L — HIGH (ref 22–31)
CREAT SERPL-MCNC: 2.39 MG/DL — HIGH (ref 0.5–1.3)
EGFR: 21 ML/MIN/1.73M2 — LOW
GLUCOSE SERPL-MCNC: 92 MG/DL — SIGNIFICANT CHANGE UP (ref 70–99)
HCT VFR BLD CALC: 30.2 % — LOW (ref 34.5–45)
HGB BLD-MCNC: 9.6 G/DL — LOW (ref 11.5–15.5)
MCHC RBC-ENTMCNC: 30.4 PG — SIGNIFICANT CHANGE UP (ref 27–34)
MCHC RBC-ENTMCNC: 31.8 GM/DL — LOW (ref 32–36)
MCV RBC AUTO: 95.6 FL — SIGNIFICANT CHANGE UP (ref 80–100)
NRBC # BLD: 0 /100 WBCS — SIGNIFICANT CHANGE UP (ref 0–0)
PLATELET # BLD AUTO: 252 K/UL — SIGNIFICANT CHANGE UP (ref 150–400)
POTASSIUM SERPL-MCNC: 3.9 MMOL/L — SIGNIFICANT CHANGE UP (ref 3.5–5.3)
POTASSIUM SERPL-SCNC: 3.9 MMOL/L — SIGNIFICANT CHANGE UP (ref 3.5–5.3)
RBC # BLD: 3.16 M/UL — LOW (ref 3.8–5.2)
RBC # FLD: 14.8 % — HIGH (ref 10.3–14.5)
SODIUM SERPL-SCNC: 136 MMOL/L — SIGNIFICANT CHANGE UP (ref 135–145)
WBC # BLD: 9.06 K/UL — SIGNIFICANT CHANGE UP (ref 3.8–10.5)
WBC # FLD AUTO: 9.06 K/UL — SIGNIFICANT CHANGE UP (ref 3.8–10.5)

## 2024-02-04 RX ORDER — POTASSIUM CHLORIDE 20 MEQ
20 PACKET (EA) ORAL ONCE
Refills: 0 | Status: COMPLETED | OUTPATIENT
Start: 2024-02-04 | End: 2024-02-04

## 2024-02-04 RX ORDER — INSULIN GLARGINE 100 [IU]/ML
12 INJECTION, SOLUTION SUBCUTANEOUS AT BEDTIME
Refills: 0 | Status: DISCONTINUED | OUTPATIENT
Start: 2024-02-04 | End: 2024-02-08

## 2024-02-04 RX ADMIN — APIXABAN 2.5 MILLIGRAM(S): 2.5 TABLET, FILM COATED ORAL at 17:00

## 2024-02-04 RX ADMIN — INSULIN GLARGINE 12 UNIT(S): 100 INJECTION, SOLUTION SUBCUTANEOUS at 22:00

## 2024-02-04 RX ADMIN — SPIRONOLACTONE 50 MILLIGRAM(S): 25 TABLET, FILM COATED ORAL at 05:27

## 2024-02-04 RX ADMIN — OXYCODONE HYDROCHLORIDE 5 MILLIGRAM(S): 5 TABLET ORAL at 21:19

## 2024-02-04 RX ADMIN — POLYETHYLENE GLYCOL 3350 17 GRAM(S): 17 POWDER, FOR SOLUTION ORAL at 08:34

## 2024-02-04 RX ADMIN — OXYCODONE HYDROCHLORIDE 5 MILLIGRAM(S): 5 TABLET ORAL at 08:43

## 2024-02-04 RX ADMIN — Medication 20 MILLIGRAM(S): at 05:27

## 2024-02-04 RX ADMIN — Medication 81 MILLIGRAM(S): at 08:34

## 2024-02-04 RX ADMIN — Medication 12.5 MILLIGRAM(S): at 17:00

## 2024-02-04 RX ADMIN — Medication 4 UNIT(S): at 16:39

## 2024-02-04 RX ADMIN — Medication 4 UNIT(S): at 12:20

## 2024-02-04 RX ADMIN — OXYCODONE HYDROCHLORIDE 5 MILLIGRAM(S): 5 TABLET ORAL at 22:18

## 2024-02-04 RX ADMIN — Medication 20 MILLIEQUIVALENT(S): at 14:26

## 2024-02-04 RX ADMIN — Medication 4 UNIT(S): at 08:33

## 2024-02-04 RX ADMIN — CHLORHEXIDINE GLUCONATE 1 APPLICATION(S): 213 SOLUTION TOPICAL at 11:41

## 2024-02-04 RX ADMIN — SODIUM CHLORIDE 3 MILLILITER(S): 9 INJECTION INTRAMUSCULAR; INTRAVENOUS; SUBCUTANEOUS at 05:24

## 2024-02-04 RX ADMIN — OXYCODONE HYDROCHLORIDE 5 MILLIGRAM(S): 5 TABLET ORAL at 09:43

## 2024-02-04 RX ADMIN — ATORVASTATIN CALCIUM 80 MILLIGRAM(S): 80 TABLET, FILM COATED ORAL at 21:18

## 2024-02-04 RX ADMIN — Medication 12.5 MILLIGRAM(S): at 05:28

## 2024-02-04 RX ADMIN — SODIUM CHLORIDE 3 MILLILITER(S): 9 INJECTION INTRAMUSCULAR; INTRAVENOUS; SUBCUTANEOUS at 22:00

## 2024-02-04 RX ADMIN — SODIUM CHLORIDE 3 MILLILITER(S): 9 INJECTION INTRAMUSCULAR; INTRAVENOUS; SUBCUTANEOUS at 13:36

## 2024-02-04 RX ADMIN — Medication 100 MILLIGRAM(S): at 08:34

## 2024-02-04 RX ADMIN — AMIODARONE HYDROCHLORIDE 200 MILLIGRAM(S): 400 TABLET ORAL at 05:27

## 2024-02-04 RX ADMIN — APIXABAN 2.5 MILLIGRAM(S): 2.5 TABLET, FILM COATED ORAL at 05:27

## 2024-02-04 RX ADMIN — SENNA PLUS 2 TABLET(S): 8.6 TABLET ORAL at 21:19

## 2024-02-04 RX ADMIN — PANTOPRAZOLE SODIUM 40 MILLIGRAM(S): 20 TABLET, DELAYED RELEASE ORAL at 05:27

## 2024-02-04 NOTE — PROGRESS NOTE ADULT - SUBJECTIVE AND OBJECTIVE BOX
Chief complaint  Patient is a 74y old  Female who presents with a chief complaint of I need surgery (02 Feb 2024 08:26)         Labs and Fingersticks  CAPILLARY BLOOD GLUCOSE      POCT Blood Glucose.: 100 mg/dL (04 Feb 2024 11:51)  POCT Blood Glucose.: 90 mg/dL (04 Feb 2024 07:52)  POCT Blood Glucose.: 121 mg/dL (03 Feb 2024 21:28)  POCT Blood Glucose.: 113 mg/dL (03 Feb 2024 16:36)      Anion Gap: 12 (02-04 @ 11:31)  Anion Gap: 15 (02-03 @ 12:06)      Calcium: 11.2 *H* (02-04 @ 11:31)  Calcium: 10.8 *H* (02-03 @ 12:06)  Intact PTH: 89 *H* (02-03 @ 12:06)          02-04    136  |  90<L>  |  44<H>  ----------------------------<  92  3.9   |  34<H>  |  2.39<H>    Ca    11.2<H>      04 Feb 2024 11:31                          9.6    9.06  )-----------( 252      ( 04 Feb 2024 11:31 )             30.2     Medications  MEDICATIONS  (STANDING):  allopurinol 100 milliGRAM(s) Oral daily  aMIOdarone    Tablet   Oral   aMIOdarone    Tablet 200 milliGRAM(s) Oral daily  apixaban 2.5 milliGRAM(s) Oral every 12 hours  aspirin enteric coated 81 milliGRAM(s) Oral daily  atorvastatin 80 milliGRAM(s) Oral at bedtime  bisacodyl Suppository 10 milliGRAM(s) Rectal once  chlorhexidine 2% Cloths 1 Application(s) Topical daily  dextrose 5%. 1000 milliLiter(s) (50 mL/Hr) IV Continuous <Continuous>  dextrose 5%. 1000 milliLiter(s) (100 mL/Hr) IV Continuous <Continuous>  dextrose 50% Injectable 12.5 Gram(s) IV Push once  dextrose 50% Injectable 25 milliLiter(s) IV Push every 15 minutes  dextrose 50% Injectable 50 milliLiter(s) IV Push every 15 minutes  dextrose 50% Injectable 25 Gram(s) IV Push once  dextrose 50% Injectable 25 Gram(s) IV Push once  glucagon  Injectable 1 milliGRAM(s) IntraMuscular once  insulin glargine Injectable (LANTUS) 12 Unit(s) SubCutaneous at bedtime  insulin lispro (ADMELOG) corrective regimen sliding scale   SubCutaneous three times a day before meals  insulin lispro (ADMELOG) corrective regimen sliding scale   SubCutaneous at bedtime  insulin lispro Injectable (ADMELOG) 4 Unit(s) SubCutaneous three times a day before meals  metoprolol tartrate 12.5 milliGRAM(s) Oral two times a day  pantoprazole    Tablet 40 milliGRAM(s) Oral before breakfast  polyethylene glycol 3350 17 Gram(s) Oral daily  senna 2 Tablet(s) Oral at bedtime  sodium chloride 0.9% lock flush 3 milliLiter(s) IV Push every 8 hours  spironolactone 50 milliGRAM(s) Oral daily  torsemide 20 milliGRAM(s) Oral daily      Physical Exam  General: Patient comfortable in bed   Vital Signs Last 12 Hrs  T(F): 96.8 (02-04-24 @ 11:59), Max: 97.2 (02-04-24 @ 04:28)  HR: 60 (02-04-24 @ 11:59) (60 - 61)  BP: 98/49 (02-04-24 @ 11:59) (98/49 - 111/64)  BP(mean): --  RR: 18 (02-04-24 @ 11:59) (18 - 18)  SpO2: 93% (02-04-24 @ 11:59) (93% - 98%)    CVS: S1S2   Respiratory: No wheezing, no crepitations  GI: Abdomen soft, bowel sounds positive  Musculoskeletal:  moves all extremities  : Voiding        Chief complaint  Patient is a 74y old  Female who presents with a chief complaint of I need surgery (02 Feb 2024 08:26)         Labs and Fingersticks  CAPILLARY BLOOD GLUCOSE      POCT Blood Glucose.: 100 mg/dL (04 Feb 2024 11:51)  POCT Blood Glucose.: 90 mg/dL (04 Feb 2024 07:52)  POCT Blood Glucose.: 121 mg/dL (03 Feb 2024 21:28)  POCT Blood Glucose.: 113 mg/dL (03 Feb 2024 16:36)      Anion Gap: 12 (02-04 @ 11:31)  Anion Gap: 15 (02-03 @ 12:06)      Calcium: 11.2 *H* (02-04 @ 11:31)  Calcium: 10.8 *H* (02-03 @ 12:06)  Intact PTH: 89 *H* (02-03 @ 12:06)          02-04    136  |  90<L>  |  44<H>  ----------------------------<  92  3.9   |  34<H>  |  2.39<H>    Ca    11.2<H>      04 Feb 2024 11:31                          9.6    9.06  )-----------( 252      ( 04 Feb 2024 11:31 )             30.2     Medications  MEDICATIONS  (STANDING):  allopurinol 100 milliGRAM(s) Oral daily  aMIOdarone    Tablet   Oral   aMIOdarone    Tablet 200 milliGRAM(s) Oral daily  apixaban 2.5 milliGRAM(s) Oral every 12 hours  aspirin enteric coated 81 milliGRAM(s) Oral daily  atorvastatin 80 milliGRAM(s) Oral at bedtime  bisacodyl Suppository 10 milliGRAM(s) Rectal once  chlorhexidine 2% Cloths 1 Application(s) Topical daily  dextrose 5%. 1000 milliLiter(s) (50 mL/Hr) IV Continuous <Continuous>  dextrose 5%. 1000 milliLiter(s) (100 mL/Hr) IV Continuous <Continuous>  dextrose 50% Injectable 12.5 Gram(s) IV Push once  dextrose 50% Injectable 25 milliLiter(s) IV Push every 15 minutes  dextrose 50% Injectable 50 milliLiter(s) IV Push every 15 minutes  dextrose 50% Injectable 25 Gram(s) IV Push once  dextrose 50% Injectable 25 Gram(s) IV Push once  glucagon  Injectable 1 milliGRAM(s) IntraMuscular once  insulin glargine Injectable (LANTUS) 12 Unit(s) SubCutaneous at bedtime  insulin lispro (ADMELOG) corrective regimen sliding scale   SubCutaneous three times a day before meals  insulin lispro (ADMELOG) corrective regimen sliding scale   SubCutaneous at bedtime  insulin lispro Injectable (ADMELOG) 4 Unit(s) SubCutaneous three times a day before meals  metoprolol tartrate 12.5 milliGRAM(s) Oral two times a day  pantoprazole    Tablet 40 milliGRAM(s) Oral before breakfast  polyethylene glycol 3350 17 Gram(s) Oral daily  senna 2 Tablet(s) Oral at bedtime  sodium chloride 0.9% lock flush 3 milliLiter(s) IV Push every 8 hours  spironolactone 50 milliGRAM(s) Oral daily  torsemide 20 milliGRAM(s) Oral daily      Physical Exam  General: Patient comfortable in bed   Vital Signs Last 12 Hrs  T(F): 96.8 (02-04-24 @ 11:59), Max: 97.2 (02-04-24 @ 04:28)  HR: 60 (02-04-24 @ 11:59) (60 - 61)  BP: 98/49 (02-04-24 @ 11:59) (98/49 - 111/64)  BP(mean): --  RR: 18 (02-04-24 @ 11:59) (18 - 18)  SpO2: 93% (02-04-24 @ 11:59) (93% - 98%)    CVS: S1S2   Respiratory: No wheezing, no crepitations  GI: Abdomen soft, bowel sounds positive  Musculoskeletal:  moves all extremities  : Voiding

## 2024-02-04 NOTE — PROGRESS NOTE ADULT - ASSESSMENT
on nasal cannula, ambulating around unit  with daughter  denies dyspnea on exertion    acetaminophen     Tablet .. 650 milliGRAM(s) Oral every 6 hours PRN  allopurinol 100 milliGRAM(s) Oral daily  aMIOdarone    Tablet   Oral   aMIOdarone    Tablet 200 milliGRAM(s) Oral daily  apixaban 2.5 milliGRAM(s) Oral every 12 hours  aspirin enteric coated 81 milliGRAM(s) Oral daily  atorvastatin 80 milliGRAM(s) Oral at bedtime  bisacodyl Suppository 10 milliGRAM(s) Rectal once  chlorhexidine 2% Cloths 1 Application(s) Topical daily  dextrose 5%. 1000 milliLiter(s) IV Continuous <Continuous>  dextrose 5%. 1000 milliLiter(s) IV Continuous <Continuous>  dextrose 50% Injectable 25 milliLiter(s) IV Push every 15 minutes  dextrose 50% Injectable 25 Gram(s) IV Push once  dextrose 50% Injectable 50 milliLiter(s) IV Push every 15 minutes  dextrose 50% Injectable 25 Gram(s) IV Push once  dextrose 50% Injectable 12.5 Gram(s) IV Push once  dextrose Oral Gel 15 Gram(s) Oral once PRN  glucagon  Injectable 1 milliGRAM(s) IntraMuscular once  insulin glargine Injectable (LANTUS) 12 Unit(s) SubCutaneous at bedtime  insulin lispro (ADMELOG) corrective regimen sliding scale   SubCutaneous at bedtime  insulin lispro (ADMELOG) corrective regimen sliding scale   SubCutaneous three times a day before meals  insulin lispro Injectable (ADMELOG) 4 Unit(s) SubCutaneous three times a day before meals  metoprolol tartrate 12.5 milliGRAM(s) Oral two times a day  oxyCODONE    IR 5 milliGRAM(s) Oral every 4 hours PRN  oxyCODONE    IR 10 milliGRAM(s) Oral every 4 hours PRN  pantoprazole    Tablet 40 milliGRAM(s) Oral before breakfast  polyethylene glycol 3350 17 Gram(s) Oral daily  senna 2 Tablet(s) Oral at bedtime  sodium chloride 0.9% lock flush 3 milliLiter(s) IV Push every 8 hours  spironolactone 50 milliGRAM(s) Oral daily  torsemide 20 milliGRAM(s) Oral daily      VITAL:  T(C): , Max: 37.1 (02-03-24 @ 13:00)  T(F): , Max: 98.8 (02-03-24 @ 13:00)  HR: 60 (02-04-24 @ 11:59)  BP: 98/49 (02-04-24 @ 11:59)  BP(mean): --  RR: 18 (02-04-24 @ 11:59)  SpO2: 93% (02-04-24 @ 11:59)  Wt(kg): --    02-03-24 @ 07:01  -  02-04-24 @ 07:00  --------------------------------------------------------  IN: 560 mL / OUT: 1250 mL / NET: -690 mL    02-04-24 @ 07:01  -  02-04-24 @ 12:35  --------------------------------------------------------  IN: 240 mL / OUT: 1100 mL / NET: -860 mL        PHYSICAL EXAM:  Constitutional: NAD; obese   Neck:  No JVD  Respiratory: reduced   Cardiovascular: S1 and S2 , Sternum C/D/I, surgical bra  Gastrointestinal: BS+, soft, NT/ND  Extremities: + peripheral edema LE b/l  Neurological: A/O x 3, no focal deficits  Psychiatric: Normal mood, normal affect  : No Diallo  Skin: No rashes  Access: Not applicable  LABS:                          9.6    9.06  )-----------( 252      ( 04 Feb 2024 11:31 )             30.2     Na(136)/K(3.9)/Cl(90)/HCO3(34)/BUN(44)/Cr(2.39)Glu(92)/Ca(11.2)/Mg(--)/PO4(--)    02-04 @ 11:31  Na(134)/K(3.9)/Cl(88)/HCO3(31)/BUN(45)/Cr(2.52)Glu(141)/Ca(10.8)/Mg(--)/PO4(--)    02-03 @ 12:06  Na(137)/K(4.2)/Cl(91)/HCO3(33)/BUN(38)/Cr(2.15)Glu(119)/Ca(10.9)/Mg(--)/PO4(--)    02-02 @ 09:19    Urinalysis Basic - ( 04 Feb 2024 11:31 )    Color: x / Appearance: x / SG: x / pH: x  Gluc: 92 mg/dL / Ketone: x  / Bili: x / Urobili: x   Blood: x / Protein: x / Nitrite: x   Leuk Esterase: x / RBC: x / WBC x   Sq Epi: x / Non Sq Epi: x / Bacteria: x            ASSESSMENT/PLAN  73 year old female with PMH morbid obesity (BMI 42.7 s/p gastric sleeve 2015), DMT2, CAD s/p PCI of LCX (2012), LEIDY (on CPAP), CKD (stage 3 - on farxiga), AF (on Eliquis) and rheumatic mitral valve disease reports c/o chronic BARBOZA  1/23/24 SP Mitral valve replacement;  Hybrid Maze procedure Clipping, left atrial appendage and CABG, using 1 vein graft    CKD stage 3a        1 Renal - Creatinine @ baseline more in the 1.6-1.7 range;   scr improving  from yesterday  No need for renal sono   Torsemide and  Aldactone  2 Endo-Farxiga and ARB are both held and to restarted as outpt   3 CVS-  Not on pressors ; BP acceptable  4 Pulm-Nasal canula ;     5 EPS -SP PPM placement   6 Anemia-  hgb down  s/p Epogen 10000 x 1  2/3/24  7 Hyponatremia- mild;   resolving trend     Dirk Maciel NP-BC  [email or web address]  [phone]

## 2024-02-04 NOTE — PROGRESS NOTE ADULT - SUBJECTIVE AND OBJECTIVE BOX
VITAL SIGNS    Telemetry:    Vital Signs Last 24 Hrs  T(C): 36.2 (24 @ 04:28), Max: 37.1 (24 @ 13:00)  T(F): 97.2 (24 @ 04:28), Max: 98.8 (24 @ 13:00)  HR: 61 (24 @ 07:03) (60 - 63)  BP: 111/64 (24 @ 04:28) (102/60 - 117/56)  RR: 18 (24 @ 04:28) (18 - 18)  SpO2: 98% (24 @ 07:03) (92% - 98%)             @ 07:01  -   @ 07:00  --------------------------------------------------------  IN: 560 mL / OUT: 1250 mL / NET: -690 mL     @ 07:01  -   @ 09:14  --------------------------------------------------------  IN: 240 mL / OUT: 1100 mL / NET: -860 mL       Daily     Daily Weight in k.9 (2024 08:38)  Admit Wt: Drug Dosing Weight  Height (cm): 158.8 (2024 11:26)  Weight (kg): 108.9 (2024 11:26)  BMI (kg/m2): 43.2 (2024 11:26)  BSA (m2): 2.08 (2024 11:26)      CAPILLARY BLOOD GLUCOSE      POCT Blood Glucose.: 90 mg/dL (2024 07:52)  POCT Blood Glucose.: 121 mg/dL (2024 21:28)  POCT Blood Glucose.: 113 mg/dL (2024 16:36)  POCT Blood Glucose.: 178 mg/dL (2024 12:06)          LABS:     @ 07:  -   @ 07:00  --------------------------------------------------------  IN: 560 mL / OUT: 1250 mL / NET: -690 mL     @ 07:01   @ 09:14  --------------------------------------------------------  IN: 240 mL / OUT: 1100 mL / NET: -860 mL    cret                        9.8    10.33 )-----------( 260      ( 2024 12:06 )             30.8     02-03    134<L>  |  88<L>  |  45<H>  ----------------------------<  141<H>  3.9   |  31  |  2.52<H>    Ca    10.8<H>      2024 12:06          acetaminophen     Tablet .. 650 milliGRAM(s) Oral every 6 hours PRN  allopurinol 100 milliGRAM(s) Oral daily  aMIOdarone    Tablet   Oral   aMIOdarone    Tablet 200 milliGRAM(s) Oral daily  apixaban 2.5 milliGRAM(s) Oral every 12 hours  aspirin enteric coated 81 milliGRAM(s) Oral daily  atorvastatin 80 milliGRAM(s) Oral at bedtime  bisacodyl Suppository 10 milliGRAM(s) Rectal once  chlorhexidine 2% Cloths 1 Application(s) Topical daily  dextrose 5%. 1000 milliLiter(s) IV Continuous <Continuous>  dextrose 5%. 1000 milliLiter(s) IV Continuous <Continuous>  dextrose 50% Injectable 25 milliLiter(s) IV Push every 15 minutes  dextrose 50% Injectable 25 Gram(s) IV Push once  dextrose 50% Injectable 50 milliLiter(s) IV Push every 15 minutes  dextrose 50% Injectable 25 Gram(s) IV Push once  dextrose 50% Injectable 12.5 Gram(s) IV Push once  dextrose Oral Gel 15 Gram(s) Oral once PRN  glucagon  Injectable 1 milliGRAM(s) IntraMuscular once  insulin glargine Injectable (LANTUS) 15 Unit(s) SubCutaneous at bedtime  insulin lispro (ADMELOG) corrective regimen sliding scale   SubCutaneous at bedtime  insulin lispro (ADMELOG) corrective regimen sliding scale   SubCutaneous three times a day before meals  insulin lispro Injectable (ADMELOG) 4 Unit(s) SubCutaneous three times a day before meals  metoprolol tartrate 12.5 milliGRAM(s) Oral two times a day  oxyCODONE    IR 5 milliGRAM(s) Oral every 4 hours PRN  oxyCODONE    IR 10 milliGRAM(s) Oral every 4 hours PRN  pantoprazole    Tablet 40 milliGRAM(s) Oral before breakfast  polyethylene glycol 3350 17 Gram(s) Oral daily  senna 2 Tablet(s) Oral at bedtime  sodium chloride 0.9% lock flush 3 milliLiter(s) IV Push every 8 hours  sodium chloride 0.9%. 1000 milliLiter(s) IV Continuous <Continuous>  spironolactone 50 milliGRAM(s) Oral daily  torsemide 20 milliGRAM(s) Oral daily      PHYSICAL EXAM    Subjective: "Hi.   Neurology: alert and oriented x 3, nonfocal, no gross deficits  CV : tele:  RSR  Sternal Wound :  CDI with dressing , Stable  Lungs: clear. RR easy, unlabored   Abdomen: soft, nontender, nondistended, positive bowel sounds, bowel movement   Neg N/V/D   :  pt voiding without difficulty   Extremities:   BRAUN; edema, neg calf tenderness.   PPP bilaterally      PW:  Chest tubes:                 VITAL SIGNS    Telemetry:  AV paced 60   Vital Signs Last 24 Hrs  T(C): 36.2 (24 @ 04:28), Max: 37.1 (24 @ 13:00)  T(F): 97.2 (24 @ 04:28), Max: 98.8 (24 @ 13:00)  HR: 61 (24 @ 07:03) (60 - 63)  BP: 111/64 (24 @ 04:28) (102/60 - 117/56)  RR: 18 (24 @ 04:28) (18 - 18)  SpO2: 98% (24 @ 07:03) (92% - 98%)             @ 07:01  -   @ 07:00  --------------------------------------------------------  IN: 560 mL / OUT: 1250 mL / NET: -690 mL     @ 07:01  -   @ 09:14  --------------------------------------------------------  IN: 240 mL / OUT: 1100 mL / NET: -860 mL       Daily     Daily Weight in k.9 (2024 08:38)  Admit Wt: Drug Dosing Weight  Height (cm): 158.8 (2024 11:26)  Weight (kg): 108.9 (2024 11:26)  BMI (kg/m2): 43.2 (2024 11:26)  BSA (m2): 2.08 (2024 11:26)      CAPILLARY BLOOD GLUCOSE      POCT Blood Glucose.: 90 mg/dL (2024 07:52)  POCT Blood Glucose.: 121 mg/dL (2024 21:28)  POCT Blood Glucose.: 113 mg/dL (2024 16:36)  POCT Blood Glucose.: 178 mg/dL (2024 12:06)          LABS:     @ 07:01  -   @ 07:00  --------------------------------------------------------  IN: 560 mL / OUT: 1250 mL / NET: -690 mL     @ 07:01   @ 09:14  --------------------------------------------------------  IN: 240 mL / OUT: 1100 mL / NET: -860 mL    cret                        9.8    10.33 )-----------( 260      ( 2024 12:06 )             30.8         134<L>  |  88<L>  |  45<H>  ----------------------------<  141<H>  3.9   |  31  |  2.52<H>    Ca    10.8<H>      2024 12:06          acetaminophen     Tablet .. 650 milliGRAM(s) Oral every 6 hours PRN  allopurinol 100 milliGRAM(s) Oral daily  aMIOdarone    Tablet   Oral   aMIOdarone    Tablet 200 milliGRAM(s) Oral daily  apixaban 2.5 milliGRAM(s) Oral every 12 hours  aspirin enteric coated 81 milliGRAM(s) Oral daily  atorvastatin 80 milliGRAM(s) Oral at bedtime  bisacodyl Suppository 10 milliGRAM(s) Rectal once  chlorhexidine 2% Cloths 1 Application(s) Topical daily  dextrose 5%. 1000 milliLiter(s) IV Continuous <Continuous>  dextrose 5%. 1000 milliLiter(s) IV Continuous <Continuous>  dextrose 50% Injectable 25 milliLiter(s) IV Push every 15 minutes  dextrose 50% Injectable 25 Gram(s) IV Push once  dextrose 50% Injectable 50 milliLiter(s) IV Push every 15 minutes  dextrose 50% Injectable 25 Gram(s) IV Push once  dextrose 50% Injectable 12.5 Gram(s) IV Push once  dextrose Oral Gel 15 Gram(s) Oral once PRN  glucagon  Injectable 1 milliGRAM(s) IntraMuscular once  insulin glargine Injectable (LANTUS) 15 Unit(s) SubCutaneous at bedtime  insulin lispro (ADMELOG) corrective regimen sliding scale   SubCutaneous at bedtime  insulin lispro (ADMELOG) corrective regimen sliding scale   SubCutaneous three times a day before meals  insulin lispro Injectable (ADMELOG) 4 Unit(s) SubCutaneous three times a day before meals  metoprolol tartrate 12.5 milliGRAM(s) Oral two times a day  oxyCODONE    IR 5 milliGRAM(s) Oral every 4 hours PRN  oxyCODONE    IR 10 milliGRAM(s) Oral every 4 hours PRN  pantoprazole    Tablet 40 milliGRAM(s) Oral before breakfast  polyethylene glycol 3350 17 Gram(s) Oral daily  senna 2 Tablet(s) Oral at bedtime  sodium chloride 0.9% lock flush 3 milliLiter(s) IV Push every 8 hours  sodium chloride 0.9%. 1000 milliLiter(s) IV Continuous <Continuous>  spironolactone 50 milliGRAM(s) Oral daily  torsemide 20 milliGRAM(s) Oral daily        PHYSICAL EXAM    Subjective: "I'm ok."   Neurology: alert and oriented x 3, nonfocal, no gross deficits  CV : tele:  A. PACED/ V PACED  PPM site cdi donnie   Sternal Wound :  CDI DONNIE- sternum stable; + surgical bra   Lungs: clear diminished at the bases. RR easy, unlabored   Abdomen: soft, nontender, nondistended, positive bowel sounds, + bowel movement   Neg N/V/D; obese abdomen    :  pt voiding without difficulty   Extremities:   BRAUN; +1 LE edema, neg calf tenderness.   PPP bilaterally;  SVG site cdi donnie       PW: no  Chest tubes: none                 VITAL SIGNS    Telemetry:  AV paced 60   Vital Signs Last 24 Hrs  T(C): 36.2 (24 @ 04:28), Max: 37.1 (24 @ 13:00)  T(F): 97.2 (24 @ 04:28), Max: 98.8 (24 @ 13:00)  HR: 61 (24 @ 07:03) (60 - 63)  BP: 111/64 (24 @ 04:28) (102/60 - 117/56)  RR: 18 (24 @ 04:28) (18 - 18)  SpO2: 98% (24 @ 07:03) (92% - 98%)             @ 07:01  -   @ 07:00  --------------------------------------------------------  IN: 560 mL / OUT: 1250 mL / NET: -690 mL     @ 07:01  -   @ 09:14  --------------------------------------------------------  IN: 240 mL / OUT: 1100 mL / NET: -860 mL       Daily     Daily Weight in k.9 (2024 08:38)  Admit Wt: Drug Dosing Weight  Height (cm): 158.8 (2024 11:26)  Weight (kg): 108.9 (2024 11:26)  BMI (kg/m2): 43.2 (2024 11:26)  BSA (m2): 2.08 (2024 11:26)      CAPILLARY BLOOD GLUCOSE      POCT Blood Glucose.: 90 mg/dL (2024 07:52)  POCT Blood Glucose.: 121 mg/dL (2024 21:28)  POCT Blood Glucose.: 113 mg/dL (2024 16:36)  POCT Blood Glucose.: 178 mg/dL (2024 12:06)          LABS:     @ 07:01  -   @ 07:00  --------------------------------------------------------  IN: 560 mL / OUT: 1250 mL / NET: -690 mL     @ 07:01   @ 09:14  --------------------------------------------------------  IN: 240 mL / OUT: 1100 mL / NET: -860 mL    cret                        9.8    10.33 )-----------( 260      ( 2024 12:06 )             30.8         134<L>  |  88<L>  |  45<H>  ----------------------------<  141<H>  3.9   |  31  |  2.52<H>    Ca    10.8<H>      2024 12:06          acetaminophen     Tablet .. 650 milliGRAM(s) Oral every 6 hours PRN  allopurinol 100 milliGRAM(s) Oral daily  aMIOdarone    Tablet   Oral   aMIOdarone    Tablet 200 milliGRAM(s) Oral daily  apixaban 2.5 milliGRAM(s) Oral every 12 hours  aspirin enteric coated 81 milliGRAM(s) Oral daily  atorvastatin 80 milliGRAM(s) Oral at bedtime  bisacodyl Suppository 10 milliGRAM(s) Rectal once  chlorhexidine 2% Cloths 1 Application(s) Topical daily  dextrose 5%. 1000 milliLiter(s) IV Continuous <Continuous>  dextrose 5%. 1000 milliLiter(s) IV Continuous <Continuous>  dextrose 50% Injectable 25 milliLiter(s) IV Push every 15 minutes  dextrose 50% Injectable 25 Gram(s) IV Push once  dextrose 50% Injectable 50 milliLiter(s) IV Push every 15 minutes  dextrose 50% Injectable 25 Gram(s) IV Push once  dextrose 50% Injectable 12.5 Gram(s) IV Push once  dextrose Oral Gel 15 Gram(s) Oral once PRN  glucagon  Injectable 1 milliGRAM(s) IntraMuscular once  insulin glargine Injectable (LANTUS) 15 Unit(s) SubCutaneous at bedtime  insulin lispro (ADMELOG) corrective regimen sliding scale   SubCutaneous at bedtime  insulin lispro (ADMELOG) corrective regimen sliding scale   SubCutaneous three times a day before meals  insulin lispro Injectable (ADMELOG) 4 Unit(s) SubCutaneous three times a day before meals  metoprolol tartrate 12.5 milliGRAM(s) Oral two times a day  oxyCODONE    IR 5 milliGRAM(s) Oral every 4 hours PRN  oxyCODONE    IR 10 milliGRAM(s) Oral every 4 hours PRN  pantoprazole    Tablet 40 milliGRAM(s) Oral before breakfast  polyethylene glycol 3350 17 Gram(s) Oral daily  senna 2 Tablet(s) Oral at bedtime  sodium chloride 0.9% lock flush 3 milliLiter(s) IV Push every 8 hours  sodium chloride 0.9%. 1000 milliLiter(s) IV Continuous <Continuous>  spironolactone 50 milliGRAM(s) Oral daily  torsemide 20 milliGRAM(s) Oral daily        PHYSICAL EXAM    Subjective: "I'm ok."   Neurology: alert and oriented x 3, nonfocal, no gross deficits  CV : tele:  A. PACED/ V PACED  PPM site cdi donnie   Sternal Wound :  CDI DONNIE- sternum stable; + surgical bra   Lungs: clear diminished at the bases. RR easy, unlabored   Abdomen: soft, nontender, nondistended, positive bowel sounds, + bowel movement   Neg N/V/D; obese abdomen    :  pt voiding without difficulty   Extremities:   BRAUN; +1 LE edema, neg calf tenderness.   PPP bilaterally;  SVG site cdi donnie         PW: no  Chest tubes: none

## 2024-02-04 NOTE — PROGRESS NOTE ADULT - ASSESSMENT
Assessment  DMT2: 73y Female with DM T2 with hyperglycemia, A1C 7% , was on oral meds at home, was using trulicity prior, on basal bolus insulin with coverage, sugars are improving.  Will need tight glycemic control for postop wound healing.   CAD: on medications, stable, monitored. s/p CABG/ MVR   HTN: on antihypertensive medications, monitored, asymptomatic.  Obesity: No strict exercise routines, not on any weight loss program, neither on low calorie diet.        Discussed plan and management with Dr Chika Perry NP - TEAMS  Fay Farr MD  Cell: [phone]  Office: [phone]    Assessment  DMT2: 73y Female with DM T2 with hyperglycemia, A1C 7% , was on oral meds at home, was using trulicity prior, on basal bolus insulin with coverage,  sugars are improving.  Will need tight glycemic control for postop wound healing.   CAD: on medications, stable, monitored. s/p CABG/ MVR   HTN: on antihypertensive medications, monitored, asymptomatic.  Obesity: No strict exercise routines, not on any weight loss program, neither on low calorie diet.        Discussed plan and management with Dr Chika Perry NP - TEAMS  Fay Farr MD  Cell: [phone]  Office: [phone]

## 2024-02-04 NOTE — PROGRESS NOTE ADULT - PROBLEM SELECTOR PLAN 4
EP following  s/p 1/28 ppm placement  start low dose bb lop 12.5 bid  amio 200 qd EP following  s/p 1/28 ppm placement  low dose bb lop 12.5 bid  amio 200 qd

## 2024-02-04 NOTE — PROGRESS NOTE ADULT - ASSESSMENT
73F w/ PMH morbid obesity (BMI 42.7 s/p gastric sleeve ), DMT2, CAD s/p PCI of LCX (), LEIDY (on CPAP), CKD (stage 3 - on farxiga), AF (on Eliquis) and rheumatic mitral valve disease reports c/o chronic BARBOZA for over 10 years that has progressively worsened over the past few months with the feeling of an "elephant on my chest." Her symptoms improved after starting Lasix daily. She gets dizzy when she bends over or when she stands up too fast. She denies CP or weight gain. She sleeps with 3 pillows on an incline at night. She cannot lay flat due to comfort as well as orthopnea. She can only walk one block before she has to stop to rest due to SOB. Pt now presents to [facility] for scheduled mitral valve replacement, maze procedure and left atrial appendage ligation with Dr. Dalal on 24.  s/p  MVR (t)/ C1V/ Maze/ RICHARD clip  post extubated / inotropic support  EP consulted for underlying junctional rhythm; no av nodals at this time; + epicardial pw; maintain    tx sdu; VSS; afib 60-70 on  2.5; maintain med ct's; diuresis as per renal; endo consulted for diabetic management  keep npo after midnight sat into sun for possible ppm as per EP; no av nodals at this time  24 VSS on  2.5 EP following for ?ppm   VSS NPO for PPM for aflutter w/ chb  - s/p PPM placement; tx floor   VSS: AV Paced 60-80; d/c  as per Dr. Dalal; bun/cr increased today --> d/c diuretics and ck echo as per Dr. Dalal  pa/lat chest xray s/p PPM ; d/w pw   wean O2 as tolerated; sugical bra    VSS s/p ppm site cdi diuretics d/c'd yesterday- rounds made w/ Dr. Dalal - creatinine down to 1.7 - torsemide 20mg po qd & aldactone 50 bid resumed Dr. Nieves, renal following pt. discharge planning- home when stable Thursday  1/31: VSS; Cr coming down, 1.62 today, negative 1.8L, renal following, CXR PA/LAT today    VSS diuresing  b/l pl effusions  ambulate d/c planning home - ween O2  2/2 VSS; A paced; V.paced; bun/cr 38/2.1; renal following; diuretics decreased to qd; ace wrap LE ; wean O2 as tolerated; ck chest xray   2/3VSS   60's.  -600cc/24hrs.  bun/cr 38/2.15 ( will dw renal ).  CXR stable b/l effusions.  Resume eliquis  discharge planning- home pt when stable  73F w/ PMH morbid obesity (BMI 42.7 s/p gastric sleeve ), DMT2, CAD s/p PCI of LCX (), LEIDY (on CPAP), CKD (stage 3 - on farxiga), AF (on Eliquis) and rheumatic mitral valve disease reports c/o chronic BARBOZA for over 10 years that has progressively worsened over the past few months with the feeling of an "elephant on my chest." Her symptoms improved after starting Lasix daily. She gets dizzy when she bends over or when she stands up too fast. She denies CP or weight gain. She sleeps with 3 pillows on an incline at night. She cannot lay flat due to comfort as well as orthopnea. She can only walk one block before she has to stop to rest due to SOB. Pt now presents to [facility] for scheduled mitral valve replacement, maze procedure and left atrial appendage ligation with Dr. Dalal on 24.  s/p  MVR (t)/ C1V/ Maze/ RICHARD clip  post extubated / inotropic support  EP consulted for underlying junctional rhythm; no av nodals at this time; + epicardial pw; maintain    tx sdu; VSS; afib 60-70 on  2.5; maintain med ct's; diuresis as per renal; endo consulted for diabetic management  keep npo after midnight sat into sun for possible ppm as per EP; no av nodals at this time  24 VSS on  2.5 EP following for ?ppm   VSS NPO for PPM for aflutter w/ chb  - s/p PPM placement; tx floor   VSS: AV Paced 60-80; d/c  as per Dr. Dalal; bun/cr increased today --> d/c diuretics and ck echo as per Dr. Dalal  pa/lat chest xray s/p PPM ; d/w pw   wean O2 as tolerated; sugical bra    VSS s/p ppm site cdi diuretics d/c'd yesterday- rounds made w/ Dr. Dalal - creatinine down to 1.7 - torsemide 20mg po qd & aldactone 50 bid resumed Dr. Nieves, renal following pt. discharge planning- home when stable Thursday  1/31: VSS; Cr coming down, 1.62 today, negative 1.8L, renal following, CXR PA/LAT today    VSS diuresing  b/l pl effusions  ambulate d/c planning home - ween O2  2/ VSS; A paced; V.paced; bun/cr 38/2.1; renal following; diuretics decreased to qd; ace wrap LE ; wean O2 as tolerated; ck chest xray   2/3 VSS   60's.  -600cc/24hrs.  bun/cr 38/2.15 ( will dw renal ).  CXR stable b/l effusions.  Resume eliquis  2/ VSS; AV paced; eliquis for AC for mvr and hx afib; diuresis; pulm toilet; increase activity; wean O2   discharge planning- home pt this week when bun/ cr stable

## 2024-02-04 NOTE — PROGRESS NOTE ADULT - PROBLEM SELECTOR PLAN 6
bb / christopher  eliquis 2.5 bid restarted today continue bb / amio  eliquis 2.5 bid resumed 2/3  pt currently paced

## 2024-02-04 NOTE — PROGRESS NOTE ADULT - PROBLEM SELECTOR PLAN 3
s/p 1/23 MVR(t)/ C1V   continue postop care  continue asa and statin  amio 200 qd   lop 12.5 bid   PPM 1/28  pulm toilet  pain management  wean O2 as tolerated  surgical bra   increase activity as tolerated   discharge planning- home when stable s/p 1/23 MVR(t)/ C1V   continue postop care  continue asa and statin  amio 200 qd   lop 12.5 bid   eliquis 2.5 bid for ac   PPM 1/28  pulm toilet  pain management  wean O2 as tolerated  surgical bra   increase activity as tolerated   discharge planning- home pt when bun/cr stable this week and off O2

## 2024-02-04 NOTE — PROGRESS NOTE ADULT - PROBLEM SELECTOR PLAN 1
Will continue current insulin regimen for now. Will continue monitoring  blood sugars, will Follow up.  Patient counseled for compliance with consistent low carb diet.    DC plan  Has CKD, decreased eGFR  Suggest decrease Januvia 25 mg daily  Start 1 mg glimepiride in AM with breakfast  FU outpatient

## 2024-02-05 LAB
ANION GAP SERPL CALC-SCNC: 11 MMOL/L — SIGNIFICANT CHANGE UP (ref 5–17)
BUN SERPL-MCNC: 48 MG/DL — HIGH (ref 7–23)
CALCIUM SERPL-MCNC: 10.5 MG/DL — SIGNIFICANT CHANGE UP (ref 8.4–10.5)
CALCIUM SERPL-MCNC: 11.2 MG/DL — HIGH (ref 8.4–10.5)
CHLORIDE SERPL-SCNC: 93 MMOL/L — LOW (ref 96–108)
CO2 SERPL-SCNC: 35 MMOL/L — HIGH (ref 22–31)
CREAT SERPL-MCNC: 2.68 MG/DL — HIGH (ref 0.5–1.3)
EGFR: 18 ML/MIN/1.73M2 — LOW
GLUCOSE SERPL-MCNC: 98 MG/DL — SIGNIFICANT CHANGE UP (ref 70–99)
HCT VFR BLD CALC: 30.6 % — LOW (ref 34.5–45)
HGB BLD-MCNC: 9.9 G/DL — LOW (ref 11.5–15.5)
MCHC RBC-ENTMCNC: 31.1 PG — SIGNIFICANT CHANGE UP (ref 27–34)
MCHC RBC-ENTMCNC: 32.4 GM/DL — SIGNIFICANT CHANGE UP (ref 32–36)
MCV RBC AUTO: 96.2 FL — SIGNIFICANT CHANGE UP (ref 80–100)
NRBC # BLD: 0 /100 WBCS — SIGNIFICANT CHANGE UP (ref 0–0)
PLATELET # BLD AUTO: 238 K/UL — SIGNIFICANT CHANGE UP (ref 150–400)
POTASSIUM SERPL-MCNC: 4.7 MMOL/L — SIGNIFICANT CHANGE UP (ref 3.5–5.3)
POTASSIUM SERPL-SCNC: 4.7 MMOL/L — SIGNIFICANT CHANGE UP (ref 3.5–5.3)
PTH-INTACT FLD-MCNC: 64 PG/ML — SIGNIFICANT CHANGE UP (ref 15–65)
RBC # BLD: 3.18 M/UL — LOW (ref 3.8–5.2)
RBC # FLD: 15.2 % — HIGH (ref 10.3–14.5)
SODIUM SERPL-SCNC: 139 MMOL/L — SIGNIFICANT CHANGE UP (ref 135–145)
WBC # BLD: 8.58 K/UL — SIGNIFICANT CHANGE UP (ref 3.8–10.5)
WBC # FLD AUTO: 8.58 K/UL — SIGNIFICANT CHANGE UP (ref 3.8–10.5)

## 2024-02-05 PROCEDURE — 71045 X-RAY EXAM CHEST 1 VIEW: CPT | Mod: 26

## 2024-02-05 RX ADMIN — Medication 20 MILLIGRAM(S): at 05:37

## 2024-02-05 RX ADMIN — PANTOPRAZOLE SODIUM 40 MILLIGRAM(S): 20 TABLET, DELAYED RELEASE ORAL at 05:37

## 2024-02-05 RX ADMIN — SENNA PLUS 2 TABLET(S): 8.6 TABLET ORAL at 21:41

## 2024-02-05 RX ADMIN — APIXABAN 2.5 MILLIGRAM(S): 2.5 TABLET, FILM COATED ORAL at 05:36

## 2024-02-05 RX ADMIN — SODIUM CHLORIDE 3 MILLILITER(S): 9 INJECTION INTRAMUSCULAR; INTRAVENOUS; SUBCUTANEOUS at 05:34

## 2024-02-05 RX ADMIN — SODIUM CHLORIDE 3 MILLILITER(S): 9 INJECTION INTRAMUSCULAR; INTRAVENOUS; SUBCUTANEOUS at 21:36

## 2024-02-05 RX ADMIN — INSULIN GLARGINE 12 UNIT(S): 100 INJECTION, SOLUTION SUBCUTANEOUS at 21:42

## 2024-02-05 RX ADMIN — CHLORHEXIDINE GLUCONATE 1 APPLICATION(S): 213 SOLUTION TOPICAL at 10:00

## 2024-02-05 RX ADMIN — OXYCODONE HYDROCHLORIDE 5 MILLIGRAM(S): 5 TABLET ORAL at 10:18

## 2024-02-05 RX ADMIN — Medication 100 MILLIGRAM(S): at 10:19

## 2024-02-05 RX ADMIN — OXYCODONE HYDROCHLORIDE 5 MILLIGRAM(S): 5 TABLET ORAL at 10:48

## 2024-02-05 RX ADMIN — Medication 4 UNIT(S): at 08:25

## 2024-02-05 RX ADMIN — Medication 12.5 MILLIGRAM(S): at 17:26

## 2024-02-05 RX ADMIN — Medication 81 MILLIGRAM(S): at 10:18

## 2024-02-05 RX ADMIN — Medication 4 UNIT(S): at 12:07

## 2024-02-05 RX ADMIN — Medication 2: at 17:25

## 2024-02-05 RX ADMIN — Medication 4 UNIT(S): at 17:25

## 2024-02-05 RX ADMIN — POLYETHYLENE GLYCOL 3350 17 GRAM(S): 17 POWDER, FOR SOLUTION ORAL at 10:19

## 2024-02-05 RX ADMIN — Medication 12.5 MILLIGRAM(S): at 05:37

## 2024-02-05 RX ADMIN — AMIODARONE HYDROCHLORIDE 200 MILLIGRAM(S): 400 TABLET ORAL at 05:37

## 2024-02-05 RX ADMIN — SPIRONOLACTONE 50 MILLIGRAM(S): 25 TABLET, FILM COATED ORAL at 05:39

## 2024-02-05 RX ADMIN — ATORVASTATIN CALCIUM 80 MILLIGRAM(S): 80 TABLET, FILM COATED ORAL at 21:41

## 2024-02-05 RX ADMIN — SODIUM CHLORIDE 3 MILLILITER(S): 9 INJECTION INTRAMUSCULAR; INTRAVENOUS; SUBCUTANEOUS at 13:32

## 2024-02-05 NOTE — PROGRESS NOTE ADULT - PROBLEM SELECTOR PLAN 3
s/p 1/23 MVR(t)/ C1V   continue postop care  continue asa and statin  amio 200 qd   lop 12.5 bid   eliquis 2.5 bid for ac   PPM 1/28  pulm toilet  pain management  wean O2 as tolerated  surgical bra   increase activity as tolerated   discharge planning- home pt when bun/cr stable this week and off O2

## 2024-02-05 NOTE — PROGRESS NOTE ADULT - PROBLEM SELECTOR PLAN 1
Will continue current insulin regimen for now.   Will continue monitoring  blood sugars, will Follow up.  Patient counseled for compliance with consistent low carb diet.    DC plan  Has CKD, decreased eGFR  Suggest decrease Januvia 25 mg daily  Start 1 mg glimepiride in AM with breakfast  FU outpatient 2-4 weeks

## 2024-02-05 NOTE — PROGRESS NOTE ADULT - PROBLEM SELECTOR PLAN 1
Renal following-Dr. Nieves  trend bmp  strict I & O's  avoid nephrotoxic agents  echo negative for effusion 1/29  d/c diuretics per Dr. Dalal, Cr 2.63 today

## 2024-02-05 NOTE — PROGRESS NOTE ADULT - ASSESSMENT
73 year old female with PMH morbid obesity (BMI 42.7 s/p gastric sleeve 2015), DMT2, CAD s/p PCI of LCX (2012), LEIDY (on CPAP), CKD (stage 3 - on farxiga), AF (on Eliquis) and rheumatic mitral valve disease reports c/o chronic BARBOZA  1/23/24 SP Mitral valve replacement;  Hybrid Maze procedure Clipping, left atrial appendage and CABG, using 1 vein graft    CKD stage 3a and now worse        1 Renal - Creatinine @ baseline more in the 1.6-1.7 range;   scr higher then baseline in light of elevated HCO3 and hypercalcemia --Likely a degree of slight dehydration     Torsemide and  Aldactone but will not add zaroxolyn today   2 Endo-Farxiga and ARB are both held and to restarted as outpt   3 [facility]-  Not on pressors ; BP acceptable  4 Pulm-Nasal canula ;     5 EPS -SP PPM placement   6 Anemia-  hgb down  s/p Epogen 10000 x 1  2/3/24     Sayed [facility]   [phone]

## 2024-02-05 NOTE — PROGRESS NOTE ADULT - SUBJECTIVE AND OBJECTIVE BOX
Chief complaint  Patient is a 74y old  Female who presents with a chief complaint of I need surgery (02 Feb 2024 08:26)         Labs and Fingersticks  CAPILLARY BLOOD GLUCOSE      POCT Blood Glucose.: 142 mg/dL (05 Feb 2024 11:26)  POCT Blood Glucose.: 99 mg/dL (05 Feb 2024 08:15)  POCT Blood Glucose.: 153 mg/dL (04 Feb 2024 21:44)  POCT Blood Glucose.: 121 mg/dL (04 Feb 2024 16:21)      Anion Gap: 11 (02-05 @ 07:35)  Anion Gap: 12 (02-04 @ 11:31)      Calcium: 11.2 *H* (02-05 @ 07:35)  Calcium: 11.2 *H* (02-04 @ 11:31)  Intact PTH: 64 (02-05 @ 07:35)          02-05    139  |  93<L>  |  48<H>  ----------------------------<  98  4.7   |  35<H>  |  2.68<H>    Ca    11.2<H>      05 Feb 2024 07:35                          9.9    8.58  )-----------( 238      ( 05 Feb 2024 07:35 )             30.6     Medications  MEDICATIONS  (STANDING):  allopurinol 100 milliGRAM(s) Oral daily  aMIOdarone    Tablet   Oral   aMIOdarone    Tablet 200 milliGRAM(s) Oral daily  aspirin enteric coated 81 milliGRAM(s) Oral daily  atorvastatin 80 milliGRAM(s) Oral at bedtime  bisacodyl Suppository 10 milliGRAM(s) Rectal once  chlorhexidine 2% Cloths 1 Application(s) Topical daily  dextrose 5%. 1000 milliLiter(s) (100 mL/Hr) IV Continuous <Continuous>  dextrose 5%. 1000 milliLiter(s) (50 mL/Hr) IV Continuous <Continuous>  dextrose 50% Injectable 25 milliLiter(s) IV Push every 15 minutes  dextrose 50% Injectable 25 Gram(s) IV Push once  dextrose 50% Injectable 50 milliLiter(s) IV Push every 15 minutes  dextrose 50% Injectable 25 Gram(s) IV Push once  dextrose 50% Injectable 12.5 Gram(s) IV Push once  glucagon  Injectable 1 milliGRAM(s) IntraMuscular once  insulin glargine Injectable (LANTUS) 12 Unit(s) SubCutaneous at bedtime  insulin lispro (ADMELOG) corrective regimen sliding scale   SubCutaneous at bedtime  insulin lispro (ADMELOG) corrective regimen sliding scale   SubCutaneous three times a day before meals  insulin lispro Injectable (ADMELOG) 4 Unit(s) SubCutaneous three times a day before meals  metoprolol tartrate 12.5 milliGRAM(s) Oral two times a day  pantoprazole    Tablet 40 milliGRAM(s) Oral before breakfast  polyethylene glycol 3350 17 Gram(s) Oral daily  senna 2 Tablet(s) Oral at bedtime  sodium chloride 0.9% lock flush 3 milliLiter(s) IV Push every 8 hours      Physical Exam  General: Patient comfortable in bed   Vital Signs Last 12 Hrs  T(F): 98.4 (02-05-24 @ 11:17), Max: 98.4 (02-05-24 @ 11:17)  HR: 60 (02-05-24 @ 12:30) (59 - 64)  BP: 108/56 (02-05-24 @ 12:30) (101/54 - 108/64)  BP(mean): --  RR: 18 (02-05-24 @ 11:17) (18 - 18)  SpO2: 92% (02-05-24 @ 12:30) (92% - 98%)    CVS: S1S2   Respiratory: No wheezing, no crepitations  GI: Abdomen soft, bowel sounds positive  Musculoskeletal:  moves all extremities       Chief complaint  Patient is a 74y old  Female who presents with a chief complaint of I need surgery (02 Feb 2024 08:26)         Labs and Fingersticks  CAPILLARY BLOOD GLUCOSE      POCT Blood Glucose.: 142 mg/dL (05 Feb 2024 11:26)  POCT Blood Glucose.: 99 mg/dL (05 Feb 2024 08:15)  POCT Blood Glucose.: 153 mg/dL (04 Feb 2024 21:44)  POCT Blood Glucose.: 121 mg/dL (04 Feb 2024 16:21)      Anion Gap: 11 (02-05 @ 07:35)  Anion Gap: 12 (02-04 @ 11:31)      Calcium: 11.2 *H* (02-05 @ 07:35)  Calcium: 11.2 *H* (02-04 @ 11:31)  Intact PTH: 64 (02-05 @ 07:35)          02-05    139  |  93<L>  |  48<H>  ----------------------------<  98  4.7   |  35<H>  |  2.68<H>    Ca    11.2<H>      05 Feb 2024 07:35                          9.9    8.58  )-----------( 238      ( 05 Feb 2024 07:35 )             30.6     Medications  MEDICATIONS  (STANDING):  allopurinol 100 milliGRAM(s) Oral daily  aMIOdarone    Tablet   Oral   aMIOdarone    Tablet 200 milliGRAM(s) Oral daily  aspirin enteric coated 81 milliGRAM(s) Oral daily  atorvastatin 80 milliGRAM(s) Oral at bedtime  bisacodyl Suppository 10 milliGRAM(s) Rectal once  chlorhexidine 2% Cloths 1 Application(s) Topical daily  dextrose 5%. 1000 milliLiter(s) (100 mL/Hr) IV Continuous <Continuous>  dextrose 5%. 1000 milliLiter(s) (50 mL/Hr) IV Continuous <Continuous>  dextrose 50% Injectable 25 milliLiter(s) IV Push every 15 minutes  dextrose 50% Injectable 25 Gram(s) IV Push once  dextrose 50% Injectable 50 milliLiter(s) IV Push every 15 minutes  dextrose 50% Injectable 25 Gram(s) IV Push once  dextrose 50% Injectable 12.5 Gram(s) IV Push once  glucagon  Injectable 1 milliGRAM(s) IntraMuscular once  insulin glargine Injectable (LANTUS) 12 Unit(s) SubCutaneous at bedtime  insulin lispro (ADMELOG) corrective regimen sliding scale   SubCutaneous at bedtime  insulin lispro (ADMELOG) corrective regimen sliding scale   SubCutaneous three times a day before meals  insulin lispro Injectable (ADMELOG) 4 Unit(s) SubCutaneous three times a day before meals  metoprolol tartrate 12.5 milliGRAM(s) Oral two times a day  pantoprazole    Tablet 40 milliGRAM(s) Oral before breakfast  polyethylene glycol 3350 17 Gram(s) Oral daily  senna 2 Tablet(s) Oral at bedtime  sodium chloride 0.9% lock flush 3 milliLiter(s) IV Push every 8 hours      Physical Exam  General: Patient comfortable in bed   Vital Signs Last 12 Hrs  T(F): 98.4 (02-05-24 @ 11:17), Max: 98.4 (02-05-24 @ 11:17)  HR: 60 (02-05-24 @ 12:30) (59 - 64)  BP: 108/56 (02-05-24 @ 12:30) (101/54 - 108/64)  BP(mean): --  RR: 18 (02-05-24 @ 11:17) (18 - 18)  SpO2: 92% (02-05-24 @ 12:30) (92% - 98%)    CVS: S1S2   Respiratory: No wheezing, no crepitations  GI: Abdomen soft, bowel sounds positive  Musculoskeletal:  moves all extremities

## 2024-02-05 NOTE — PROGRESS NOTE ADULT - ASSESSMENT
73F w/ PMH morbid obesity (BMI 42.7 s/p gastric sleeve ), DMT2, CAD s/p PCI of LCX (), LEIDY (on CPAP), CKD (stage 3 - on farxiga), AF (on Eliquis) and rheumatic mitral valve disease reports c/o chronic BARBOZA for over 10 years that has progressively worsened over the past few months with the feeling of an "elephant on my chest." Her symptoms improved after starting Lasix daily. She gets dizzy when she bends over or when she stands up too fast. She denies CP or weight gain. She sleeps with 3 pillows on an incline at night. She cannot lay flat due to comfort as well as orthopnea. She can only walk one block before she has to stop to rest due to SOB. Pt now presents to PST for scheduled mitral valve replacement, maze procedure and left atrial appendage ligation with Dr. Dalal on 24.  s/p  MVR (t)/ C1V/ Maze/ RICHARD clip  post extubated / inotropic support  EP consulted for underlying junctional rhythm; no av nodals at this time; + epicardial pw; maintain    tx sdu; VSS; afib 60-70 on  2.5; maintain med ct's; diuresis as per renal; endo consulted for diabetic management  keep npo after midnight sat into sun for possible ppm as per EP; no av nodals at this time  24 VSS on  2.5 EP following for ?ppm   VSS NPO for PPM for aflutter w/ chb  - s/p PPM placement; tx floor   VSS: AV Paced 60-80; d/c  as per Dr. Dalal; bun/cr increased today --> d/c diuretics and ck echo as per Dr. Dalal  pa/lat chest xray s/p PPM ; d/w pw   wean O2 as tolerated; sugical bra    VSS s/p ppm site cdi diuretics d/c'd yesterday- rounds made w/ Dr. Dalal - creatinine down to 1.7 - torsemide 20mg po qd & aldactone 50 bid resumed Dr. Nieves, renal following pt. discharge planning- home when stable Thursday  1/31: VSS; Cr coming down, 1.62 today, negative 1.8L, renal following, CXR PA/LAT today    VSS diuresing  b/l pl effusions  ambulate d/c planning home - ween O2  / VSS; A paced; V.paced; bun/cr 38/2.1; renal following; diuretics decreased to qd; ace wrap LE ; wean O2 as tolerated; ck chest xray   2/3 VSS   60's.  -600cc/24hrs.  bun/cr 38/2.15 ( will dw renal ).  CXR stable b/l effusions.  Resume eliquis   VSS; AV paced; eliquis for AC for mvr and hx afib; diuresis; pulm toilet; increase activity; wean O2   discharge planning- home pt this week when bun/ cr stable   2: VSS, D/C diuretics per Dr. Dalal, Cr 2.68

## 2024-02-05 NOTE — PROGRESS NOTE ADULT - ASSESSMENT
Assessment  DMT2: 73y Female with DM T2 with hyperglycemia, A1C 7% , was on oral meds at home, was using trulicity prior, now on basal bolus insulin with coverage, sugars are improving.  Will need tight glycemic control for postop wound healing.   CAD: on medications, stable, monitored. s/p CABG/ MVR   HTN: on antihypertensive medications, monitored, asymptomatic.  Obesity: No strict exercise routines, not on any weight loss program, neither on low calorie diet.      Discussed plan and management with Dr Chika Perry NP - TEAMS  Fay Farr MD  Cell: [phone]  Office: [phone]    Assessment  DMT2: 73y Female with DM T2 with hyperglycemia, A1C 7% , was on oral meds at home, was using trulicity prior, now on basal bolus  insulin with coverage, sugars are improving.  Will need tight glycemic control for postop wound healing.   CAD: on medications, stable, monitored. s/p CABG/ MVR   HTN: on antihypertensive medications, monitored, asymptomatic.  Obesity: No strict exercise routines, not on any weight loss program, neither on low calorie diet.      Discussed plan and management with Dr Chika Perry NP - TEAMS  Fay Farr MD  Cell: [phone]  Office: [phone]

## 2024-02-05 NOTE — PROGRESS NOTE ADULT - SUBJECTIVE AND OBJECTIVE BOX
Patient discussed on morning rounds with Dr. Dalal    Operation / Date:  1/23 MVR-t, C1V, MAZE, RICHARD CLIP    SUBJECTIVE ASSESSMENT:  74y Female seen and examined. Feels well, ambulated 2x yesterday, reports breathing improved, denies fever, chest pain, SOB, abdominal pain.         Vital Signs Last 24 Hrs  T(C): 36.2 (05 Feb 2024 04:15), Max: 36.6 (04 Feb 2024 19:58)  T(F): 97.2 (05 Feb 2024 04:15), Max: 97.9 (04 Feb 2024 19:58)  HR: 60 (05 Feb 2024 09:10) (59 - 68)  BP: 108/64 (05 Feb 2024 04:15) (98/49 - 111/65)  BP(mean): 79 (04 Feb 2024 16:44) (70 - 79)  RR: 18 (05 Feb 2024 04:15) (18 - 18)  SpO2: 94% (05 Feb 2024 09:10) (92% - 99%)    Parameters below as of 05 Feb 2024 04:15  Patient On (Oxygen Delivery Method): BiPAP/CPAP      I&O's Detail    04 Feb 2024 07:01  -  05 Feb 2024 07:00  --------------------------------------------------------  IN:    Oral Fluid: 880 mL  Total IN: 880 mL    OUT:    Voided (mL): 1900 mL  Total OUT: 1900 mL    Total NET: -1020 mL          PHYSICAL EXAM:  General: Patient lying comfortably in bed, no acute distress     Neurological: Alert and oriented. No focal neurological deficits     Cardiovascular: S1S2, RRR, no murmurs appreciated on exam     Respiratory: decreased L base, no wheeze/rhonchi/rales    Gastrointestinal: + BS, soft, non tender, non distended     Extremities: Warm and well perfused. trace b/l LE edema, no calf tenderness     Vascular: palpable peripheral pulses b/l     Incision Sites: MSI: clean, no signs of infection/dehiscence/click. L EVH: clean. PPm site: soft, no hematoma.     LABS:                        9.9    8.58  )-----------( 238      ( 05 Feb 2024 07:35 )             30.6       COUMADIN:  NO        02-05    139  |  93<L>  |  48<H>  ----------------------------<  98  4.7   |  35<H>  |  2.68<H>    Ca    11.2<H>      05 Feb 2024 07:35        Urinalysis Basic - ( 05 Feb 2024 07:35 )    Color: x / Appearance: x / SG: x / pH: x  Gluc: 98 mg/dL / Ketone: x  / Bili: x / Urobili: x   Blood: x / Protein: x / Nitrite: x   Leuk Esterase: x / RBC: x / WBC x   Sq Epi: x / Non Sq Epi: x / Bacteria: x        MEDICATIONS  (STANDING):  allopurinol 100 milliGRAM(s) Oral daily  aMIOdarone    Tablet 200 milliGRAM(s) Oral daily  aMIOdarone    Tablet   Oral   apixaban 2.5 milliGRAM(s) Oral every 12 hours  aspirin enteric coated 81 milliGRAM(s) Oral daily  atorvastatin 80 milliGRAM(s) Oral at bedtime  bisacodyl Suppository 10 milliGRAM(s) Rectal once  chlorhexidine 2% Cloths 1 Application(s) Topical daily  dextrose 5%. 1000 milliLiter(s) (50 mL/Hr) IV Continuous <Continuous>  dextrose 5%. 1000 milliLiter(s) (100 mL/Hr) IV Continuous <Continuous>  dextrose 50% Injectable 25 milliLiter(s) IV Push every 15 minutes  dextrose 50% Injectable 50 milliLiter(s) IV Push every 15 minutes  dextrose 50% Injectable 25 Gram(s) IV Push once  dextrose 50% Injectable 12.5 Gram(s) IV Push once  dextrose 50% Injectable 25 Gram(s) IV Push once  glucagon  Injectable 1 milliGRAM(s) IntraMuscular once  insulin glargine Injectable (LANTUS) 12 Unit(s) SubCutaneous at bedtime  insulin lispro (ADMELOG) corrective regimen sliding scale   SubCutaneous at bedtime  insulin lispro (ADMELOG) corrective regimen sliding scale   SubCutaneous three times a day before meals  insulin lispro Injectable (ADMELOG) 4 Unit(s) SubCutaneous three times a day before meals  metoprolol tartrate 12.5 milliGRAM(s) Oral two times a day  pantoprazole    Tablet 40 milliGRAM(s) Oral before breakfast  polyethylene glycol 3350 17 Gram(s) Oral daily  senna 2 Tablet(s) Oral at bedtime  sodium chloride 0.9% lock flush 3 milliLiter(s) IV Push every 8 hours    MEDICATIONS  (PRN):  acetaminophen     Tablet .. 650 milliGRAM(s) Oral every 6 hours PRN Mild Pain (1 - 3)  dextrose Oral Gel 15 Gram(s) Oral once PRN Blood Glucose LESS THAN 70 milliGRAM(s)/deciliter  oxyCODONE    IR 5 milliGRAM(s) Oral every 4 hours PRN Moderate Pain (4 - 6)  oxyCODONE    IR 10 milliGRAM(s) Oral every 4 hours PRN Severe Pain (7 - 10)        RADIOLOGY & ADDITIONAL TESTS:

## 2024-02-05 NOTE — PROGRESS NOTE ADULT - SUBJECTIVE AND OBJECTIVE BOX
[facility] [phone]        Patient seen and examined in bed.  She felt well  Remains on oxygen and is at bipap at night         MEDICATIONS  (STANDING):  allopurinol 100 milliGRAM(s) Oral daily  aMIOdarone    Tablet   Oral   aMIOdarone    Tablet 200 milliGRAM(s) Oral daily  apixaban 2.5 milliGRAM(s) Oral every 12 hours  aspirin enteric coated 81 milliGRAM(s) Oral daily  atorvastatin 80 milliGRAM(s) Oral at bedtime  bisacodyl Suppository 10 milliGRAM(s) Rectal once  chlorhexidine 2% Cloths 1 Application(s) Topical daily  dextrose 5%. 1000 milliLiter(s) (100 mL/Hr) IV Continuous <Continuous>  dextrose 5%. 1000 milliLiter(s) (50 mL/Hr) IV Continuous <Continuous>  dextrose 50% Injectable 25 milliLiter(s) IV Push every 15 minutes  dextrose 50% Injectable 25 Gram(s) IV Push once  dextrose 50% Injectable 25 Gram(s) IV Push once  dextrose 50% Injectable 50 milliLiter(s) IV Push every 15 minutes  dextrose 50% Injectable 12.5 Gram(s) IV Push once  glucagon  Injectable 1 milliGRAM(s) IntraMuscular once  insulin glargine Injectable (LANTUS) 12 Unit(s) SubCutaneous at bedtime  insulin lispro (ADMELOG) corrective regimen sliding scale   SubCutaneous at bedtime  insulin lispro (ADMELOG) corrective regimen sliding scale   SubCutaneous three times a day before meals  insulin lispro Injectable (ADMELOG) 4 Unit(s) SubCutaneous three times a day before meals  metoprolol tartrate 12.5 milliGRAM(s) Oral two times a day  pantoprazole    Tablet 40 milliGRAM(s) Oral before breakfast  polyethylene glycol 3350 17 Gram(s) Oral daily  senna 2 Tablet(s) Oral at bedtime  sodium chloride 0.9% lock flush 3 milliLiter(s) IV Push every 8 hours      VITAL:  T(C): , Max: 36.6 (02-04-24 @ 19:58)  T(F): , Max: 97.9 (02-04-24 @ 19:58)  HR: 62 (02-05-24 @ 05:45)  BP: 108/64 (02-05-24 @ 04:15)  BP(mean): 79 (02-04-24 @ 16:44)  RR: 18 (02-05-24 @ 04:15)  SpO2: 98% (02-05-24 @ 05:45)  Wt(kg): --    I and O's:    02-04 @ 07:01  -  02-05 @ 07:00  --------------------------------------------------------  IN: 880 mL / OUT: 1900 mL / NET: -1020 mL          PHYSICAL EXAM:    Constitutional: NAD; obese   Neck:  No JVD  Respiratory: CTAB/L  Cardiovascular: S1 and S2  Gastrointestinal: BS+, soft, NT/ND  Extremities: No peripheral edema  Neurological: A/O x 3, no focal deficits  Psychiatric: Normal mood, normal affect  : No Diallo  Skin: No rashes  Access: Not applicable    LABS:                        9.9    8.58  )-----------( 238      ( 05 Feb 2024 07:35 )             30.6     02-05    139  |  93<L>  |  48<H>  ----------------------------<  98  4.7   |  35<H>  |  2.68<H>    Ca    11.2<H>      05 Feb 2024 07:35            Urine Studies:  Urinalysis Basic - ( 05 Feb 2024 07:35 )    Color: x / Appearance: x / SG: x / pH: x  Gluc: 98 mg/dL / Ketone: x  / Bili: x / Urobili: x   Blood: x / Protein: x / Nitrite: x   Leuk Esterase: x / RBC: x / WBC x   Sq Epi: x / Non Sq Epi: x / Bacteria: x            RADIOLOGY & ADDITIONAL STUDIES:

## 2024-02-06 LAB
ANION GAP SERPL CALC-SCNC: 14 MMOL/L — SIGNIFICANT CHANGE UP (ref 5–17)
BASOPHILS # BLD AUTO: 0.05 K/UL — SIGNIFICANT CHANGE UP (ref 0–0.2)
BASOPHILS NFR BLD AUTO: 0.5 % — SIGNIFICANT CHANGE UP (ref 0–2)
BUN SERPL-MCNC: 53 MG/DL — HIGH (ref 7–23)
CALCIUM SERPL-MCNC: 10.3 MG/DL — SIGNIFICANT CHANGE UP (ref 8.4–10.5)
CHLORIDE SERPL-SCNC: 88 MMOL/L — LOW (ref 96–108)
CO2 SERPL-SCNC: 31 MMOL/L — SIGNIFICANT CHANGE UP (ref 22–31)
CREAT SERPL-MCNC: 2.84 MG/DL — HIGH (ref 0.5–1.3)
EGFR: 17 ML/MIN/1.73M2 — LOW
EOSINOPHIL # BLD AUTO: 0.11 K/UL — SIGNIFICANT CHANGE UP (ref 0–0.5)
EOSINOPHIL NFR BLD AUTO: 1.1 % — SIGNIFICANT CHANGE UP (ref 0–6)
GLUCOSE SERPL-MCNC: 248 MG/DL — HIGH (ref 70–99)
HCT VFR BLD CALC: 32 % — LOW (ref 34.5–45)
HGB BLD-MCNC: 10.5 G/DL — LOW (ref 11.5–15.5)
IMM GRANULOCYTES NFR BLD AUTO: 0.7 % — SIGNIFICANT CHANGE UP (ref 0–0.9)
LYMPHOCYTES # BLD AUTO: 1.64 K/UL — SIGNIFICANT CHANGE UP (ref 1–3.3)
LYMPHOCYTES # BLD AUTO: 16.6 % — SIGNIFICANT CHANGE UP (ref 13–44)
MCHC RBC-ENTMCNC: 31.5 PG — SIGNIFICANT CHANGE UP (ref 27–34)
MCHC RBC-ENTMCNC: 32.8 GM/DL — SIGNIFICANT CHANGE UP (ref 32–36)
MCV RBC AUTO: 96.1 FL — SIGNIFICANT CHANGE UP (ref 80–100)
MONOCYTES # BLD AUTO: 0.49 K/UL — SIGNIFICANT CHANGE UP (ref 0–0.9)
MONOCYTES NFR BLD AUTO: 4.9 % — SIGNIFICANT CHANGE UP (ref 2–14)
NEUTROPHILS # BLD AUTO: 7.54 K/UL — HIGH (ref 1.8–7.4)
NEUTROPHILS NFR BLD AUTO: 76.2 % — SIGNIFICANT CHANGE UP (ref 43–77)
NRBC # BLD: 0 /100 WBCS — SIGNIFICANT CHANGE UP (ref 0–0)
PLATELET # BLD AUTO: 279 K/UL — SIGNIFICANT CHANGE UP (ref 150–400)
POTASSIUM SERPL-MCNC: 3.9 MMOL/L — SIGNIFICANT CHANGE UP (ref 3.5–5.3)
POTASSIUM SERPL-SCNC: 3.9 MMOL/L — SIGNIFICANT CHANGE UP (ref 3.5–5.3)
RBC # BLD: 3.33 M/UL — LOW (ref 3.8–5.2)
RBC # FLD: 15.5 % — HIGH (ref 10.3–14.5)
SODIUM SERPL-SCNC: 133 MMOL/L — LOW (ref 135–145)
WBC # BLD: 9.9 K/UL — SIGNIFICANT CHANGE UP (ref 3.8–10.5)
WBC # FLD AUTO: 9.9 K/UL — SIGNIFICANT CHANGE UP (ref 3.8–10.5)

## 2024-02-06 PROCEDURE — 32557 INSERT CATH PLEURA W/ IMAGE: CPT | Mod: RT

## 2024-02-06 PROCEDURE — 71045 X-RAY EXAM CHEST 1 VIEW: CPT | Mod: 26

## 2024-02-06 RX ORDER — SIMETHICONE 80 MG/1
80 TABLET, CHEWABLE ORAL ONCE
Refills: 0 | Status: COMPLETED | OUTPATIENT
Start: 2024-02-06 | End: 2024-02-07

## 2024-02-06 RX ORDER — HYDROMORPHONE HYDROCHLORIDE 2 MG/ML
0.5 INJECTION INTRAMUSCULAR; INTRAVENOUS; SUBCUTANEOUS ONCE
Refills: 0 | Status: DISCONTINUED | OUTPATIENT
Start: 2024-02-06 | End: 2024-02-06

## 2024-02-06 RX ORDER — LIDOCAINE 4 G/100G
1 CREAM TOPICAL DAILY
Refills: 0 | Status: DISCONTINUED | OUTPATIENT
Start: 2024-02-06 | End: 2024-02-08

## 2024-02-06 RX ORDER — ACETAMINOPHEN 500 MG
1000 TABLET ORAL ONCE
Refills: 0 | Status: COMPLETED | OUTPATIENT
Start: 2024-02-06 | End: 2024-02-06

## 2024-02-06 RX ADMIN — CHLORHEXIDINE GLUCONATE 1 APPLICATION(S): 213 SOLUTION TOPICAL at 12:00

## 2024-02-06 RX ADMIN — Medication 2: at 11:51

## 2024-02-06 RX ADMIN — Medication 4 UNIT(S): at 17:24

## 2024-02-06 RX ADMIN — Medication 4 UNIT(S): at 08:02

## 2024-02-06 RX ADMIN — Medication 400 MILLIGRAM(S): at 13:48

## 2024-02-06 RX ADMIN — SODIUM CHLORIDE 3 MILLILITER(S): 9 INJECTION INTRAMUSCULAR; INTRAVENOUS; SUBCUTANEOUS at 22:40

## 2024-02-06 RX ADMIN — SODIUM CHLORIDE 3 MILLILITER(S): 9 INJECTION INTRAMUSCULAR; INTRAVENOUS; SUBCUTANEOUS at 13:49

## 2024-02-06 RX ADMIN — HYDROMORPHONE HYDROCHLORIDE 0.5 MILLIGRAM(S): 2 INJECTION INTRAMUSCULAR; INTRAVENOUS; SUBCUTANEOUS at 17:24

## 2024-02-06 RX ADMIN — Medication 4 UNIT(S): at 11:52

## 2024-02-06 RX ADMIN — OXYCODONE HYDROCHLORIDE 10 MILLIGRAM(S): 5 TABLET ORAL at 23:44

## 2024-02-06 RX ADMIN — Medication 1000 MILLIGRAM(S): at 14:18

## 2024-02-06 RX ADMIN — OXYCODONE HYDROCHLORIDE 10 MILLIGRAM(S): 5 TABLET ORAL at 13:47

## 2024-02-06 RX ADMIN — LIDOCAINE 1 PATCH: 4 CREAM TOPICAL at 22:38

## 2024-02-06 RX ADMIN — ATORVASTATIN CALCIUM 80 MILLIGRAM(S): 80 TABLET, FILM COATED ORAL at 22:39

## 2024-02-06 RX ADMIN — Medication 100 MILLIGRAM(S): at 11:53

## 2024-02-06 RX ADMIN — POLYETHYLENE GLYCOL 3350 17 GRAM(S): 17 POWDER, FOR SOLUTION ORAL at 11:52

## 2024-02-06 RX ADMIN — Medication 12.5 MILLIGRAM(S): at 17:25

## 2024-02-06 RX ADMIN — HYDROMORPHONE HYDROCHLORIDE 0.5 MILLIGRAM(S): 2 INJECTION INTRAMUSCULAR; INTRAVENOUS; SUBCUTANEOUS at 17:54

## 2024-02-06 RX ADMIN — SODIUM CHLORIDE 3 MILLILITER(S): 9 INJECTION INTRAMUSCULAR; INTRAVENOUS; SUBCUTANEOUS at 05:42

## 2024-02-06 RX ADMIN — Medication 81 MILLIGRAM(S): at 11:52

## 2024-02-06 RX ADMIN — Medication 12.5 MILLIGRAM(S): at 05:45

## 2024-02-06 RX ADMIN — AMIODARONE HYDROCHLORIDE 200 MILLIGRAM(S): 400 TABLET ORAL at 05:45

## 2024-02-06 RX ADMIN — INSULIN GLARGINE 12 UNIT(S): 100 INJECTION, SOLUTION SUBCUTANEOUS at 22:39

## 2024-02-06 RX ADMIN — PANTOPRAZOLE SODIUM 40 MILLIGRAM(S): 20 TABLET, DELAYED RELEASE ORAL at 05:45

## 2024-02-06 RX ADMIN — OXYCODONE HYDROCHLORIDE 10 MILLIGRAM(S): 5 TABLET ORAL at 14:17

## 2024-02-06 NOTE — PROGRESS NOTE ADULT - ASSESSMENT
73 year old female with PMH morbid obesity (BMI 42.7 s/p gastric sleeve 2015), DMT2, CAD s/p PCI of LCX (2012), LEIDY (on CPAP), CKD (stage 3 - on farxiga), AF (on Eliquis) and rheumatic mitral valve disease reports c/o chronic BARBOZA  1/23/24 SP Mitral valve replacement;  Hybrid Maze procedure Clipping, left atrial appendage and CABG, using 1 vein graft    CKD stage 3a and now worse        1 Renal - Creatinine @ baseline more in the 1.6-1.7 range;   scr higher then baseline in light of elevated HCO3 and hypercalcemia --Likely a degree of slight dehydration     Off diuretics   Labs pending for today   2 Endo-Farxiga and ARB are both held and to restarted as outpt   3 CVS-  Not on pressors ; BP acceptable  4 Pulm-Nasal canula ;   Bipap at night   5 EPS -SP PPM placement   6 Anemia-  hgb down  s/p Epogen 10000 x 1  2/3/24     [facility]   [phone]      73 year old female with PMH morbid obesity (BMI 42.7 s/p gastric sleeve 2015), DMT2, CAD s/p PCI of LCX (2012), LEIDY (on CPAP), CKD (stage 3 - on farxiga), AF (on Eliquis) and rheumatic mitral valve disease reports c/o chronic BARBOZA  1/23/24 SP Mitral valve replacement;  Hybrid Maze procedure Clipping, left atrial appendage and CABG, using 1 vein graft    CKD stage 3a and now worse   Hypercalcemia and elevated serum hco3        1 Renal - Creatinine @ baseline more in the 1.6-1.7 range;   scr higher then baseline in light of elevated HCO3 and hypercalcemia --Likely a degree of slight dehydration     Off diuretics and I still think we can observe off IVF   Labs pending for today   2 Endo-Farxiga and ARB are both held and to restarted as outpt   3 CVS-  Not on pressors ; BP acceptable  4 Pulm-Nasal canula ;   Bipap at night ;  Any role for ultrasound of the chest?  CXR reviewed   5 EPS -SP PPM placement   6 Anemia-  hgb down  s/p Epogen 10000 x 1  2/3/24    >60 minutes spent on total encounter and more then 50% of the visit was spent counseling and/or coordinating care by the attending physician    Randal chapman [facility] HospitalWINDY     [facility]   [phone]

## 2024-02-06 NOTE — PROGRESS NOTE ADULT - ASSESSMENT
73F w/ PMH morbid obesity (BMI 42.7 s/p gastric sleeve ), DMT2, CAD s/p PCI of LCX (), LEIDY (on CPAP), CKD (stage 3 - on farxiga), AF (on Eliquis) and rheumatic mitral valve disease reports c/o chronic BARBOZA for over 10 years that has progressively worsened over the past few months with the feeling of an "elephant on my chest." Her symptoms improved after starting Lasix daily. She gets dizzy when she bends over or when she stands up too fast. She denies CP or weight gain. She sleeps with 3 pillows on an incline at night. She cannot lay flat due to comfort as well as orthopnea. She can only walk one block before she has to stop to rest due to SOB. Pt now presents to PST for scheduled mitral valve replacement, maze procedure and left atrial appendage ligation with Dr. Dalal on 24.  s/p  MVR (t)/ C1V/ Maze/ RICHARD clip  post extubated / inotropic support  EP consulted for underlying junctional rhythm; no av nodals at this time; + epicardial pw; maintain    tx sdu; VSS; afib 60-70 on  2.5; maintain med ct's; diuresis as per renal; endo consulted for diabetic management  keep npo after midnight sat into sun for possible ppm as per EP; no av nodals at this time  24 VSS on  2.5 EP following for ?ppm   VSS NPO for PPM for aflutter w/ chb  - s/p PPM placement; tx floor   VSS: AV Paced 60-80; d/c  as per Dr. Dalal; bun/cr increased today --> d/c diuretics and ck echo as per Dr. Dalal  pa/lat chest xray s/p PPM ; d/w pw   wean O2 as tolerated; sugical bra    VSS s/p ppm site cdi diuretics d/c'd yesterday- rounds made w/ Dr. Dalal - creatinine down to 1.7 - torsemide 20mg po qd & aldactone 50 bid resumed Dr. Nieves, renal following pt. discharge planning- home when stable Thursday  1/31: VSS; Cr coming down, 1.62 today, negative 1.8L, renal following, CXR PA/LAT today    VSS diuresing  b/l pl effusions  ambulate d/c planning home - ween O2  / VSS; A paced; V.paced; bun/cr 38/2.1; renal following; diuretics decreased to qd; ace wrap LE ; wean O2 as tolerated; ck chest xray   2/3 VSS   60's.  -600cc/24hrs.  bun/cr 38/2.15 ( will dw renal ).  CXR stable b/l effusions.  Resume eliquis  2/ VSS; AV paced; eliquis for AC for mvr and hx afib; diuresis; pulm toilet; increase activity; wean O2   discharge planning- home pt this week when bun/ cr stable   2/5: VSS, D/C diuretics per Dr. Dalal, Cr 2.68   2/6 Right pleural effusion confirmed with bedside sono. Right pigtail placement yielding 400ml serosanguinous fluid. CXR negative for ptx.  Continue to hold eliquis. JORDIN worsening  creat 2.84 on torsemide. negative 1270 fluid balance

## 2024-02-06 NOTE — PROGRESS NOTE ADULT - SUBJECTIVE AND OBJECTIVE BOX
Chief complaint  Patient is a 74y old  Female who presents with a chief complaint of I need surgery (02 Feb 2024 08:26)         Labs and Fingersticks  CAPILLARY BLOOD GLUCOSE      POCT Blood Glucose.: 169 mg/dL (06 Feb 2024 11:37)  POCT Blood Glucose.: 126 mg/dL (06 Feb 2024 07:39)  POCT Blood Glucose.: 134 mg/dL (05 Feb 2024 21:27)  POCT Blood Glucose.: 154 mg/dL (05 Feb 2024 16:45)      Anion Gap: 14 (02-06 @ 09:12)  Anion Gap: 11 (02-05 @ 07:35)      Calcium: 10.3 (02-06 @ 09:12)  Calcium: 11.2 *H* (02-05 @ 07:35)  Intact PTH: 64 (02-05 @ 07:35)          02-06    133<L>  |  88<L>  |  53<H>  ----------------------------<  248<H>  3.9   |  31  |  2.84<H>    Ca    10.3      06 Feb 2024 09:12                          10.5   9.90  )-----------( 279      ( 06 Feb 2024 09:12 )             32.0     Medications  MEDICATIONS  (STANDING):  acetaminophen   IVPB .. 1000 milliGRAM(s) IV Intermittent once  allopurinol 100 milliGRAM(s) Oral daily  aMIOdarone    Tablet   Oral   aMIOdarone    Tablet 200 milliGRAM(s) Oral daily  aspirin enteric coated 81 milliGRAM(s) Oral daily  atorvastatin 80 milliGRAM(s) Oral at bedtime  bisacodyl Suppository 10 milliGRAM(s) Rectal once  chlorhexidine 2% Cloths 1 Application(s) Topical daily  dextrose 5%. 1000 milliLiter(s) (100 mL/Hr) IV Continuous <Continuous>  dextrose 5%. 1000 milliLiter(s) (50 mL/Hr) IV Continuous <Continuous>  dextrose 50% Injectable 25 Gram(s) IV Push once  dextrose 50% Injectable 25 milliLiter(s) IV Push every 15 minutes  dextrose 50% Injectable 25 Gram(s) IV Push once  dextrose 50% Injectable 50 milliLiter(s) IV Push every 15 minutes  dextrose 50% Injectable 12.5 Gram(s) IV Push once  glucagon  Injectable 1 milliGRAM(s) IntraMuscular once  insulin glargine Injectable (LANTUS) 12 Unit(s) SubCutaneous at bedtime  insulin lispro (ADMELOG) corrective regimen sliding scale   SubCutaneous at bedtime  insulin lispro (ADMELOG) corrective regimen sliding scale   SubCutaneous three times a day before meals  insulin lispro Injectable (ADMELOG) 4 Unit(s) SubCutaneous three times a day before meals  metoprolol tartrate 12.5 milliGRAM(s) Oral two times a day  pantoprazole    Tablet 40 milliGRAM(s) Oral before breakfast  polyethylene glycol 3350 17 Gram(s) Oral daily  senna 2 Tablet(s) Oral at bedtime  simethicone 80 milliGRAM(s) Chew once  sodium chloride 0.9% lock flush 3 milliLiter(s) IV Push every 8 hours      Physical Exam  General: Patient comfortable in bed   Vital Signs Last 12 Hrs  T(F): 97.9 (02-06-24 @ 12:12), Max: 97.9 (02-06-24 @ 12:12)  HR: 60 (02-06-24 @ 12:12) (60 - 60)  BP: 103/55 (02-06-24 @ 12:12) (103/55 - 109/66)  BP(mean): --  RR: 18 (02-06-24 @ 12:12) (18 - 18)  SpO2: 94% (02-06-24 @ 12:12) (94% - 97%)    CVS: S1S2   Respiratory: No wheezing, no crepitations  GI: Abdomen soft, bowel sounds positive  Musculoskeletal:  moves all extremities       Chief complaint  Patient is a 74y old  Female who presents with a chief complaint of I need surgery (02 Feb 2024 08:26)         Labs and Fingersticks  CAPILLARY BLOOD GLUCOSE      POCT Blood Glucose.: 169 mg/dL (06 Feb 2024 11:37)  POCT Blood Glucose.: 126 mg/dL (06 Feb 2024 07:39)  POCT Blood Glucose.: 134 mg/dL (05 Feb 2024 21:27)  POCT Blood Glucose.: 154 mg/dL (05 Feb 2024 16:45)      Anion Gap: 14 (02-06 @ 09:12)  Anion Gap: 11 (02-05 @ 07:35)      Calcium: 10.3 (02-06 @ 09:12)  Calcium: 11.2 *H* (02-05 @ 07:35)  Intact PTH: 64 (02-05 @ 07:35)          02-06    133<L>  |  88<L>  |  53<H>  ----------------------------<  248<H>  3.9   |  31  |  2.84<H>    Ca    10.3      06 Feb 2024 09:12                          10.5   9.90  )-----------( 279      ( 06 Feb 2024 09:12 )             32.0     Medications  MEDICATIONS  (STANDING):  acetaminophen   IVPB .. 1000 milliGRAM(s) IV Intermittent once  allopurinol 100 milliGRAM(s) Oral daily  aMIOdarone    Tablet   Oral   aMIOdarone    Tablet 200 milliGRAM(s) Oral daily  aspirin enteric coated 81 milliGRAM(s) Oral daily  atorvastatin 80 milliGRAM(s) Oral at bedtime  bisacodyl Suppository 10 milliGRAM(s) Rectal once  chlorhexidine 2% Cloths 1 Application(s) Topical daily  dextrose 5%. 1000 milliLiter(s) (100 mL/Hr) IV Continuous <Continuous>  dextrose 5%. 1000 milliLiter(s) (50 mL/Hr) IV Continuous <Continuous>  dextrose 50% Injectable 25 Gram(s) IV Push once  dextrose 50% Injectable 25 milliLiter(s) IV Push every 15 minutes  dextrose 50% Injectable 25 Gram(s) IV Push once  dextrose 50% Injectable 50 milliLiter(s) IV Push every 15 minutes  dextrose 50% Injectable 12.5 Gram(s) IV Push once  glucagon  Injectable 1 milliGRAM(s) IntraMuscular once  insulin glargine Injectable (LANTUS) 12 Unit(s) SubCutaneous at bedtime  insulin lispro (ADMELOG) corrective regimen sliding scale   SubCutaneous at bedtime  insulin lispro (ADMELOG) corrective regimen sliding scale   SubCutaneous three times a day before meals  insulin lispro Injectable (ADMELOG) 4 Unit(s) SubCutaneous three times a day before meals  metoprolol tartrate 12.5 milliGRAM(s) Oral two times a day  pantoprazole    Tablet 40 milliGRAM(s) Oral before breakfast  polyethylene glycol 3350 17 Gram(s) Oral daily  senna 2 Tablet(s) Oral at bedtime  simethicone 80 milliGRAM(s) Chew once  sodium chloride 0.9% lock flush 3 milliLiter(s) IV Push every 8 hours      Physical Exam  General: Patient comfortable in bed   Vital Signs Last 12 Hrs  T(F): 97.9 (02-06-24 @ 12:12), Max: 97.9 (02-06-24 @ 12:12)  HR: 60 (02-06-24 @ 12:12) (60 - 60)  BP: 103/55 (02-06-24 @ 12:12) (103/55 - 109/66)  BP(mean): --  RR: 18 (02-06-24 @ 12:12) (18 - 18)  SpO2: 94% (02-06-24 @ 12:12) (94% - 97%)    CVS: S1S2   Respiratory: No wheezing, no crepitations  GI: Abdomen soft, bowel sounds positive  Musculoskeletal:  moves all extremities

## 2024-02-06 NOTE — PROGRESS NOTE ADULT - SUBJECTIVE AND OBJECTIVE BOX
VITAL SIGNS    Telemetry:   60-70  Vital Signs Last 24 Hrs  T(C): 36.6 (24 @ 12:12), Max: 36.6 (24 @ 20:48)  T(F): 97.9 (24 @ 12:12), Max: 97.9 (24 @ 20:48)  HR: 60 (24 @ 12:12) (59 - 60)  BP: 103/55 (24 @ 12:12) (103/55 - 139/66)  RR: 18 (24 @ 12:12) (18 - 18)  SpO2: 94% (24 @ 12:12) (94% - 98%)             @ 07:01  -   @ 07:00  --------------------------------------------------------  IN: 680 mL / OUT: 1950 mL / NET: -1270 mL     @ 07:01  -   @ 14:09  --------------------------------------------------------  IN: 480 mL / OUT: 350 mL / NET: 130 mL       Daily     Daily Weight in k.2 (2024 08:25)  Admit Wt: Drug Dosing Weight  Height (cm): 158.8 (2024 11:26)  Weight (kg): 108.9 (2024 11:26)  BMI (kg/m2): 43.2 (2024 11:26)  BSA (m2): 2.08 (2024 11:26)      CAPILLARY BLOOD GLUCOSE      POCT Blood Glucose.: 169 mg/dL (2024 11:37)  POCT Blood Glucose.: 126 mg/dL (2024 07:39)  POCT Blood Glucose.: 134 mg/dL (2024 21:27)  POCT Blood Glucose.: 154 mg/dL (2024 16:45)          MEDICATIONS  acetaminophen     Tablet .. 650 milliGRAM(s) Oral every 6 hours PRN  allopurinol 100 milliGRAM(s) Oral daily  aMIOdarone    Tablet   Oral   aMIOdarone    Tablet 200 milliGRAM(s) Oral daily  aspirin enteric coated 81 milliGRAM(s) Oral daily  atorvastatin 80 milliGRAM(s) Oral at bedtime  bisacodyl Suppository 10 milliGRAM(s) Rectal once  chlorhexidine 2% Cloths 1 Application(s) Topical daily  dextrose 5%. 1000 milliLiter(s) IV Continuous <Continuous>  dextrose 5%. 1000 milliLiter(s) IV Continuous <Continuous>  dextrose 50% Injectable 12.5 Gram(s) IV Push once  dextrose 50% Injectable 25 milliLiter(s) IV Push every 15 minutes  dextrose 50% Injectable 50 milliLiter(s) IV Push every 15 minutes  dextrose 50% Injectable 25 Gram(s) IV Push once  dextrose 50% Injectable 25 Gram(s) IV Push once  dextrose Oral Gel 15 Gram(s) Oral once PRN  glucagon  Injectable 1 milliGRAM(s) IntraMuscular once  insulin glargine Injectable (LANTUS) 12 Unit(s) SubCutaneous at bedtime  insulin lispro (ADMELOG) corrective regimen sliding scale   SubCutaneous three times a day before meals  insulin lispro (ADMELOG) corrective regimen sliding scale   SubCutaneous at bedtime  insulin lispro Injectable (ADMELOG) 4 Unit(s) SubCutaneous three times a day before meals  metoprolol tartrate 12.5 milliGRAM(s) Oral two times a day  oxyCODONE    IR 5 milliGRAM(s) Oral every 4 hours PRN  oxyCODONE    IR 10 milliGRAM(s) Oral every 4 hours PRN  pantoprazole    Tablet 40 milliGRAM(s) Oral before breakfast  polyethylene glycol 3350 17 Gram(s) Oral daily  senna 2 Tablet(s) Oral at bedtime  simethicone 80 milliGRAM(s) Chew once  sodium chloride 0.9% lock flush 3 milliLiter(s) IV Push every 8 hours      >>> <<<  PHYSICAL EXAM  Subjective: NAD  Neurology: alert and oriented x 3, nonfocal, no gross deficits  CV :s1s2  Sternal Wound :  CDI , Stable  Lungs: right pigtail to lws -400ml  Abdomen: soft, NT,obese, (+ )BM   :  voiding  Extremities:-c/c/+1-2 edema       LABS  -    133<L>  |  88<L>  |  53<H>  ----------------------------<  248<H>  3.9   |  31  |  2.84<H>    Ca    10.3      2024 09:12                                   10.5   9.90  )-----------( 279      ( 2024 09:12 )             32.0                 PAST MEDICAL & SURGICAL HISTORY:  Nonrheumatic mitral (valve) annulus calcification      Obesity, morbid, BMI 40.0-49.9      Chronic kidney disease (CKD)      AF (atrial fibrillation)      Type 2 diabetes mellitus      HLD (hyperlipidemia)      LEIDY on CPAP      Hypertrophic obstructive cardiomyopathy      2019 novel coronavirus disease (COVID-19)      HTN (hypertension)      Stented coronary artery      History of coronary angiogram      S/P coronary artery stent placement      H/O gastric sleeve      History of laparoscopic adjustable gastric banding      History of removal of laparoscopic gastric banding device      NVD (normal vaginal delivery)

## 2024-02-06 NOTE — PROGRESS NOTE ADULT - ASSESSMENT
Assessment  DMT2: 73y Female with DM T2 with hyperglycemia, A1C 7% , was on oral meds at home, was using trulicity prior, now on basal bolus  insulin with coverage, sugars are stable.  Will need tight glycemic control for postop wound healing.   CAD: on medications, stable, monitored. s/p CABG/ MVR   HTN: on antihypertensive medications, monitored, asymptomatic.  Obesity: No strict exercise routines, not on any weight loss program, neither on low calorie diet.      Discussed plan and management with Dr Chika Perry NP - TEAMS  Fay Farr MD  Cell: [phone]  Office: [phone]    Assessment  DMT2: 73y Female with DM T2 with hyperglycemia, A1C 7% , was on oral meds at home, was using trulicity prior, now on basal bolus insulin with coverage, sugars are stable.  Will need tight glycemic control for postop wound healing.   CAD: on medications, stable, monitored. s/p CABG/ MVR   HTN: on antihypertensive medications, monitored, asymptomatic.  Obesity: No strict exercise routines, not on any weight loss program, neither on low calorie diet.      Discussed plan and management with Dr Chika Perry NP - TEAMS  Fay Farr MD  Cell: [phone]  Office: [phone]

## 2024-02-06 NOTE — PROGRESS NOTE ADULT - SUBJECTIVE AND OBJECTIVE BOX
NEPHROLOGY-[facility] [phone]        Patient seen and examined in the chair.  She was the same         MEDICATIONS  (STANDING):  allopurinol 100 milliGRAM(s) Oral daily  aMIOdarone    Tablet   Oral   aMIOdarone    Tablet 200 milliGRAM(s) Oral daily  aspirin enteric coated 81 milliGRAM(s) Oral daily  atorvastatin 80 milliGRAM(s) Oral at bedtime  bisacodyl Suppository 10 milliGRAM(s) Rectal once  chlorhexidine 2% Cloths 1 Application(s) Topical daily  dextrose 5%. 1000 milliLiter(s) (100 mL/Hr) IV Continuous <Continuous>  dextrose 5%. 1000 milliLiter(s) (50 mL/Hr) IV Continuous <Continuous>  dextrose 50% Injectable 12.5 Gram(s) IV Push once  dextrose 50% Injectable 25 Gram(s) IV Push once  dextrose 50% Injectable 50 milliLiter(s) IV Push every 15 minutes  dextrose 50% Injectable 25 Gram(s) IV Push once  dextrose 50% Injectable 25 milliLiter(s) IV Push every 15 minutes  glucagon  Injectable 1 milliGRAM(s) IntraMuscular once  insulin glargine Injectable (LANTUS) 12 Unit(s) SubCutaneous at bedtime  insulin lispro (ADMELOG) corrective regimen sliding scale   SubCutaneous at bedtime  insulin lispro (ADMELOG) corrective regimen sliding scale   SubCutaneous three times a day before meals  insulin lispro Injectable (ADMELOG) 4 Unit(s) SubCutaneous three times a day before meals  metoprolol tartrate 12.5 milliGRAM(s) Oral two times a day  pantoprazole    Tablet 40 milliGRAM(s) Oral before breakfast  polyethylene glycol 3350 17 Gram(s) Oral daily  senna 2 Tablet(s) Oral at bedtime  sodium chloride 0.9% lock flush 3 milliLiter(s) IV Push every 8 hours      VITAL:  T(C): , Max: 36.9 (02-05-24 @ 11:17)  T(F): , Max: 98.4 (02-05-24 @ 11:17)  HR: 60 (02-06-24 @ 08:45)  BP: 109/66 (02-06-24 @ 06:00)  BP(mean): 96 (02-05-24 @ 20:48)  RR: 18 (02-06-24 @ 06:00)  SpO2: 97% (02-06-24 @ 08:45)  Wt(kg): --    I and O's:    02-05 @ 07:01  -  02-06 @ 07:00  --------------------------------------------------------  IN: 680 mL / OUT: 1950 mL / NET: -1270 mL    02-06 @ 07:01  -  02-06 @ 09:01  --------------------------------------------------------  IN: 240 mL / OUT: 0 mL / NET: 240 mL          PHYSICAL EXAM:    Constitutional: NAD  Neck:  No JVD  Respiratory: CTAB/L  Cardiovascular: S1 and S2  Gastrointestinal: BS+, soft, NT/ND  Extremities: No peripheral edema  Neurological: A/O x 3, no focal deficits  Psychiatric: Normal mood, normal affect  : No Diallo  Skin: No rashes  Access: Not applicable    LABS:                        9.9    8.58  )-----------( 238      ( 05 Feb 2024 07:35 )             30.6     02-05    139  |  93<L>  |  48<H>  ----------------------------<  98  4.7   |  35<H>  |  2.68<H>    Ca    11.2<H>      05 Feb 2024 07:35            Urine Studies:  Urinalysis Basic - ( 05 Feb 2024 07:35 )    Color: x / Appearance: x / SG: x / pH: x  Gluc: 98 mg/dL / Ketone: x  / Bili: x / Urobili: x   Blood: x / Protein: x / Nitrite: x   Leuk Esterase: x / RBC: x / WBC x   Sq Epi: x / Non Sq Epi: x / Bacteria: x            RADIOLOGY & ADDITIONAL STUDIES:

## 2024-02-07 LAB
ANION GAP SERPL CALC-SCNC: 12 MMOL/L — SIGNIFICANT CHANGE UP (ref 5–17)
BASOPHILS # BLD AUTO: 0.03 K/UL — SIGNIFICANT CHANGE UP (ref 0–0.2)
BASOPHILS NFR BLD AUTO: 0.3 % — SIGNIFICANT CHANGE UP (ref 0–2)
BUN SERPL-MCNC: 51 MG/DL — HIGH (ref 7–23)
CALCIUM SERPL-MCNC: 10.2 MG/DL — SIGNIFICANT CHANGE UP (ref 8.4–10.5)
CHLORIDE SERPL-SCNC: 93 MMOL/L — LOW (ref 96–108)
CO2 SERPL-SCNC: 30 MMOL/L — SIGNIFICANT CHANGE UP (ref 22–31)
CREAT SERPL-MCNC: 2.72 MG/DL — HIGH (ref 0.5–1.3)
EGFR: 18 ML/MIN/1.73M2 — LOW
EOSINOPHIL # BLD AUTO: 0.13 K/UL — SIGNIFICANT CHANGE UP (ref 0–0.5)
EOSINOPHIL NFR BLD AUTO: 1.4 % — SIGNIFICANT CHANGE UP (ref 0–6)
GLUCOSE SERPL-MCNC: 119 MG/DL — HIGH (ref 70–99)
HCT VFR BLD CALC: 30.7 % — LOW (ref 34.5–45)
HGB BLD-MCNC: 9.8 G/DL — LOW (ref 11.5–15.5)
IMM GRANULOCYTES NFR BLD AUTO: 0.3 % — SIGNIFICANT CHANGE UP (ref 0–0.9)
LYMPHOCYTES # BLD AUTO: 2.13 K/UL — SIGNIFICANT CHANGE UP (ref 1–3.3)
LYMPHOCYTES # BLD AUTO: 22.6 % — SIGNIFICANT CHANGE UP (ref 13–44)
MCHC RBC-ENTMCNC: 30.9 PG — SIGNIFICANT CHANGE UP (ref 27–34)
MCHC RBC-ENTMCNC: 31.9 GM/DL — LOW (ref 32–36)
MCV RBC AUTO: 96.8 FL — SIGNIFICANT CHANGE UP (ref 80–100)
MONOCYTES # BLD AUTO: 0.59 K/UL — SIGNIFICANT CHANGE UP (ref 0–0.9)
MONOCYTES NFR BLD AUTO: 6.3 % — SIGNIFICANT CHANGE UP (ref 2–14)
NEUTROPHILS # BLD AUTO: 6.52 K/UL — SIGNIFICANT CHANGE UP (ref 1.8–7.4)
NEUTROPHILS NFR BLD AUTO: 69.1 % — SIGNIFICANT CHANGE UP (ref 43–77)
NRBC # BLD: 0 /100 WBCS — SIGNIFICANT CHANGE UP (ref 0–0)
PLATELET # BLD AUTO: 243 K/UL — SIGNIFICANT CHANGE UP (ref 150–400)
POTASSIUM SERPL-MCNC: 4.6 MMOL/L — SIGNIFICANT CHANGE UP (ref 3.5–5.3)
POTASSIUM SERPL-SCNC: 4.6 MMOL/L — SIGNIFICANT CHANGE UP (ref 3.5–5.3)
RBC # BLD: 3.17 M/UL — LOW (ref 3.8–5.2)
RBC # FLD: 15.3 % — HIGH (ref 10.3–14.5)
SODIUM SERPL-SCNC: 135 MMOL/L — SIGNIFICANT CHANGE UP (ref 135–145)
WBC # BLD: 9.43 K/UL — SIGNIFICANT CHANGE UP (ref 3.8–10.5)
WBC # FLD AUTO: 9.43 K/UL — SIGNIFICANT CHANGE UP (ref 3.8–10.5)

## 2024-02-07 PROCEDURE — 71045 X-RAY EXAM CHEST 1 VIEW: CPT | Mod: 26

## 2024-02-07 PROCEDURE — 93308 TTE F-UP OR LMTD: CPT | Mod: 26

## 2024-02-07 PROCEDURE — 93321 DOPPLER ECHO F-UP/LMTD STD: CPT | Mod: 26

## 2024-02-07 PROCEDURE — 93010 ELECTROCARDIOGRAM REPORT: CPT

## 2024-02-07 RX ORDER — APIXABAN 2.5 MG/1
2.5 TABLET, FILM COATED ORAL
Refills: 0 | Status: DISCONTINUED | OUTPATIENT
Start: 2024-02-08 | End: 2024-02-08

## 2024-02-07 RX ORDER — SIMETHICONE 80 MG/1
80 TABLET, CHEWABLE ORAL EVERY 6 HOURS
Refills: 0 | Status: DISCONTINUED | OUTPATIENT
Start: 2024-02-07 | End: 2024-02-08

## 2024-02-07 RX ORDER — ONDANSETRON 8 MG/1
4 TABLET, FILM COATED ORAL ONCE
Refills: 0 | Status: COMPLETED | OUTPATIENT
Start: 2024-02-07 | End: 2024-02-07

## 2024-02-07 RX ORDER — ONDANSETRON 8 MG/1
4 TABLET, FILM COATED ORAL ONCE
Refills: 0 | Status: DISCONTINUED | OUTPATIENT
Start: 2024-02-07 | End: 2024-02-08

## 2024-02-07 RX ADMIN — ATORVASTATIN CALCIUM 80 MILLIGRAM(S): 80 TABLET, FILM COATED ORAL at 21:46

## 2024-02-07 RX ADMIN — Medication 12.5 MILLIGRAM(S): at 17:50

## 2024-02-07 RX ADMIN — LIDOCAINE 1 PATCH: 4 CREAM TOPICAL at 13:38

## 2024-02-07 RX ADMIN — SODIUM CHLORIDE 3 MILLILITER(S): 9 INJECTION INTRAMUSCULAR; INTRAVENOUS; SUBCUTANEOUS at 16:38

## 2024-02-07 RX ADMIN — POLYETHYLENE GLYCOL 3350 17 GRAM(S): 17 POWDER, FOR SOLUTION ORAL at 12:31

## 2024-02-07 RX ADMIN — SIMETHICONE 80 MILLIGRAM(S): 80 TABLET, CHEWABLE ORAL at 18:08

## 2024-02-07 RX ADMIN — ONDANSETRON 4 MILLIGRAM(S): 8 TABLET, FILM COATED ORAL at 08:37

## 2024-02-07 RX ADMIN — Medication 12.5 MILLIGRAM(S): at 05:14

## 2024-02-07 RX ADMIN — SIMETHICONE 80 MILLIGRAM(S): 80 TABLET, CHEWABLE ORAL at 12:31

## 2024-02-07 RX ADMIN — OXYCODONE HYDROCHLORIDE 10 MILLIGRAM(S): 5 TABLET ORAL at 00:15

## 2024-02-07 RX ADMIN — OXYCODONE HYDROCHLORIDE 10 MILLIGRAM(S): 5 TABLET ORAL at 05:15

## 2024-02-07 RX ADMIN — INSULIN GLARGINE 12 UNIT(S): 100 INJECTION, SOLUTION SUBCUTANEOUS at 21:45

## 2024-02-07 RX ADMIN — SODIUM CHLORIDE 3 MILLILITER(S): 9 INJECTION INTRAMUSCULAR; INTRAVENOUS; SUBCUTANEOUS at 05:16

## 2024-02-07 RX ADMIN — LIDOCAINE 1 PATCH: 4 CREAM TOPICAL at 10:00

## 2024-02-07 RX ADMIN — OXYCODONE HYDROCHLORIDE 10 MILLIGRAM(S): 5 TABLET ORAL at 05:45

## 2024-02-07 RX ADMIN — Medication 4 UNIT(S): at 12:30

## 2024-02-07 RX ADMIN — Medication 81 MILLIGRAM(S): at 12:31

## 2024-02-07 RX ADMIN — CHLORHEXIDINE GLUCONATE 1 APPLICATION(S): 213 SOLUTION TOPICAL at 13:38

## 2024-02-07 RX ADMIN — SODIUM CHLORIDE 3 MILLILITER(S): 9 INJECTION INTRAMUSCULAR; INTRAVENOUS; SUBCUTANEOUS at 21:51

## 2024-02-07 RX ADMIN — Medication 4 UNIT(S): at 08:29

## 2024-02-07 RX ADMIN — SENNA PLUS 2 TABLET(S): 8.6 TABLET ORAL at 21:46

## 2024-02-07 RX ADMIN — Medication 100 MILLIGRAM(S): at 12:31

## 2024-02-07 RX ADMIN — AMIODARONE HYDROCHLORIDE 200 MILLIGRAM(S): 400 TABLET ORAL at 05:14

## 2024-02-07 RX ADMIN — PANTOPRAZOLE SODIUM 40 MILLIGRAM(S): 20 TABLET, DELAYED RELEASE ORAL at 05:14

## 2024-02-07 NOTE — PROGRESS NOTE ADULT - PROBLEM SELECTOR PLAN 1
Renal following-Dr. Nieves  trend bmp  strict I & O's  avoid nephrotoxic agents  echo negative for effusion 1/29  d/c diuretics per Dr. Dalal, Cr 2.63 today Renal following-Dr. Nieves  trend bmp  strict I & O's  avoid nephrotoxic agents  echo negative for effusion 1/29  bun/cr stable 51/1.9- continue to monitor off diuretics today  repeat echo as per renal

## 2024-02-07 NOTE — PROGRESS NOTE ADULT - PROBLEM SELECTOR PLAN 3
s/p 1/23 MVR(t)/ C1V   continue postop care  continue asa and statin  amio 200 qd   lop 12.5 bid   eliquis 2.5 bid for ac   PPM 1/28  pulm toilet  pain management  wean O2 as tolerated  surgical bra   increase activity as tolerated   discharge planning- home pt when bun/cr stable this week and off O2 s/p 1/23 MVR(t)/ C1V   continue postop care  continue asa and statin  amio 200 qd   lop 12.5 bid   eliquis 2.5 bid for ac - resume in am 2/8   pulm toilet  pain management  wean O2 as tolerated  surgical bra   increase activity as tolerated   discharge planning- home pt when bun/cr stable this week and off O2 ? thur

## 2024-02-07 NOTE — PROGRESS NOTE ADULT - ASSESSMENT
73F w/ PMH morbid obesity (BMI 42.7 s/p gastric sleeve ), DMT2, CAD s/p PCI of LCX (), LEIDY (on CPAP), CKD (stage 3 - on farxiga), AF (on Eliquis) and rheumatic mitral valve disease reports c/o chronic BARBOZA for over 10 years that has progressively worsened over the past few months with the feeling of an "elephant on my chest." Her symptoms improved after starting Lasix daily. She gets dizzy when she bends over or when she stands up too fast. She denies CP or weight gain. She sleeps with 3 pillows on an incline at night. She cannot lay flat due to comfort as well as orthopnea. She can only walk one block before she has to stop to rest due to SOB. Pt now presents to [facility] for scheduled mitral valve replacement, maze procedure and left atrial appendage ligation with Dr. Dalal on 24.  s/p  MVR (t)/ C1V/ Maze/ RICHARD clip  post extubated / inotropic support  EP consulted for underlying junctional rhythm; no av nodals at this time; + epicardial pw; maintain    tx sdu; VSS; afib 60-70 on  2.5; maintain med ct's; diuresis as per renal; endo consulted for diabetic management  keep npo after midnight sat into sun for possible ppm as per EP; no av nodals at this time  24 VSS on  2.5 EP following for ?ppm   VSS NPO for PPM for aflutter w/ chb  - s/p PPM placement; tx floor   VSS: AV Paced 60-80; d/c  as per Dr. Dalal; bun/cr increased today --> d/c diuretics and ck echo as per Dr. Dalal  pa/lat chest xray s/p PPM ; d/w pw   wean O2 as tolerated; sugical bra    VSS s/p ppm site cdi diuretics d/c'd yesterday- rounds made w/ Dr. Dalal - creatinine down to 1.7 - torsemide 20mg po qd & aldactone 50 bid resumed Dr. Nieves, renal following pt. discharge planning- home when stable Thursday  1/31: VSS; Cr coming down, 1.62 today, negative 1.8L, renal following, CXR PA/LAT today    VSS diuresing  b/l pl effusions  ambulate d/c planning home - ween O2  / VSS; A paced; V.paced; bun/cr 38/2.1; renal following; diuretics decreased to qd; ace wrap LE ; wean O2 as tolerated; ck chest xray   2/3 VSS   60's.  -600cc/24hrs.  bun/cr 38/2.15 ( will dw renal ).  CXR stable b/l effusions.  Resume eliquis  2/ VSS; AV paced; eliquis for AC for mvr and hx afib; diuresis; pulm toilet; increase activity; wean O2   discharge planning- home pt this week when bun/ cr stable   2/5: VSS, D/C diuretics per Dr. Dalal, Cr 2.68   2/6 Right pleural effusion confirmed with bedside sono. Right pigtail placement yielding 400ml serosanguinous fluid. CXR negative for ptx.  Continue to hold eliquis. JORDIN worsening  creat 2.84 on torsemide. negative 1270 fluid balance   73F w/ PMH morbid obesity (BMI 42.7 s/p gastric sleeve ), DMT2, CAD s/p PCI of LCX (), LEIDY (on CPAP), CKD (stage 3 - on farxiga), AF (on Eliquis) and rheumatic mitral valve disease reports c/o chronic BARBOZA for over 10 years that has progressively worsened over the past few months with the feeling of an "elephant on my chest." Her symptoms improved after starting Lasix daily. She gets dizzy when she bends over or when she stands up too fast. She denies CP or weight gain. She sleeps with 3 pillows on an incline at night. She cannot lay flat due to comfort as well as orthopnea. She can only walk one block before she has to stop to rest due to SOB. Pt now presents to [facility] for scheduled mitral valve replacement, maze procedure and left atrial appendage ligation with Dr. Dalal on 24.  s/p  MVR (t)/ C1V/ Maze/ RICHARD clip  post extubated / inotropic support  EP consulted for underlying junctional rhythm; no av nodals at this time; + epicardial pw; maintain    tx sdu; VSS; afib 60-70 on  2.5; maintain med ct's; diuresis as per renal; endo consulted for diabetic management  keep npo after midnight sat into sun for possible ppm as per EP; no av nodals at this time  24 VSS on  2.5 EP following for ?ppm   VSS NPO for PPM for aflutter w/ chb  - s/p PPM placement; tx floor   VSS: AV Paced 60-80; d/c  as per Dr. Dalal; bun/cr increased today --> d/c diuretics and ck echo as per Dr. Dalal  pa/lat chest xray s/p PPM ; d/w pw   wean O2 as tolerated; sugical bra    VSS s/p ppm site cdi diuretics d/c'd yesterday- rounds made w/ Dr. Dalal - creatinine down to 1.7 - torsemide 20mg po qd & aldactone 50 bid resumed Dr. Nieves, renal following pt. discharge planning- home when stable Thursday  1/31: VSS; Cr coming down, 1.62 today, negative 1.8L, renal following, CXR PA/LAT today    VSS diuresing  b/l pl effusions  ambulate d/c planning home - ween O2   VSS; A paced; V.paced; bun/cr 38/2.1; renal following; diuretics decreased to qd; ace wrap LE ; wean O2 as tolerated; ck chest xray   2/3 VSS   60's.  -600cc/24hrs.  bun/cr 38/2.15 ( will dw renal ).  CXR stable b/l effusions.  Resume eliquis   VSS; AV paced; eliquis for AC for mvr and hx afib; diuresis; pulm toilet; increase activity; wean O2   discharge planning- home pt this week when bun/ cr stable   : VSS, D/C diuretics per Dr. Dalal, Cr 2.68    Right pleural effusion confirmed with bedside sono. Right pigtail placement yielding 400ml serosanguinous fluid. CXR negative for ptx.  Continue to hold eliquis. JORDIN worsening  creat 2.84 on torsemide. negative 1270 fluid balance   VSS; AV Paced @ 60; d/c rt pigtail this am; ck f/u chest xray; resume eliquis in am; bun/cr stable 51/1.9; continue to monitor off diuretics; ck echo as per renal; wean O2 as tolerated; ck RA sat; narcs d/c secondary to n/v  discharge planning- home in am thur if off O2 and bun/cr stable

## 2024-02-07 NOTE — PROGRESS NOTE ADULT - SUBJECTIVE AND OBJECTIVE BOX
NEPHROLOGY-[facility] [phone]        Patient seen and examined in bed.  She was the same but still winded when walking   CHest tube placed         MEDICATIONS  (STANDING):  allopurinol 100 milliGRAM(s) Oral daily  aMIOdarone    Tablet   Oral   aMIOdarone    Tablet 200 milliGRAM(s) Oral daily  aspirin enteric coated 81 milliGRAM(s) Oral daily  atorvastatin 80 milliGRAM(s) Oral at bedtime  bisacodyl Suppository 10 milliGRAM(s) Rectal once  chlorhexidine 2% Cloths 1 Application(s) Topical daily  dextrose 5%. 1000 milliLiter(s) (50 mL/Hr) IV Continuous <Continuous>  dextrose 5%. 1000 milliLiter(s) (100 mL/Hr) IV Continuous <Continuous>  dextrose 50% Injectable 12.5 Gram(s) IV Push once  dextrose 50% Injectable 50 milliLiter(s) IV Push every 15 minutes  dextrose 50% Injectable 25 Gram(s) IV Push once  dextrose 50% Injectable 25 milliLiter(s) IV Push every 15 minutes  dextrose 50% Injectable 25 Gram(s) IV Push once  glucagon  Injectable 1 milliGRAM(s) IntraMuscular once  insulin glargine Injectable (LANTUS) 12 Unit(s) SubCutaneous at bedtime  insulin lispro (ADMELOG) corrective regimen sliding scale   SubCutaneous at bedtime  insulin lispro (ADMELOG) corrective regimen sliding scale   SubCutaneous three times a day before meals  insulin lispro Injectable (ADMELOG) 4 Unit(s) SubCutaneous three times a day before meals  lidocaine   4% Patch 1 Patch Transdermal daily  metoprolol tartrate 12.5 milliGRAM(s) Oral two times a day  pantoprazole    Tablet 40 milliGRAM(s) Oral before breakfast  polyethylene glycol 3350 17 Gram(s) Oral daily  senna 2 Tablet(s) Oral at bedtime  sodium chloride 0.9% lock flush 3 milliLiter(s) IV Push every 8 hours      VITAL:  T(C): , Max: 36.6 (02-06-24 @ 19:42)  T(F): , Max: 97.9 (02-06-24 @ 19:42)  HR: 60 (02-07-24 @ 12:12)  BP: 133/64 (02-07-24 @ 12:12)  BP(mean): 76 (02-07-24 @ 05:12)  RR: 18 (02-07-24 @ 12:12)  SpO2: 97% (02-07-24 @ 12:12)  Wt(kg): --    I and O's:    02-06 @ 07:01  -  02-07 @ 07:00  --------------------------------------------------------  IN: 1020 mL / OUT: 1900 mL / NET: -880 mL    02-07 @ 07:01  -  02-07 @ 12:41  --------------------------------------------------------  IN: 120 mL / OUT: 250 mL / NET: -130 mL          PHYSICAL EXAM:    Constitutional: NAD  Neck:  No JVD  Respiratory: CTAB/L  Cardiovascular: S1 and S2  Gastrointestinal: BS+, soft, NT/ND  Extremities: No peripheral edema  Neurological: A/O x 3, no focal deficits  Psychiatric: Normal mood, normal affect  : No Diallo  Skin: No rashes  Access: Not applicable    LABS:                        9.8    9.43  )-----------( 243      ( 07 Feb 2024 06:07 )             30.7     02-07    135  |  93<L>  |  51<H>  ----------------------------<  119<H>  4.6   |  30  |  2.72<H>    Ca    10.2      07 Feb 2024 06:07            Urine Studies:  Urinalysis Basic - ( 07 Feb 2024 06:07 )    Color: x / Appearance: x / SG: x / pH: x  Gluc: 119 mg/dL / Ketone: x  / Bili: x / Urobili: x   Blood: x / Protein: x / Nitrite: x   Leuk Esterase: x / RBC: x / WBC x   Sq Epi: x / Non Sq Epi: x / Bacteria: x            RADIOLOGY & ADDITIONAL STUDIES:

## 2024-02-07 NOTE — PROGRESS NOTE ADULT - PROBLEM SELECTOR PROBLEM 6
AF (atrial fibrillation)

## 2024-02-07 NOTE — PROGRESS NOTE ADULT - SUBJECTIVE AND OBJECTIVE BOX
Chief complaint  Patient is a 74y old  Female who presents with a chief complaint of  60-70 (06 Feb 2024 14:08)         Labs and Fingersticks  CAPILLARY BLOOD GLUCOSE      POCT Blood Glucose.: 135 mg/dL (07 Feb 2024 11:45)  POCT Blood Glucose.: 133 mg/dL (07 Feb 2024 08:16)  POCT Blood Glucose.: 136 mg/dL (06 Feb 2024 21:41)  POCT Blood Glucose.: 81 mg/dL (06 Feb 2024 16:41)      Anion Gap: 12 (02-07 @ 06:07)  Anion Gap: 14 (02-06 @ 09:12)      Calcium: 10.2 (02-07 @ 06:07)  Calcium: 10.3 (02-06 @ 09:12)          02-07    135  |  93<L>  |  51<H>  ----------------------------<  119<H>  4.6   |  30  |  2.72<H>    Ca    10.2      07 Feb 2024 06:07                          9.8    9.43  )-----------( 243      ( 07 Feb 2024 06:07 )             30.7     Medications  MEDICATIONS  (STANDING):  allopurinol 100 milliGRAM(s) Oral daily  aMIOdarone    Tablet   Oral   aMIOdarone    Tablet 200 milliGRAM(s) Oral daily  aspirin enteric coated 81 milliGRAM(s) Oral daily  atorvastatin 80 milliGRAM(s) Oral at bedtime  bisacodyl Suppository 10 milliGRAM(s) Rectal once  chlorhexidine 2% Cloths 1 Application(s) Topical daily  dextrose 5%. 1000 milliLiter(s) (50 mL/Hr) IV Continuous <Continuous>  dextrose 5%. 1000 milliLiter(s) (100 mL/Hr) IV Continuous <Continuous>  dextrose 50% Injectable 12.5 Gram(s) IV Push once  dextrose 50% Injectable 50 milliLiter(s) IV Push every 15 minutes  dextrose 50% Injectable 25 Gram(s) IV Push once  dextrose 50% Injectable 25 milliLiter(s) IV Push every 15 minutes  dextrose 50% Injectable 25 Gram(s) IV Push once  glucagon  Injectable 1 milliGRAM(s) IntraMuscular once  insulin glargine Injectable (LANTUS) 12 Unit(s) SubCutaneous at bedtime  insulin lispro (ADMELOG) corrective regimen sliding scale   SubCutaneous three times a day before meals  insulin lispro (ADMELOG) corrective regimen sliding scale   SubCutaneous at bedtime  insulin lispro Injectable (ADMELOG) 4 Unit(s) SubCutaneous three times a day before meals  lidocaine   4% Patch 1 Patch Transdermal daily  metoprolol tartrate 12.5 milliGRAM(s) Oral two times a day  pantoprazole    Tablet 40 milliGRAM(s) Oral before breakfast  polyethylene glycol 3350 17 Gram(s) Oral daily  senna 2 Tablet(s) Oral at bedtime  sodium chloride 0.9% lock flush 3 milliLiter(s) IV Push every 8 hours      Physical Exam  General: Patient comfortable in bed   Vital Signs Last 12 Hrs  T(F): 97.7 (02-07-24 @ 12:12), Max: 97.7 (02-07-24 @ 12:12)  HR: 60 (02-07-24 @ 12:12) (60 - 69)  BP: 133/64 (02-07-24 @ 12:12) (101/61 - 133/64)  BP(mean): 76 (02-07-24 @ 05:12) (76 - 76)  RR: 18 (02-07-24 @ 12:12) (16 - 18)  SpO2: 97% (02-07-24 @ 12:12) (97% - 98%)    CVS: S1S2   Respiratory: No wheezing, no crepitations  GI: Abdomen soft, bowel sounds positive  Musculoskeletal:  moves all extremities  : Voiding        Chief complaint  Patient is a 74y old  Female who presents with a chief complaint of  60-70 (06 Feb 2024 14:08)         Labs and Fingersticks  CAPILLARY BLOOD GLUCOSE      POCT Blood Glucose.: 135 mg/dL (07 Feb 2024 11:45)  POCT Blood Glucose.: 133 mg/dL (07 Feb 2024 08:16)  POCT Blood Glucose.: 136 mg/dL (06 Feb 2024 21:41)  POCT Blood Glucose.: 81 mg/dL (06 Feb 2024 16:41)      Anion Gap: 12 (02-07 @ 06:07)  Anion Gap: 14 (02-06 @ 09:12)      Calcium: 10.2 (02-07 @ 06:07)  Calcium: 10.3 (02-06 @ 09:12)          02-07    135  |  93<L>  |  51<H>  ----------------------------<  119<H>  4.6   |  30  |  2.72<H>    Ca    10.2      07 Feb 2024 06:07                          9.8    9.43  )-----------( 243      ( 07 Feb 2024 06:07 )             30.7     Medications  MEDICATIONS  (STANDING):  allopurinol 100 milliGRAM(s) Oral daily  aMIOdarone    Tablet   Oral   aMIOdarone    Tablet 200 milliGRAM(s) Oral daily  aspirin enteric coated 81 milliGRAM(s) Oral daily  atorvastatin 80 milliGRAM(s) Oral at bedtime  bisacodyl Suppository 10 milliGRAM(s) Rectal once  chlorhexidine 2% Cloths 1 Application(s) Topical daily  dextrose 5%. 1000 milliLiter(s) (50 mL/Hr) IV Continuous <Continuous>  dextrose 5%. 1000 milliLiter(s) (100 mL/Hr) IV Continuous <Continuous>  dextrose 50% Injectable 12.5 Gram(s) IV Push once  dextrose 50% Injectable 50 milliLiter(s) IV Push every 15 minutes  dextrose 50% Injectable 25 Gram(s) IV Push once  dextrose 50% Injectable 25 milliLiter(s) IV Push every 15 minutes  dextrose 50% Injectable 25 Gram(s) IV Push once  glucagon  Injectable 1 milliGRAM(s) IntraMuscular once  insulin glargine Injectable (LANTUS) 12 Unit(s) SubCutaneous at bedtime  insulin lispro (ADMELOG) corrective regimen sliding scale   SubCutaneous three times a day before meals  insulin lispro (ADMELOG) corrective regimen sliding scale   SubCutaneous at bedtime  insulin lispro Injectable (ADMELOG) 4 Unit(s) SubCutaneous three times a day before meals  lidocaine   4% Patch 1 Patch Transdermal daily  metoprolol tartrate 12.5 milliGRAM(s) Oral two times a day  pantoprazole    Tablet 40 milliGRAM(s) Oral before breakfast  polyethylene glycol 3350 17 Gram(s) Oral daily  senna 2 Tablet(s) Oral at bedtime  sodium chloride 0.9% lock flush 3 milliLiter(s) IV Push every 8 hours      Physical Exam  General: Patient comfortable in bed   Vital Signs Last 12 Hrs  T(F): 97.7 (02-07-24 @ 12:12), Max: 97.7 (02-07-24 @ 12:12)  HR: 60 (02-07-24 @ 12:12) (60 - 69)  BP: 133/64 (02-07-24 @ 12:12) (101/61 - 133/64)  BP(mean): 76 (02-07-24 @ 05:12) (76 - 76)  RR: 18 (02-07-24 @ 12:12) (16 - 18)  SpO2: 97% (02-07-24 @ 12:12) (97% - 98%)    CVS: S1S2   Respiratory: No wheezing, no crepitations  GI: Abdomen soft, bowel sounds positive  Musculoskeletal:  moves all extremities  : Voiding

## 2024-02-07 NOTE — PROGRESS NOTE ADULT - NS ATTEND AMEND GEN_ALL_CORE FT
73 year old female with PMH morbid obesity (BMI 42.7 s/p gastric sleeve 2015), DMT2, CAD s/p PCI of LCX (2012), LEIDY (on CPAP), CKD (stage 3 - on farxiga), valvular pAF (on Eliquis) and rheumatic mitral valve disease reports c/o chronic BRABOZA for over 10 years that has progressively worsened over the past few months. Pt now s/p scheduled mitral valve replacement, 1v CABG to LAD, maze procedure and left atrial appendage ligation with Dr. Dalal on 1/23/24. Pt with epicardial A and V wires  Regularized AF with junctional escape at 70 bpm on dobutamine post op day 5. Plan for PPM tomorrow.
s/p PPM - CXR and ECG reviewed, no abnormalities noted.    OSMAN Norman
Chart, labs, vitals, radiology reviewed. Above H&P reviewed and edited where appropriate. Agree with history and physical exam. Agree with assessment and plan. I reviewed the overnight course of events and discussed the care with the patient/ family.  All the decisions in assessment and plan are made by me
Chart, labs, vitals, radiology reviewed. Above H&P reviewed and edited where appropriate. Agree with history and physical exam. Agree with assessment and plan. I reviewed the overnight course of events and discussed the care with the patient/ family.  All the decisions in assessment and plan are made by me.
Chart, labs, vitals, radiology reviewed. Above H&P reviewed and edited where appropriate. Agree with history and physical exam. Agree with assessment and plan. I reviewed the overnight course of events and discussed the care with the patient/ family.  All the decisions in assessment and plan are made by me.
Chart, labs, vitals, radiology reviewed. Above H&P reviewed and edited where appropriate. Agree with history and physical exam. Agree with assessment and plan. I reviewed the overnight course of events and discussed the care with the patient/ family.  All the decisions in assessment and plan are made by me..
Chart, labs, vitals, radiology reviewed. Above H&P reviewed and edited where appropriate. Agree with history and physical exam. Agree with assessment and plan. I reviewed the overnight course of events and discussed the care with the patient/ family.  All the decisions in assessment and plan are made by me
Chart, labs, vitals, radiology reviewed. Above H&P reviewed and edited where appropriate. Agree with history and physical exam. Agree with assessment and plan. I reviewed the overnight course of events and discussed the care with the patient/ family.  All the decisions in assessment and plan are made by me.

## 2024-02-07 NOTE — PROGRESS NOTE ADULT - ASSESSMENT
73 year old female with PMH morbid obesity (BMI 42.7 s/p gastric sleeve 2015), DMT2, CAD s/p PCI of LCX (2012), LEIDY (on CPAP), CKD (stage 3 - on farxiga), AF (on Eliquis) and rheumatic mitral valve disease reports c/o chronic BARBOZA  1/23/24 SP Mitral valve replacement;  Hybrid Maze procedure Clipping, left atrial appendage and CABG, using 1 vein graft    CKD stage 3a and now worse           1 Renal - Creatinine @ baseline more in the 1.6-1.7 range;    Off diuretics   2 Endo-Farxiga and ARB are both held and to restarted as outpt   3 CVS-  Not on pressors ; BP acceptable;  Echo to assess effusion to be done today as she is still on oxygen despite thoracentesis   4 Pulm-Nasal canula ;   Bipap at night ;   CXR reviewed   5 EPS -SP PPM placement   6 Anemia-  hgb down  s/p Epogen 10000 x 1  2/3/24    >60 minutes spent on total encounter and more then 50% of the visit was spent counseling and/or coordinating care by the attending physician    Randal MCCARTHY     [facility]   [phone]

## 2024-02-07 NOTE — PROGRESS NOTE ADULT - PROBLEM SELECTOR PLAN 6
continue bb / amio  eliquis 2.5 bid resumed 2/3  pt currently paced continue bb / amio  eliquis 2.5 bid resume 2/8  pt currently paced

## 2024-02-07 NOTE — PROGRESS NOTE ADULT - ASSESSMENT
Assessment  DMT2: 73y Female with DM T2 with hyperglycemia, A1C 7% , was on oral meds at home, was using trulicity prior, now on basal bolus insulin with coverage, sugars are stable.  Will need tight glycemic control for postop wound healing.   CAD: on medications, stable, monitored. s/p CABG/ MVR   HTN: on antihypertensive medications, monitored, asymptomatic.  Obesity: No strict exercise routines, not on any weight loss program, neither on low calorie diet.      Discussed plan and management with Dr Chika Perry NP - TEAMS  Fay Farr MD  Cell: [phone]  Office: [phone]    Assessment  DMT2: 73y Female with DM T2 with hyperglycemia, A1C 7% , was on oral meds at home, was using trulicity prior, now on  basal bolus insulin with coverage, sugars are stable.  Will need tight glycemic control for postop wound healing.   CAD: on medications, stable, monitored. s/p CABG/ MVR   HTN: on antihypertensive medications, monitored, asymptomatic.  Obesity: No strict exercise routines, not on any weight loss program, neither on low calorie diet.      Discussed plan and management with Dr Chika Perry NP - TEAMS  Fay Farr MD  Cell: [phone]  Office: [phone]

## 2024-02-07 NOTE — PROGRESS NOTE ADULT - SUBJECTIVE AND OBJECTIVE BOX
VITAL SIGNS    Telemetry:    Vital Signs Last 24 Hrs  T(C): 36.3 (24 @ 05:12), Max: 36.6 (24 @ 12:12)  T(F): 97.3 (24 @ 05:12), Max: 97.9 (24 @ 12:12)  HR: 69 (24 @ 06:42) (60 - 69)  BP: 101/61 (24 @ 05:12) (101/61 - 115/62)  RR: 16 (24 @ 05:12) (16 - 18)  SpO2: 98% (24 @ 06:42) (94% - 98%)             @ 07:01  -   @ 07:00  --------------------------------------------------------  IN: 1020 mL / OUT: 1900 mL / NET: -880 mL     @ 07:01  -   @ 11:17  --------------------------------------------------------  IN: 120 mL / OUT: 250 mL / NET: -130 mL       Daily     Daily Weight in k.2 (2024 08:06)  Admit Wt: Drug Dosing Weight  Height (cm): 158.8 (2024 11:26)  Weight (kg): 108.9 (2024 11:26)  BMI (kg/m2): 43.2 (2024 11:26)  BSA (m2): 2.08 (2024 11:26)      CAPILLARY BLOOD GLUCOSE      POCT Blood Glucose.: 133 mg/dL (2024 08:16)  POCT Blood Glucose.: 136 mg/dL (2024 21:41)  POCT Blood Glucose.: 81 mg/dL (2024 16:41)  POCT Blood Glucose.: 169 mg/dL (2024 11:37)          LABS:     @ 07:  -   @ 07:00  --------------------------------------------------------  IN: 1020 mL / OUT: 1900 mL / NET: -880 mL     @ 07:01  -   @ 11:17  --------------------------------------------------------  IN: 120 mL / OUT: 250 mL / NET: -130 mL    cret                        9.8    9.43  )-----------( 243      ( 2024 06:07 )             30.7         135  |  93<L>  |  51<H>  ----------------------------<  119<H>  4.6   |  30  |  2.72<H>    Ca    10.2      2024 06:07          acetaminophen     Tablet .. 650 milliGRAM(s) Oral every 6 hours PRN  allopurinol 100 milliGRAM(s) Oral daily  aMIOdarone    Tablet 200 milliGRAM(s) Oral daily  aMIOdarone    Tablet   Oral   aspirin enteric coated 81 milliGRAM(s) Oral daily  atorvastatin 80 milliGRAM(s) Oral at bedtime  bisacodyl Suppository 10 milliGRAM(s) Rectal once  chlorhexidine 2% Cloths 1 Application(s) Topical daily  dextrose 5%. 1000 milliLiter(s) IV Continuous <Continuous>  dextrose 5%. 1000 milliLiter(s) IV Continuous <Continuous>  dextrose 50% Injectable 25 Gram(s) IV Push once  dextrose 50% Injectable 25 milliLiter(s) IV Push every 15 minutes  dextrose 50% Injectable 12.5 Gram(s) IV Push once  dextrose 50% Injectable 25 Gram(s) IV Push once  dextrose 50% Injectable 50 milliLiter(s) IV Push every 15 minutes  dextrose Oral Gel 15 Gram(s) Oral once PRN  glucagon  Injectable 1 milliGRAM(s) IntraMuscular once  insulin glargine Injectable (LANTUS) 12 Unit(s) SubCutaneous at bedtime  insulin lispro (ADMELOG) corrective regimen sliding scale   SubCutaneous at bedtime  insulin lispro (ADMELOG) corrective regimen sliding scale   SubCutaneous three times a day before meals  insulin lispro Injectable (ADMELOG) 4 Unit(s) SubCutaneous three times a day before meals  lidocaine   4% Patch 1 Patch Transdermal daily  metoprolol tartrate 12.5 milliGRAM(s) Oral two times a day  pantoprazole    Tablet 40 milliGRAM(s) Oral before breakfast  polyethylene glycol 3350 17 Gram(s) Oral daily  senna 2 Tablet(s) Oral at bedtime  simethicone 80 milliGRAM(s) Chew once  sodium chloride 0.9% lock flush 3 milliLiter(s) IV Push every 8 hours      PHYSICAL EXAM    Subjective: "Hi.   Neurology: alert and oriented x 3, nonfocal, no gross deficits  CV : tele:  RSR  Sternal Wound :  CDI with dressing , Stable  Lungs: clear. RR easy, unlabored   Abdomen: soft, nontender, nondistended, positive bowel sounds, bowel movement   Neg N/V/D   :  pt voiding without difficulty   Extremities:   BRAUN; edema, neg calf tenderness.   PPP bilaterally      PW:  Chest tubes:                 VITAL SIGNS    Telemetry:  AV paced @ 60  Vital Signs Last 24 Hrs  T(C): 36.3 (24 @ 05:12), Max: 36.6 (24 @ 12:12)  T(F): 97.3 (24 @ 05:12), Max: 97.9 (24 @ 12:12)  HR: 69 (24 @ 06:42) (60 - 69)  BP: 101/61 (24 @ 05:12) (101/61 - 115/62)  RR: 16 (24 @ 05:12) (16 - 18)  SpO2: 98% (24 @ 06:42) (94% - 98%)             07:01  -   @ 07:00  --------------------------------------------------------  IN: 1020 mL / OUT: 1900 mL / NET: -880 mL     07:01  -   @ 11:17  --------------------------------------------------------  IN: 120 mL / OUT: 250 mL / NET: -130 mL       Daily     Daily Weight in k.2 (2024 08:06)  Admit Wt: Drug Dosing Weight  Height (cm): 158.8 (2024 11:26)  Weight (kg): 108.9 (2024 11:26)  BMI (kg/m2): 43.2 (2024 11:26)  BSA (m2): 2.08 (2024 11:26)      CAPILLARY BLOOD GLUCOSE      POCT Blood Glucose.: 133 mg/dL (2024 08:16)  POCT Blood Glucose.: 136 mg/dL (2024 21:41)  POCT Blood Glucose.: 81 mg/dL (2024 16:41)  POCT Blood Glucose.: 169 mg/dL (2024 11:37)          LABS:     @ 07:01  -   @ 07:00  --------------------------------------------------------  IN: 1020 mL / OUT: 1900 mL / NET: -880 mL     @ 07:01  -   @ 11:17  --------------------------------------------------------  IN: 120 mL / OUT: 250 mL / NET: -130 mL    cret                        9.8    9.43  )-----------( 243      ( 2024 06:07 )             30.7     02    135  |  93<L>  |  51<H>  ----------------------------<  119<H>  4.6   |  30  |  2.72<H>    Ca    10.2      2024 06:07          acetaminophen     Tablet .. 650 milliGRAM(s) Oral every 6 hours PRN  allopurinol 100 milliGRAM(s) Oral daily  aMIOdarone    Tablet 200 milliGRAM(s) Oral daily  aMIOdarone    Tablet   Oral   aspirin enteric coated 81 milliGRAM(s) Oral daily  atorvastatin 80 milliGRAM(s) Oral at bedtime  bisacodyl Suppository 10 milliGRAM(s) Rectal once  chlorhexidine 2% Cloths 1 Application(s) Topical daily  dextrose 5%. 1000 milliLiter(s) IV Continuous <Continuous>  dextrose 5%. 1000 milliLiter(s) IV Continuous <Continuous>  dextrose 50% Injectable 25 Gram(s) IV Push once  dextrose 50% Injectable 25 milliLiter(s) IV Push every 15 minutes  dextrose 50% Injectable 12.5 Gram(s) IV Push once  dextrose 50% Injectable 25 Gram(s) IV Push once  dextrose 50% Injectable 50 milliLiter(s) IV Push every 15 minutes  dextrose Oral Gel 15 Gram(s) Oral once PRN  glucagon  Injectable 1 milliGRAM(s) IntraMuscular once  insulin glargine Injectable (LANTUS) 12 Unit(s) SubCutaneous at bedtime  insulin lispro (ADMELOG) corrective regimen sliding scale   SubCutaneous at bedtime  insulin lispro (ADMELOG) corrective regimen sliding scale   SubCutaneous three times a day before meals  insulin lispro Injectable (ADMELOG) 4 Unit(s) SubCutaneous three times a day before meals  lidocaine   4% Patch 1 Patch Transdermal daily  metoprolol tartrate 12.5 milliGRAM(s) Oral two times a day  pantoprazole    Tablet 40 milliGRAM(s) Oral before breakfast  polyethylene glycol 3350 17 Gram(s) Oral daily  senna 2 Tablet(s) Oral at bedtime  simethicone 80 milliGRAM(s) Chew once  sodium chloride 0.9% lock flush 3 milliLiter(s) IV Push every 8 hours      PHYSICAL EXAM    Subjective: "The tube is coming out?"   Neurology: alert and oriented x 3, nonfocal, no gross deficits  CV : tele: AV paced @ 60  PPM site cdi donnie   Sternal Wound :  CDI DONNIE- sternum stabble   Lungs: clear diminished at the bases;  RR easy, unlabored   Abdomen: soft, nontender, nondistended, positive bowel sounds, + bowel movement   Neg N/V/D; obese abdomen    :  pt voiding without difficulty   Extremities:   BRAUN; trace LE edema, neg calf tenderness.   PPP bilaterally; Left svg site cdi donnie         PW: no  Chest tubes: rt pigtail d/c this am

## 2024-02-07 NOTE — PROGRESS NOTE ADULT - PROBLEM SELECTOR PROBLEM 5
Cardiac pacemaker

## 2024-02-08 ENCOUNTER — NON-APPOINTMENT (OUTPATIENT)
Age: 74
End: 2024-02-08

## 2024-02-08 ENCOUNTER — TRANSCRIPTION ENCOUNTER (OUTPATIENT)
Age: 74
End: 2024-02-08

## 2024-02-08 VITALS — WEIGHT: 224.43 LBS

## 2024-02-08 LAB
ANION GAP SERPL CALC-SCNC: 12 MMOL/L — SIGNIFICANT CHANGE UP (ref 5–17)
BUN SERPL-MCNC: 51 MG/DL — HIGH (ref 7–23)
CALCIUM SERPL-MCNC: 10 MG/DL — SIGNIFICANT CHANGE UP (ref 8.4–10.5)
CHLORIDE SERPL-SCNC: 93 MMOL/L — LOW (ref 96–108)
CO2 SERPL-SCNC: 29 MMOL/L — SIGNIFICANT CHANGE UP (ref 22–31)
CREAT SERPL-MCNC: 2.68 MG/DL — HIGH (ref 0.5–1.3)
EGFR: 18 ML/MIN/1.73M2 — LOW
GLUCOSE SERPL-MCNC: 96 MG/DL — SIGNIFICANT CHANGE UP (ref 70–99)
HCT VFR BLD CALC: 29.7 % — LOW (ref 34.5–45)
HGB BLD-MCNC: 9.4 G/DL — LOW (ref 11.5–15.5)
MCHC RBC-ENTMCNC: 30.5 PG — SIGNIFICANT CHANGE UP (ref 27–34)
MCHC RBC-ENTMCNC: 31.6 GM/DL — LOW (ref 32–36)
MCV RBC AUTO: 96.4 FL — SIGNIFICANT CHANGE UP (ref 80–100)
NRBC # BLD: 0 /100 WBCS — SIGNIFICANT CHANGE UP (ref 0–0)
PLATELET # BLD AUTO: 241 K/UL — SIGNIFICANT CHANGE UP (ref 150–400)
POTASSIUM SERPL-MCNC: 4 MMOL/L — SIGNIFICANT CHANGE UP (ref 3.5–5.3)
POTASSIUM SERPL-SCNC: 4 MMOL/L — SIGNIFICANT CHANGE UP (ref 3.5–5.3)
RBC # BLD: 3.08 M/UL — LOW (ref 3.8–5.2)
RBC # FLD: 15.3 % — HIGH (ref 10.3–14.5)
SODIUM SERPL-SCNC: 134 MMOL/L — LOW (ref 135–145)
WBC # BLD: 8.09 K/UL — SIGNIFICANT CHANGE UP (ref 3.8–10.5)
WBC # FLD AUTO: 8.09 K/UL — SIGNIFICANT CHANGE UP (ref 3.8–10.5)

## 2024-02-08 PROCEDURE — 71046 X-RAY EXAM CHEST 2 VIEWS: CPT

## 2024-02-08 PROCEDURE — 86900 BLOOD TYPING SEROLOGIC ABO: CPT

## 2024-02-08 PROCEDURE — 82550 ASSAY OF CK (CPK): CPT

## 2024-02-08 PROCEDURE — 86850 RBC ANTIBODY SCREEN: CPT

## 2024-02-08 PROCEDURE — 84132 ASSAY OF SERUM POTASSIUM: CPT

## 2024-02-08 PROCEDURE — 82310 ASSAY OF CALCIUM: CPT

## 2024-02-08 PROCEDURE — 82803 BLOOD GASES ANY COMBINATION: CPT

## 2024-02-08 PROCEDURE — 82553 CREATINE MB FRACTION: CPT

## 2024-02-08 PROCEDURE — 97110 THERAPEUTIC EXERCISES: CPT

## 2024-02-08 PROCEDURE — C1751: CPT

## 2024-02-08 PROCEDURE — C1887: CPT

## 2024-02-08 PROCEDURE — 86891 AUTOLOGOUS BLOOD OP SALVAGE: CPT

## 2024-02-08 PROCEDURE — 94660 CPAP INITIATION&MGMT: CPT

## 2024-02-08 PROCEDURE — 83605 ASSAY OF LACTIC ACID: CPT

## 2024-02-08 PROCEDURE — 97165 OT EVAL LOW COMPLEX 30 MIN: CPT

## 2024-02-08 PROCEDURE — 85520 HEPARIN ASSAY: CPT

## 2024-02-08 PROCEDURE — 83735 ASSAY OF MAGNESIUM: CPT

## 2024-02-08 PROCEDURE — 86923 COMPATIBILITY TEST ELECTRIC: CPT

## 2024-02-08 PROCEDURE — 84100 ASSAY OF PHOSPHORUS: CPT

## 2024-02-08 PROCEDURE — 97116 GAIT TRAINING THERAPY: CPT

## 2024-02-08 PROCEDURE — 94002 VENT MGMT INPAT INIT DAY: CPT

## 2024-02-08 PROCEDURE — 93005 ELECTROCARDIOGRAM TRACING: CPT

## 2024-02-08 PROCEDURE — 36430 TRANSFUSION BLD/BLD COMPNT: CPT

## 2024-02-08 PROCEDURE — C8929: CPT

## 2024-02-08 PROCEDURE — 71045 X-RAY EXAM CHEST 1 VIEW: CPT

## 2024-02-08 PROCEDURE — P9037: CPT

## 2024-02-08 PROCEDURE — C1886: CPT

## 2024-02-08 PROCEDURE — 85025 COMPLETE CBC W/AUTO DIFF WBC: CPT

## 2024-02-08 PROCEDURE — 86965 POOLING BLOOD PLATELETS: CPT

## 2024-02-08 PROCEDURE — 85610 PROTHROMBIN TIME: CPT

## 2024-02-08 PROCEDURE — 88305 TISSUE EXAM BY PATHOLOGIST: CPT

## 2024-02-08 PROCEDURE — 83970 ASSAY OF PARATHORMONE: CPT

## 2024-02-08 PROCEDURE — 82565 ASSAY OF CREATININE: CPT

## 2024-02-08 PROCEDURE — C1889: CPT

## 2024-02-08 PROCEDURE — 85027 COMPLETE CBC AUTOMATED: CPT

## 2024-02-08 PROCEDURE — P9016: CPT

## 2024-02-08 PROCEDURE — 85049 AUTOMATED PLATELET COUNT: CPT

## 2024-02-08 PROCEDURE — 93321 DOPPLER ECHO F-UP/LMTD STD: CPT

## 2024-02-08 PROCEDURE — C1785: CPT

## 2024-02-08 PROCEDURE — 84484 ASSAY OF TROPONIN QUANT: CPT

## 2024-02-08 PROCEDURE — 82435 ASSAY OF BLOOD CHLORIDE: CPT

## 2024-02-08 PROCEDURE — 93308 TTE F-UP OR LMTD: CPT

## 2024-02-08 PROCEDURE — C1769: CPT

## 2024-02-08 PROCEDURE — 85384 FIBRINOGEN ACTIVITY: CPT

## 2024-02-08 PROCEDURE — 86901 BLOOD TYPING SEROLOGIC RH(D): CPT

## 2024-02-08 PROCEDURE — 93010 ELECTROCARDIOGRAM REPORT: CPT

## 2024-02-08 PROCEDURE — P9045: CPT

## 2024-02-08 PROCEDURE — 84443 ASSAY THYROID STIM HORMONE: CPT

## 2024-02-08 PROCEDURE — P9012: CPT

## 2024-02-08 PROCEDURE — 85396 CLOTTING ASSAY WHOLE BLOOD: CPT

## 2024-02-08 PROCEDURE — 33208 INSRT HEART PM ATRIAL & VENT: CPT | Mod: KX

## 2024-02-08 PROCEDURE — 82330 ASSAY OF CALCIUM: CPT

## 2024-02-08 PROCEDURE — C1898: CPT

## 2024-02-08 PROCEDURE — 88304 TISSUE EXAM BY PATHOLOGIST: CPT

## 2024-02-08 PROCEDURE — C1892: CPT

## 2024-02-08 PROCEDURE — 84295 ASSAY OF SERUM SODIUM: CPT

## 2024-02-08 PROCEDURE — P9100: CPT

## 2024-02-08 PROCEDURE — 85730 THROMBOPLASTIN TIME PARTIAL: CPT

## 2024-02-08 PROCEDURE — 36415 COLL VENOUS BLD VENIPUNCTURE: CPT

## 2024-02-08 PROCEDURE — 85014 HEMATOCRIT: CPT

## 2024-02-08 PROCEDURE — 80202 ASSAY OF VANCOMYCIN: CPT

## 2024-02-08 PROCEDURE — 80048 BASIC METABOLIC PNL TOTAL CA: CPT

## 2024-02-08 PROCEDURE — 82962 GLUCOSE BLOOD TEST: CPT

## 2024-02-08 PROCEDURE — 97535 SELF CARE MNGMENT TRAINING: CPT

## 2024-02-08 PROCEDURE — 85018 HEMOGLOBIN: CPT

## 2024-02-08 PROCEDURE — 82947 ASSAY GLUCOSE BLOOD QUANT: CPT

## 2024-02-08 PROCEDURE — 80053 COMPREHEN METABOLIC PANEL: CPT

## 2024-02-08 PROCEDURE — 97530 THERAPEUTIC ACTIVITIES: CPT

## 2024-02-08 RX ORDER — ROSUVASTATIN CALCIUM 5 MG/1
1 TABLET ORAL
Refills: 0 | DISCHARGE

## 2024-02-08 RX ORDER — APIXABAN 2.5 MG/1
1 TABLET, FILM COATED ORAL
Qty: 60 | Refills: 0
Start: 2024-02-08 | End: 2024-03-08

## 2024-02-08 RX ORDER — ERYTHROPOIETIN 10000 [IU]/ML
10000 INJECTION, SOLUTION INTRAVENOUS; SUBCUTANEOUS ONCE
Refills: 0 | Status: DISCONTINUED | OUTPATIENT
Start: 2024-02-08 | End: 2024-02-08

## 2024-02-08 RX ORDER — METOPROLOL TARTRATE 50 MG
0.5 TABLET ORAL
Qty: 30 | Refills: 0
Start: 2024-02-08 | End: 2024-03-08

## 2024-02-08 RX ORDER — ASPIRIN/CALCIUM CARB/MAGNESIUM 324 MG
1 TABLET ORAL
Qty: 30 | Refills: 0
Start: 2024-02-08 | End: 2024-03-08

## 2024-02-08 RX ORDER — METOPROLOL TARTRATE 50 MG
1 TABLET ORAL
Refills: 0 | DISCHARGE

## 2024-02-08 RX ORDER — SENNA PLUS 8.6 MG/1
2 TABLET ORAL
Qty: 20 | Refills: 0
Start: 2024-02-08 | End: 2024-02-17

## 2024-02-08 RX ORDER — AMIODARONE HYDROCHLORIDE 400 MG/1
1 TABLET ORAL
Qty: 30 | Refills: 0
Start: 2024-02-08 | End: 2024-03-08

## 2024-02-08 RX ORDER — APIXABAN 2.5 MG/1
1 TABLET, FILM COATED ORAL
Qty: 0 | Refills: 0 | DISCHARGE

## 2024-02-08 RX ORDER — SITAGLIPTIN 50 MG/1
1 TABLET, FILM COATED ORAL
Refills: 0 | DISCHARGE

## 2024-02-08 RX ORDER — ATORVASTATIN CALCIUM 80 MG/1
1 TABLET, FILM COATED ORAL
Qty: 30 | Refills: 0
Start: 2024-02-08 | End: 2024-03-08

## 2024-02-08 RX ORDER — GLIMEPIRIDE 1 MG
1 TABLET ORAL
Qty: 30 | Refills: 0
Start: 2024-02-08 | End: 2024-03-08

## 2024-02-08 RX ORDER — ACETAMINOPHEN 500 MG
650 TABLET ORAL
Qty: 0 | Refills: 0 | DISCHARGE
Start: 2024-02-08

## 2024-02-08 RX ORDER — FUROSEMIDE 40 MG
1 TABLET ORAL
Refills: 0 | DISCHARGE

## 2024-02-08 RX ORDER — SITAGLIPTIN 50 MG/1
1 TABLET, FILM COATED ORAL
Qty: 30 | Refills: 0
Start: 2024-02-08 | End: 2024-03-08

## 2024-02-08 RX ADMIN — SODIUM CHLORIDE 3 MILLILITER(S): 9 INJECTION INTRAMUSCULAR; INTRAVENOUS; SUBCUTANEOUS at 14:04

## 2024-02-08 RX ADMIN — Medication 4 UNIT(S): at 08:02

## 2024-02-08 RX ADMIN — SIMETHICONE 80 MILLIGRAM(S): 80 TABLET, CHEWABLE ORAL at 14:04

## 2024-02-08 RX ADMIN — Medication 100 MILLIGRAM(S): at 12:04

## 2024-02-08 RX ADMIN — Medication 12.5 MILLIGRAM(S): at 05:31

## 2024-02-08 RX ADMIN — SODIUM CHLORIDE 3 MILLILITER(S): 9 INJECTION INTRAMUSCULAR; INTRAVENOUS; SUBCUTANEOUS at 05:32

## 2024-02-08 RX ADMIN — PANTOPRAZOLE SODIUM 40 MILLIGRAM(S): 20 TABLET, DELAYED RELEASE ORAL at 05:31

## 2024-02-08 RX ADMIN — Medication 4 UNIT(S): at 12:04

## 2024-02-08 RX ADMIN — AMIODARONE HYDROCHLORIDE 200 MILLIGRAM(S): 400 TABLET ORAL at 05:31

## 2024-02-08 RX ADMIN — APIXABAN 2.5 MILLIGRAM(S): 2.5 TABLET, FILM COATED ORAL at 05:31

## 2024-02-08 RX ADMIN — POLYETHYLENE GLYCOL 3350 17 GRAM(S): 17 POWDER, FOR SOLUTION ORAL at 12:05

## 2024-02-08 RX ADMIN — Medication 81 MILLIGRAM(S): at 12:04

## 2024-02-08 NOTE — DISCHARGE NOTE PROVIDER - NSDCPNSUBOBJ_GEN_ALL_CORE
Vital Signs Last 24 Hrs  T(C): 36.7 (08 Feb 2024 04:24), Max: 36.9 (07 Feb 2024 20:18)  T(F): 98.1 (08 Feb 2024 04:24), Max: 98.4 (07 Feb 2024 20:18)  HR: 60 (08 Feb 2024 08:35) (60 - 92)  BP: 105/64 (08 Feb 2024 04:24) (105/64 - 133/64)  BP(mean): 78 (08 Feb 2024 04:24) (78 - 85)  RR: 18 (08 Feb 2024 04:24) (17 - 18)  SpO2: 92% (08 Feb 2024 08:48) (92% - 99%)  room air      PHYSICAL EXAM  Neurology: A&Ox3, NAD  CV : RRR+S1S2  Sternal Wound: MSI CDI , Stable  Lungs: Respirations non-labored, B/L BS  Abdomen: Soft, NT/ND, +BSx4Q, last BM   (-)N/V/D  : Voiding without difficulty  Extremities: B/L LE edema, negative calf tenderness, +PP , SVG incision        45 minutes face to face spent on total encounter, patient counseling and discharge instructions. Vital Signs Last 24 Hrs  T(C): 36.7 (08 Feb 2024 04:24), Max: 36.9 (07 Feb 2024 20:18)  T(F): 98.1 (08 Feb 2024 04:24), Max: 98.4 (07 Feb 2024 20:18)  HR: 60 (08 Feb 2024 08:35) (60 - 92)  BP: 105/64 (08 Feb 2024 04:24) (105/64 - 133/64)  BP(mean): 78 (08 Feb 2024 04:24) (78 - 85)  RR: 18 (08 Feb 2024 04:24) (17 - 18)  SpO2: 92% (08 Feb 2024 08:48) (92% - 99%)  room air      PHYSICAL EXAM  Neurology: A&Ox3, NAD  CV : RRR+S1S2  Sternal Wound: MSI CDI DEVIN +surgical bra, Stable   +Left ACW incision CDI DEVIN  Lungs: Respirations non-labored, B/L BS CTA  Abdomen: Soft, NT/ND, +BSx4Q, last BM 2/8 (-)N/V/D  : Voiding without difficulty  Extremities: B/L LE trace edema, negative calf tenderness, +PP, +LLE SVG incision CDI DEVIN        45 minutes face to face spent on total encounter, patient counseling and discharge instructions.

## 2024-02-08 NOTE — DISCHARGE NOTE NURSING/CASE MANAGEMENT/SOCIAL WORK - NSSCNAMETXT_GEN_ALL_CORE
[facility] (formerly [facility])

## 2024-02-08 NOTE — PROGRESS NOTE ADULT - ASSESSMENT
Assessment  DMT2: 73y Female with DM T2 with hyperglycemia, A1C 7% , was on oral meds at home, was using trulicity prior, now on  basal bolus insulin with coverage, sugars are  trending within acceptable range.  Will need tight glycemic control for postop wound healing.   CAD: on medications, stable, monitored. s/p CABG/ MVR   HTN: on antihypertensive medications, monitored, asymptomatic.  Obesity: No strict exercise routines, not on any weight loss program, neither on low calorie diet.        Fay Farr MD  Cell: [phone]  Office: [phone]

## 2024-02-08 NOTE — DISCHARGE NOTE PROVIDER - HOSPITAL COURSE
73F w/ PMH morbid obesity (BMI 42.7 s/p gastric sleeve ), DMT2, CAD s/p PCI of LCX (), LEIDY (on CPAP), CKD (stage 3 - on farxiga), AF (on Eliquis) and rheumatic mitral valve disease reports c/o chronic BARBOZA for over 10 years that has progressively worsened over the past few months with the feeling of an "elephant on my chest." Her symptoms improved after starting Lasix daily. She gets dizzy when she bends over or when she stands up too fast. She denies CP or weight gain. She sleeps with 3 pillows on an incline at night. She cannot lay flat due to comfort as well as orthopnea. She can only walk one block before she has to stop to rest due to SOB. Pt now presents to [facility] for scheduled mitral valve replacement, maze procedure and left atrial appendage ligation with Dr. Dalal on 24.  s/p  MVR (t)/ C1V/ Maze/ RICHARD clip  post extubated / inotropic support  EP consulted for underlying junctional rhythm; no av nodals at this time; + epicardial pw; maintain    tx sdu; VSS; afib 60-70 on  2.5; maintain med ct's; diuresis as per renal; endo consulted for diabetic management  keep npo after midnight sat into sun for possible ppm as per EP; no av nodals at this time  24 VSS on  2.5 EP following for ?ppm   VSS NPO for PPM for aflutter w/ chb  - s/p PPM placement; tx floor   VSS: AV Paced 60-80; d/c  as per Dr. Dalal; bun/cr increased today --> d/c diuretics and ck echo as per Dr. Dalal  pa/lat chest xray s/p PPM ; d/w pw   wean O2 as tolerated; sugical bra    VSS s/p ppm site cdi diuretics d/c'd yesterday- rounds made w/ Dr. Dalal - creatinine down to 1.7 - torsemide 20mg po qd & aldactone 50 bid resumed Dr. Nieves, renal following pt. discharge planning- home when stable Thursday  1/31: VSS; Cr coming down, 1.62 today, negative 1.8L, renal following, CXR PA/LAT today    VSS diuresing  b/l pl effusions  ambulate d/c planning home - ween O2   VSS; A paced; V.paced; bun/cr 38/2.1; renal following; diuretics decreased to qd; ace wrap LE ; wean O2 as tolerated; ck chest xray   2/3 VSS   60's.  -600cc/24hrs.  bun/cr 38/2.15 ( will dw renal ).  CXR stable b/l effusions.  Resume eliquis   VSS; AV paced; eliquis for AC for mvr and hx afib; diuresis; pulm toilet; increase activity; wean O2   discharge planning- home pt this week when bun/ cr stable   : VSS, D/C diuretics per Dr. Dalal, Cr 2.68    Right pleural effusion confirmed with bedside sono. Right pigtail placement yielding 400ml serosanguinous fluid. CXR negative for ptx.  Continue to hold eliquis. JORDIN worsening  creat 2.84 on torsemide. negative 1270 fluid balance   VSS; AV Paced @ 60; d/c rt pigtail this am; ck f/u chest xray; resume eliquis in am; bun/cr stable 51/1.9; continue to monitor off diuretics; ck echo as per renal; TTE no pericardial effusion, wean O2 as tolerated; ck RA sat; narcs d/c secondary to n/v.    VSS, 93% on RA, BUN/Cr 51/2.68 stable, pt cleared for discharge home today per Dr. Dalal.

## 2024-02-08 NOTE — PROGRESS NOTE ADULT - PROVIDER SPECIALTY LIST ADULT
Critical Care
Electrophysiology
Nephrology
Critical Care
Electrophysiology
Endocrinology
Endocrinology
Nephrology
CT Surgery
Electrophysiology
Endocrinology
Nephrology
Endocrinology
CT Surgery
Endocrinology
Endocrinology
CT Surgery
Endocrinology
CT Surgery
CT Surgery
Endocrinology
CT Surgery
Endocrinology
CT Surgery
CT Surgery
Endocrinology
Endocrinology
CT Surgery
CT Surgery

## 2024-02-08 NOTE — PROGRESS NOTE ADULT - PROBLEM SELECTOR PROBLEM 1
Type 2 diabetes mellitus
Chronic kidney disease (CKD)
Type 2 diabetes mellitus
Type 2 diabetes mellitus
Chronic kidney disease (CKD)
Chronic kidney disease (CKD)
Type 2 diabetes mellitus
Chronic kidney disease (CKD)
Type 2 diabetes mellitus
Chronic kidney disease (CKD)
Type 2 diabetes mellitus
Chronic kidney disease (CKD)
Type 2 diabetes mellitus
Chronic kidney disease (CKD)

## 2024-02-08 NOTE — DISCHARGE NOTE PROVIDER - CARE PROVIDERS DIRECT ADDRESSES
[email or web address]

## 2024-02-08 NOTE — DISCHARGE NOTE PROVIDER - NSDCFUADDAPPT_GEN_ALL_CORE_FT
Follow up with Dr. hsieh at CTS office at [facility], call [phone] to confirm appointment.  Follow up with your Cardiologist in 1-2 weeks, please call the office to schedule an appointment.  Follow up with your PCP in 1-2 weeks, call the office to schedule an appointment.  Follow up with Dr. Dalal on Friday 2/16 at 9:30am in CTS office at [facility], call [phone] to confirm appointment.  Follow up in EP clinic at [facility] for pacemaker wound check on Friday 2/9/24 at 9:40am.   Follow up with your nephrologist, Dr. Nieves in 1 week, call the office to schedule an appointment.   Follow up with your endocrinologist, Dr. Farr in 2 weeks, call the office to schedule an appointment.   Follow up with your Cardiologist, Dr. Cheatham in 1-2 weeks, please call the office to schedule an appointment.  Follow up with your PCP, Dr. Ferrari in 1-2 weeks, call the office to schedule an appointment.

## 2024-02-08 NOTE — PROGRESS NOTE ADULT - SUBJECTIVE AND OBJECTIVE BOX
Chief complaint    Patient is a 74y old  Female who presents with a chief complaint of  60-70 (06 Feb 2024 14:08)   Review of systems  Patient appears comfortable.    Labs and Fingersticks  CAPILLARY BLOOD GLUCOSE      POCT Blood Glucose.: 116 mg/dL (08 Feb 2024 07:51)  POCT Blood Glucose.: 149 mg/dL (07 Feb 2024 21:40)  POCT Blood Glucose.: 129 mg/dL (07 Feb 2024 16:26)  POCT Blood Glucose.: 135 mg/dL (07 Feb 2024 11:45)  POCT Blood Glucose.: 133 mg/dL (07 Feb 2024 08:16)      Anion Gap: 12 (02-08 @ 05:47)  Anion Gap: 12 (02-07 @ 06:07)  Anion Gap: 14 (02-06 @ 09:12)      Calcium: 10.0 (02-08 @ 05:47)  Calcium: 10.2 (02-07 @ 06:07)  Calcium: 10.3 (02-06 @ 09:12)          02-08    134<L>  |  93<L>  |  51<H>  ----------------------------<  96  4.0   |  29  |  2.68<H>    Ca    10.0      08 Feb 2024 05:47                          9.4    8.09  )-----------( 241      ( 08 Feb 2024 05:47 )             29.7     Medications  MEDICATIONS  (STANDING):  allopurinol 100 milliGRAM(s) Oral daily  aMIOdarone    Tablet   Oral   aMIOdarone    Tablet 200 milliGRAM(s) Oral daily  apixaban 2.5 milliGRAM(s) Oral two times a day  aspirin enteric coated 81 milliGRAM(s) Oral daily  atorvastatin 80 milliGRAM(s) Oral at bedtime  bisacodyl Suppository 10 milliGRAM(s) Rectal once  chlorhexidine 2% Cloths 1 Application(s) Topical daily  dextrose 5%. 1000 milliLiter(s) (100 mL/Hr) IV Continuous <Continuous>  dextrose 5%. 1000 milliLiter(s) (50 mL/Hr) IV Continuous <Continuous>  dextrose 50% Injectable 25 milliLiter(s) IV Push every 15 minutes  dextrose 50% Injectable 12.5 Gram(s) IV Push once  dextrose 50% Injectable 50 milliLiter(s) IV Push every 15 minutes  dextrose 50% Injectable 25 Gram(s) IV Push once  dextrose 50% Injectable 25 Gram(s) IV Push once  epoetin eve (EPOGEN) Injectable 00924 Unit(s) SubCutaneous once  glucagon  Injectable 1 milliGRAM(s) IntraMuscular once  insulin glargine Injectable (LANTUS) 12 Unit(s) SubCutaneous at bedtime  insulin lispro (ADMELOG) corrective regimen sliding scale   SubCutaneous at bedtime  insulin lispro (ADMELOG) corrective regimen sliding scale   SubCutaneous three times a day before meals  insulin lispro Injectable (ADMELOG) 4 Unit(s) SubCutaneous three times a day before meals  lidocaine   4% Patch 1 Patch Transdermal daily  metoprolol tartrate 12.5 milliGRAM(s) Oral two times a day  ondansetron Injectable 4 milliGRAM(s) IV Push once  pantoprazole    Tablet 40 milliGRAM(s) Oral before breakfast  polyethylene glycol 3350 17 Gram(s) Oral daily  senna 2 Tablet(s) Oral at bedtime  sodium chloride 0.9% lock flush 3 milliLiter(s) IV Push every 8 hours      Physical Exam  General: Patient appears comfortable.  Vital Signs Last 12 Hrs  T(F): 98.1 (02-08-24 @ 04:24), Max: 98.4 (02-07-24 @ 20:18)  HR: 92 (02-08-24 @ 06:26) (60 - 92)  BP: 105/64 (02-08-24 @ 04:24) (105/64 - 115/61)  BP(mean): 78 (02-08-24 @ 04:24) (78 - 79)  RR: 18 (02-08-24 @ 04:24) (17 - 18)  SpO2: 99% (02-08-24 @ 06:26) (95% - 99%)  Neck: No palpable thyroid nodules.  CVS: S1S2, No murmurs  Respiratory: No wheezing, no crepitations  GI: Abdomen soft, non tender.    Diagnostics    Parathyroid Hormone Intact, Serum: AM Sched. Collection: 05-Feb-2024 06:00 (02-04 @ 08:15)  Parathyroid Hormone Intact, Serum: AM Sched. Collection: 03-Feb-2024 06:00 (02-02 @ 10:37)      Radiology:

## 2024-02-08 NOTE — DISCHARGE NOTE PROVIDER - PROVIDER TOKENS
PROVIDER:[TOKEN:[[id number]],SCHEDULEDAPPT:[02/16/2024],SCHEDULEDAPPTTIME:[09:30 AM]],PROVIDER:[TOKEN:[[id number]],FOLLOWUP:[1 week]],PROVIDER:[TOKEN:[[id number]],FOLLOWUP:[2 weeks]],PROVIDER:[TOKEN:[[id number]],SCHEDULEDAPPT:[02/09/2024],SCHEDULEDAPPTTIME:[09:40 AM]]

## 2024-02-08 NOTE — DISCHARGE NOTE PROVIDER - NSDCCPCAREPLAN_GEN_ALL_CORE_FT
PRINCIPAL DISCHARGE DIAGNOSIS  Diagnosis: S/P MVR (mitral valve replacement)  Assessment and Plan of Treatment: 1. Daily Shower  2. Weight yourself daily.  3. Diabetic diet - low fat, low cholesterol, no added salt.  4. Cleanse Midsternal incision and leg incision daily while showering with warm water and mild soap, pat dry and maintain open to air.   5. Follow Cardiac Surgery Do's and Don'ts discharge instructions.   6. Increase Activity as tolerated.  7. Be sure to let your dentist/physician know about your valve and assess the need for prophylactic antibiotics before any invasive procedures

## 2024-02-08 NOTE — PROGRESS NOTE ADULT - PROBLEM SELECTOR PROBLEM 3
AF (atrial fibrillation)
S/P CABG x 1
S/P CABG x 1
AF (atrial fibrillation)
S/P CABG x 1
AF (atrial fibrillation)
S/P CABG x 1
AF (atrial fibrillation)
S/P CABG x 1
AF (atrial fibrillation)
S/P CABG x 1

## 2024-02-08 NOTE — PROGRESS NOTE ADULT - ASSESSMENT
73 year old female with PMH morbid obesity (BMI 42.7 s/p gastric sleeve 2015), DMT2, CAD s/p PCI of LCX (2012), LEIDY (on CPAP), CKD (stage 3 - on farxiga), AF (on Eliquis) and rheumatic mitral valve disease reports c/o chronic BARBOZA  1/23/24 SP Mitral valve replacement;  Hybrid Maze procedure Clipping, left atrial appendage and CABG, using 1 vein graft    CKD stage 3a and now worse           1 Renal - Creatinine @ baseline more in the 1.6-1.7 range;    Off diuretics and the renal parameters are improving   2 Endo-Farxiga and ARB are both held and to restarted as outpt   3 CVS-  Not on pressors ; BP acceptable;  Limited Echo without  effusion    4 Pulm-Nasal canula ;   Bipap at night ;  5 EPS -SP PPM placement   6 Anemia-  hgb down   Epogen 10000 x 1  today        [facility]   [facility]   [phone]

## 2024-02-08 NOTE — PROGRESS NOTE ADULT - PROBLEM SELECTOR PLAN 2
Endo consulted/; A1C 7.0  accuchecks ac and hs  lantus and admelog as per endo recs  dm diet  accuchecks ac and hs
On medications,  no chest pain, stable, monitored and followed up by primary cardiothoracic team/cardiology team
Endo consulted/; A1C 7.0  accuchecks ac and hs  lantus and admelog as per endo recs  dm diet  accuchecks ac and hs
On medications,  no chest pain, stable, monitored and followed up by primary cardiothoracic team/cardiology team
Endo consulted/; A1C 7.0  accuchecks ac and hs  lantus and admelog as per endo recs  dm diet  accuchecks ac and hs
Endo consulted/; A1C 7.0  accuchecks ac and hs  lantus and admelog as per endo recs  dm diet  accuchecks ac and hs
On medications,  no chest pain, stable, monitored and followed up by primary cardiothoracic team/cardiology team
Endo consulted/; A1C 7.0  accuchecks ac and hs  lantus and admelog as per endo recs  dm diet  accuchecks ac and hs
Endo consulted/; A1C 7.0  accuchecks ac and hs  lantus and admelog as per endo recs  dm diet  accuchecks ac and hs
On medications,  no chest pain, stable, monitored and followed up by primary cardiothoracic team/cardiology team
Endo consulted/; A1C 7.0  accuchecks ac and hs  lantus and admelog as per endo recs  dm diet  accuchecks ac and hs

## 2024-02-08 NOTE — DISCHARGE NOTE PROVIDER - CARE PROVIDER_API CALL
Pierre Dalal  Thoracic and Cardiac Surgery  [address] [place] [address]  Phone: [phone]  Fax: [phone]  Scheduled Appointment: 02/16/2024 09:30 AM    Galindo Nieves  Nephrology  [address] [place] [address] [address]  [place] [address]  Phone: [phone]  Fax: [phone]  Follow Up Time: 1 week    Fay Farr)  Endocrinology/Metab/Diabetes  [address] [place] [address]  Phone: [phone]  Fax: [phone]  Follow Up Time: 2 weeks    Edy Norman  Cardiac Electrophysiology  [address] [place] [address]  Phone: [phone]  Fax: [phone]  Scheduled Appointment: 02/09/2024 09:40 AM

## 2024-02-08 NOTE — DISCHARGE NOTE NURSING/CASE MANAGEMENT/SOCIAL WORK - NSDCFUADDAPPT_GEN_ALL_CORE_FT
Follow up with Dr. Dalal on Friday 2/16 at 9:30am in CTS office at [facility], call [phone] to confirm appointment.  Follow up in EP clinic at [facility] for pacemaker wound check on Friday 2/9/24 at 9:40am.   Follow up with your nephrologist, Dr. Nieves in 1 week, call the office to schedule an appointment.   Follow up with your endocrinologist, Dr. Farr in 2 weeks, call the office to schedule an appointment.   Follow up with your Cardiologist, Dr. Cheatham in 1-2 weeks, please call the office to schedule an appointment.  Follow up with your PCP, Dr. Ferrari in 1-2 weeks, call the office to schedule an appointment.

## 2024-02-08 NOTE — PROGRESS NOTE ADULT - PROBLEM SELECTOR PROBLEM 4
Junctional rhythm
Obesity, morbid, BMI 40.0-49.9
Junctional rhythm
Obesity, morbid, BMI 40.0-49.9
Junctional rhythm
Obesity, morbid, BMI 40.0-49.9
Obesity, morbid, BMI 40.0-49.9
Junctional rhythm
Obesity, morbid, BMI 40.0-49.9
Junctional rhythm
Obesity, morbid, BMI 40.0-49.9
Junctional rhythm
Obesity, morbid, BMI 40.0-49.9
Obesity, morbid, BMI 40.0-49.9
Junctional rhythm
Junctional rhythm

## 2024-02-08 NOTE — PROGRESS NOTE ADULT - PROBLEM SELECTOR PROBLEM 2
Nonrheumatic mitral (valve) annulus calcification
Type 2 diabetes mellitus
Type 2 diabetes mellitus
Nonrheumatic mitral (valve) annulus calcification
Nonrheumatic mitral (valve) annulus calcification
Type 2 diabetes mellitus
Type 2 diabetes mellitus
Nonrheumatic mitral (valve) annulus calcification
Type 2 diabetes mellitus
Type 2 diabetes mellitus
Nonrheumatic mitral (valve) annulus calcification
Type 2 diabetes mellitus
Nonrheumatic mitral (valve) annulus calcification
Nonrheumatic mitral (valve) annulus calcification
Type 2 diabetes mellitus
Nonrheumatic mitral (valve) annulus calcification
Type 2 diabetes mellitus

## 2024-02-08 NOTE — DISCHARGE NOTE NURSING/CASE MANAGEMENT/SOCIAL WORK - PATIENT PORTAL LINK FT
You can access the FollowMyHealth Patient Portal offered by [facility] by registering at the following website: [email or web address]. By joining [employer]’s FollowMyHealth portal, you will also be able to view your health information using other applications (apps) compatible with our system.

## 2024-02-08 NOTE — DISCHARGE NOTE PROVIDER - NSDCFUSCHEDAPPT_GEN_ALL_CORE_FT
[facility]  ELECTROPH [address]  Scheduled Appointment: 02/09/2024    Pierre Dalal  [facility]  CTSURG [address]  Scheduled Appointment: 02/16/2024

## 2024-02-08 NOTE — PROGRESS NOTE ADULT - SUBJECTIVE AND OBJECTIVE BOX
NEPHROLOGY-[facility] [phone]        Patient seen and examined in bed.  She was the same         MEDICATIONS  (STANDING):  allopurinol 100 milliGRAM(s) Oral daily  aMIOdarone    Tablet   Oral   aMIOdarone    Tablet 200 milliGRAM(s) Oral daily  apixaban 2.5 milliGRAM(s) Oral two times a day  aspirin enteric coated 81 milliGRAM(s) Oral daily  atorvastatin 80 milliGRAM(s) Oral at bedtime  bisacodyl Suppository 10 milliGRAM(s) Rectal once  chlorhexidine 2% Cloths 1 Application(s) Topical daily  dextrose 5%. 1000 milliLiter(s) (100 mL/Hr) IV Continuous <Continuous>  dextrose 5%. 1000 milliLiter(s) (50 mL/Hr) IV Continuous <Continuous>  dextrose 50% Injectable 25 milliLiter(s) IV Push every 15 minutes  dextrose 50% Injectable 25 Gram(s) IV Push once  dextrose 50% Injectable 25 Gram(s) IV Push once  dextrose 50% Injectable 50 milliLiter(s) IV Push every 15 minutes  dextrose 50% Injectable 12.5 Gram(s) IV Push once  glucagon  Injectable 1 milliGRAM(s) IntraMuscular once  insulin glargine Injectable (LANTUS) 12 Unit(s) SubCutaneous at bedtime  insulin lispro (ADMELOG) corrective regimen sliding scale   SubCutaneous three times a day before meals  insulin lispro (ADMELOG) corrective regimen sliding scale   SubCutaneous at bedtime  insulin lispro Injectable (ADMELOG) 4 Unit(s) SubCutaneous three times a day before meals  lidocaine   4% Patch 1 Patch Transdermal daily  metoprolol tartrate 12.5 milliGRAM(s) Oral two times a day  ondansetron Injectable 4 milliGRAM(s) IV Push once  pantoprazole    Tablet 40 milliGRAM(s) Oral before breakfast  polyethylene glycol 3350 17 Gram(s) Oral daily  senna 2 Tablet(s) Oral at bedtime  sodium chloride 0.9% lock flush 3 milliLiter(s) IV Push every 8 hours      VITAL:  T(C): , Max: 36.9 (02-07-24 @ 20:18)  T(F): , Max: 98.4 (02-07-24 @ 20:18)  HR: 92 (02-08-24 @ 06:26)  BP: 105/64 (02-08-24 @ 04:24)  BP(mean): 78 (02-08-24 @ 04:24)  RR: 18 (02-08-24 @ 04:24)  SpO2: 99% (02-08-24 @ 06:26)  Wt(kg): --    I and O's:    02-07 @ 07:01  -  02-08 @ 07:00  --------------------------------------------------------  IN: 900 mL / OUT: 1300 mL / NET: -400 mL          PHYSICAL EXAM:    Constitutional: NAD;obese   Neck:  No JVD  Respiratory: reduced but clear   Cardiovascular: S1 and S2  Gastrointestinal: BS+, soft, NT/ND  Extremities: No peripheral edema  Neurological: A/O x 3, no focal deficits  Psychiatric: Normal mood, normal affect  : No Diallo  Skin: No rashes  Access: Not applicable    LABS:                        9.4    8.09  )-----------( 241      ( 08 Feb 2024 05:47 )             29.7     02-08    134<L>  |  93<L>  |  51<H>  ----------------------------<  96  4.0   |  29  |  2.68<H>    Ca    10.0      08 Feb 2024 05:47            Urine Studies:  Urinalysis Basic - ( 08 Feb 2024 05:47 )    Color: x / Appearance: x / SG: x / pH: x  Gluc: 96 mg/dL / Ketone: x  / Bili: x / Urobili: x   Blood: x / Protein: x / Nitrite: x   Leuk Esterase: x / RBC: x / WBC x   Sq Epi: x / Non Sq Epi: x / Bacteria: x            RADIOLOGY & ADDITIONAL STUDIES:            < from: TTE W or WO Ultrasound Enhancing Agent (02.07.24 @ 11:23) >    TRANSTHORACIC ECHOCARDIOGRAM REPORT  ________________________________________________________________________________                                      _______       Pt. Name:       JOJO DOOLEY Study Date:    2/7/2024  MRN:            [id number]    YOB: 1950  Accession #:    [id number]     Age:           74 years  Account#:       [id number]  Gender:        F  Heart Rate:     60 bpm        Height:        63.00 in (160.02 cm)  Rhythm:         sinus rhythm  Weight:        240.00 lb (108.86 kg)  Blood Pressure: 101/61 mmHg   BSA/BMI:       2.09 m² / 42.51 kg/m²  ________________________________________________________________________________________  Referring Physician:    [id number] Pierre Dalal  Interpreting Physician: Penny Whitman  Primary Sonographer:    Zaynab Hooper RDCS    CPT:               ECHO TTE W/O CON F/U LTD - 70717.m;LIMITED SPECTRAL - 96784.m  Indication(s):     Dyspnea, unspecified - R06.00  Procedure:         Limited transthoracic echocardiogram.  Ordering Location: [facility]  Admission Status:  Inpatient  Study Information: Image quality for this study is fair.    _______________________________________________________________________________________     CONCLUSIONS:      1. No pericardial effusion seen.   2. Compared to the transthoracic echocardiogram performed on 1/29/2024, there is no pericardial effusion.    ________________________________________________________________________________________  FINDINGS:     Pericardium:  No pericardial effusion seen. There is a trace pericardial effusion noted adjacent to the anterior right ventricle.  ____________________________________________________________________  QUANTITATIVE DATA:     Tricuspid Valve Measurements:     TR Vmax:          2.7 m/s  TR Peak Gradient: 28.5 mmHg    ________________________________________________________________________________________  Electronically signed on 2/7/2024 at 1:52:49 PM by Penny Whitman      *** Final ***    < end of copied text >

## 2024-02-08 NOTE — DISCHARGE NOTE PROVIDER - NSDCMRMEDTOKEN_GEN_ALL_CORE_FT
allopurinol 100 mg oral tablet: 1 tab(s) orally once a day  apixaban 2.5 mg oral tablet: 1 tab(s) orally 2 times a day call Dr. Dalal&#x27;s office to confirm stop date  furosemide 20 mg oral tablet: 1 tab(s) orally once a day  Januvia 50 mg oral tablet: 1 tab(s) orally once a day  metoprolol succinate 50 mg oral capsule, extended release: 1 cap(s) orally once a day  rosuvastatin 40 mg oral tablet: 1 tab(s) orally once a day (at bedtime)   acetaminophen: 650 milligram(s) orally every 6 hours as needed for  mild pain  allopurinol 100 mg oral tablet: 1 tab(s) orally once a day  amiodarone 200 mg oral tablet: 1 tab(s) orally once a day  apixaban 2.5 mg oral tablet: 1 tab(s) orally 2 times a day  aspirin 81 mg oral delayed release tablet: 1 tab(s) orally once a day  atorvastatin 80 mg oral tablet: 1 tab(s) orally once a day (at bedtime)  glimepiride 1 mg oral tablet: 1 tab(s) orally once a day with breakfast  metoprolol tartrate 25 mg oral tablet: 0.5 tab(s) orally every 12 hours  senna leaf extract oral tablet: 2 tab(s) orally once a day (at bedtime) as needed for  constipation  SITagliptin 25 mg oral tablet: 1 tab(s) orally x 30 days

## 2024-02-09 ENCOUNTER — APPOINTMENT (OUTPATIENT)
Dept: CARE COORDINATION | Facility: HOME HEALTH | Age: 74
End: 2024-02-09
Payer: MEDICARE

## 2024-02-09 VITALS
SYSTOLIC BLOOD PRESSURE: 116 MMHG | RESPIRATION RATE: 14 BRPM | OXYGEN SATURATION: 94 % | HEART RATE: 60 BPM | DIASTOLIC BLOOD PRESSURE: 64 MMHG

## 2024-02-09 PROCEDURE — 99024 POSTOP FOLLOW-UP VISIT: CPT

## 2024-02-09 RX ORDER — MULTIVITAMIN
TABLET ORAL
Refills: 0 | Status: DISCONTINUED | COMMUNITY
End: 2024-02-09

## 2024-02-09 RX ORDER — METFORMIN HYDROCHLORIDE 500 MG/1
500 TABLET, COATED ORAL DAILY
Refills: 0 | Status: DISCONTINUED | COMMUNITY
End: 2024-02-09

## 2024-02-09 RX ORDER — CLOTRIMAZOLE 10 MG/G
1 CREAM TOPICAL
Refills: 0 | Status: DISCONTINUED | COMMUNITY
End: 2024-02-09

## 2024-02-09 RX ORDER — NYSTATIN AND TRIAMCINOLONE ACETONIDE 100000; 1 MG/G; MG/G
100000-0.1 CREAM TOPICAL
Refills: 0 | Status: DISCONTINUED | COMMUNITY
End: 2024-02-09

## 2024-02-09 RX ORDER — SITAGLIPTIN 25 MG/1
25 TABLET, FILM COATED ORAL DAILY
Refills: 0 | Status: ACTIVE | COMMUNITY
Start: 2024-02-09

## 2024-02-09 RX ORDER — FUROSEMIDE 20 MG/1
20 TABLET ORAL
Qty: 90 | Refills: 3 | Status: DISCONTINUED | COMMUNITY
Start: 2023-12-01 | End: 2024-02-09

## 2024-02-09 RX ORDER — ROSUVASTATIN CALCIUM 40 MG/1
40 TABLET, FILM COATED ORAL DAILY
Refills: 0 | Status: DISCONTINUED | COMMUNITY
End: 2024-02-09

## 2024-02-09 RX ORDER — DULAGLUTIDE 0.75 MG/.5ML
0.75 INJECTION, SOLUTION SUBCUTANEOUS
Refills: 0 | Status: DISCONTINUED | COMMUNITY
End: 2024-02-09

## 2024-02-09 RX ORDER — COLCHICINE 0.6 MG/1
0.6 TABLET ORAL TWICE DAILY
Refills: 0 | Status: DISCONTINUED | COMMUNITY
End: 2024-02-09

## 2024-02-09 RX ORDER — AMIODARONE HYDROCHLORIDE 200 MG/1
200 TABLET ORAL DAILY
Qty: 30 | Refills: 0 | Status: ACTIVE | COMMUNITY
Start: 2024-02-09

## 2024-02-09 RX ORDER — LOSARTAN POTASSIUM 100 MG/1
100 TABLET, FILM COATED ORAL DAILY
Refills: 0 | Status: DISCONTINUED | COMMUNITY
End: 2024-02-09

## 2024-02-09 RX ORDER — METOPROLOL TARTRATE 25 MG/1
25 TABLET, FILM COATED ORAL
Qty: 15 | Refills: 0 | Status: ACTIVE | COMMUNITY
Start: 2024-02-09

## 2024-02-09 RX ORDER — APIXABAN 5 MG/1
5 TABLET, FILM COATED ORAL
Qty: 180 | Refills: 3 | Status: DISCONTINUED | COMMUNITY
Start: 2024-01-08 | End: 2024-02-09

## 2024-02-09 RX ORDER — ATORVASTATIN CALCIUM 80 MG/1
80 TABLET, FILM COATED ORAL
Qty: 90 | Refills: 1 | Status: ACTIVE | COMMUNITY
Start: 2024-02-09

## 2024-02-09 RX ORDER — DAPAGLIFLOZIN 5 MG/1
5 TABLET, FILM COATED ORAL DAILY
Qty: 90 | Refills: 1 | Status: DISCONTINUED | COMMUNITY
End: 2024-02-09

## 2024-02-09 RX ORDER — GLIMEPIRIDE 1 MG/1
1 TABLET ORAL DAILY
Refills: 0 | Status: ACTIVE | COMMUNITY
Start: 2024-02-09

## 2024-02-09 RX ORDER — MUPIROCIN 20 MG/G
2 OINTMENT TOPICAL
Qty: 1 | Refills: 0 | Status: DISCONTINUED | COMMUNITY
Start: 2024-01-03 | End: 2024-02-09

## 2024-02-09 NOTE — PLAN
[TextEntry] : 1) Weigh yourself in the AM prior to eating & drinking. Contact FY team if you note a wt gain of 1-2 lbs overnight or 5 lbs within 1 week. Continue current meds. Keep your legs elevated & ambulate as tolerated. Continue incentive spirometry 10x/hr while awake. Shower using mild soap daily. Your diet should be low salt, low fat, high protein. 2) Call FY team 24/7 w/ any questions, issues, or concerns. My number [phone] was provided to the pt. Explained to pt I personally work from Mon to Fri from 8a to 4p but before or after those hours this number still functions 24/7 and pt will get in touch w/ a team member of CT Surgeon at all times. 3) Report to your FY NP any signs of infection such as redness, swelling, warmth, drainage, or pain at an incision site. Additionally, report to your FY NP if you develop a fever. 4) Do not lift anything more than 5 lbs. 5) Do not drive until you are cleared to do so by your surgeon. 6) Please walk 3x/day.   FOLLOW UP APPOINTMENTS: CTSx: Dr. Dalal on 2-16-24 EP: Dr Norman on 2/13/24 NEPHRO: Dr Nieves - F/U within 1 week ENDO: Pt does not have an endocrinologist. I provided the Patient Access Referral phone number to the pt for them to call to find a endocrinologist nearby home and accepting of insurance. CARDIOLOGIST: Dr Cheatham- Pt advised to call to schedule appt within 2 weeks PCP: Dr Lyla Ferrari- Pt advised to call to schedule appt within 1 month of discharge.

## 2024-02-09 NOTE — REASON FOR VISIT
[Post Hospitalization] : a post hospitalization visit [FreeTextEntry1] : FOLLOW YOUR HEART - Transitional Care Management Program - [facility]

## 2024-02-09 NOTE — ASSESSMENT
[FreeTextEntry1] : Pt recovering well at home s/p cardiac surgery. Pt lives w/ . She has 3 grown children and 10 grandchildren. Reviewed all medications and dosages with pt understanding. Pt has all medications in home and is taking as prescribed. Pain controlled with current medication regimen. Pt is aware of F/U appts scheduled and that need to be scheduled as listed below.

## 2024-02-09 NOTE — REVIEW OF SYSTEMS
[SOB on Exertion] : shortness of breath during exertion [Negative] : Endocrine [de-identified] : PM: DM2

## 2024-02-09 NOTE — PHYSICAL EXAM
[Sclera] : the sclera and conjunctiva were normal [Neck Appearance] : the appearance of the neck was normal [] : no respiratory distress [Respiration, Rhythm And Depth] : normal respiratory rhythm and effort [Exaggerated Use Of Accessory Muscles For Inspiration] : no accessory muscle use [Auscultation Breath Sounds / Voice Sounds] : lungs were clear to auscultation bilaterally [Apical Impulse] : the apical impulse was normal [Heart Rate And Rhythm] : heart rate was normal and rhythm regular [Heart Sounds] : normal S1 and S2 [Heart Sounds Gallop] : no gallops [Murmurs] : no murmurs [Heart Sounds Pericardial Friction Rub] : no pericardial rub [Examination Of The Chest] : the chest was normal in appearance [Chest Visual Inspection Thoracic Asymmetry] : no chest asymmetry [Diminished Respiratory Excursion] : normal chest expansion [FreeTextEntry1] : Left anterior chest wall PPM site is clean, dry, intact w/ edges well approximated, w/o any erythema, drainage, or warmth. [Bowel Sounds] : normal bowel sounds [Abdomen Soft] : soft [Abnormal Walk] : normal gait [Skin Color & Pigmentation] : normal skin color and pigmentation [No Focal Deficits] : no focal deficits [Oriented To Time, Place, And Person] : oriented to person, place, and time [Impaired Insight] : insight and judgment were intact [Affect] : the affect was normal [Mood] : the mood was normal

## 2024-02-09 NOTE — HISTORY OF PRESENT ILLNESS
[FreeTextEntry1] : 74F w/ PMH morbid obesity (BMI 42.7 s/p gastric sleeve ), DMT2, CAD s/p PCI of LCX (), LEIDY (on CPAP), CKD (stage 3 - on farxiga), AF (on Eliquis) and rheumatic mitral valve disease reports c/o chronic BARBOZA for over 10 years that has progressively worsened over the past few months with the feeling of an "elephant on my chest." Her symptoms improved after starting Lasix daily. She gets dizzy when she bends over or when she stands up too fast. She denies CP or weight gain. She sleeps with 3 pillows on an incline at night. She cannot lay flat due to comfort as well as orthopnea. She can only walk one block before she has to stop to rest due to SOB. s/p  MVR (t)/ C1V/ Maze/ RICHARD clip post extubated / inotropic support EP consulted for underlying junctional rhythm; no av nodals at this time; + epicardial pw; maintain   tx sdu; VSS; afib 60-70 on  2.5; maintain med ct's; diuresis as per renal; endo consulted for diabetic management keep npo after midnight sat into sun for possible ppm as per EP; no av nodals at this time 24 VSS on  2.5 EP following for ?ppm  VSS NPO for PPM for aflutter w/ chb  - s/p PPM placement; tx floor  VSS: AV Paced 60-80; d/c  as per Dr. Dalal; bun/cr increased today --> d/c diuretics and ck echo as per Dr. Dalal pa/lat chest xray s/p PPM ; d/w pw; wean O2 as tolerated; surgical bra  VSS s/p ppm site cdi diuretics d/c'd yesterday- rounds made w/ Dr. Dalal - creatinine down to 1.7 - torsemide 20mg po qd & aldactone 50 bid resumed Dr. Nieves, renal following pt. discharge planning- home when stable : VSS; Cr coming down, 1.62 today, negative 1.8L, renal following, CXR PA/LAT today  VSS diuresing  b/l pl effusions  ambulate d/c planning home - ween O2  VSS; A paced; V.paced; bun/cr 38/2.1; renal following; diuretics decreased to qd; ace wrap LE ; wean O2 as tolerated; ck chest xray 2/3 VSS   60's.  -600cc/24hrs.  bun/cr 38/2.15 ( will dw renal ).  CXR stable b/l effusions.  Resume eliquis  VSS; AV paced; eliquis for AC for mvr and hx afib; diuresis; pulm toilet; increase activity; wean O2 discharge planning- home pt this week when bun/ cr stable : VSS, D/C diuretics per Dr. Dalal, Cr 2.68  Right pleural effusion confirmed with bedside sono. Right pigtail placement yielding 400ml serosanguinous fluid. CXR negative for ptx.  Continue to hold eliquis. JORDIN worsening  creat 2.84 on torsemide. negative 1270 fluid balance  VSS; AV Paced @ 60; d/c rt pigtail this am; ck f/u chest xray; resume eliquis in am; bun/cr stable 51/1.9; continue to monitor off diuretics; ck echo as per renal; TTE no pericardial effusion, wean O2 as tolerated; ck RA sat; narcs d/c secondary to n/v.  VSS, 93% on RA, BUN/Cr 51/2.68 stable, pt cleared for discharge home today per Dr. Dalal.  - Seen by [facility] NP for a f/u home visit. Emotional support and education provided. All questions answered. Pt overall is recovering well. She has some BARBOZA.

## 2024-02-16 ENCOUNTER — APPOINTMENT (OUTPATIENT)
Dept: CARDIOTHORACIC SURGERY | Facility: CLINIC | Age: 74
End: 2024-02-16
Payer: MEDICARE

## 2024-02-16 ENCOUNTER — EMERGENCY (EMERGENCY)
Facility: HOSPITAL | Age: 74
LOS: 1 days | Discharge: HOME | End: 2024-02-16
Attending: EMERGENCY MEDICINE
Payer: MEDICARE

## 2024-02-16 VITALS
WEIGHT: 223.99 LBS | RESPIRATION RATE: 18 BRPM | HEART RATE: 60 BPM | DIASTOLIC BLOOD PRESSURE: 78 MMHG | OXYGEN SATURATION: 97 % | TEMPERATURE: 98 F | HEIGHT: 63 IN | SYSTOLIC BLOOD PRESSURE: 131 MMHG

## 2024-02-16 DIAGNOSIS — Z98.84 BARIATRIC SURGERY STATUS: Chronic | ICD-10-CM

## 2024-02-16 DIAGNOSIS — Z90.3 ACQUIRED ABSENCE OF STOMACH [PART OF]: Chronic | ICD-10-CM

## 2024-02-16 DIAGNOSIS — Z98.890 OTHER SPECIFIED POSTPROCEDURAL STATES: Chronic | ICD-10-CM

## 2024-02-16 DIAGNOSIS — Z95.5 PRESENCE OF CORONARY ANGIOPLASTY IMPLANT AND GRAFT: Chronic | ICD-10-CM

## 2024-02-16 LAB
ALBUMIN SERPL ELPH-MCNC: 3.7 G/DL — SIGNIFICANT CHANGE UP (ref 3.3–5)
ALP SERPL-CCNC: 103 U/L — SIGNIFICANT CHANGE UP (ref 40–120)
ALT FLD-CCNC: 17 U/L — SIGNIFICANT CHANGE UP (ref 10–45)
ANION GAP SERPL CALC-SCNC: 12 MMOL/L — SIGNIFICANT CHANGE UP (ref 5–17)
APTT BLD: 30.8 SEC — SIGNIFICANT CHANGE UP (ref 24.5–35.6)
AST SERPL-CCNC: 30 U/L — SIGNIFICANT CHANGE UP (ref 10–40)
BASOPHILS # BLD AUTO: 0.01 K/UL — SIGNIFICANT CHANGE UP (ref 0–0.2)
BASOPHILS NFR BLD AUTO: 0.2 % — SIGNIFICANT CHANGE UP (ref 0–2)
BILIRUB SERPL-MCNC: 0.5 MG/DL — SIGNIFICANT CHANGE UP (ref 0.2–1.2)
BUN SERPL-MCNC: 38 MG/DL — HIGH (ref 7–23)
CALCIUM SERPL-MCNC: 10.1 MG/DL — SIGNIFICANT CHANGE UP (ref 8.4–10.5)
CHLORIDE SERPL-SCNC: 107 MMOL/L — SIGNIFICANT CHANGE UP (ref 96–108)
CO2 SERPL-SCNC: 19 MMOL/L — LOW (ref 22–31)
CREAT SERPL-MCNC: 2.2 MG/DL — HIGH (ref 0.5–1.3)
EGFR: 23 ML/MIN/1.73M2 — LOW
EOSINOPHIL # BLD AUTO: 0.36 K/UL — SIGNIFICANT CHANGE UP (ref 0–0.5)
EOSINOPHIL NFR BLD AUTO: 6.6 % — HIGH (ref 0–6)
GLUCOSE SERPL-MCNC: 85 MG/DL — SIGNIFICANT CHANGE UP (ref 70–99)
HCT VFR BLD CALC: 29.8 % — LOW (ref 34.5–45)
HGB BLD-MCNC: 9.4 G/DL — LOW (ref 11.5–15.5)
IMM GRANULOCYTES NFR BLD AUTO: 0.6 % — SIGNIFICANT CHANGE UP (ref 0–0.9)
INR BLD: 1.51 RATIO — HIGH (ref 0.85–1.18)
LYMPHOCYTES # BLD AUTO: 1.01 K/UL — SIGNIFICANT CHANGE UP (ref 1–3.3)
LYMPHOCYTES # BLD AUTO: 18.5 % — SIGNIFICANT CHANGE UP (ref 13–44)
MANUAL SMEAR VERIFICATION: SIGNIFICANT CHANGE UP
MCHC RBC-ENTMCNC: 31.5 GM/DL — LOW (ref 32–36)
MCHC RBC-ENTMCNC: 31.6 PG — SIGNIFICANT CHANGE UP (ref 27–34)
MCV RBC AUTO: 100.3 FL — HIGH (ref 80–100)
MONOCYTES # BLD AUTO: 0.43 K/UL — SIGNIFICANT CHANGE UP (ref 0–0.9)
MONOCYTES NFR BLD AUTO: 7.9 % — SIGNIFICANT CHANGE UP (ref 2–14)
NEUTROPHILS # BLD AUTO: 3.61 K/UL — SIGNIFICANT CHANGE UP (ref 1.8–7.4)
NEUTROPHILS NFR BLD AUTO: 66.2 % — SIGNIFICANT CHANGE UP (ref 43–77)
NRBC # BLD: 0 /100 WBCS — SIGNIFICANT CHANGE UP (ref 0–0)
PLAT MORPH BLD: NORMAL — SIGNIFICANT CHANGE UP
PLATELET # BLD AUTO: 127 K/UL — LOW (ref 150–400)
POTASSIUM SERPL-MCNC: 5.1 MMOL/L — SIGNIFICANT CHANGE UP (ref 3.5–5.3)
POTASSIUM SERPL-SCNC: 5.1 MMOL/L — SIGNIFICANT CHANGE UP (ref 3.5–5.3)
PROT SERPL-MCNC: 6.8 G/DL — SIGNIFICANT CHANGE UP (ref 6–8.3)
PROTHROM AB SERPL-ACNC: 15.7 SEC — HIGH (ref 9.5–13)
RBC # BLD: 2.97 M/UL — LOW (ref 3.8–5.2)
RBC # FLD: 16 % — HIGH (ref 10.3–14.5)
RBC BLD AUTO: SIGNIFICANT CHANGE UP
SODIUM SERPL-SCNC: 138 MMOL/L — SIGNIFICANT CHANGE UP (ref 135–145)
WBC # BLD: 5.45 K/UL — SIGNIFICANT CHANGE UP (ref 3.8–10.5)
WBC # FLD AUTO: 5.45 K/UL — SIGNIFICANT CHANGE UP (ref 3.8–10.5)

## 2024-02-16 PROCEDURE — 30901 CONTROL OF NOSEBLEED: CPT | Mod: LT

## 2024-02-16 PROCEDURE — 99284 EMERGENCY DEPT VISIT MOD MDM: CPT | Mod: 25,GC

## 2024-02-16 RX ORDER — TRANEXAMIC ACID 100 MG/ML
5 INJECTION, SOLUTION INTRAVENOUS ONCE
Refills: 0 | Status: COMPLETED | OUTPATIENT
Start: 2024-02-16 | End: 2024-02-16

## 2024-02-16 NOTE — ED CLERICAL - NS ED CARE COORDINATION INFORMATION
This patient is enrolled in the Follow Your Heart program and has undergone a cardiac surgery procedure within the last 30 days and has active care navigation.   This patient can be followed up by the care navigation team within 24 hours. To arrange close follow-up or to obtain additional clinical information about this patient, please call the contact number above.   Please call the cardiac surgery team once patient is registered at [phone] for consultation PRIOR to disposition decision.  The patient recently underwent a cardiac surgery procedure and the team can assist in acute medical management.

## 2024-02-16 NOTE — ED PROVIDER NOTE - PHYSICAL EXAMINATION
Const: Awake, alert, no acute distress.  Well appearing.  Moving comfortably on stretcher.  HEENT: NC/AT.  Moist mucous membranes.  No pharyngeal erythema, no exudates. Mild pooling of blood in left nares.   Eyes: Extraocular movements intact b/l.  Conjunctiva pink.  No scleral icterus.  Neck: Neck supple, full ROM without pain.  Cardiac: Regular rate and regular rhythm. S1 S2 present.  Peripheral pulses 2+ and symmetric. No LE edema.  Resp: Speaking in full sentences. No evidence of respiratory distress.  Breath sounds clear to auscultation b/l. Normal chest excursion.   Abd: Non-distended, no overlying skin changes.  Soft, non-tender, no guarding, no rigidity, no rebound tenderness.  No palpable masses.  Normal bowel sounds in all 4 quadrants.  Back: Spine midline and non-tender. No CVAT.  Skin: Normal coloration.  No rashes, abrasions or lacerations.  Neuro: Awake, alert & oriented x 3.  Moves all extremities spontaneously.  No focal deficits.

## 2024-02-16 NOTE — ED PROCEDURE NOTE - PROCEDURE ADDITIONAL DETAILS
left nares packed with TXA cotton ball and compressed x 5 mins continuous with control of bleeding.  reassess in 45-60 mins.

## 2024-02-16 NOTE — ED PROVIDER NOTE - OBJECTIVE STATEMENT
74-year-old female with history of LEIDY on cpap, diabetes, CKD A-fib on Eliquis, recent surgery in 1 week ago of mitral valve replacement, bypass, pacemaker and ablation presents to the ED with epistaxis for the past 8 hours.  Patient endorses that she started her Eliquis back a week ago.  Patient says that she has been pinching her nose and that the bleeding has slowed down.  Patient denies chest pain, palpitations, difficulty breathing, weakness, lightheadedness.

## 2024-02-16 NOTE — ED ADULT TRIAGE NOTE - CHIEF COMPLAINT QUOTE
underwent cardiac valve surgery along with pacemaker end of January; on Eliquis; nose bleeding began at 1300 today after blowing nose; large amt bleeding; pt is covid positive based on home test;  was positive at home

## 2024-02-16 NOTE — ED PROVIDER NOTE - NSFOLLOWUPINSTRUCTIONS_ED_ALL_ED_FT
It was a pleasure caring for you today!    You were seen in the ER today for nose bleed.     Please follow up with your primary care doctor within 1 - 3 days. Call and let them know you were seen in the ER today.   Bring the results of your blood work and imaging with you to your appointment, if applicable.    For pain, please take acetaminophen 650 mg every 6 hours for pain. Additionally, you can also take ibuprofen 400 mg every 6-8 hours for pain.    Return to the ER for any worsening symptoms or concerns, including return of bleeding, chest pain, shortness of breath, lightheadedness, weakness, or any other concerns.

## 2024-02-16 NOTE — ED PROVIDER NOTE - CLINICAL SUMMARY MEDICAL DECISION MAKING FREE TEXT BOX
Attending note.  Patient was seen in room #50.  Patient is complaining of epistaxis all afternoon from the left nostril.  Patient tested COVID-positive recently and has been blowing her nose frequently.  She denies any previous episode of epistaxis.  She is not bleeding from any other sites and reports no atraumatic bruising.  Patient recently had ablation for atrial fibrillation and valve repair.  Patient also has a history of LEIDY and uses CPAP at night.  She denies any lightheadedness, dizziness, chest pain, shortness of breath.       ROS - as above.  P/E - Patient is alert and in no acute distress.  There is no pallor.  Examination of the nose reveals excoriated area without bleeding on the right nasal septum.  There is a clot in the left nasal septum.  There is dried blood in the posterior pharynx.       A/P - Epistaxis left naris most likely secondary to frequent blowing of nose secondary to recent COVID.  Patient is also taking Eliquis.

## 2024-02-16 NOTE — ED PROVIDER NOTE - PATIENT PORTAL LINK FT
Pt states she still is nauseated and now has a headache. Pt states she does not want to eat and does not want any medication for the headache. RR even and unlabored, NAD, denies any other needs.      Robert Kaur RN  02/07/20 3599 You can access the FollowMyHealth Patient Portal offered by [facility] by registering at the following website: [email or web address]. By joining [facility]’s FollowMyHealth portal, you will also be able to view your health information using other applications (apps) compatible with our system.

## 2024-02-16 NOTE — ED PROVIDER NOTE - PROGRESS NOTE DETAILS
Pt received txa gauze and pressure applied for 5 minutes. Pt reassessed and walked w/ no reoccurrence of bleeding. Pt ready for dc.

## 2024-02-17 VITALS
TEMPERATURE: 98 F | OXYGEN SATURATION: 98 % | DIASTOLIC BLOOD PRESSURE: 62 MMHG | RESPIRATION RATE: 16 BRPM | HEART RATE: 65 BPM | SYSTOLIC BLOOD PRESSURE: 158 MMHG

## 2024-02-17 LAB
APPEARANCE UR: CLEAR — SIGNIFICANT CHANGE UP
BACTERIA # UR AUTO: NEGATIVE /HPF — SIGNIFICANT CHANGE UP
BILIRUB UR-MCNC: NEGATIVE — SIGNIFICANT CHANGE UP
CAST: 2 /LPF — SIGNIFICANT CHANGE UP (ref 0–4)
COLOR SPEC: YELLOW — SIGNIFICANT CHANGE UP
DIFF PNL FLD: ABNORMAL
GLUCOSE UR QL: NEGATIVE MG/DL — SIGNIFICANT CHANGE UP
KETONES UR-MCNC: NEGATIVE MG/DL — SIGNIFICANT CHANGE UP
LEUKOCYTE ESTERASE UR-ACNC: ABNORMAL
NITRITE UR-MCNC: NEGATIVE — SIGNIFICANT CHANGE UP
PH UR: 5.5 — SIGNIFICANT CHANGE UP (ref 5–8)
PROT UR-MCNC: 30 MG/DL
RBC CASTS # UR COMP ASSIST: 4 /HPF — SIGNIFICANT CHANGE UP (ref 0–4)
REVIEW: SIGNIFICANT CHANGE UP
SP GR SPEC: 1.02 — SIGNIFICANT CHANGE UP (ref 1–1.03)
SQUAMOUS # UR AUTO: 4 /HPF — SIGNIFICANT CHANGE UP (ref 0–5)
UROBILINOGEN FLD QL: 0.2 MG/DL — SIGNIFICANT CHANGE UP (ref 0.2–1)
WBC UR QL: 2 /HPF — SIGNIFICANT CHANGE UP (ref 0–5)

## 2024-02-17 PROCEDURE — 85025 COMPLETE CBC W/AUTO DIFF WBC: CPT

## 2024-02-17 PROCEDURE — 81001 URINALYSIS AUTO W/SCOPE: CPT

## 2024-02-17 PROCEDURE — 85730 THROMBOPLASTIN TIME PARTIAL: CPT

## 2024-02-17 PROCEDURE — 93005 ELECTROCARDIOGRAM TRACING: CPT

## 2024-02-17 PROCEDURE — 80053 COMPREHEN METABOLIC PANEL: CPT

## 2024-02-17 PROCEDURE — 85610 PROTHROMBIN TIME: CPT

## 2024-02-17 PROCEDURE — 99283 EMERGENCY DEPT VISIT LOW MDM: CPT | Mod: 25

## 2024-02-17 RX ADMIN — TRANEXAMIC ACID 5 MILLILITER(S): 100 INJECTION, SOLUTION INTRAVENOUS at 00:10

## 2024-02-17 NOTE — ED ADULT NURSE NOTE - OBJECTIVE STATEMENT
pt 74y F, pmhx LEIDY on cpap, afib on eliquis, recent MV replacement, c/o epistaxis, pt states she blew nose and had sudden nose bleed, pt in ED with gauze, removed and no bleeding noted, pt recent MV replacement, wounds clean, pt denies any pains or other symptoms, pt A&Ox4, updated on plan of care

## 2024-02-17 NOTE — ED ADULT NURSE NOTE - NSFALLUNIVINTERV_ED_ALL_ED
Bed/Stretcher in lowest position, wheels locked, appropriate side rails in place/Call bell, personal items and telephone in reach/Instruct patient to call for assistance before getting out of bed/chair/stretcher/Non-slip footwear applied when patient is off stretcher/[place] to call system/Physically safe environment - no spills, clutter or unnecessary equipment/Purposeful proactive rounding/Room/bathroom lighting operational, light cord in reach

## 2024-02-18 ENCOUNTER — EMERGENCY (EMERGENCY)
Facility: HOSPITAL | Age: 74
LOS: 1 days | Discharge: HOME | End: 2024-02-18
Attending: EMERGENCY MEDICINE
Payer: MEDICARE

## 2024-02-18 VITALS
HEART RATE: 60 BPM | HEIGHT: 63 IN | WEIGHT: 223.99 LBS | RESPIRATION RATE: 17 BRPM | OXYGEN SATURATION: 98 % | TEMPERATURE: 98 F | SYSTOLIC BLOOD PRESSURE: 124 MMHG | DIASTOLIC BLOOD PRESSURE: 54 MMHG

## 2024-02-18 DIAGNOSIS — Z98.84 BARIATRIC SURGERY STATUS: Chronic | ICD-10-CM

## 2024-02-18 DIAGNOSIS — Z90.3 ACQUIRED ABSENCE OF STOMACH [PART OF]: Chronic | ICD-10-CM

## 2024-02-18 DIAGNOSIS — Z95.5 PRESENCE OF CORONARY ANGIOPLASTY IMPLANT AND GRAFT: Chronic | ICD-10-CM

## 2024-02-18 DIAGNOSIS — R04.0 EPISTAXIS: ICD-10-CM

## 2024-02-18 DIAGNOSIS — Z98.890 OTHER SPECIFIED POSTPROCEDURAL STATES: Chronic | ICD-10-CM

## 2024-02-18 LAB
ANION GAP SERPL CALC-SCNC: 12 MMOL/L — SIGNIFICANT CHANGE UP (ref 5–17)
APTT BLD: 32.5 SEC — SIGNIFICANT CHANGE UP (ref 24.5–35.6)
BASOPHILS # BLD AUTO: 0.02 K/UL — SIGNIFICANT CHANGE UP (ref 0–0.2)
BASOPHILS NFR BLD AUTO: 0.4 % — SIGNIFICANT CHANGE UP (ref 0–2)
BUN SERPL-MCNC: 34 MG/DL — HIGH (ref 7–23)
CALCIUM SERPL-MCNC: 10.2 MG/DL — SIGNIFICANT CHANGE UP (ref 8.4–10.5)
CHLORIDE SERPL-SCNC: 109 MMOL/L — HIGH (ref 96–108)
CO2 SERPL-SCNC: 21 MMOL/L — LOW (ref 22–31)
CREAT SERPL-MCNC: 2.17 MG/DL — HIGH (ref 0.5–1.3)
EGFR: 23 ML/MIN/1.73M2 — LOW
EOSINOPHIL # BLD AUTO: 0.4 K/UL — SIGNIFICANT CHANGE UP (ref 0–0.5)
EOSINOPHIL NFR BLD AUTO: 8.1 % — HIGH (ref 0–6)
GLUCOSE SERPL-MCNC: 159 MG/DL — HIGH (ref 70–99)
HCT VFR BLD CALC: 30.3 % — LOW (ref 34.5–45)
HGB BLD-MCNC: 9.5 G/DL — LOW (ref 11.5–15.5)
IMM GRANULOCYTES NFR BLD AUTO: 0.4 % — SIGNIFICANT CHANGE UP (ref 0–0.9)
INR BLD: 1.43 RATIO — HIGH (ref 0.85–1.18)
LYMPHOCYTES # BLD AUTO: 1.52 K/UL — SIGNIFICANT CHANGE UP (ref 1–3.3)
LYMPHOCYTES # BLD AUTO: 30.9 % — SIGNIFICANT CHANGE UP (ref 13–44)
MCHC RBC-ENTMCNC: 30.9 PG — SIGNIFICANT CHANGE UP (ref 27–34)
MCHC RBC-ENTMCNC: 31.4 GM/DL — LOW (ref 32–36)
MCV RBC AUTO: 98.7 FL — SIGNIFICANT CHANGE UP (ref 80–100)
MONOCYTES # BLD AUTO: 0.3 K/UL — SIGNIFICANT CHANGE UP (ref 0–0.9)
MONOCYTES NFR BLD AUTO: 6.1 % — SIGNIFICANT CHANGE UP (ref 2–14)
NEUTROPHILS # BLD AUTO: 2.66 K/UL — SIGNIFICANT CHANGE UP (ref 1.8–7.4)
NEUTROPHILS NFR BLD AUTO: 54.1 % — SIGNIFICANT CHANGE UP (ref 43–77)
NRBC # BLD: 0 /100 WBCS — SIGNIFICANT CHANGE UP (ref 0–0)
PLATELET # BLD AUTO: 128 K/UL — LOW (ref 150–400)
POTASSIUM SERPL-MCNC: 4.7 MMOL/L — SIGNIFICANT CHANGE UP (ref 3.5–5.3)
POTASSIUM SERPL-SCNC: 4.7 MMOL/L — SIGNIFICANT CHANGE UP (ref 3.5–5.3)
PROTHROM AB SERPL-ACNC: 14.9 SEC — HIGH (ref 9.5–13)
RBC # BLD: 3.07 M/UL — LOW (ref 3.8–5.2)
RBC # FLD: 16 % — HIGH (ref 10.3–14.5)
SODIUM SERPL-SCNC: 142 MMOL/L — SIGNIFICANT CHANGE UP (ref 135–145)
WBC # BLD: 4.92 K/UL — SIGNIFICANT CHANGE UP (ref 3.8–10.5)
WBC # FLD AUTO: 4.92 K/UL — SIGNIFICANT CHANGE UP (ref 3.8–10.5)

## 2024-02-18 PROCEDURE — 30905 CONTROL OF NOSEBLEED: CPT

## 2024-02-18 PROCEDURE — 99285 EMERGENCY DEPT VISIT HI MDM: CPT

## 2024-02-18 RX ORDER — ACETAMINOPHEN 500 MG
1000 TABLET ORAL ONCE
Refills: 0 | Status: COMPLETED | OUTPATIENT
Start: 2024-02-18 | End: 2024-02-18

## 2024-02-18 RX ORDER — OXYMETAZOLINE HYDROCHLORIDE 0.5 MG/ML
1 SPRAY NASAL ONCE
Refills: 0 | Status: COMPLETED | OUTPATIENT
Start: 2024-02-18 | End: 2024-02-18

## 2024-02-18 RX ORDER — TRANEXAMIC ACID 100 MG/ML
5 INJECTION, SOLUTION INTRAVENOUS ONCE
Refills: 0 | Status: COMPLETED | OUTPATIENT
Start: 2024-02-18 | End: 2024-02-18

## 2024-02-18 RX ORDER — SODIUM CHLORIDE 0.65 %
1 AEROSOL, SPRAY (ML) NASAL ONCE
Refills: 0 | Status: COMPLETED | OUTPATIENT
Start: 2024-02-18 | End: 2024-02-18

## 2024-02-18 RX ADMIN — Medication 400 MILLIGRAM(S): at 23:55

## 2024-02-18 RX ADMIN — TRANEXAMIC ACID 5 MILLILITER(S): 100 INJECTION, SOLUTION INTRAVENOUS at 21:43

## 2024-02-18 RX ADMIN — OXYMETAZOLINE HYDROCHLORIDE 1 SPRAY(S): 0.5 SPRAY NASAL at 21:43

## 2024-02-18 RX ADMIN — Medication 1 SPRAY(S): at 22:53

## 2024-02-18 NOTE — ED PROVIDER NOTE - PROGRESS NOTE DETAILS
Gauze removed from nares. BL nares suctioned. No apparent source of bleeding from R nare. Posterior bleeding from L nares. Large clot evacuated from L nare and from posterior pharynx. Afrin applied topically, as well as pressure. ENT Paged. ENT to see pt. Riky Pritchett, DO PGY-3: ENT back patient with surgical after bleeding stopped with Afrin.  ENT reassessed patient patient began bleeding again and now will be packed with a Rhino Rocket. Lucía Raymundo DO PGY-3  Received sign out from Dr. Pritchett    Patient was signed out to me pending ENT evaluation, which occurred during signout and ENT placed in a Rhino Rocket.  Recommending Augmentin twice daily for 5 days..     Patient re-assessed. Vitals stable. Pain has improved after interventions.  Discussed with CDU PA to keep patient for observation and monitoring.  Patient agreeable to the plan.

## 2024-02-18 NOTE — ED PROVIDER NOTE - CLINICAL SUMMARY MEDICAL DECISION MAKING FREE TEXT BOX
Riky Pritchett,  PGY-3: 74-year-old female past medical history of LEIDY on CPAP, diabetes, CKD, A-fib on Eliquis, recent MV R, CABG, pacemaker placement, ablation presents ED complaining of left nostril epistaxis that started earlier this morning.  Patient was in the ED on 2/16 for the same issue received TXA soaked gauze and pressure and bleeding was controlled.  Patient endorsed lightheadedness today. Patient hemodynamically stable, left nare several large clots evacuated, no anterior source of bleeding, patient likely with posterior source of bleeding.  Plan for Afrin, escalate care with ENT consult, screening labs.  Reassess

## 2024-02-18 NOTE — ED PROVIDER NOTE - ATTENDING CONTRIBUTION TO CARE
74F PMH LEIDY on CPAP, CKD, DM, Afib, recent MVR, CABG, PPM, ablation on ASA/Eliquis here with recurrent epistaxis. Seen here 2 days ago for same and was packed w TXA soaked gauze, DC'd, returning now w bleeding from L nare since 2pm, has not stopped despite pressure and gauze packing to nose. Reports feeling lightheaded. No CP or SOB. VS WNL. PE Adult F NAD no pallor Blood dripping from L nare w internal clot visualized. No obvious source of bleeding noted after removal of clot. Blood seemingly coming from posterior origin. Blood visualized dripping down posterior pharynx. RRR, no murmur, CTA BL. Ext WWP. Afrin and pressure applied. Will c/s ENT. Check CBC/BMP for anemia, plt count. Consider CDU given recurrent bleeding, AP and AC, nocturnal CPAP and posterior bleed.

## 2024-02-18 NOTE — ED PROVIDER NOTE - OBJECTIVE STATEMENT
74-year-old female past medical history of LEIDY on CPAP, diabetes, CKD, A-fib on Eliquis, recent MV R, CABG, pacemaker placement, ablation presents ED complaining of left nostril epistaxis that started earlier this morning.  Patient was in the ED on 2/16 for the same issue received TXA soaked gauze and pressure and bleeding was controlled.  Patient endorsed lightheadedness today.
Alert and oriented to person, place and time

## 2024-02-18 NOTE — CONSULT NOTE ADULT - PROBLEM SELECTOR RECOMMENDATION 9
- gram-positive abx coverage for duration of packing placement  - Continue with Nasal Packing (Rapid Rhino) for 5 days   - no contraindication for nocturnal CPAP usage   - no need to hold aspirin and eliquis   - Strict blood pressure control.  - Nasal saline, 2 sprays to both nares 4 times a day  - Avoid nasal trauma; no nose rubbing, blowing or manipulating nasal packing.  Sneeze with mouth open and pinching nares.  - Avoid bending with head blow the waist.    - No heavy lifting.  - Please follow up at outpatient ENT office for nasal packing removal. Please call [phone], address: [address] [place] [address], [place], [place]

## 2024-02-18 NOTE — ED PROVIDER NOTE - PHYSICAL EXAMINATION
GEN: Patient awake alert NAD.   HEENT: normocephalic, atraumatic, Blood in posterior oropharynx, Left nare with large amount of clots and active oozing of blood, no septal hematoma, moist MM  CARDIAC: RRR, S1, S2, no murmur.   PULM: CTA B/L no wheeze, rhonchi, rales.   ABD: soft NT, ND, no rebound no guarding  MSK: Moving all extremities, no edema.   NEURO: A&Ox3, no focal neurological deficits   SKIN: warm, dry, no rash.

## 2024-02-18 NOTE — ED ADULT NURSE NOTE - OBJECTIVE STATEMENT
Pt is a 75 y/o F with PMH of HTN, HLD, LEIDY, Type II DM, CKD, AF, Obesity, and Cardiomyopathy presenting with epistaxis and dizziness. Pt reports having open heart surgery on 1/23 and being in the [facility] Friday for epistaxis. Pt endorses onset of epistaxis returning today with large flow and clots. Pt reports being anticoagulated on Eliquis and Asprin. Upon assessment pt is a/o x 3 speaking coherently in full sentences with no facial droop/ unilateral weakness noted. Pt is breathing unlabored and equal bilaterally in no apparent distress, abdomen is soft, nontender, and nondistended, skin is warm and dry. Pt denies fever/chills, headaches, SOB/chest pain, n/v/d, or changes in urinary/defecation habits.

## 2024-02-18 NOTE — ED ADULT TRIAGE NOTE - CHIEF COMPLAINT QUOTE
states nose bleed since 2pm on thinners (Eliquis/ASA) b/l nostrils packed w/ gauze bleeding controlled was seen and d/c'd Friday for same symptoms, open heart surgery 01/23  also notes COVID + x 1 week

## 2024-02-18 NOTE — ED ADULT NURSE NOTE - NSFALLHARMRISKINTERV_ED_ALL_ED

## 2024-02-18 NOTE — ED ADULT NURSE NOTE - NS_NURSE_DISC_TEACHING_YN_ED_ALL_ED
"Patient: Caio Singh    Procedure Summary     Date: 08/08/22 Room / Location: [facility] OR [address] [place] [address] MAIN OR    Anesthesia Start: 0921 Anesthesia Stop: 1245    Procedure: BILATERAL BREAST REDUCTION (Bilateral Chest) Diagnosis:     Surgeons: Waterhouse, Maurine, MD Provider: Rober William MD    Anesthesia Type: general ASA Status: 2          Anesthesia Type: general    Vitals  Vitals Value Taken Time   /75 08/08/22 1331   Temp 36.6 °C (97.8 °F) 08/08/22 1245   Pulse 44 08/08/22 1339   Resp 16 08/08/22 1245   SpO2 95 % 08/08/22 1339   Vitals shown include unvalidated device data.        Post Anesthesia Care and Evaluation    Patient location during evaluation: bedside  Patient participation: complete - patient participated  Level of consciousness: awake and alert  Pain management: adequate    Airway patency: patent  Anesthetic complications: No anesthetic complications    Cardiovascular status: acceptable  Respiratory status: acceptable  Hydration status: acceptable    Comments: /75   Pulse (!) 46   Temp 36.6 °C (97.8 °F) (Oral)   Resp 16   Ht 167.6 cm (66\")   Wt 70.4 kg (155 lb 3.3 oz)   SpO2 98%   BMI 25.05 kg/m²       " Yes

## 2024-02-18 NOTE — CONSULT NOTE ADULT - SUBJECTIVE AND OBJECTIVE BOX
CC:    HPI:       PAST MEDICAL & SURGICAL HISTORY:  Nonrheumatic mitral (valve) annulus calcification      Obesity, morbid, BMI 40.0-49.9      Chronic kidney disease (CKD)      AF (atrial fibrillation)      Type 2 diabetes mellitus      HLD (hyperlipidemia)      LEIDY on CPAP      Hypertrophic obstructive cardiomyopathy      2019 novel coronavirus disease (COVID-19)      HTN (hypertension)      Stented coronary artery      History of coronary angiogram      S/P coronary artery stent placement      H/O gastric sleeve      History of laparoscopic adjustable gastric banding      History of removal of laparoscopic gastric banding device      NVD (normal vaginal delivery)        Allergies    No Known Allergies    Intolerances      MEDICATIONS  (STANDING):    MEDICATIONS  (PRN):      Social History: **??**    Family history: **??**    ROS:   ENT: all negative except as noted in HPI   CV: denies palpitations  Pulm: denies SOB, cough, hemoptysis  GI: denies change in appetite, indigestion, n/v  : denies pertinent urinary symptoms, urgency  Neuro: denies numbness/tingling, loss of sensation  Psych: denies anxiety  MS: denies muscle weakness, instability  Heme: denies easy bruising or bleeding  Endo: denies heat/cold intolerance, excessive sweating  Vascular: denies LE edema    Vital Signs Last 24 Hrs  T(C): 36.4 (18 Feb 2024 21:01), Max: 36.8 (18 Feb 2024 20:12)  T(F): 97.6 (18 Feb 2024 21:01), Max: 98.2 (18 Feb 2024 20:12)  HR: 59 (18 Feb 2024 21:01) (59 - 60)  BP: 129/56 (18 Feb 2024 21:01) (124/54 - 129/56)  BP(mean): --  RR: 16 (18 Feb 2024 21:01) (16 - 17)  SpO2: 97% (18 Feb 2024 21:01) (97% - 98%)    Parameters below as of 18 Feb 2024 21:01  Patient On (Oxygen Delivery Method): room air                              9.5    4.92  )-----------( 128      ( 18 Feb 2024 21:35 )             30.3    02-18    142  |  109<H>  |  34<H>  ----------------------------<  159<H>  4.7   |  21<L>  |  2.17<H>    Ca    10.2      18 Feb 2024 21:35     PT/INR - ( 18 Feb 2024 21:35 )   PT: 14.9 sec;   INR: 1.43 ratio         PTT - ( 18 Feb 2024 21:35 )  PTT:32.5 sec    PHYSICAL EXAM:  Gen: NAD  Skin: No rashes, bruises, or lesions  Head: Normocephalic, Atraumatic  Face: no edema, erythema, or fluctuance. Parotid glands soft without mass  Eyes: no scleral injection  Ears: Right: ear canal clear, TM intact without effusion or erythema. No evidence of any fluid drainage. No mastoid tenderness, erythema, or ear bulging            Left: ear canal clear, TM intact without effusion or erythema. No evidence of any fluid drainage. No mastoid tenderness, erythema, or ear bulging  Nose: Nares bilaterally patent, no discharge  Mouth: No Stridor / Drooling / Trismus.  Mucosa moist, tongue/uvula midline, oropharynx clear  Neck: Flat, supple, no lymphadenopathy, trachea midline, no masses  Lymphatic: No lymphadenopathy  Resp: breathing easily, no stridor  CV: no peripheral edema/cyanosis  GI: nondistended   Peripheral vascular: no JVD or edema  Neuro: facial nerve intact, no facial droop      Diagnostic Nasal Endoscopy: (Scope #2 used)    Fiberoptic Indirect laryngoscopy:  (Scope #2 used)    IMAGING/ADDITIONAL STUDIES:  CC: left epistaxis     HPI: 74-year-old, A&Ox4 female, with past medical history of LEIDY on CPAP, diabetes, CKD, A-fib status post ablation on Eliquis, recent MV Repair s/p CABG and pacemaker placement,  presents ED complaining of left nostril epistaxis that started earlier this afternoon around 1:30pm.  Patient was in the ED on 2/16 for the same issue, pt was sent home after hemostasis achieved from TXA soaked gauze and pressure. However, epistaxis recurred again today. And pt is endorsing lightheadedness from positional changes.  pt also endorsed recently recovery from COVID infection. No history of easy bruising or coagulapathy. Currently, pt denies F/C/N/V, SOB, odynophagia, dysphonia, changes in voice or inability to tolerate secretions.    PAST MEDICAL & SURGICAL HISTORY:  Nonrheumatic mitral (valve) annulus calcification    Obesity, morbid, BMI 40.0-49.9    Chronic kidney disease (CKD)    AF (atrial fibrillation)    Type 2 diabetes mellitus    HLD (hyperlipidemia)    LEIDY on CPAP    Hypertrophic obstructive cardiomyopathy    2019 novel coronavirus disease (COVID-19)    HTN (hypertension)    Stented coronary artery    History of coronary angiogram    S/P coronary artery stent placement    H/O gastric sleeve    History of laparoscopic adjustable gastric banding    History of removal of laparoscopic gastric banding device    NVD (normal vaginal delivery)    Allergies    No Known Allergies    Intolerances    MEDICATIONS  (STANDING):    MEDICATIONS  (PRN):    Social History: denies history of smoking     Family history: no pertinent family history     ROS:   ENT: all negative except as noted in HPI   CV: denies palpitations  Pulm: denies SOB, cough, hemoptysis  GI: denies change in appetite, indigestion, n/v  : denies pertinent urinary symptoms, urgency  Neuro: denies numbness/tingling, loss of sensation  Psych: denies anxiety  MS: denies muscle weakness, instability  Heme: denies easy bruising or bleeding  Endo: denies heat/cold intolerance, excessive sweating  Vascular: endorses bilateral LE edema    Vital Signs Last 24 Hrs  T(C): 36.4 (18 Feb 2024 21:01), Max: 36.8 (18 Feb 2024 20:12)  T(F): 97.6 (18 Feb 2024 21:01), Max: 98.2 (18 Feb 2024 20:12)  HR: 59 (18 Feb 2024 21:01) (59 - 60)  BP: 129/56 (18 Feb 2024 21:01) (124/54 - 129/56)  BP(mean): --  RR: 16 (18 Feb 2024 21:01) (16 - 17)  SpO2: 97% (18 Feb 2024 21:01) (97% - 98%)    Parameters below as of 18 Feb 2024 21:01  Patient On (Oxygen Delivery Method): room air                        9.5    4.92  )-----------( 128      ( 18 Feb 2024 21:35 )             30.3    02-18    142  |  109<H>  |  34<H>  ----------------------------<  159<H>  4.7   |  21<L>  |  2.17<H>    Ca    10.2      18 Feb 2024 21:35     PT/INR - ( 18 Feb 2024 21:35 )   PT: 14.9 sec;   INR: 1.43 ratio         PTT - ( 18 Feb 2024 21:35 )  PTT:32.5 sec    PHYSICAL EXAM:  Gen: NAD  Skin: No rashes, bruises, or lesions  Head: Normocephalic, Atraumatic  Face: no edema, erythema, or fluctuance. Parotid glands soft without mass  Eyes: no scleral injection  Nose: bleeding from left nose, s/p 5.5cm rapid rhino with 7cc of air.   Mouth: no posterior oropharynx active bleeding. No Stridor / Drooling / Trismus.  Mucosa moist, tongue/uvula midline, oropharynx clear  Neck: Flat, supple, no lymphadenopathy, trachea midline, no masses  Lymphatic: No lymphadenopathy  Resp: breathing easily, no stridor  CV: no peripheral edema/cyanosis  GI: nondistended   Peripheral vascular: no JVD or edema  Neuro: facial nerve intact, no facial droop CC: left epistaxis     HPI: 74-year-old, A&Ox4 female, with past medical history of LEIDY on CPAP, diabetes, CKD, A-fib status post ablation on Eliquis, recent MV Repair s/p CABG and pacemaker placement,  presents ED complaining of left epistaxis that started earlier this afternoon around 1:30pm.  Patient was in the ED on 2/16 for the same issue, pt was sent home after hemostasis achieved from TXA soaked gauze and pressure. However, epistaxis recurred again today. And pt is endorsing lightheadedness from positional changes.  pt also endorsed recently recovery from COVID infection. No history of easy bruising or coagulapathy. Currently, pt denies F/C/N/V, SOB, odynophagia, dysphonia, changes in voice or inability to tolerate secretions.    PAST MEDICAL & SURGICAL HISTORY:  Nonrheumatic mitral (valve) annulus calcification    Obesity, morbid, BMI 40.0-49.9    Chronic kidney disease (CKD)    AF (atrial fibrillation)    Type 2 diabetes mellitus    HLD (hyperlipidemia)    LEIDY on CPAP    Hypertrophic obstructive cardiomyopathy    2019 novel coronavirus disease (COVID-19)    HTN (hypertension)    Stented coronary artery    History of coronary angiogram    S/P coronary artery stent placement    H/O gastric sleeve    History of laparoscopic adjustable gastric banding    History of removal of laparoscopic gastric banding device    NVD (normal vaginal delivery)    Allergies    No Known Allergies    Intolerances    MEDICATIONS  (STANDING):    MEDICATIONS  (PRN):    Social History: denies history of smoking     Family history: no pertinent family history     ROS:   ENT: all negative except as noted in HPI   CV: denies palpitations  Pulm: denies SOB, cough, hemoptysis  GI: denies change in appetite, indigestion, n/v  : denies pertinent urinary symptoms, urgency  Neuro: denies numbness/tingling, loss of sensation  Psych: denies anxiety  MS: denies muscle weakness, instability  Heme: denies easy bruising or bleeding  Endo: denies heat/cold intolerance, excessive sweating  Vascular: endorses bilateral LE edema    Vital Signs Last 24 Hrs  T(C): 36.4 (18 Feb 2024 21:01), Max: 36.8 (18 Feb 2024 20:12)  T(F): 97.6 (18 Feb 2024 21:01), Max: 98.2 (18 Feb 2024 20:12)  HR: 59 (18 Feb 2024 21:01) (59 - 60)  BP: 129/56 (18 Feb 2024 21:01) (124/54 - 129/56)  BP(mean): --  RR: 16 (18 Feb 2024 21:01) (16 - 17)  SpO2: 97% (18 Feb 2024 21:01) (97% - 98%)    Parameters below as of 18 Feb 2024 21:01  Patient On (Oxygen Delivery Method): room air                        9.5    4.92  )-----------( 128      ( 18 Feb 2024 21:35 )             30.3    02-18    142  |  109<H>  |  34<H>  ----------------------------<  159<H>  4.7   |  21<L>  |  2.17<H>    Ca    10.2      18 Feb 2024 21:35     PT/INR - ( 18 Feb 2024 21:35 )   PT: 14.9 sec;   INR: 1.43 ratio         PTT - ( 18 Feb 2024 21:35 )  PTT:32.5 sec    PHYSICAL EXAM:  Gen: NAD  Skin: No rashes, bruises, or lesions  Head: Normocephalic, Atraumatic  Face: no edema, erythema, or fluctuance. Parotid glands soft without mass  Eyes: no scleral injection  Nose: bleeding from left nose, s/p 5.5cm rapid rhino with 7cc of air.   Mouth: no posterior oropharynx active bleeding. No Stridor / Drooling / Trismus.  Mucosa moist, tongue/uvula midline, oropharynx clear  Neck: Flat, supple, no lymphadenopathy, trachea midline, no masses  Lymphatic: No lymphadenopathy  Resp: breathing easily, no stridor  CV: no peripheral edema/cyanosis  GI: nondistended   Peripheral vascular: no JVD or edema  Neuro: facial nerve intact, no facial droop

## 2024-02-18 NOTE — CONSULT NOTE ADULT - ASSESSMENT
74-year-old, A&Ox4 female, with past medical history of LEIDY on CPAP, diabetes, CKD, A-fib status post ablation on Eliquis, recent MV Repair s/p CABG and pacemaker placement,  presents ED complaining of left nostril epistaxis that started earlier this afternoon around 1:30pm.  Patient was in the ED on 2/16 for the same issue, pt was sent home after hemostasis achieved from TXA soaked gauze and pressure. However, epistaxis recurred again today. And pt is endorsing lightheadedness from positional changes. ED suctioned out significant amount of blood clot. ENT consulted for further evaluation. Upon arrival, no active bleed noted. status post surgicel placement. However, bleeding recurred later. hemostasis achieved after placement of 5.5cm rapid rhino with 7cc of air. Pt tolerated procedure well without complications.

## 2024-02-18 NOTE — ED CLERICAL - NS ED CARE COORDINATION INFORMATION
This patient is enrolled in the [facility] program and has undergone a cardiac surgery procedure within the last 30 days and has active care navigation.   This patient can be followed up by the care navigation team within 24 hours. To arrange close follow-up or to obtain additional clinical information about this patient, please call the contact number above.   Please call the cardiac surgery team once patient is registered at [phone] for consultation PRIOR to disposition decision.  The patient recently underwent a cardiac surgery procedure and the team can assist in acute medical management.

## 2024-02-19 VITALS — TEMPERATURE: 97 F | DIASTOLIC BLOOD PRESSURE: 75 MMHG | SYSTOLIC BLOOD PRESSURE: 138 MMHG | HEART RATE: 60 BPM

## 2024-02-19 LAB
BASOPHILS # BLD AUTO: 0.01 K/UL — SIGNIFICANT CHANGE UP (ref 0–0.2)
BASOPHILS # BLD AUTO: 0.02 K/UL — SIGNIFICANT CHANGE UP (ref 0–0.2)
BASOPHILS NFR BLD AUTO: 0.2 % — SIGNIFICANT CHANGE UP (ref 0–2)
BASOPHILS NFR BLD AUTO: 0.4 % — SIGNIFICANT CHANGE UP (ref 0–2)
EOSINOPHIL # BLD AUTO: 0.38 K/UL — SIGNIFICANT CHANGE UP (ref 0–0.5)
EOSINOPHIL # BLD AUTO: 0.44 K/UL — SIGNIFICANT CHANGE UP (ref 0–0.5)
EOSINOPHIL NFR BLD AUTO: 7.4 % — HIGH (ref 0–6)
EOSINOPHIL NFR BLD AUTO: 8.6 % — HIGH (ref 0–6)
GLUCOSE BLDC GLUCOMTR-MCNC: 122 MG/DL — HIGH (ref 70–99)
HCT VFR BLD CALC: 27.3 % — LOW (ref 34.5–45)
HCT VFR BLD CALC: 29.9 % — LOW (ref 34.5–45)
HGB BLD-MCNC: 8.6 G/DL — LOW (ref 11.5–15.5)
HGB BLD-MCNC: 9.4 G/DL — LOW (ref 11.5–15.5)
IMM GRANULOCYTES NFR BLD AUTO: 0.2 % — SIGNIFICANT CHANGE UP (ref 0–0.9)
IMM GRANULOCYTES NFR BLD AUTO: 0.4 % — SIGNIFICANT CHANGE UP (ref 0–0.9)
LYMPHOCYTES # BLD AUTO: 1.62 K/UL — SIGNIFICANT CHANGE UP (ref 1–3.3)
LYMPHOCYTES # BLD AUTO: 2.21 K/UL — SIGNIFICANT CHANGE UP (ref 1–3.3)
LYMPHOCYTES # BLD AUTO: 31.5 % — SIGNIFICANT CHANGE UP (ref 13–44)
LYMPHOCYTES # BLD AUTO: 43 % — SIGNIFICANT CHANGE UP (ref 13–44)
MCHC RBC-ENTMCNC: 31 PG — SIGNIFICANT CHANGE UP (ref 27–34)
MCHC RBC-ENTMCNC: 31.3 PG — SIGNIFICANT CHANGE UP (ref 27–34)
MCHC RBC-ENTMCNC: 31.4 GM/DL — LOW (ref 32–36)
MCHC RBC-ENTMCNC: 31.5 GM/DL — LOW (ref 32–36)
MCV RBC AUTO: 98.6 FL — SIGNIFICANT CHANGE UP (ref 80–100)
MCV RBC AUTO: 99.7 FL — SIGNIFICANT CHANGE UP (ref 80–100)
MONOCYTES # BLD AUTO: 0.31 K/UL — SIGNIFICANT CHANGE UP (ref 0–0.9)
MONOCYTES # BLD AUTO: 0.35 K/UL — SIGNIFICANT CHANGE UP (ref 0–0.9)
MONOCYTES NFR BLD AUTO: 6 % — SIGNIFICANT CHANGE UP (ref 2–14)
MONOCYTES NFR BLD AUTO: 6.8 % — SIGNIFICANT CHANGE UP (ref 2–14)
NEUTROPHILS # BLD AUTO: 2.11 K/UL — SIGNIFICANT CHANGE UP (ref 1.8–7.4)
NEUTROPHILS # BLD AUTO: 2.8 K/UL — SIGNIFICANT CHANGE UP (ref 1.8–7.4)
NEUTROPHILS NFR BLD AUTO: 41 % — LOW (ref 43–77)
NEUTROPHILS NFR BLD AUTO: 54.5 % — SIGNIFICANT CHANGE UP (ref 43–77)
NRBC # BLD: 0 /100 WBCS — SIGNIFICANT CHANGE UP (ref 0–0)
NRBC # BLD: 0 /100 WBCS — SIGNIFICANT CHANGE UP (ref 0–0)
PLATELET # BLD AUTO: 118 K/UL — LOW (ref 150–400)
PLATELET # BLD AUTO: 119 K/UL — LOW (ref 150–400)
RBC # BLD: 2.77 M/UL — LOW (ref 3.8–5.2)
RBC # BLD: 3 M/UL — LOW (ref 3.8–5.2)
RBC # FLD: 16 % — HIGH (ref 10.3–14.5)
RBC # FLD: 16 % — HIGH (ref 10.3–14.5)
WBC # BLD: 5.14 K/UL — SIGNIFICANT CHANGE UP (ref 3.8–10.5)
WBC # BLD: 5.14 K/UL — SIGNIFICANT CHANGE UP (ref 3.8–10.5)
WBC # FLD AUTO: 5.14 K/UL — SIGNIFICANT CHANGE UP (ref 3.8–10.5)
WBC # FLD AUTO: 5.14 K/UL — SIGNIFICANT CHANGE UP (ref 3.8–10.5)

## 2024-02-19 PROCEDURE — 96375 TX/PRO/DX INJ NEW DRUG ADDON: CPT

## 2024-02-19 PROCEDURE — 99284 EMERGENCY DEPT VISIT MOD MDM: CPT | Mod: 25

## 2024-02-19 PROCEDURE — G0378: CPT

## 2024-02-19 PROCEDURE — 80048 BASIC METABOLIC PNL TOTAL CA: CPT

## 2024-02-19 PROCEDURE — 99231 SBSQ HOSP IP/OBS SF/LOW 25: CPT

## 2024-02-19 PROCEDURE — 85730 THROMBOPLASTIN TIME PARTIAL: CPT

## 2024-02-19 PROCEDURE — 85025 COMPLETE CBC W/AUTO DIFF WBC: CPT

## 2024-02-19 PROCEDURE — 96374 THER/PROPH/DIAG INJ IV PUSH: CPT

## 2024-02-19 PROCEDURE — 82962 GLUCOSE BLOOD TEST: CPT

## 2024-02-19 PROCEDURE — 85610 PROTHROMBIN TIME: CPT

## 2024-02-19 PROCEDURE — 99235 HOSP IP/OBS SAME DATE MOD 70: CPT

## 2024-02-19 RX ORDER — CEPHALEXIN 500 MG
1 CAPSULE ORAL
Qty: 14 | Refills: 0
Start: 2024-02-19 | End: 2024-02-25

## 2024-02-19 RX ORDER — OXYCODONE HYDROCHLORIDE 5 MG/1
5 TABLET ORAL ONCE
Refills: 0 | Status: DISCONTINUED | OUTPATIENT
Start: 2024-02-19 | End: 2024-02-19

## 2024-02-19 RX ORDER — SODIUM CHLORIDE 9 MG/ML
1000 INJECTION, SOLUTION INTRAVENOUS
Refills: 0 | Status: DISCONTINUED | OUTPATIENT
Start: 2024-02-19 | End: 2024-02-22

## 2024-02-19 RX ORDER — INSULIN LISPRO 100/ML
VIAL (ML) SUBCUTANEOUS AT BEDTIME
Refills: 0 | Status: DISCONTINUED | OUTPATIENT
Start: 2024-02-19 | End: 2024-02-22

## 2024-02-19 RX ORDER — ASPIRIN/CALCIUM CARB/MAGNESIUM 324 MG
81 TABLET ORAL DAILY
Refills: 0 | Status: DISCONTINUED | OUTPATIENT
Start: 2024-02-19 | End: 2024-02-22

## 2024-02-19 RX ORDER — SODIUM CHLORIDE 0.65 %
2 AEROSOL, SPRAY (ML) NASAL
Refills: 0 | Status: DISCONTINUED | OUTPATIENT
Start: 2024-02-19 | End: 2024-02-22

## 2024-02-19 RX ORDER — METOPROLOL TARTRATE 50 MG
25 TABLET ORAL
Refills: 0 | Status: DISCONTINUED | OUTPATIENT
Start: 2024-02-19 | End: 2024-02-22

## 2024-02-19 RX ORDER — ACETAMINOPHEN 500 MG
975 TABLET ORAL EVERY 6 HOURS
Refills: 0 | Status: DISCONTINUED | OUTPATIENT
Start: 2024-02-19 | End: 2024-02-22

## 2024-02-19 RX ORDER — ALLOPURINOL 300 MG
100 TABLET ORAL DAILY
Refills: 0 | Status: DISCONTINUED | OUTPATIENT
Start: 2024-02-19 | End: 2024-02-22

## 2024-02-19 RX ORDER — INSULIN LISPRO 100/ML
VIAL (ML) SUBCUTANEOUS
Refills: 0 | Status: DISCONTINUED | OUTPATIENT
Start: 2024-02-19 | End: 2024-02-22

## 2024-02-19 RX ORDER — APIXABAN 2.5 MG/1
2.5 TABLET, FILM COATED ORAL
Refills: 0 | Status: DISCONTINUED | OUTPATIENT
Start: 2024-02-19 | End: 2024-02-22

## 2024-02-19 RX ORDER — ATORVASTATIN CALCIUM 80 MG/1
80 TABLET, FILM COATED ORAL AT BEDTIME
Refills: 0 | Status: DISCONTINUED | OUTPATIENT
Start: 2024-02-19 | End: 2024-02-22

## 2024-02-19 RX ORDER — DEXTROSE 50 % IN WATER 50 %
15 SYRINGE (ML) INTRAVENOUS ONCE
Refills: 0 | Status: DISCONTINUED | OUTPATIENT
Start: 2024-02-19 | End: 2024-02-22

## 2024-02-19 RX ORDER — GLUCAGON INJECTION, SOLUTION 0.5 MG/.1ML
1 INJECTION, SOLUTION SUBCUTANEOUS ONCE
Refills: 0 | Status: DISCONTINUED | OUTPATIENT
Start: 2024-02-19 | End: 2024-02-22

## 2024-02-19 RX ORDER — AMIODARONE HYDROCHLORIDE 400 MG/1
200 TABLET ORAL DAILY
Refills: 0 | Status: DISCONTINUED | OUTPATIENT
Start: 2024-02-19 | End: 2024-02-22

## 2024-02-19 RX ORDER — DEXTROSE 50 % IN WATER 50 %
25 SYRINGE (ML) INTRAVENOUS ONCE
Refills: 0 | Status: DISCONTINUED | OUTPATIENT
Start: 2024-02-19 | End: 2024-02-22

## 2024-02-19 RX ORDER — DEXTROSE 50 % IN WATER 50 %
12.5 SYRINGE (ML) INTRAVENOUS ONCE
Refills: 0 | Status: DISCONTINUED | OUTPATIENT
Start: 2024-02-19 | End: 2024-02-22

## 2024-02-19 RX ORDER — ONDANSETRON 8 MG/1
4 TABLET, FILM COATED ORAL ONCE
Refills: 0 | Status: COMPLETED | OUTPATIENT
Start: 2024-02-19 | End: 2024-02-19

## 2024-02-19 RX ADMIN — Medication 25 MILLIGRAM(S): at 08:56

## 2024-02-19 RX ADMIN — AMIODARONE HYDROCHLORIDE 200 MILLIGRAM(S): 400 TABLET ORAL at 08:56

## 2024-02-19 RX ADMIN — ONDANSETRON 4 MILLIGRAM(S): 8 TABLET, FILM COATED ORAL at 01:10

## 2024-02-19 RX ADMIN — Medication 100 MILLIGRAM(S): at 08:56

## 2024-02-19 RX ADMIN — APIXABAN 2.5 MILLIGRAM(S): 2.5 TABLET, FILM COATED ORAL at 08:56

## 2024-02-19 RX ADMIN — OXYCODONE HYDROCHLORIDE 5 MILLIGRAM(S): 5 TABLET ORAL at 01:11

## 2024-02-19 RX ADMIN — OXYCODONE HYDROCHLORIDE 5 MILLIGRAM(S): 5 TABLET ORAL at 01:41

## 2024-02-19 RX ADMIN — Medication 81 MILLIGRAM(S): at 08:56

## 2024-02-19 RX ADMIN — Medication 1 TABLET(S): at 00:42

## 2024-02-19 RX ADMIN — Medication 975 MILLIGRAM(S): at 07:33

## 2024-02-19 NOTE — PROGRESS NOTE ADULT - ASSESSMENT
74-year-old, A&Ox4 female, with past medical history of LEIDY on CPAP, diabetes, CKD, A-fib status post ablation on Eliquis, recent MV Repair s/p CABG and pacemaker placement, presents ED complaining of left sided epistaxis controlled with a 5.5cm RR. Patient had another episode of epistaxis on 2/16 controlled with TXA soaked gauze. No further bleeding.

## 2024-02-19 NOTE — ED ADULT NURSE REASSESSMENT NOTE - NSFALLHARMRISKINTERV_ED_ALL_ED

## 2024-02-19 NOTE — PROGRESS NOTE ADULT - SUBJECTIVE AND OBJECTIVE BOX
ENT ISSUE/POD: left epistaxis       HPI: 74-year-old, A&Ox4 female, with past medical history of LEIDY on CPAP, diabetes, CKD, A-fib status post ablation on Eliquis, recent MV Repair s/p CABG and pacemaker placement,  presents ED complaining of left epistaxis that started earlier this afternoon around 1:30pm.  Patient was in the ED on 2/16 for the same issue, pt was sent home after hemostasis achieved from TXA soaked gauze and pressure. However, epistaxis recurred again controlled with a 5.5cm RR. No further bleeding. PT is C/O left facial pain. Pt denies dizziness, HAs, voice change, dysphagia.        PAST MEDICAL & SURGICAL HISTORY:  Nonrheumatic mitral (valve) annulus calcification      Obesity, morbid, BMI 40.0-49.9      Chronic kidney disease (CKD)      AF (atrial fibrillation)      Type 2 diabetes mellitus      HLD (hyperlipidemia)      LEIDY on CPAP      Hypertrophic obstructive cardiomyopathy      2019 novel coronavirus disease (COVID-19)      HTN (hypertension)      Stented coronary artery      History of coronary angiogram      S/P coronary artery stent placement      H/O gastric sleeve      History of laparoscopic adjustable gastric banding      History of removal of laparoscopic gastric banding device      NVD (normal vaginal delivery)        Allergies    No Known Allergies    Intolerances      MEDICATIONS  (STANDING):  allopurinol 100 milliGRAM(s) Oral daily  aMIOdarone    Tablet 200 milliGRAM(s) Oral daily  apixaban 2.5 milliGRAM(s) Oral two times a day  aspirin  chewable 81 milliGRAM(s) Oral daily  atorvastatin 80 milliGRAM(s) Oral at bedtime  dextrose 5%. 1000 milliLiter(s) (100 mL/Hr) IV Continuous <Continuous>  dextrose 5%. 1000 milliLiter(s) (50 mL/Hr) IV Continuous <Continuous>  dextrose 50% Injectable 25 Gram(s) IV Push once  dextrose 50% Injectable 25 Gram(s) IV Push once  dextrose 50% Injectable 12.5 Gram(s) IV Push once  glucagon  Injectable 1 milliGRAM(s) IntraMuscular once  insulin lispro (ADMELOG) corrective regimen sliding scale   SubCutaneous three times a day before meals  insulin lispro (ADMELOG) corrective regimen sliding scale   SubCutaneous at bedtime  metoprolol tartrate 25 milliGRAM(s) Oral two times a day    MEDICATIONS  (PRN):  acetaminophen     Tablet .. 975 milliGRAM(s) Oral every 6 hours PRN Mild Pain (1 - 3), Moderate Pain (4 - 6)  dextrose Oral Gel 15 Gram(s) Oral once PRN Blood Glucose LESS THAN 70 milliGRAM(s)/deciliter  sodium chloride 0.65% Nasal 2 Spray(s) Both Nostrils two times a day PRN Nasal Congestion      Social History: see consult    Family history: see consult    ROS:   ENT: all negative except as noted in HPI   Pulm: denies SOB, cough, hemoptysis  Neuro: denies numbness/tingling, loss of sensation  Endo: denies heat/cold intolerance, excessive sweating      Vital Signs Last 24 Hrs  T(C): 36.3 (19 Feb 2024 08:00), Max: 36.8 (18 Feb 2024 20:12)  T(F): 97.4 (19 Feb 2024 08:00), Max: 98.3 (19 Feb 2024 04:00)  HR: 60 (19 Feb 2024 08:00) (59 - 60)  BP: 138/75 (19 Feb 2024 08:00) (124/54 - 149/50)  BP(mean): 84 (19 Feb 2024 01:09) (78 - 84)  RR: 16 (19 Feb 2024 04:00) (16 - 17)  SpO2: 97% (19 Feb 2024 04:00) (97% - 98%)    Parameters below as of 19 Feb 2024 04:00  Patient On (Oxygen Delivery Method): room air                              9.4    5.14  )-----------( 118      ( 19 Feb 2024 07:55 )             29.9    02-18    142  |  109<H>  |  34<H>  ----------------------------<  159<H>  4.7   |  21<L>  |  2.17<H>    Ca    10.2      18 Feb 2024 21:35     PT/INR - ( 18 Feb 2024 21:35 )   PT: 14.9 sec;   INR: 1.43 ratio         PTT - ( 18 Feb 2024 21:35 )  PTT:32.5 sec    PHYSICAL EXAM:  Gen: NAD  Skin: No rashes, bruises, or lesions  Head: Normocephalic, Atraumatic  Face: no edema, erythema, or fluctuance. Parotid glands soft without mass  Eyes: no scleral injection  Nose: s/p 5.5cm rapid rhino with 7cc of air in place, no active bleeding.   Mouth: no posterior oropharynx active bleeding. No Stridor / Drooling / Trismus.  Mucosa moist, tongue/uvula midline, oropharynx clear  Neck: Flat, supple, no lymphadenopathy, trachea midline, no masses  Lymphatic: No lymphadenopathy  Resp: breathing easily, no stridor  CV: no peripheral edema/cyanosis  GI: nondistended   Peripheral vascular: no JVD or edema  Neuro: facial nerve intact, no facial droop

## 2024-02-19 NOTE — ED CDU PROVIDER DISPOSITION NOTE - CARE PROVIDER_API CALL
Amparo Smart  Otolaryngology  [address] [place] [address], [address]  [place] [address]  Phone: [phone]  Fax: [phone]  Follow Up Time: 1-3 Days

## 2024-02-19 NOTE — ED CDU PROVIDER DISPOSITION NOTE - CLINICAL COURSE
74-year-old female past medical history of LEIDY on CPAP, diabetes, CKD, A-fib on Eliquis, recent MV R, CABG, pacemaker placement, ablation presents ED complaining of left nostril epistaxis that started earlier this morning.  Patient was in the ED on 2/16 for the same issue received TXA soaked gauze and pressure and bleeding was controlled.  Patient endorsed lightheadedness today.  While in the ED patient had stable hemoglobin and hematocrit repeated once, coags within normal limits.  CMP nonactionable.  ED team attempted hemostasis with clot removal and Afrin spray, ENT team placed nasal packing for suspected posterior epistaxis.  ENT recommends continuing nasal packing for 5 days, Augmentin, nasal saline, nasal precautions.  Patient went to CDU to monitor for recurrence of epistaxis and trend CBCs.  In CDU, 74-year-old female past medical history of LEIDY on CPAP, diabetes, CKD, A-fib on Eliquis, recent MV R, CABG, pacemaker placement, ablation presents ED complaining of left nostril epistaxis that started earlier this morning.  Patient was in the ED on 2/16 for the same issue received TXA soaked gauze and pressure and bleeding was controlled.  Patient endorsed lightheadedness today.  While in the ED patient had stable hemoglobin and hematocrit repeated once, coags within normal limits.  CMP nonactionable.  ED team attempted hemostasis with clot removal and Afrin spray, ENT team placed nasal packing for suspected posterior epistaxis.  ENT recommends continuing nasal packing for 5 days, Augmentin, nasal saline, nasal precautions.  Patient went to CDU to monitor for recurrence of epistaxis and trend CBCs.  In CDU, labs trended. hgb improved to 9.4. seen by ent this morning. states patient okay for dc. can follow up with Dr. Smart on the 23rd. states should continue her Eliquis. advised to send keflex to pharmacy. patient well appearing. no active bleeding at this time. reports feeling well. VSS. stable for discharge. discussed with Dr. Lam.

## 2024-02-19 NOTE — ED CDU PROVIDER DISPOSITION NOTE - NSFOLLOWUPINSTRUCTIONS_ED_ALL_ED_FT
Please follow up with your primary care doctor in 1-3 days.  Follow up with ENT within 1-5 days - see contact info.    *Bring all printed lab/test results to your appointment(s).*    Take Augmentin twice daily as prescribed.  Take acetaminophen 500-1000mg every 6 hrs as needed for pain. DO NOT EXCEED 4000mg DAILY.  Continue all home medications as prescribed.    Keep packing in place until followup with ENT this week.    DO NOT blow your nose for at least two weeks.  DO NOT forcibly spit for one week.  DO NOT smoke or use smokeless tobacco; smoking greatly inhibits the healing process, especially in the sinuses.  Sneeze with your MOUTH OPEN. If the urge to sneeze arises, do not sneeze through your nose and avoid pinching nostrils.  Drink without a straw for one week.  Avoid swimming for one month and strenuous exercise (e.g. heavy lifting) for one week.  Gentle swishing with salt water may be done for one week, but do not rinse vigorously.  Slight bleeding from the nose is not uncommon and may occur for several days after surgery.     Return for worsening bleeding, severe pain, facial swelling, fevers, chills, or any other concerns. Please follow up with your primary care doctor in 1-3 days.  Follow up with ENT with on February 23rd.     *Bring all printed lab/test results to your appointment(s).*    continue taking all your home medications.     Take Keflex twice daily as prescribed.  Take acetaminophen 500-1000mg every 6 hrs as needed for pain. DO NOT EXCEED 4000mg DAILY.  Continue all home medications as prescribed.    Keep packing in place until followup with ENT this week.    DO NOT blow your nose for at least two weeks.  DO NOT forcibly spit for one week.  DO NOT smoke or use smokeless tobacco; smoking greatly inhibits the healing process, especially in the sinuses.  Sneeze with your MOUTH OPEN. If the urge to sneeze arises, do not sneeze through your nose and avoid pinching nostrils.  Drink without a straw for one week.  Avoid swimming for one month and strenuous exercise (e.g. heavy lifting) for one week.  Gentle swishing with salt water may be done for one week, but do not rinse vigorously.  Slight bleeding from the nose is not uncommon and may occur for several days after surgery.     Return for worsening bleeding, severe pain, facial swelling, fevers, chills, or any other concerns.

## 2024-02-19 NOTE — ED ADULT NURSE REASSESSMENT NOTE - NS ED NURSE REASSESS COMMENT FT1
Pt received from JENNA Marquez. Pt oriented to CDU & plan of care was discussed. Pt A&O x 4. Pt in CDU to trend CBCs, augmentin BID, nasal precautions, ENT following. Left nostril rhino pack is in place. No epistasis noted during this shift. V/S stable, pt afebrile, R AC 20g  IV in place, patent and free of signs of infiltration. Pt resting in bed. Safety & comfort measures maintained. Call bell in reach. Care continues.
07.00 Received the Pt from  JENNA Castillo Pt is Observed for Epistaxis. Received the Pt A&OX 4 with nasal packing  for left &  nares with Rapid Rhino . No epistaxis noted now . Pt obeys commands Magda N/V/D fever chills cp SOB   Comfort care & safety measures continued  IV site looks clean & dry no signs of infiltration noted pt denies  pain IV site .Pt is oriented to the unit Plan of care explained .  Pt is advised to call for help  call bell with in the reach pt verbalized the understanding .  pending CDU  MD matos . GCS 15/15 A&OX 4 PERRLA  size 3 Strong upper & lower extremities steady gait  Pt is ambulatory & independent   No facial droop  No Hand Leg drop denies numbness tingling  Plan of care ongoing    10.00  Pt is evaluated by CDU MD Carole Shirley . pt is feeling better.  Pt is discharged . Ml out  LISA Sanchez   explained the follow up care & printed the discharge summary  . Pt has stable vitals steady gait A&OX 4 at the time of Discharge

## 2024-02-19 NOTE — ED CDU PROVIDER DISPOSITION NOTE - ATTENDING CONTRIBUTION TO CARE
Patient was signed out to me this morning.  Patient was seen and examined.  Patient is a 74-year-old female with a history of LEIDY on CPAP, diabetes mellitus, CKD, A-fib on Eliquis, s/p CABG, MVR, PPM and ablation who presented with epistaxis from the left nostril yesterday morning.  Patient had TXA soaked gauze placed, was evaluated by ENT who placed a posterior Rhino Rocket.  Patient was placed in CDU for observation, repeat blood work.  Hemoglobin is stable at 9.4.  Patient was reassessed by ENT this morning and cleared for discharge with instructions for outpatient follow-up with Dr. Smart on the 23rd.  Patient should continue her Eliquis. Plan to discharge with Keflex.  Patient states that she feels okay to go home. She was able to ambulate without difficulty. On exam, no active bleeding, packing in place in left nostril. RRR, lungs CTA, abd soft, nt, nd.

## 2024-02-19 NOTE — ED CDU PROVIDER INITIAL DAY NOTE - PROGRESS NOTE DETAILS
patient seen and evaluated at bedside. states has some pain at this time. no active bleeding present. will give analgesics and reassess. pending labs this AM. will continue to monitor. labs reviewed. hgb improved to 9.4. seen by ent this morning. states patient okay for dc. can follow up with Dr. Smart on the 23rd. states should continue her Eliquis. advised to send keflex to pharmacy. patient well appearing. no active bleeding at this time. reports feeling well. VSS. stable for discharge. discussed with Dr. Lam. Patient was signed out to me this morning.  Patient was seen and examined.  Patient is a 74-year-old female with a history of LEIDY on CPAP, diabetes mellitus, CKD, A-fib on Eliquis, s/p CABG, MVR, PPM and ablation who presented with epistaxis from the left nostril yesterday morning.  Patient had TXA soaked gauze placed, was evaluated by ENT who placed a posterior Rhino Rocket.  Patient was placed in CDU for observation, repeat blood work.  Hemoglobin is stable at 9.4.  Patient was reassessed by ENT this morning and cleared for discharge with instructions for outpatient follow-up with Dr. Smart on the 23rd.  Patient should continue her Eliquis. Plan to discharge with Keflex.  Patient states that she feels okay to go home. She was able to ambulate without difficulty. Patient was signed out to me this morning.  Patient was seen and examined.  Patient is a 74-year-old female with a history of LEIDY on CPAP, diabetes mellitus, CKD, A-fib on Eliquis, s/p CABG, MVR, PPM and ablation who presented with epistaxis from the left nostril yesterday morning.  Patient had TXA soaked gauze placed, was evaluated by ENT who placed a posterior Rhino Rocket.  Patient was placed in CDU for observation, repeat blood work.  Hemoglobin is stable at 9.4.  Patient was reassessed by ENT this morning and cleared for discharge with instructions for outpatient follow-up with Dr. Smart on the 23rd.  Patient should continue her Eliquis. Plan to discharge with Keflex.  Patient states that she feels okay to go home. She was able to ambulate without difficulty. On exam, no active bleeding, packing in place in left nostril. RRR, lungs CTA, abd soft, nt, nd.

## 2024-02-19 NOTE — PROGRESS NOTE ADULT - PROBLEM SELECTOR PLAN 1
- gram-positive abx coverage for duration of packing placement  - Continue with Nasal Packing (Rapid Rhino) for 5 days   - no contraindication for nocturnal CPAP usage   - no need to hold aspirin and eliquis   - Strict blood pressure control.  - Nasal saline, 2 sprays to both nares 4 times a day  - Avoid nasal trauma; no nose rubbing, blowing or manipulating nasal packing.  Sneeze with mouth open and pinching nares.  - Avoid bending with head blow the waist.    - No heavy lifting.  - Please follow up at outpatient ENT office for nasal packing removal. Please call [phone], address: [address] [place] [address], [place], [place].

## 2024-02-19 NOTE — ED CDU PROVIDER INITIAL DAY NOTE - ATTENDING APP SHARED VISIT CONTRIBUTION OF CARE
74F PMH LEIDY on CPAP, CKD, DM, Afib, recent MVR, CABG, PPM, ablation on ASA/Eliquis here with recurrent epistaxis. Seen here 2 days ago for same and was packed w TXA soaked gauze, DC'd, returning now w bleeding from L nare since 2pm, Clots evacuated, ENT consulted, 5.5cm AP packing placed by ENT, CBC and plt count unchanged from prior visit. Dispo to CDU given recurrent bleeding, AP and AC usage, nocturnal CPAP and posterior bleed requiring AP packing. Pan for monitoring, freq re-eval, AM labs, ENT re-eval

## 2024-02-19 NOTE — ED CDU PROVIDER DISPOSITION NOTE - PATIENT PORTAL LINK FT
You can access the FollowMyHealth Patient Portal offered by [facility] by registering at the following website: [email or web address]. By joining [employer]’s FollowMyHealth portal, you will also be able to view your health information using other applications (apps) compatible with our system.

## 2024-02-19 NOTE — ED CDU PROVIDER INITIAL DAY NOTE - DETAILS
74y F p/w epistaxis recently started on eliquis s/p CABG:  -trend CBCs, augmentin BID, nasal precautions, ENT following    d/w ED team

## 2024-02-19 NOTE — ED CDU PROVIDER INITIAL DAY NOTE - OBJECTIVE STATEMENT
74-year-old female past medical history of LEIDY on CPAP, diabetes, CKD, A-fib on Eliquis, recent MV R, CABG, pacemaker placement, ablation presents ED complaining of left nostril epistaxis that started earlier this morning.  Patient was in the ED on 2/16 for the same issue received TXA soaked gauze and pressure and bleeding was controlled.  Patient endorsed lightheadedness today.  While in the ED patient had stable hemoglobin and hematocrit repeated once, coags within normal limits.  CMP nonactionable.  ED team attempted hemostasis with clot removal and Afrin spray, ENT team placed nasal packing for suspected posterior epistaxis.  ENT recommends continuing nasal packing for 5 days, Augmentin, nasal saline, nasal precautions.  Patient went to CDU to monitor for recurrence of epistaxis and trend CBCs.

## 2024-02-19 NOTE — ED CDU PROVIDER INITIAL DAY NOTE - PHYSICAL EXAMINATION
GEN: Patient awake alert NAD.   HEENT: normocephalic, atraumatic, nasal packing in place with no visible active bleed from nares or in oropharynx   CARDIAC: RRR, S1, S2, no murmur.   PULM: CTA B/L no wheeze, rhonchi, rales.   ABD: soft NT, ND, no rebound no guarding  MSK: Moving all extremities, no edema.   NEURO: A&Ox3, no focal neurological deficits   SKIN: warm, dry, no rash.

## 2024-02-20 ENCOUNTER — RX RENEWAL (OUTPATIENT)
Age: 74
End: 2024-02-20

## 2024-02-20 ENCOUNTER — NON-APPOINTMENT (OUTPATIENT)
Age: 74
End: 2024-02-20

## 2024-02-20 RX ORDER — ALLOPURINOL 100 MG/1
100 TABLET ORAL DAILY
Qty: 90 | Refills: 3 | Status: ACTIVE | COMMUNITY
Start: 1900-01-01 | End: 1900-01-01

## 2024-02-21 ENCOUNTER — APPOINTMENT (OUTPATIENT)
Dept: CARDIOTHORACIC SURGERY | Facility: CLINIC | Age: 74
End: 2024-02-21
Payer: MEDICARE

## 2024-02-21 ENCOUNTER — NON-APPOINTMENT (OUTPATIENT)
Age: 74
End: 2024-02-21

## 2024-02-21 VITALS
HEART RATE: 60 BPM | WEIGHT: 226 LBS | DIASTOLIC BLOOD PRESSURE: 77 MMHG | HEIGHT: 63 IN | OXYGEN SATURATION: 97 % | SYSTOLIC BLOOD PRESSURE: 145 MMHG | BODY MASS INDEX: 40.04 KG/M2 | TEMPERATURE: 98.1 F | RESPIRATION RATE: 14 BRPM

## 2024-02-21 PROCEDURE — 99024 POSTOP FOLLOW-UP VISIT: CPT

## 2024-02-22 ENCOUNTER — NON-APPOINTMENT (OUTPATIENT)
Age: 74
End: 2024-02-22

## 2024-02-22 ENCOUNTER — APPOINTMENT (OUTPATIENT)
Dept: ELECTROPHYSIOLOGY | Facility: CLINIC | Age: 74
End: 2024-02-22
Payer: MEDICARE

## 2024-02-22 ENCOUNTER — APPOINTMENT (OUTPATIENT)
Dept: CARDIOLOGY | Facility: CLINIC | Age: 74
End: 2024-02-22
Payer: MEDICARE

## 2024-02-22 ENCOUNTER — APPOINTMENT (OUTPATIENT)
Dept: INTERNAL MEDICINE | Facility: CLINIC | Age: 74
End: 2024-02-22
Payer: MEDICARE

## 2024-02-22 ENCOUNTER — APPOINTMENT (OUTPATIENT)
Dept: OTOLARYNGOLOGY | Facility: CLINIC | Age: 74
End: 2024-02-22
Payer: MEDICARE

## 2024-02-22 VITALS
WEIGHT: 226 LBS | DIASTOLIC BLOOD PRESSURE: 74 MMHG | HEIGHT: 63 IN | TEMPERATURE: 98.1 F | HEART RATE: 60 BPM | SYSTOLIC BLOOD PRESSURE: 117 MMHG | OXYGEN SATURATION: 97 % | BODY MASS INDEX: 40.04 KG/M2

## 2024-02-22 VITALS
WEIGHT: 226 LBS | BODY MASS INDEX: 40.03 KG/M2 | SYSTOLIC BLOOD PRESSURE: 120 MMHG | OXYGEN SATURATION: 98 % | DIASTOLIC BLOOD PRESSURE: 74 MMHG | HEART RATE: 60 BPM

## 2024-02-22 VITALS — WEIGHT: 226 LBS | BODY MASS INDEX: 40.04 KG/M2 | HEIGHT: 63 IN

## 2024-02-22 DIAGNOSIS — G47.33 OBSTRUCTIVE SLEEP APNEA (ADULT) (PEDIATRIC): ICD-10-CM

## 2024-02-22 DIAGNOSIS — R04.0 EPISTAXIS: ICD-10-CM

## 2024-02-22 PROCEDURE — 99204 OFFICE O/P NEW MOD 45 MIN: CPT | Mod: 25

## 2024-02-22 PROCEDURE — 30901 CONTROL OF NOSEBLEED: CPT | Mod: LT

## 2024-02-22 PROCEDURE — 99215 OFFICE O/P EST HI 40 MIN: CPT

## 2024-02-22 PROCEDURE — 99213 OFFICE O/P EST LOW 20 MIN: CPT

## 2024-02-22 PROCEDURE — 93000 ELECTROCARDIOGRAM COMPLETE: CPT | Mod: 59

## 2024-02-22 PROCEDURE — 93280 PM DEVICE PROGR EVAL DUAL: CPT

## 2024-02-22 PROCEDURE — 99024 POSTOP FOLLOW-UP VISIT: CPT

## 2024-02-22 PROCEDURE — G2211 COMPLEX E/M VISIT ADD ON: CPT

## 2024-02-22 RX ORDER — APIXABAN 2.5 MG/1
2.5 TABLET, FILM COATED ORAL
Qty: 180 | Refills: 0 | Status: DISCONTINUED | COMMUNITY
End: 2024-02-22

## 2024-02-22 RX ORDER — BLOOD SUGAR DIAGNOSTIC
STRIP MISCELLANEOUS 4 TIMES DAILY
Qty: 2 | Refills: 4 | Status: ACTIVE | COMMUNITY
Start: 2024-02-22 | End: 1900-01-01

## 2024-02-22 RX ORDER — ASPIRIN ENTERIC COATED TABLETS 81 MG 81 MG/1
81 TABLET, DELAYED RELEASE ORAL DAILY
Qty: 30 | Refills: 0 | Status: DISCONTINUED | COMMUNITY
Start: 2024-02-09 | End: 2024-02-22

## 2024-02-22 RX ORDER — SENNA 8.6 MG/1
8.6 TABLET, FILM COATED ORAL
Qty: 40 | Refills: 0 | Status: DISCONTINUED | COMMUNITY
Start: 2024-02-09 | End: 2024-02-22

## 2024-02-22 RX ORDER — ACETAMINOPHEN 325 MG/1
325 TABLET ORAL EVERY 6 HOURS
Qty: 56 | Refills: 0 | Status: DISCONTINUED | COMMUNITY
Start: 2024-02-09 | End: 2024-02-22

## 2024-02-22 NOTE — HISTORY OF PRESENT ILLNESS
[FreeTextEntry1] : Pt here to f/u- had Covid 2 weeks ago and then developed nosebleeds. Went to ER for nosebleed and put in some solution and it stopped. 2 days later the nosebleed started again and she had balloon was inserted. Going back to ENT today.  Saw Cardio today for f/u. Also seeing Renal and Cardiac surgeons- just had CABG, MV replacement, and PPM.

## 2024-02-22 NOTE — ASSESSMENT
[FreeTextEntry1] : Patient 74-year-old female had a balloon placed in her left nasal cavity for epistaxis she is status post open heart surgery she is off of her blood thinners packing was removed endoscopically some irritation of her mucosa this was controlled with Surgicel she will follow-up and see us as needed.

## 2024-02-22 NOTE — PHYSICAL EXAM
[Well Developed] : well developed [No Carotid Bruit] : no carotid bruit [Well Nourished] : well nourished [Normal S1, S2] : normal S1, S2 [No Rub] : no rub [No Gallop] : no gallop [Normal Gait] : normal gait [Edema ___] : edema [unfilled] [Normal] : alert and oriented, normal memory [de-identified] : Pale conjunctiva [de-identified] : CORIE across the aorta

## 2024-02-22 NOTE — PLAN
[FreeTextEntry1] : cont meds f/u with ENT today ; s/p nosebleed off blood thinners now, Cardio may restart aspirin soon f/u with Cardiac surgeon.

## 2024-02-22 NOTE — END OF VISIT
[FreeTextEntry3] : I, Dr. Mc personally performed the evaluation and management (E/M) services including all necessary procedures, for this new patient. That E/M includes conducting the clinically appropriate initial history &/or exam, assessing all conditions, and establishing the plan of care. Today, my MAGGIE, Idalia Leach, was here to observe &/or participate in the visit & follow plan of care established by me.

## 2024-02-22 NOTE — ASSESSMENT
[FreeTextEntry1] : She is s/p MVR; a f/u echocardiogram will need to be obtained. She is off anticoagulation for now as the risks outweigh the benefit She will be monitored for afib recurrence by daily PPM transmissions She is on statins for her CAD; no ASA for the time being Cardiac rehab is pending Labs are pending

## 2024-02-22 NOTE — CONSULT LETTER
[Dear  ___] : Dear  [unfilled], [Please see my note below.] : Please see my note below. [Consult Letter:] : I had the pleasure of evaluating your patient, [unfilled]. [Consult Closing:] : Thank you very much for allowing me to participate in the care of this patient.  If you have any questions, please do not hesitate to contact me. [Sincerely,] : Sincerely, [FreeTextEntry3] : Pako Mc MD [facility] Associated Professor, [place]

## 2024-02-22 NOTE — HISTORY OF PRESENT ILLNESS
[de-identified] : Patient is on blood thinners including Eliquis and aspirin for open heart surgery  and had a nosebleed from [facility] nasal cavity. She went to the ER and was packed with a rhino rocket. She has not had any further bleeding since the packing was placed. She is on antibitoics since the pack was placed. She wears a CPAP at night

## 2024-02-22 NOTE — REASON FOR VISIT
[FreeTextEntry1] : The patient is s/p LIMA to LAD (70% mid LAD lesion), MAZE, RICHARD excision (RICHARD clot noted). and MVR (27mm Cancino II). [place]-op ETTA with MVA of 1.5, mean gradient of 5mmHg and an LVEF of 65+%  Post operatively peak E=1.7m/s with a mean gradient of 3.6mmHg  Her surgery was complicated by complete AV block requiring the placement of a dual chamber pacemaker Postoperatively she developed several nose bleeds requiring cessation of anticoagulation and placement of a balloon occluder She developed edema postoperatively and was recently started on torsemide and spirinoicaltone  She recently had Covid She has contacted cardiac rehab Endocarditis prophylaxis was discussed  On exam today she has a flow murmur across the aorta On a quick look echo her MVA by p1/2T is 1.9cm her E velocity is increaseed (2m/s) with a mean gradient of 4.5mmHg; howevere she is anemic and tachycardic (the valve was not well seen)

## 2024-02-29 ENCOUNTER — APPOINTMENT (OUTPATIENT)
Dept: OTOLARYNGOLOGY | Facility: CLINIC | Age: 74
End: 2024-02-29

## 2024-02-29 ENCOUNTER — APPOINTMENT (OUTPATIENT)
Dept: CARDIOTHORACIC SURGERY | Facility: CLINIC | Age: 74
End: 2024-02-29
Payer: MEDICARE

## 2024-02-29 VITALS
TEMPERATURE: 97.8 F | DIASTOLIC BLOOD PRESSURE: 78 MMHG | SYSTOLIC BLOOD PRESSURE: 137 MMHG | OXYGEN SATURATION: 94 % | RESPIRATION RATE: 14 BRPM | HEIGHT: 63 IN | HEART RATE: 60 BPM

## 2024-02-29 LAB
ANION GAP SERPL CALC-SCNC: 13 MMOL/L
BUN SERPL-MCNC: 29 MG/DL
CALCIUM SERPL-MCNC: 10.4 MG/DL
CHLORIDE SERPL-SCNC: 106 MMOL/L
CO2 SERPL-SCNC: 24 MMOL/L
CREAT SERPL-MCNC: 2.28 MG/DL
EGFR: 22 ML/MIN/1.73M2
GLUCOSE SERPL-MCNC: 71 MG/DL
POTASSIUM SERPL-SCNC: 5.1 MMOL/L
SODIUM SERPL-SCNC: 142 MMOL/L

## 2024-02-29 PROCEDURE — 99024 POSTOP FOLLOW-UP VISIT: CPT

## 2024-02-29 NOTE — REASON FOR VISIT
[Family Member] : family member [de-identified] :  MVReplacement ultilizing a 27 Cancino II valve prosthesis and a single vessel bypass to LAD using saphenous vein graft; MAZE (Left and right sided lesion sets) RICHARD clip [de-identified] : 1/23/24

## 2024-02-29 NOTE — CONSULT LETTER
[Dear  ___] : Dear  [unfilled], [Courtesy Letter:] : I had the pleasure of seeing your patient, [unfilled], in my office today. [Please see my note below.] : Please see my note below. [Consult Closing:] : Thank you very much for allowing me to participate in the care of this patient.  If you have any questions, please do not hesitate to contact me. [Sincerely,] : Sincerely, [FreeTextEntry2] : Dr.Ronald Mccarthy [FreeTextEntry3] : Pierre Dalal MD  &   Cardiovascular & Thoracic Surgery [place] [phone]

## 2024-02-29 NOTE — END OF VISIT
[FreeTextEntry3] :  I, GISELLE Gtz , personally performed the evaluation and management (E/M) services for this established  patient. That E/M includes conducting the initial examination, assessing all conditions, and establishing the plan of care. Today, OMID JOHNSON  was here to observe my evaluation and management services for this patient.

## 2024-02-29 NOTE — PHYSICAL EXAM
[Respiration, Rhythm And Depth] : normal respiratory rhythm and effort [] : no respiratory distress [Auscultation Breath Sounds / Voice Sounds] : lungs were clear to auscultation bilaterally [Apical Impulse] : the apical impulse was normal [Murmurs] : no murmurs [Heart Rate And Rhythm] : heart rate was normal and rhythm regular [Heart Sounds] : normal S1 and S2 [Clean] : clean [Dry] : dry [No Edema] : no edema [Healing Well] : healing well Opzelura Counseling:  I discussed with the patient the risks of Opzelura including but not limited to nasopharngitis, bronchitis, ear infection, eosinophila, hives, diarrhea, folliculitis, tonsillitis, and rhinorrhea.  Taken orally, this medication has been linked to serious infections; higher rate of mortality; malignancy and lymphoproliferative disorders; major adverse cardiovascular events; thrombosis; thrombocytopenia, anemia, and neutropenia; and lipid elevations.

## 2024-02-29 NOTE — COUNSELING
[Hygeine (Including Daily Shower)] : hygeine (including daily shower) [No Heavy Lifting] : no heavy lifting (>15-20 lb. for 1 month or 25 lb. for 3 months from date of surgery) [Importance of Regular Medical Follow-Up] : the importance of regular medical follow-up [Weight Management] : weight management [Blood Pressure Control] : blood pressure control [S/S of infection] : signs and symptoms of infection (and to whom it should be reported) [Progressive Ambulation/Activity] : progressive ambulation/activity [Medication/Vitamin/Herb/Food Interaction] : medication/vitamin/herb/food interaction [Stress Management] : stress management [SBE antibiotic prophylaxis] : SBE antibiotic prophylaxis was recommended

## 2024-02-29 NOTE — ASSESSMENT
[FreeTextEntry1] : Ms. JOJO DOOLEY, 74 year old female presents for first post op evaluation. She had been worked up, consented Dr. Baldemar Mccarthy for consideration for hypertrophic cardiomyopathy. History of severe rheumatic heart disease with mitral gradient across the mitral valve and paroxysmal Afib. Referred for surgical treatment.   Additional PmHx: Morbid obesity (BMI 42.7 s/p gastric sleeve ), DMT2, CAD s/p PCI of LCX (), LEIDY (on CPAP), CKD (stage 3 - on farxiga), AF (on Eliquis) and Rheumatic mitral valve disease   Now, s/p MV Replacement utilizing a 27 Cancino II valve prosthesis and a single vessel bypass to LAD - SVG; MAZE (Left and right sided lesion sets) RICHARD clip on 24.   Post op course significant with EP consulted for underlying junctional rhythm; no av nodals at this time; + epicardial pw; maintain ,   afib 60-70 on  2.5; maintain med ct's; diuresis as per renal; endo consulted for diabetic management, keep npo after midnight sat into sun for possible ppm as per EP; no av nodals at this time.  PPM for aflutter w/ chb  - s/p PPM placement.  AV Paced 60-80; d/c ,   s/p ppm site,  creatinine down to 1.7 - torsemide 20mg po qd & aldactone 50 bid resumed Dr. Nieves, renal following pt. 2/ A paced; V.paced; bun/cr 38/2.1; renal following; diuretics decreased to qd; ace wrap LE. 2/3  60's.  -600cc/24hrs.  bun/cr 38/2.15 ( will dw renal ).  CXR stable b/l effusions.  Resume eliquis. 2/6 Right pleural effusion confirmed with bedside sono. Right pigtail placement yielding 400ml serosanguinous fluid. CXR negative for ptx. Continue to hold eliquis. JORDIN worsening  creat 2.84 on torsemide. negative 1270 fluid balance 2/7 AV Paced @ 60; d/c rt pigtail this am; ck f/u chest xray; resume eliquis in am; bun/cr stable 51/1.9; continue to monitor off diuretics; ck echo as per renal; TTE no pericardial effusion, wean O2 as tolerated; ck RA sat; narcs d/c secondary to n/v.  She reports that she has less pedal edema . Her Rhino rocket was removed. Last visit she was started on Torsemide 10 mg / Aldactone 25 mg daily  and increased to Torsemide 20 mg and Aldactone 50 mg daily on 24.   Today on exam patient's lungs clear bilaterally, normal sinus rhythm, sternum stable, incision clean, dry and intact.   Trace peripheral edema noted.    Instructed patient on importance of optimal glycemic control, daily showering, daily weights, any signs of fever (temperature greater than 101F, chills,  incentive spirometer use, and increase ambulation as tolerated. Instructed to call office with any signs or symptoms of infection or weight gain of 2 or more pounds in 1 day or 3 or more pounds in 1 week.    Discussed intake of plant based foods, including vegetables, fruits, and whole grain foods: legumes, nuts and seeds, fish or seafood, lean meats, and non-fat or low-fat diary foods. Plant based oils (non-tropical) in place of solid fats. Instructed patient to limit intake of high fat meats and processed meats, high-fat diary foods, dietary cholesterol and sodium, foods and beverages with added sugars.   Plan: 1) Continue current medication regimen 2) Follow up with cardiologist and PCP  3) May return on as needed basis  4) Ambulate as tolerated  5) SBE antibiotic prophylaxis discussed at length  6) Continue to increase activity and walk daily as tolerated. Continue to use incentive spirometer.  7) Keep legs elevated above heart when resting/sitting/sleeping.  8) Call MD if you experience fever, fatigue, dizziness, confusion, syncope, shortness of breath, chest pain not relieved with analgesics, increased redness/drainage from the surgical  incision site 9) Virtual Cardiac Rehab referral 10) Labs: BMP  11) Restart Aspirin for 1 week, no bleed then resume Eliquis

## 2024-03-13 VITALS — HEIGHT: 63 IN | BODY MASS INDEX: 39.16 KG/M2 | WEIGHT: 221 LBS

## 2024-03-13 RX ORDER — SPIRONOLACTONE 25 MG/1
25 TABLET ORAL
Qty: 30 | Refills: 0 | Status: ACTIVE | COMMUNITY
Start: 2024-02-21 | End: 1900-01-01

## 2024-03-13 RX ORDER — TORSEMIDE 10 MG/1
10 TABLET ORAL
Qty: 30 | Refills: 0 | Status: ACTIVE | COMMUNITY
Start: 2024-02-21 | End: 1900-01-01

## 2024-05-20 ENCOUNTER — RX RENEWAL (OUTPATIENT)
Age: 74
End: 2024-05-20

## 2024-05-20 RX ORDER — METOPROLOL SUCCINATE 50 MG/1
50 TABLET, EXTENDED RELEASE ORAL DAILY
Qty: 90 | Refills: 3 | Status: ACTIVE | COMMUNITY
Start: 1900-01-01 | End: 1900-01-01

## 2024-06-03 ENCOUNTER — APPOINTMENT (OUTPATIENT)
Dept: CARDIOLOGY | Facility: CLINIC | Age: 74
End: 2024-06-03
Payer: MEDICARE

## 2024-06-03 VITALS
SYSTOLIC BLOOD PRESSURE: 111 MMHG | HEART RATE: 60 BPM | DIASTOLIC BLOOD PRESSURE: 67 MMHG | OXYGEN SATURATION: 94 % | TEMPERATURE: 97.8 F | HEIGHT: 63 IN | WEIGHT: 221 LBS | BODY MASS INDEX: 39.16 KG/M2

## 2024-06-03 DIAGNOSIS — E11.9 TYPE 2 DIABETES MELLITUS W/OUT COMPLICATIONS: ICD-10-CM

## 2024-06-03 DIAGNOSIS — Z86.79 OTHER SPECIFIED POSTPROCEDURAL STATES: ICD-10-CM

## 2024-06-03 DIAGNOSIS — I25.10 ATHEROSCLEROTIC HEART DISEASE OF NATIVE CORONARY ARTERY W/OUT ANGINA PECTORIS: ICD-10-CM

## 2024-06-03 DIAGNOSIS — I05.0 RHEUMATIC MITRAL STENOSIS: ICD-10-CM

## 2024-06-03 DIAGNOSIS — I48.19 OTHER PERSISTENT ATRIAL FIBRILLATION: ICD-10-CM

## 2024-06-03 DIAGNOSIS — E78.00 PURE HYPERCHOLESTEROLEMIA, UNSPECIFIED: ICD-10-CM

## 2024-06-03 DIAGNOSIS — Z95.1 PRESENCE OF AORTOCORONARY BYPASS GRAFT: ICD-10-CM

## 2024-06-03 DIAGNOSIS — Z95.2 PRESENCE OF PROSTHETIC HEART VALVE: ICD-10-CM

## 2024-06-03 DIAGNOSIS — N18.9 CHRONIC KIDNEY DISEASE, UNSPECIFIED: ICD-10-CM

## 2024-06-03 DIAGNOSIS — I10 ESSENTIAL (PRIMARY) HYPERTENSION: ICD-10-CM

## 2024-06-03 DIAGNOSIS — Z98.890 OTHER SPECIFIED POSTPROCEDURAL STATES: ICD-10-CM

## 2024-06-03 PROCEDURE — 99214 OFFICE O/P EST MOD 30 MIN: CPT

## 2024-06-03 RX ORDER — SEMAGLUTIDE 1.34 MG/ML
4 INJECTION, SOLUTION SUBCUTANEOUS
Qty: 4 | Refills: 3 | Status: ACTIVE | COMMUNITY
Start: 2024-06-03 | End: 1900-01-01

## 2024-06-03 NOTE — REASON FOR VISIT
[FreeTextEntry1] : The patient is s/p MVR (for MS), CABG +MAZE and appendage resection She is also s/p PPM implant for AV block She still feels short of breath/fatigued after surgery, it does not appear there has been a significant improvement in her symptoms post surgery She has not gone to cardiac rehab due to logistic issues She is following with FELIX wade(remote readings) for her PPM  On exam her conjunctiva are a bit pale There is a harsh CORIE at the LUSB

## 2024-06-03 NOTE — ASSESSMENT
[FreeTextEntry1] : Amiodarone was stopped. If afib doesn't recur (on PPM checks) it might be reasonable to stop Eliquis An echocardiogram (at [facility] ) was ordered Labs were ordered An ECG stress was ordered; cardiac rehab was discussed She has not done well on trulicity and wants to try ozemopic; i have taken the liberty of ordering it

## 2024-06-03 NOTE — PHYSICAL EXAM
[Well Developed] : well developed [Well Nourished] : well nourished [No Acute Distress] : no acute distress [Normal Conjunctiva] : normal conjunctiva [Normal Venous Pressure] : normal venous pressure [No Carotid Bruit] : no carotid bruit [Normal S1, S2] : normal S1, S2 [No Rub] : no rub [No Gallop] : no gallop [Clear Lung Fields] : clear lung fields [Good Air Entry] : good air entry [No Respiratory Distress] : no respiratory distress  [Soft] : abdomen soft [Non Tender] : non-tender [No Masses/organomegaly] : no masses/organomegaly [Normal Bowel Sounds] : normal bowel sounds [Normal Gait] : normal gait [No Edema] : no edema [No Cyanosis] : no cyanosis [No Clubbing] : no clubbing [No Varicosities] : no varicosities [No Rash] : no rash [No Skin Lesions] : no skin lesions [Moves all extremities] : moves all extremities [No Focal Deficits] : no focal deficits [Normal Speech] : normal speech [Alert and Oriented] : alert and oriented [Normal memory] : normal memory [[facility]] [facility]

## 2024-06-05 LAB
HCT VFR BLD CALC: 37.2 %
HGB BLD-MCNC: 12 G/DL
MCHC RBC-ENTMCNC: 31 PG
MCHC RBC-ENTMCNC: 32.3 GM/DL
MCV RBC AUTO: 96.1 FL
PLATELET # BLD AUTO: 134 K/UL
RBC # BLD: 3.87 M/UL
RBC # FLD: 16 %
WBC # FLD AUTO: 7.12 K/UL

## 2024-06-17 ENCOUNTER — NON-APPOINTMENT (OUTPATIENT)
Age: 74
End: 2024-06-17

## 2024-06-27 ENCOUNTER — APPOINTMENT (OUTPATIENT)
Dept: CARDIOLOGY | Facility: CLINIC | Age: 74
End: 2024-06-27
Payer: MEDICARE

## 2024-06-27 VITALS — DIASTOLIC BLOOD PRESSURE: 78 MMHG | SYSTOLIC BLOOD PRESSURE: 148 MMHG

## 2024-06-27 DIAGNOSIS — E66.01 MORBID (SEVERE) OBESITY DUE TO EXCESS CALORIES: ICD-10-CM

## 2024-06-27 PROCEDURE — 93015 CV STRESS TEST SUPVJ I&R: CPT

## 2024-07-01 ENCOUNTER — NON-APPOINTMENT (OUTPATIENT)
Age: 74
End: 2024-07-01

## 2024-07-09 ENCOUNTER — OUTPATIENT (OUTPATIENT)
Dept: OUTPATIENT SERVICES | Facility: HOSPITAL | Age: 74
LOS: 1 days | End: 2024-07-09
Payer: MEDICARE

## 2024-07-09 ENCOUNTER — RESULT REVIEW (OUTPATIENT)
Age: 74
End: 2024-07-09

## 2024-07-09 ENCOUNTER — APPOINTMENT (OUTPATIENT)
Dept: CV DIAGNOSITCS | Facility: HOSPITAL | Age: 74
End: 2024-07-09

## 2024-07-09 DIAGNOSIS — I05.0 RHEUMATIC MITRAL STENOSIS: ICD-10-CM

## 2024-07-09 DIAGNOSIS — Z98.84 BARIATRIC SURGERY STATUS: Chronic | ICD-10-CM

## 2024-07-09 DIAGNOSIS — Z95.5 PRESENCE OF CORONARY ANGIOPLASTY IMPLANT AND GRAFT: Chronic | ICD-10-CM

## 2024-07-09 DIAGNOSIS — Z90.3 ACQUIRED ABSENCE OF STOMACH [PART OF]: Chronic | ICD-10-CM

## 2024-07-09 DIAGNOSIS — Z98.890 OTHER SPECIFIED POSTPROCEDURAL STATES: Chronic | ICD-10-CM

## 2024-07-09 PROCEDURE — 93306 TTE W/DOPPLER COMPLETE: CPT

## 2024-07-09 PROCEDURE — 93306 TTE W/DOPPLER COMPLETE: CPT | Mod: 26

## 2024-07-26 ENCOUNTER — NON-APPOINTMENT (OUTPATIENT)
Age: 74
End: 2024-07-26

## 2024-07-26 ENCOUNTER — APPOINTMENT (OUTPATIENT)
Dept: ELECTROPHYSIOLOGY | Facility: CLINIC | Age: 74
End: 2024-07-26
Payer: MEDICARE

## 2024-07-26 VITALS — OXYGEN SATURATION: 97 % | SYSTOLIC BLOOD PRESSURE: 131 MMHG | HEART RATE: 80 BPM | DIASTOLIC BLOOD PRESSURE: 77 MMHG

## 2024-07-26 PROCEDURE — 93000 ELECTROCARDIOGRAM COMPLETE: CPT | Mod: XU

## 2024-07-26 PROCEDURE — 93280 PM DEVICE PROGR EVAL DUAL: CPT

## 2024-09-05 ENCOUNTER — APPOINTMENT (OUTPATIENT)
Dept: CARDIOLOGY | Facility: CLINIC | Age: 74
End: 2024-09-05
Payer: MEDICARE

## 2024-09-05 VITALS
SYSTOLIC BLOOD PRESSURE: 113 MMHG | BODY MASS INDEX: 41.99 KG/M2 | DIASTOLIC BLOOD PRESSURE: 68 MMHG | OXYGEN SATURATION: 93 % | RESPIRATION RATE: 14 BRPM | HEART RATE: 61 BPM | WEIGHT: 237 LBS | HEIGHT: 63 IN

## 2024-09-05 DIAGNOSIS — N18.9 CHRONIC KIDNEY DISEASE, UNSPECIFIED: ICD-10-CM

## 2024-09-05 DIAGNOSIS — Z95.1 PRESENCE OF AORTOCORONARY BYPASS GRAFT: ICD-10-CM

## 2024-09-05 DIAGNOSIS — I48.19 OTHER PERSISTENT ATRIAL FIBRILLATION: ICD-10-CM

## 2024-09-05 DIAGNOSIS — E11.9 TYPE 2 DIABETES MELLITUS W/OUT COMPLICATIONS: ICD-10-CM

## 2024-09-05 DIAGNOSIS — I25.10 ATHEROSCLEROTIC HEART DISEASE OF NATIVE CORONARY ARTERY W/OUT ANGINA PECTORIS: ICD-10-CM

## 2024-09-05 DIAGNOSIS — I10 ESSENTIAL (PRIMARY) HYPERTENSION: ICD-10-CM

## 2024-09-05 DIAGNOSIS — E78.00 PURE HYPERCHOLESTEROLEMIA, UNSPECIFIED: ICD-10-CM

## 2024-09-05 DIAGNOSIS — I05.0 RHEUMATIC MITRAL STENOSIS: ICD-10-CM

## 2024-09-05 DIAGNOSIS — Z95.2 PRESENCE OF PROSTHETIC HEART VALVE: ICD-10-CM

## 2024-09-05 PROCEDURE — 99214 OFFICE O/P EST MOD 30 MIN: CPT

## 2024-09-09 NOTE — ASSESSMENT
[FreeTextEntry1] : Endocarditis prophylaxis was again reviewed She will stop Eliquis; afib will be monitored for by EP via remote interrogations Farxiga was started If possible, it might be helpful to reduce the torsemide dose

## 2024-09-09 NOTE — REASON FOR VISIT
[FreeTextEntry1] : The patient c/o shortness of breath with exertion; essentially unchanged    The patient has a 10+ year history of shortness of breath with exertion As part of her w/u she was diagnosed with CAD and [place] [facility] [place] of [place]). In 2019 she was sent for a R+L heart cath where she was found to have a patent stent Her CI was 2.3l/m/m2 and her PCW was A=9 V=6 Mean=4 She has had multiple stress imaging exams (SPECt and PET) where she was found to have a smallish LV cavity with an EF of 70+% and no active ischemia (of note she visually has hypertrophy of the septum noted on SPECT) She has had several echocardiograms revealing normal to hyperdynamic LV function. Mitral calcification with a low gradient (peak E around 1m/s and a short deceleration time) She was empirically treated for possible diastolic dysfunction despite normal BNP levels and normal pressures at cath both in 2012 and 2019 w/o any significant improvement in symptoms She is being treated for LEIDY Most recently she developed a-fib and was sent for an EP consult. On her echocardiogram prior to her EP visit she was noted to have a hypercontractile LV along with her known septal hypertrophy and an intracavitary gradient was found. She was seen by Dr. Mccarthy where an echocardiogram clearly demonstrated MS and a mean gradient around 5.5-6.5mmHg at a HR of around 80bpm. She was eventually sent for surgery and  is s/p MVR (for MS), CABG +MAZE and appendage resection She is also s/p PPM implant for AV block (of note there have been PAF episodes on PPM interrogation; last in Feb)  At her last visit she was sent for an echo due to a harsh murmur: She was found to have a hypercontractile LV with a 27mmhG gradient along with mitral struts protruding into the LVOT (no gradient was reported on echoes performed in Jan and Feb 2024) Of note; as per the patient, EP feels it is OK to stop Eliquis The patient is also following with renal and is on torsemide and spironolactone

## 2024-09-09 NOTE — PHYSICAL EXAM
[Well Developed] : well developed [Well Nourished] : well nourished [No Acute Distress] : no acute distress [Normal Conjunctiva] : normal conjunctiva [Normal Venous Pressure] : normal venous pressure [No Carotid Bruit] : no carotid bruit [Normal S1, S2] : normal S1, S2 [No Rub] : no rub [No Gallop] : no gallop [Clear Lung Fields] : clear lung fields [Good Air Entry] : good air entry [No Respiratory Distress] : no respiratory distress  [Soft] : abdomen soft [Non Tender] : non-tender [No Masses/organomegaly] : no masses/organomegaly [Normal Bowel Sounds] : normal bowel sounds [Normal Gait] : normal gait [No Edema] : no edema [No Cyanosis] : no cyanosis [No Clubbing] : no clubbing [No Varicosities] : no varicosities [No Rash] : no rash [No Skin Lesions] : no skin lesions [Moves all extremities] : moves all extremities [No Focal Deficits] : no focal deficits [Normal Speech] : normal speech [Alert and Oriented] : alert and oriented [Normal memory] : normal memory [de-identified] : CORIE

## 2024-09-23 RX ORDER — DAPAGLIFLOZIN 10 MG/1
10 TABLET, FILM COATED ORAL DAILY
Qty: 90 | Refills: 3 | Status: ACTIVE | COMMUNITY
Start: 2024-09-23 | End: 1900-01-01

## 2024-10-17 ENCOUNTER — NON-APPOINTMENT (OUTPATIENT)
Age: 74
End: 2024-10-17

## 2024-10-17 ENCOUNTER — APPOINTMENT (OUTPATIENT)
Dept: INTERNAL MEDICINE | Facility: CLINIC | Age: 74
End: 2024-10-17
Payer: MEDICARE

## 2024-10-17 VITALS
HEART RATE: 59 BPM | SYSTOLIC BLOOD PRESSURE: 103 MMHG | OXYGEN SATURATION: 95 % | DIASTOLIC BLOOD PRESSURE: 70 MMHG | TEMPERATURE: 98 F | HEIGHT: 63 IN | BODY MASS INDEX: 41.99 KG/M2 | WEIGHT: 237 LBS

## 2024-10-17 DIAGNOSIS — E11.9 TYPE 2 DIABETES MELLITUS W/OUT COMPLICATIONS: ICD-10-CM

## 2024-10-17 DIAGNOSIS — E78.00 PURE HYPERCHOLESTEROLEMIA, UNSPECIFIED: ICD-10-CM

## 2024-10-17 DIAGNOSIS — Z23 ENCOUNTER FOR IMMUNIZATION: ICD-10-CM

## 2024-10-17 DIAGNOSIS — I10 ESSENTIAL (PRIMARY) HYPERTENSION: ICD-10-CM

## 2024-10-17 PROCEDURE — G2211 COMPLEX E/M VISIT ADD ON: CPT

## 2024-10-17 PROCEDURE — 99213 OFFICE O/P EST LOW 20 MIN: CPT

## 2024-10-17 PROCEDURE — G0008: CPT

## 2024-10-17 PROCEDURE — 90662 IIV NO PRSV INCREASED AG IM: CPT

## 2024-10-17 RX ORDER — DAPAGLIFLOZIN 5 MG/1
5 TABLET, FILM COATED ORAL
Refills: 0 | Status: ACTIVE | COMMUNITY

## 2024-10-17 RX ORDER — ASPIRIN 81 MG
81 TABLET, DELAYED RELEASE (ENTERIC COATED) ORAL
Refills: 0 | Status: ACTIVE | COMMUNITY

## 2024-10-17 RX ORDER — LOSARTAN POTASSIUM 100 MG/1
100 TABLET, FILM COATED ORAL
Refills: 0 | Status: ACTIVE | COMMUNITY

## 2024-10-17 RX ORDER — SEMAGLUTIDE 0.68 MG/ML
2 INJECTION, SOLUTION SUBCUTANEOUS
Refills: 0 | Status: ACTIVE | COMMUNITY

## 2024-10-17 RX ORDER — ROSUVASTATIN CALCIUM 40 MG/1
40 TABLET, FILM COATED ORAL
Refills: 0 | Status: ACTIVE | COMMUNITY

## 2024-10-24 ENCOUNTER — NON-APPOINTMENT (OUTPATIENT)
Age: 74
End: 2024-10-24

## 2024-10-25 ENCOUNTER — NON-APPOINTMENT (OUTPATIENT)
Age: 74
End: 2024-10-25

## 2024-10-25 ENCOUNTER — APPOINTMENT (OUTPATIENT)
Dept: ELECTROPHYSIOLOGY | Facility: CLINIC | Age: 74
End: 2024-10-25
Payer: MEDICARE

## 2024-10-25 PROCEDURE — 93294 REM INTERROG EVL PM/LDLS PM: CPT

## 2024-10-25 PROCEDURE — 93296 REM INTERROG EVL PM/IDS: CPT

## 2024-11-16 ENCOUNTER — RX RENEWAL (OUTPATIENT)
Age: 74
End: 2024-11-16

## 2024-12-09 ENCOUNTER — APPOINTMENT (OUTPATIENT)
Dept: CARDIOLOGY | Facility: CLINIC | Age: 74
End: 2024-12-09
Payer: MEDICARE

## 2024-12-09 VITALS
WEIGHT: 239 LBS | HEART RATE: 79 BPM | BODY MASS INDEX: 42.35 KG/M2 | SYSTOLIC BLOOD PRESSURE: 114 MMHG | DIASTOLIC BLOOD PRESSURE: 52 MMHG | HEIGHT: 63 IN | OXYGEN SATURATION: 96 %

## 2024-12-09 DIAGNOSIS — Z86.79 OTHER SPECIFIED POSTPROCEDURAL STATES: ICD-10-CM

## 2024-12-09 DIAGNOSIS — N18.9 CHRONIC KIDNEY DISEASE, UNSPECIFIED: ICD-10-CM

## 2024-12-09 DIAGNOSIS — I10 ESSENTIAL (PRIMARY) HYPERTENSION: ICD-10-CM

## 2024-12-09 DIAGNOSIS — Z95.1 PRESENCE OF AORTOCORONARY BYPASS GRAFT: ICD-10-CM

## 2024-12-09 DIAGNOSIS — Z95.2 PRESENCE OF PROSTHETIC HEART VALVE: ICD-10-CM

## 2024-12-09 DIAGNOSIS — I05.0 RHEUMATIC MITRAL STENOSIS: ICD-10-CM

## 2024-12-09 DIAGNOSIS — G47.33 OBSTRUCTIVE SLEEP APNEA (ADULT) (PEDIATRIC): ICD-10-CM

## 2024-12-09 DIAGNOSIS — E66.01 MORBID (SEVERE) OBESITY DUE TO EXCESS CALORIES: ICD-10-CM

## 2024-12-09 DIAGNOSIS — E11.9 TYPE 2 DIABETES MELLITUS W/OUT COMPLICATIONS: ICD-10-CM

## 2024-12-09 DIAGNOSIS — E78.5 HYPERLIPIDEMIA, UNSPECIFIED: ICD-10-CM

## 2024-12-09 DIAGNOSIS — N39.0 URINARY TRACT INFECTION, SITE NOT SPECIFIED: ICD-10-CM

## 2024-12-09 DIAGNOSIS — Z98.890 OTHER SPECIFIED POSTPROCEDURAL STATES: ICD-10-CM

## 2024-12-09 PROCEDURE — 99214 OFFICE O/P EST MOD 30 MIN: CPT

## 2024-12-11 PROBLEM — N39.0 UTI (URINARY TRACT INFECTION): Status: ACTIVE | Noted: 2024-12-11 | Resolved: 2025-01-10

## 2024-12-11 RX ORDER — SULFAMETHOXAZOLE AND TRIMETHOPRIM 800; 160 MG/1; MG/1
800-160 TABLET ORAL TWICE DAILY
Qty: 10 | Refills: 0 | Status: ACTIVE | COMMUNITY
Start: 2024-12-11 | End: 1900-01-01

## 2025-01-27 ENCOUNTER — APPOINTMENT (OUTPATIENT)
Dept: ELECTROPHYSIOLOGY | Facility: CLINIC | Age: 75
End: 2025-01-27
Payer: MEDICARE

## 2025-01-27 PROCEDURE — 93296 REM INTERROG EVL PM/IDS: CPT

## 2025-01-27 PROCEDURE — 93294 REM INTERROG EVL PM/LDLS PM: CPT

## 2025-01-28 ENCOUNTER — NON-APPOINTMENT (OUTPATIENT)
Age: 75
End: 2025-01-28

## 2025-02-18 ENCOUNTER — RX RENEWAL (OUTPATIENT)
Age: 75
End: 2025-02-18

## 2025-04-25 ENCOUNTER — APPOINTMENT (OUTPATIENT)
Dept: ELECTROPHYSIOLOGY | Facility: CLINIC | Age: 75
End: 2025-04-25

## 2025-05-19 ENCOUNTER — NON-APPOINTMENT (OUTPATIENT)
Age: 75
End: 2025-05-19

## 2025-05-20 ENCOUNTER — APPOINTMENT (OUTPATIENT)
Dept: DERMATOLOGY | Facility: CLINIC | Age: 75
End: 2025-05-20
Payer: MEDICARE

## 2025-05-20 VITALS — WEIGHT: 226 LBS | BODY MASS INDEX: 40.04 KG/M2 | HEIGHT: 63 IN

## 2025-05-20 DIAGNOSIS — M79.3 PANNICULITIS, UNSPECIFIED: ICD-10-CM

## 2025-05-20 PROCEDURE — 99203 OFFICE O/P NEW LOW 30 MIN: CPT

## 2025-05-20 RX ORDER — PENTOXIFYLLINE 400 MG/1
400 TABLET, EXTENDED RELEASE ORAL
Qty: 180 | Refills: 4 | Status: ACTIVE | COMMUNITY
Start: 2025-05-20 | End: 1900-01-01

## 2025-05-29 ENCOUNTER — APPOINTMENT (OUTPATIENT)
Dept: CARDIOLOGY | Facility: CLINIC | Age: 75
End: 2025-05-29

## 2025-05-30 ENCOUNTER — APPOINTMENT (OUTPATIENT)
Dept: ELECTROPHYSIOLOGY | Facility: CLINIC | Age: 75
End: 2025-05-30
Payer: MEDICARE

## 2025-05-30 ENCOUNTER — NON-APPOINTMENT (OUTPATIENT)
Age: 75
End: 2025-05-30

## 2025-05-30 PROCEDURE — 93296 REM INTERROG EVL PM/IDS: CPT

## 2025-05-30 PROCEDURE — 93294 REM INTERROG EVL PM/LDLS PM: CPT

## 2025-07-28 ENCOUNTER — APPOINTMENT (OUTPATIENT)
Dept: ELECTROPHYSIOLOGY | Facility: CLINIC | Age: 75
End: 2025-07-28
Payer: MEDICARE

## 2025-07-28 ENCOUNTER — NON-APPOINTMENT (OUTPATIENT)
Age: 75
End: 2025-07-28

## 2025-07-28 VITALS
HEIGHT: 63 IN | SYSTOLIC BLOOD PRESSURE: 106 MMHG | OXYGEN SATURATION: 95 % | DIASTOLIC BLOOD PRESSURE: 63 MMHG | HEART RATE: 75 BPM

## 2025-07-28 DIAGNOSIS — I44.2 ATRIOVENTRICULAR BLOCK, COMPLETE: ICD-10-CM

## 2025-07-28 PROCEDURE — 93000 ELECTROCARDIOGRAM COMPLETE: CPT | Mod: XU

## 2025-07-28 PROCEDURE — 93280 PM DEVICE PROGR EVAL DUAL: CPT

## 2025-07-28 PROCEDURE — 99213 OFFICE O/P EST LOW 20 MIN: CPT

## 2025-07-28 RX ORDER — ELECTROLYTES/DEXTROSE
SOLUTION, ORAL ORAL DAILY
Refills: 0 | Status: ACTIVE | COMMUNITY
Start: 2025-07-28

## 2025-07-28 RX ORDER — BIOTIN 5000 MCG
5000 TABLET,DISINTEGRATING ORAL
Refills: 0 | Status: ACTIVE | COMMUNITY
Start: 2025-07-28

## 2025-08-07 ENCOUNTER — APPOINTMENT (OUTPATIENT)
Dept: CARDIOLOGY | Facility: CLINIC | Age: 75
End: 2025-08-07

## 2025-08-07 RX ORDER — AMOXICILLIN 500 MG/1
500 CAPSULE ORAL
Qty: 4 | Refills: 0 | Status: ACTIVE | COMMUNITY
Start: 2025-08-07 | End: 1900-01-01

## 2025-08-08 ENCOUNTER — APPOINTMENT (OUTPATIENT)
Dept: CARDIOLOGY | Facility: CLINIC | Age: 75
End: 2025-08-08

## 2025-08-08 PROCEDURE — 93306 TTE W/DOPPLER COMPLETE: CPT

## 2025-08-11 LAB — NT-PROBNP SERPL-MCNC: 400 PG/ML

## 2025-08-18 RX ORDER — BLOOD-GLUCOSE METER
W/DEVICE EACH MISCELLANEOUS
Qty: 1 | Refills: 1 | Status: ACTIVE | COMMUNITY
Start: 2025-08-18 | End: 1900-01-01

## 2025-08-19 RX ORDER — BLOOD SUGAR DIAGNOSTIC
STRIP MISCELLANEOUS TWICE DAILY
Qty: 1 | Refills: 3 | Status: ACTIVE | COMMUNITY
Start: 2025-08-18 | End: 1900-01-01

## 2025-09-09 ENCOUNTER — APPOINTMENT (OUTPATIENT)
Dept: DERMATOLOGY | Facility: CLINIC | Age: 75
End: 2025-09-09
Payer: MEDICARE

## 2025-09-09 DIAGNOSIS — M79.3 PANNICULITIS, UNSPECIFIED: ICD-10-CM

## 2025-09-09 DIAGNOSIS — R20.2 PARESTHESIA OF SKIN: ICD-10-CM

## 2025-09-09 PROCEDURE — 99214 OFFICE O/P EST MOD 30 MIN: CPT

## (undated) DEVICE — VESSEL LOOP ASPEN SUPERMAXI BLUE

## (undated) DEVICE — SUT POLYSORB 2 30" GS-26

## (undated) DEVICE — TOURNIQUET SET 12FR (1 RED, 1 BLUE, 1 SNARE) 7"

## (undated) DEVICE — SOL IRR POUR NS 0.9% 500ML

## (undated) DEVICE — CHEST DRAIN PLEUR-EVAC WET/WET ADULT-PEDS SINGLE (QUICK)

## (undated) DEVICE — GLV 7.5 PROTEXIS (WHITE)

## (undated) DEVICE — CONNECTOR STRAIGHT 3/8 X 1/2"

## (undated) DEVICE — URETERAL CATH RED RUBBER 18FR (RED)

## (undated) DEVICE — TUBING SUCTION 20FT

## (undated) DEVICE — SUT ETHIBOND 2-0 4-30" RB-1 WHITE

## (undated) DEVICE — SUT PROLENE 3-0 36" SH

## (undated) DEVICE — SOL IRR POUR H2O 250ML

## (undated) DEVICE — BEAVER BLADE MINI SHARP ALL ROUND (BLUE)

## (undated) DEVICE — SOL INJ NS 0.9% 1000ML

## (undated) DEVICE — DRAPE LIGHT HANDLE COVER (BLUE)

## (undated) DEVICE — DRAIN RESERVOIR FOR JACKSON PRATT 100CC CARDINAL

## (undated) DEVICE — DEVICE CLAMP ENCOMPASS SYNERGY ISOLATOR

## (undated) DEVICE — BULLDOG SPRING CLIP 6MM SOFT/SOFT

## (undated) DEVICE — ELCTR BOVIE TIP BLADE VALLEYLAB 6.5"

## (undated) DEVICE — SUT VICRYL 2-0 27" CT-1 UNDYED

## (undated) DEVICE — PACK UNIVERSAL CARDIAC

## (undated) DEVICE — SYR ASEPTO

## (undated) DEVICE — SUT SURGICAL STEEL 6 30" BP-1

## (undated) DEVICE — SUT SOFSILK 0 18" TIES

## (undated) DEVICE — SYR LUER LOK 20CC

## (undated) DEVICE — SOL NORMOSOL-R PH7.4 1000ML

## (undated) DEVICE — LAP PAD 18 X 18"

## (undated) DEVICE — SUT TICRON 2-0 36" CV-316 DA

## (undated) DEVICE — DRSG KLING 6"

## (undated) DEVICE — ELCTR BOVIE PENCIL HANDPIECE ROCKER SWITCH 15FT

## (undated) DEVICE — DRAIN CHANNEL 19FR ROUND FULL FLUTED

## (undated) DEVICE — GLV 8 PROTEXIS ORTHO (CREAM)

## (undated) DEVICE — DRSG STERISTRIPS 0.5 X 4"

## (undated) DEVICE — DRAIN CHANNEL 32FR ROUND HUBLESS FULL FLUTED

## (undated) DEVICE — SUT POLYSORB 2-0 30" GS-21 UNDYED

## (undated) DEVICE — SUT ETHIBOND 2-0 4-30" RB-1 GREEN

## (undated) DEVICE — SUT TICRON 5 30" KV-40

## (undated) DEVICE — DRSG MAMMARY SUPPORT XL SIZE 5

## (undated) DEVICE — SYR LUER LOK 1CC

## (undated) DEVICE — POSITIONER FOAM EGG CRATE ULNAR 2PCS (PINK)

## (undated) DEVICE — SUT DOUBLE 6 WIRE STERNAL

## (undated) DEVICE — PHRENIC NERVE PAD MEDIUM

## (undated) DEVICE — CATH IV SAFE BC 20G X 1.16" (PINK)

## (undated) DEVICE — DRSG OPSITE 2.5 X 2"

## (undated) DEVICE — ELCTR BOVIE TIP BLADE MEGADYNE E-Z CLEAN 6.5" (LONG)

## (undated) DEVICE — SOL IRR BAG NS 0.9% 3000ML

## (undated) DEVICE — DRAPE MAYO STAND 30"

## (undated) DEVICE — STOPCOCK 4-WAY W SWIVEL MALE LUER LOCK NON VENTED RED CAP

## (undated) DEVICE — GLV 8.5 PROTEXIS (WHITE)

## (undated) DEVICE — GLV 6.5 PROTEXIS (WHITE)

## (undated) DEVICE — FILTER REINFUSION FOR SALVAGED BLOOD DISP

## (undated) DEVICE — SUT PROLENE 6-0 30" C-1

## (undated) DEVICE — DRAPE IOBAN 23" X 23"

## (undated) DEVICE — SAW BLADE MICROAIRE STERNUM 1X34X9.4MM

## (undated) DEVICE — PACK CUSTOM W/INSPIRE OXYGENATOR

## (undated) DEVICE — AORTIC PUNCH 5MM STANDARD HANDLE

## (undated) DEVICE — SUT GORETEX CV-4 (3-0) 36" TH-18

## (undated) DEVICE — BLADE SCALPEL SAFETYLOCK #11

## (undated) DEVICE — SUT PROLENE 7-0 24" BV175-6

## (undated) DEVICE — RIGID ADULT SUCKER

## (undated) DEVICE — STRYKER INTERPULSE HANDPIECE W IRR SUCTION TUBE

## (undated) DEVICE — PACING CABLE (BROWN) A/V TEMP SCREW DOWN 12FT

## (undated) DEVICE — Device

## (undated) DEVICE — VESSEL LOOP EXTRA MAXI-BLUE 0.200" X 22"

## (undated) DEVICE — FEEDING TUBE NG SUMP 16FR 48"

## (undated) DEVICE — SUCTION YANKAUER NO CONTROL VENT

## (undated) DEVICE — BLADE SCALPEL SAFETYLOCK #10

## (undated) DEVICE — SUT PROLENE 5-0 30" RB-2

## (undated) DEVICE — SUT POLYSORB 0 36" GS-25 UNDYED

## (undated) DEVICE — TUBING KIT FAST START ATF 40

## (undated) DEVICE — DRAPE SLUSH / WARMER 44 X 66"

## (undated) DEVICE — SUT ETHIBOND EXCEL 2-0 36" V-7 4 GREEN 4 WHITE

## (undated) DEVICE — DRSG XEROFORM 1 X 8"

## (undated) DEVICE — POSITIONER CARDIAC BUMP

## (undated) DEVICE — SUT STAINLESS STEEL 5 18" SCC

## (undated) DEVICE — SUT SOFSILK 0 30" V-20

## (undated) DEVICE — CHEST DRAIN OASIS DRY SUCTION WATER SEAL

## (undated) DEVICE — WARMING BLANKET DUO-THERM HYPER/HYPOTHERM ADULT

## (undated) DEVICE — SUT PROLENE 4-0 54" SH-1

## (undated) DEVICE — MULTIPLE PERFUSION SET FEMALE 1 INLET LEG W 4 LEGS 15" (BLUE/RED)

## (undated) DEVICE — GOWN TRIMAX XXL

## (undated) DEVICE — SUT PROLENE 4-0 36" RB-1

## (undated) DEVICE — SUT PROLENE 4-0 36" BB

## (undated) DEVICE — DRSG PREVENA PLUS SYSTEM

## (undated) DEVICE — DRAPE 1/2 SHEET 40X57"

## (undated) DEVICE — DRSG DERMABOND PRINEO 60CM

## (undated) DEVICE — TUBING ALARIS PUMP MODULE NON-DEHP

## (undated) DEVICE — CONNECTOR "Y" 1/2 X 3/8 X 3/8"

## (undated) DEVICE — SUT PROLENE 4-0 36" SH

## (undated) DEVICE — TUBING INSUFFLATION LAP FILTER 10FT

## (undated) DEVICE — NDL COUNTER FOAM AND MAGNET 40-70

## (undated) DEVICE — SUT ETHIBOND EXCEL 3-0 30" V-5 PLDGT 5 GREEN 5 WHITE

## (undated) DEVICE — GOWN TRIMAX LG

## (undated) DEVICE — DRSG OPSITE 13.75 X 4"

## (undated) DEVICE — CONNECTOR "Y" 1/2 X 1/2 X 3/8"

## (undated) DEVICE — DRSG TEGADERM 6"X8"

## (undated) DEVICE — SUMP PERICARDIAL 20FR 1/4" ADULT

## (undated) DEVICE — SUT BIOSYN 4-0 18" P-12

## (undated) DEVICE — GOWN XXXL

## (undated) DEVICE — DRAPE TOWEL BLUE 17" X 24"

## (undated) DEVICE — ELCTR REM POLYHESIVE ADULT PT RETURN 15FT

## (undated) DEVICE — SUT PROLENE 6-0 30" BV-1

## (undated) DEVICE — VENTING ADAPTER "Y" (RED/BLUE) 7.5"

## (undated) DEVICE — URETERAL CATH RED RUBBER 20FR (YELLOW)

## (undated) DEVICE — SYR LUER LOK 50CC

## (undated) DEVICE — SUT SOFSILK 2 60" TIES

## (undated) DEVICE — MEDTRONIC CLEARVIEW BLOWER MISTER KIT W TUBING SET

## (undated) DEVICE — VENODYNE/SCD SLEEVE CALF MEDIUM

## (undated) DEVICE — TUBING ATS SUCTION LINE

## (undated) DEVICE — TUBING TUR 2 PRONG

## (undated) DEVICE — STAPLER SKIN VISI-STAT 35 WIDE

## (undated) DEVICE — SAW BLADE MICROAIRE STERNUM 1.1X50X42MM

## (undated) DEVICE — SENSOR MYOCARDIAL TEMP 15MM

## (undated) DEVICE — SUT SOFSILK 4-0 18" TIES

## (undated) DEVICE — PREP CHLORAPREP HI-LITE ORANGE 26ML

## (undated) DEVICE — GLV 8 PROTEXIS (WHITE)

## (undated) DEVICE — SUMP INTRACARDIAC 20FR 1/4" ADULT

## (undated) DEVICE — SUT ETHIBOND 2-0 36" SH

## (undated) DEVICE — GLV 7.5 DERMAPRENE ULTRA

## (undated) DEVICE — VISITEC 4X4

## (undated) DEVICE — SUT BOOT STANDARD (ASSORTED) 5 PAIR

## (undated) DEVICE — SYR LUER LOK 30CC

## (undated) DEVICE — VASOVIEW HEMOPRO 2

## (undated) DEVICE — MARKING PEN W RULER

## (undated) DEVICE — FOLEY TRAY 16FR 5CC LF LUBRISIL ADVANCE TEMP CLOSED

## (undated) DEVICE — URETERAL CATH RED RUBBER 8FR

## (undated) DEVICE — PACK UNIVERSAL CARDIAC SUPPLEMNTAL B

## (undated) DEVICE — SUT PLEDGET PRE PUNCH 4.8 X 9.5 X 1.5 MM

## (undated) DEVICE — GLV 7 PROTEXIS (WHITE)

## (undated) DEVICE — SUT QUILL MONODERM 2-0 30CM 18MM

## (undated) DEVICE — TUBING IV SET MICROCLAVE ADAPTER

## (undated) DEVICE — SUT PROLENE 5-0 36" RB-1

## (undated) DEVICE — BLADE SCALPEL SAFETYLOCK #15